# Patient Record
Sex: MALE | Race: WHITE | Employment: FULL TIME | URBAN - NONMETROPOLITAN AREA
[De-identification: names, ages, dates, MRNs, and addresses within clinical notes are randomized per-mention and may not be internally consistent; named-entity substitution may affect disease eponyms.]

---

## 2018-12-15 ENCOUNTER — OFFICE VISIT (OUTPATIENT)
Dept: URGENT CARE | Age: 48
End: 2018-12-15
Payer: COMMERCIAL

## 2018-12-15 VITALS
HEART RATE: 89 BPM | SYSTOLIC BLOOD PRESSURE: 132 MMHG | WEIGHT: 235 LBS | DIASTOLIC BLOOD PRESSURE: 84 MMHG | RESPIRATION RATE: 18 BRPM | TEMPERATURE: 98.6 F | OXYGEN SATURATION: 98 %

## 2018-12-15 DIAGNOSIS — J40 BRONCHITIS: Primary | ICD-10-CM

## 2018-12-15 DIAGNOSIS — R05.9 COUGH: ICD-10-CM

## 2018-12-15 PROCEDURE — 99213 OFFICE O/P EST LOW 20 MIN: CPT | Performed by: NURSE PRACTITIONER

## 2018-12-15 RX ORDER — AZITHROMYCIN 250 MG/1
250 TABLET, FILM COATED ORAL SEE ADMIN INSTRUCTIONS
Qty: 6 TABLET | Refills: 0 | Status: SHIPPED | OUTPATIENT
Start: 2018-12-15 | End: 2018-12-20

## 2018-12-15 RX ORDER — IRBESARTAN 300 MG/1
300 TABLET ORAL DAILY
COMMUNITY
End: 2019-08-07

## 2018-12-15 RX ORDER — INSULIN GLARGINE 100 [IU]/ML
100 INJECTION, SOLUTION SUBCUTANEOUS
COMMUNITY
End: 2021-01-01

## 2018-12-15 RX ORDER — BENZONATATE 200 MG/1
200 CAPSULE ORAL 3 TIMES DAILY PRN
Qty: 30 CAPSULE | Refills: 0 | Status: SHIPPED | OUTPATIENT
Start: 2018-12-15 | End: 2019-03-21 | Stop reason: ALTCHOICE

## 2018-12-15 ASSESSMENT — ENCOUNTER SYMPTOMS
SORE THROAT: 0
EYE REDNESS: 0
ABDOMINAL PAIN: 0
DIARRHEA: 0
SHORTNESS OF BREATH: 0
PHOTOPHOBIA: 0
WHEEZING: 0
EYE DISCHARGE: 0
NAUSEA: 0
EYE PAIN: 0
BLOOD IN STOOL: 0
CONSTIPATION: 0
COUGH: 1
VOMITING: 0

## 2019-03-13 ENCOUNTER — TRANSCRIBE ORDERS (OUTPATIENT)
Dept: ADMINISTRATIVE | Facility: HOSPITAL | Age: 49
End: 2019-03-13

## 2019-03-13 DIAGNOSIS — R10.11 ABDOMINAL PAIN, RIGHT UPPER QUADRANT: Primary | ICD-10-CM

## 2019-03-14 ENCOUNTER — APPOINTMENT (OUTPATIENT)
Dept: ONCOLOGY | Facility: HOSPITAL | Age: 49
End: 2019-03-14

## 2019-03-14 ENCOUNTER — APPOINTMENT (OUTPATIENT)
Dept: LAB | Facility: HOSPITAL | Age: 49
End: 2019-03-14

## 2019-03-18 ENCOUNTER — HOSPITAL ENCOUNTER (OUTPATIENT)
Dept: ULTRASOUND IMAGING | Facility: HOSPITAL | Age: 49
Discharge: HOME | End: 2019-03-18
Admitting: FAMILY MEDICINE

## 2019-03-18 ENCOUNTER — HOSPITAL ENCOUNTER (OUTPATIENT)
Dept: NUCLEAR MEDICINE | Facility: HOSPITAL | Age: 49
Discharge: HOME | End: 2019-03-18

## 2019-03-18 DIAGNOSIS — R10.11 ABDOMINAL PAIN, RIGHT UPPER QUADRANT: ICD-10-CM

## 2019-03-18 PROCEDURE — 76705 ECHO EXAM OF ABDOMEN: CPT

## 2019-03-18 PROCEDURE — A9500 TC99M SESTAMIBI: HCPCS | Performed by: FAMILY MEDICINE

## 2019-03-18 PROCEDURE — 0 TECHNETIUM SESTAMIBI: Performed by: FAMILY MEDICINE

## 2019-03-18 PROCEDURE — 78226 HEPATOBILIARY SYSTEM IMAGING: CPT

## 2019-03-18 RX ADMIN — TECHNETIUM TC 99M SESTAMIBI 1 DOSE: 1 INJECTION INTRAVENOUS at 08:46

## 2019-03-21 ENCOUNTER — HOSPITAL ENCOUNTER (OUTPATIENT)
Dept: PREADMISSION TESTING | Age: 49
Discharge: HOME | End: 2019-03-25
Payer: COMMERCIAL

## 2019-03-21 ENCOUNTER — OFFICE VISIT (OUTPATIENT)
Dept: SURGERY | Age: 49
End: 2019-03-21
Payer: COMMERCIAL

## 2019-03-21 VITALS
BODY MASS INDEX: 34.63 KG/M2 | TEMPERATURE: 98.4 F | HEART RATE: 102 BPM | OXYGEN SATURATION: 99 % | HEIGHT: 69 IN | WEIGHT: 233.8 LBS

## 2019-03-21 VITALS — BODY MASS INDEX: 34.51 KG/M2 | HEIGHT: 69 IN | WEIGHT: 233 LBS

## 2019-03-21 DIAGNOSIS — K81.9 ACALCULOUS CHOLECYSTITIS: Primary | ICD-10-CM

## 2019-03-21 LAB
ALBUMIN SERPL-MCNC: 3.6 G/DL (ref 3.5–5.2)
ALP BLD-CCNC: 56 U/L (ref 40–130)
ALT SERPL-CCNC: 21 U/L (ref 5–41)
ANION GAP SERPL CALCULATED.3IONS-SCNC: 13 MMOL/L (ref 7–19)
AST SERPL-CCNC: 19 U/L (ref 5–40)
BASOPHILS ABSOLUTE: 0.1 K/UL (ref 0–0.2)
BASOPHILS RELATIVE PERCENT: 0.4 % (ref 0–1)
BILIRUB SERPL-MCNC: 0.9 MG/DL (ref 0.2–1.2)
BUN BLDV-MCNC: 8 MG/DL (ref 6–20)
CALCIUM SERPL-MCNC: 8.5 MG/DL (ref 8.6–10)
CHLORIDE BLD-SCNC: 100 MMOL/L (ref 98–111)
CO2: 25 MMOL/L (ref 22–29)
CREAT SERPL-MCNC: 1 MG/DL (ref 0.5–1.2)
EOSINOPHILS ABSOLUTE: 0.2 K/UL (ref 0–0.6)
EOSINOPHILS RELATIVE PERCENT: 0.8 % (ref 0–5)
GFR NON-AFRICAN AMERICAN: >60
GLUCOSE BLD-MCNC: 69 MG/DL (ref 74–109)
HCT VFR BLD CALC: 49.1 % (ref 42–52)
HEMOGLOBIN: 15.9 G/DL (ref 14–18)
LYMPHOCYTES ABSOLUTE: 2.4 K/UL (ref 1.1–4.5)
LYMPHOCYTES RELATIVE PERCENT: 13 % (ref 20–40)
MCH RBC QN AUTO: 28.5 PG (ref 27–31)
MCHC RBC AUTO-ENTMCNC: 32.4 G/DL (ref 33–37)
MCV RBC AUTO: 88 FL (ref 80–94)
MONOCYTES ABSOLUTE: 2 K/UL (ref 0–0.9)
MONOCYTES RELATIVE PERCENT: 10.4 % (ref 0–10)
NEUTROPHILS ABSOLUTE: 14.1 K/UL (ref 1.5–7.5)
NEUTROPHILS RELATIVE PERCENT: 74.8 % (ref 50–65)
PDW BLD-RTO: 13.2 % (ref 11.5–14.5)
PLATELET # BLD: 241 K/UL (ref 130–400)
PMV BLD AUTO: 11.9 FL (ref 9.4–12.4)
POTASSIUM SERPL-SCNC: 3.4 MMOL/L (ref 3.5–5)
RBC # BLD: 5.58 M/UL (ref 4.7–6.1)
SODIUM BLD-SCNC: 138 MMOL/L (ref 136–145)
TOTAL PROTEIN: 6.9 G/DL (ref 6.6–8.7)
WBC # BLD: 18.8 K/UL (ref 4.8–10.8)

## 2019-03-21 PROCEDURE — 85025 COMPLETE CBC W/AUTO DIFF WBC: CPT

## 2019-03-21 PROCEDURE — 80053 COMPREHEN METABOLIC PANEL: CPT

## 2019-03-21 PROCEDURE — 93005 ELECTROCARDIOGRAM TRACING: CPT

## 2019-03-21 PROCEDURE — 99203 OFFICE O/P NEW LOW 30 MIN: CPT | Performed by: PHYSICIAN ASSISTANT

## 2019-03-21 RX ORDER — ROSUVASTATIN CALCIUM 5 MG/1
5 TABLET, COATED ORAL
COMMUNITY
End: 2021-01-01

## 2019-03-22 LAB
EKG P AXIS: 37 DEGREES
EKG P-R INTERVAL: 124 MS
EKG Q-T INTERVAL: 336 MS
EKG QRS DURATION: 100 MS
EKG QTC CALCULATION (BAZETT): 411 MS
EKG T AXIS: 63 DEGREES

## 2019-03-25 ENCOUNTER — PREP FOR PROCEDURE (OUTPATIENT)
Dept: SURGERY | Age: 49
End: 2019-03-25

## 2019-03-25 RX ORDER — SODIUM CHLORIDE 0.9 % (FLUSH) 0.9 %
10 SYRINGE (ML) INJECTION EVERY 12 HOURS SCHEDULED
Status: CANCELLED | OUTPATIENT
Start: 2019-03-25

## 2019-03-25 RX ORDER — SODIUM CHLORIDE, SODIUM LACTATE, POTASSIUM CHLORIDE, CALCIUM CHLORIDE 600; 310; 30; 20 MG/100ML; MG/100ML; MG/100ML; MG/100ML
INJECTION, SOLUTION INTRAVENOUS CONTINUOUS
Status: CANCELLED | OUTPATIENT
Start: 2019-03-25

## 2019-03-25 RX ORDER — SODIUM CHLORIDE 0.9 % (FLUSH) 0.9 %
10 SYRINGE (ML) INJECTION PRN
Status: CANCELLED | OUTPATIENT
Start: 2019-03-25

## 2019-03-25 ASSESSMENT — ENCOUNTER SYMPTOMS
APNEA: 0
ALLERGIC/IMMUNOLOGIC NEGATIVE: 1
DIARRHEA: 1
ABDOMINAL DISTENTION: 1
VOMITING: 1
NAUSEA: 1
BACK PAIN: 1
SHORTNESS OF BREATH: 0
WHEEZING: 0
EYES NEGATIVE: 1
ABDOMINAL PAIN: 1

## 2019-03-26 NOTE — H&P (VIEW-ONLY)
Subjective:      Patient ID: Doreen Talbert is a 53 y.o. male. ALDO Tolentino is a pleasant 67-year-old  male that was referred by Dr. Anjelica Sapp due to problems with midepigastric abdominal pain. He reports the onset of the pain began about 2-1/2 weeks ago after eating a fatty/greasy meal. He reports of problems with nausea, abdominal bloating, and diarrhea that is associated with the pain. He denies any issues with fever, chills, or jaundice. Due to his symptoms he was sent for an ultrasound the gallbladder that was normal. HIDA scan ejection fraction demonstrated a decreased ejection fraction at 22%. The risks, benefits, and options were discussed with the patient. He is agreeable to accept all risks proceed with surgery for as soon as possible. Allergies: Patient has no known allergies. Current Outpatient Medications   Medication Sig Dispense Refill    irbesartan (AVAPRO) 300 MG tablet Take 300 mg by mouth daily Indications: High Blood Pressure Disorder       Dulaglutide (TRULICITY SC) Inject into the skin once a week       insulin glargine (LANTUS) 100 UNIT/ML injection vial Inject 100 Units into the skin daily       traMADol-acetaminophen (ULTRACET) 37.5-325 MG per tablet Take 1 tablet by mouth every 6 hours as needed.  rosuvastatin (CRESTOR) 5 MG tablet Take 5 mg by mouth daily Indications: Changes in Cholesterol      SITagliptin-metFORMIN HCl ER (JANUMET XR) 100-1000 MG TB24 Take 1 tablet by mouth nightly Indications: Diabetes       No current facility-administered medications for this visit.         Past Surgical History:   Procedure Laterality Date    HERNIA REPAIR      RIH     VASECTOMY         Past Medical History:   Diagnosis Date    Diabetes mellitus ([address]     Gallbladder disease     Hyperlipidemia     Hypertension     Sleep apnea     snores       Family History   Problem Relation Age of Onset    Diabetes Mother     Heart Disease Mother     High Blood Pressure and no mass. There is tenderness. There is no rebound and no guarding. No hernia. Musculoskeletal: Normal range of motion. He exhibits no tenderness. Lymphadenopathy:     He has no cervical adenopathy. Neurological: He is alert and oriented to person, place, and time. He exhibits normal muscle tone. Skin: Skin is warm and dry. Psychiatric: He has a normal mood and affect. His behavior is normal. Judgment and thought content normal.       Assessment:      Acalculus Cholecystitis with EF at 22%      Plan:      Plan: The risks, benefits, and options were discussed with the pt. He is willing to accept all risks and proceed with a Lap. Cholecystectomy. The surgical risks included but not limited to bleeding, infection, perforation, risk of common duct injury, risk of needing an open surgery, etc. The anesthetic risks included heart attacks, strokes, pneumonia, phlebitis, etc.  He is willing to accept all risks and proceed with surgery. No guarantees have been given.       Electronically signed by Deedee Winchester PA-C on 3/25/19 at 9:49 PM

## 2019-03-27 ENCOUNTER — HOSPITAL ENCOUNTER (OUTPATIENT)
Age: 49
Setting detail: OUTPATIENT SURGERY
Discharge: HOME | End: 2019-03-27
Attending: SURGERY | Admitting: SURGERY
Payer: COMMERCIAL

## 2019-03-27 ENCOUNTER — ANESTHESIA (OUTPATIENT)
Dept: OPERATING ROOM | Age: 49
End: 2019-03-27
Payer: COMMERCIAL

## 2019-03-27 ENCOUNTER — ANESTHESIA EVENT (OUTPATIENT)
Dept: OPERATING ROOM | Age: 49
End: 2019-03-27
Payer: COMMERCIAL

## 2019-03-27 VITALS
DIASTOLIC BLOOD PRESSURE: 66 MMHG | BODY MASS INDEX: 34.51 KG/M2 | RESPIRATION RATE: 16 BRPM | OXYGEN SATURATION: 97 % | WEIGHT: 233 LBS | TEMPERATURE: 97.2 F | HEART RATE: 70 BPM | HEIGHT: 69 IN | SYSTOLIC BLOOD PRESSURE: 102 MMHG

## 2019-03-27 VITALS
TEMPERATURE: 98.5 F | RESPIRATION RATE: 15 BRPM | SYSTOLIC BLOOD PRESSURE: 86 MMHG | DIASTOLIC BLOOD PRESSURE: 39 MMHG | OXYGEN SATURATION: 94 %

## 2019-03-27 DIAGNOSIS — K81.9 ACALCULOUS CHOLECYSTITIS: Primary | ICD-10-CM

## 2019-03-27 LAB
GLUCOSE BLD-MCNC: 121 MG/DL (ref 70–99)
PERFORMED ON: ABNORMAL

## 2019-03-27 PROCEDURE — 47562 LAPAROSCOPIC CHOLECYSTECTOMY: CPT | Performed by: SURGERY

## 2019-03-27 PROCEDURE — 6370000000 HC RX 637 (ALT 250 FOR IP): Performed by: ANESTHESIOLOGY

## 2019-03-27 PROCEDURE — 3600000004 HC SURGERY LEVEL 4 BASE: Performed by: SURGERY

## 2019-03-27 PROCEDURE — 82948 REAGENT STRIP/BLOOD GLUCOSE: CPT

## 2019-03-27 PROCEDURE — 6360000002 HC RX W HCPCS: Performed by: PHYSICIAN ASSISTANT

## 2019-03-27 PROCEDURE — 3700000000 HC ANESTHESIA ATTENDED CARE: Performed by: SURGERY

## 2019-03-27 PROCEDURE — 6360000002 HC RX W HCPCS: Performed by: ANESTHESIOLOGY

## 2019-03-27 PROCEDURE — 2500000003 HC RX 250 WO HCPCS: Performed by: NURSE ANESTHETIST, CERTIFIED REGISTERED

## 2019-03-27 PROCEDURE — 2709999900 HC NON-CHARGEABLE SUPPLY: Performed by: SURGERY

## 2019-03-27 PROCEDURE — 7100000000 HC PACU RECOVERY - FIRST 15 MIN: Performed by: SURGERY

## 2019-03-27 PROCEDURE — 3700000001 HC ADD 15 MINUTES (ANESTHESIA): Performed by: SURGERY

## 2019-03-27 PROCEDURE — 2580000003 HC RX 258: Performed by: PHYSICIAN ASSISTANT

## 2019-03-27 PROCEDURE — 6360000002 HC RX W HCPCS: Performed by: NURSE ANESTHETIST, CERTIFIED REGISTERED

## 2019-03-27 PROCEDURE — 3600000014 HC SURGERY LEVEL 4 ADDTL 15MIN: Performed by: SURGERY

## 2019-03-27 PROCEDURE — 7100000010 HC PHASE II RECOVERY - FIRST 15 MIN: Performed by: SURGERY

## 2019-03-27 PROCEDURE — 88304 TISSUE EXAM BY PATHOLOGIST: CPT

## 2019-03-27 PROCEDURE — 7100000011 HC PHASE II RECOVERY - ADDTL 15 MIN: Performed by: SURGERY

## 2019-03-27 PROCEDURE — 7100000001 HC PACU RECOVERY - ADDTL 15 MIN: Performed by: SURGERY

## 2019-03-27 PROCEDURE — 2500000003 HC RX 250 WO HCPCS: Performed by: SURGERY

## 2019-03-27 PROCEDURE — 2720000010 HC SURG SUPPLY STERILE: Performed by: SURGERY

## 2019-03-27 RX ORDER — ROCURONIUM BROMIDE 10 MG/ML
INJECTION, SOLUTION INTRAVENOUS PRN
Status: DISCONTINUED | OUTPATIENT
Start: 2019-03-27 | End: 2019-03-27 | Stop reason: SDUPTHER

## 2019-03-27 RX ORDER — SUCCINYLCHOLINE CHLORIDE 20 MG/ML
INJECTION INTRAMUSCULAR; INTRAVENOUS PRN
Status: DISCONTINUED | OUTPATIENT
Start: 2019-03-27 | End: 2019-03-27 | Stop reason: SDUPTHER

## 2019-03-27 RX ORDER — MIDAZOLAM HYDROCHLORIDE 1 MG/ML
2 INJECTION INTRAMUSCULAR; INTRAVENOUS
Status: DISCONTINUED | OUTPATIENT
Start: 2019-03-27 | End: 2019-03-27 | Stop reason: HOSPADM

## 2019-03-27 RX ORDER — BUPIVACAINE HYDROCHLORIDE AND EPINEPHRINE 2.5; 5 MG/ML; UG/ML
INJECTION, SOLUTION INFILTRATION; PERINEURAL PRN
Status: DISCONTINUED | OUTPATIENT
Start: 2019-03-27 | End: 2019-03-27 | Stop reason: ALTCHOICE

## 2019-03-27 RX ORDER — LIDOCAINE HYDROCHLORIDE 10 MG/ML
1 INJECTION, SOLUTION EPIDURAL; INFILTRATION; INTRACAUDAL; PERINEURAL
Status: DISCONTINUED | OUTPATIENT
Start: 2019-03-27 | End: 2019-03-27 | Stop reason: HOSPADM

## 2019-03-27 RX ORDER — ONDANSETRON 2 MG/ML
4 INJECTION INTRAMUSCULAR; INTRAVENOUS EVERY 6 HOURS PRN
Status: CANCELLED | OUTPATIENT
Start: 2019-03-27

## 2019-03-27 RX ORDER — ONDANSETRON 2 MG/ML
INJECTION INTRAMUSCULAR; INTRAVENOUS PRN
Status: DISCONTINUED | OUTPATIENT
Start: 2019-03-27 | End: 2019-03-27 | Stop reason: SDUPTHER

## 2019-03-27 RX ORDER — SODIUM CHLORIDE 0.9 % (FLUSH) 0.9 %
10 SYRINGE (ML) INJECTION EVERY 12 HOURS SCHEDULED
Status: DISCONTINUED | OUTPATIENT
Start: 2019-03-27 | End: 2019-03-27 | Stop reason: HOSPADM

## 2019-03-27 RX ORDER — SODIUM CHLORIDE 0.9 % (FLUSH) 0.9 %
10 SYRINGE (ML) INJECTION EVERY 12 HOURS SCHEDULED
Status: CANCELLED | OUTPATIENT
Start: 2019-03-27

## 2019-03-27 RX ORDER — LIDOCAINE HYDROCHLORIDE 10 MG/ML
INJECTION, SOLUTION EPIDURAL; INFILTRATION; INTRACAUDAL; PERINEURAL PRN
Status: DISCONTINUED | OUTPATIENT
Start: 2019-03-27 | End: 2019-03-27 | Stop reason: SDUPTHER

## 2019-03-27 RX ORDER — FENTANYL CITRATE 50 UG/ML
25 INJECTION, SOLUTION INTRAMUSCULAR; INTRAVENOUS
Status: DISCONTINUED | OUTPATIENT
Start: 2019-03-27 | End: 2019-03-27 | Stop reason: HOSPADM

## 2019-03-27 RX ORDER — FENTANYL CITRATE 50 UG/ML
50 INJECTION, SOLUTION INTRAMUSCULAR; INTRAVENOUS
Status: DISCONTINUED | OUTPATIENT
Start: 2019-03-27 | End: 2019-03-27 | Stop reason: HOSPADM

## 2019-03-27 RX ORDER — SODIUM CHLORIDE, SODIUM LACTATE, POTASSIUM CHLORIDE, CALCIUM CHLORIDE 600; 310; 30; 20 MG/100ML; MG/100ML; MG/100ML; MG/100ML
INJECTION, SOLUTION INTRAVENOUS CONTINUOUS
Status: DISCONTINUED | OUTPATIENT
Start: 2019-03-27 | End: 2019-03-27 | Stop reason: HOSPADM

## 2019-03-27 RX ORDER — SODIUM CHLORIDE 0.9 % (FLUSH) 0.9 %
10 SYRINGE (ML) INJECTION PRN
Status: DISCONTINUED | OUTPATIENT
Start: 2019-03-27 | End: 2019-03-27 | Stop reason: HOSPADM

## 2019-03-27 RX ORDER — MEPERIDINE HYDROCHLORIDE 50 MG/ML
12.5 INJECTION INTRAMUSCULAR; INTRAVENOUS; SUBCUTANEOUS EVERY 5 MIN PRN
Status: DISCONTINUED | OUTPATIENT
Start: 2019-03-27 | End: 2019-03-27 | Stop reason: HOSPADM

## 2019-03-27 RX ORDER — METOCLOPRAMIDE HYDROCHLORIDE 5 MG/ML
10 INJECTION INTRAMUSCULAR; INTRAVENOUS ONCE
Status: COMPLETED | OUTPATIENT
Start: 2019-03-27 | End: 2019-03-27

## 2019-03-27 RX ORDER — MIDAZOLAM HYDROCHLORIDE 1 MG/ML
INJECTION INTRAMUSCULAR; INTRAVENOUS PRN
Status: DISCONTINUED | OUTPATIENT
Start: 2019-03-27 | End: 2019-03-27 | Stop reason: SDUPTHER

## 2019-03-27 RX ORDER — SODIUM CHLORIDE 0.9 % (FLUSH) 0.9 %
10 SYRINGE (ML) INJECTION PRN
Status: CANCELLED | OUTPATIENT
Start: 2019-03-27

## 2019-03-27 RX ORDER — LABETALOL HYDROCHLORIDE 5 MG/ML
5 INJECTION, SOLUTION INTRAVENOUS EVERY 10 MIN PRN
Status: DISCONTINUED | OUTPATIENT
Start: 2019-03-27 | End: 2019-03-27 | Stop reason: HOSPADM

## 2019-03-27 RX ORDER — ENALAPRILAT 2.5 MG/2ML
1.25 INJECTION INTRAVENOUS
Status: DISCONTINUED | OUTPATIENT
Start: 2019-03-27 | End: 2019-03-27 | Stop reason: HOSPADM

## 2019-03-27 RX ORDER — MORPHINE SULFATE 2 MG/ML
2 INJECTION, SOLUTION INTRAMUSCULAR; INTRAVENOUS EVERY 5 MIN PRN
Status: DISCONTINUED | OUTPATIENT
Start: 2019-03-27 | End: 2019-03-27 | Stop reason: HOSPADM

## 2019-03-27 RX ORDER — SCOLOPAMINE TRANSDERMAL SYSTEM 1 MG/1
1 PATCH, EXTENDED RELEASE TRANSDERMAL
Status: DISCONTINUED | OUTPATIENT
Start: 2019-03-27 | End: 2019-03-27 | Stop reason: HOSPADM

## 2019-03-27 RX ORDER — PROMETHAZINE HYDROCHLORIDE 25 MG/ML
6.25 INJECTION, SOLUTION INTRAMUSCULAR; INTRAVENOUS
Status: DISCONTINUED | OUTPATIENT
Start: 2019-03-27 | End: 2019-03-27 | Stop reason: HOSPADM

## 2019-03-27 RX ORDER — MORPHINE SULFATE 4 MG/ML
2 INJECTION, SOLUTION INTRAMUSCULAR; INTRAVENOUS
Status: CANCELLED | OUTPATIENT
Start: 2019-03-27

## 2019-03-27 RX ORDER — KETOROLAC TROMETHAMINE 30 MG/ML
INJECTION, SOLUTION INTRAMUSCULAR; INTRAVENOUS PRN
Status: DISCONTINUED | OUTPATIENT
Start: 2019-03-27 | End: 2019-03-27 | Stop reason: SDUPTHER

## 2019-03-27 RX ORDER — LABETALOL HYDROCHLORIDE 5 MG/ML
INJECTION, SOLUTION INTRAVENOUS PRN
Status: DISCONTINUED | OUTPATIENT
Start: 2019-03-27 | End: 2019-03-27 | Stop reason: SDUPTHER

## 2019-03-27 RX ORDER — OXYCODONE HYDROCHLORIDE 5 MG/1
5 TABLET ORAL EVERY 4 HOURS PRN
Qty: 20 TABLET | Refills: 0 | Status: SHIPPED | OUTPATIENT
Start: 2019-03-27 | End: 2019-04-03

## 2019-03-27 RX ORDER — APREPITANT 40 MG/1
40 CAPSULE ORAL ONCE
Status: COMPLETED | OUTPATIENT
Start: 2019-03-27 | End: 2019-03-27

## 2019-03-27 RX ORDER — HYDROCODONE BITARTRATE AND ACETAMINOPHEN 5; 325 MG/1; MG/1
2 TABLET ORAL EVERY 4 HOURS PRN
Status: CANCELLED | OUTPATIENT
Start: 2019-03-27

## 2019-03-27 RX ORDER — DEXAMETHASONE SODIUM PHOSPHATE 10 MG/ML
INJECTION INTRAMUSCULAR; INTRAVENOUS PRN
Status: DISCONTINUED | OUTPATIENT
Start: 2019-03-27 | End: 2019-03-27 | Stop reason: SDUPTHER

## 2019-03-27 RX ORDER — MORPHINE SULFATE 1 MG/ML
1 INJECTION, SOLUTION EPIDURAL; INTRATHECAL; INTRAVENOUS EVERY 5 MIN PRN
Status: DISCONTINUED | OUTPATIENT
Start: 2019-03-27 | End: 2019-03-27 | Stop reason: HOSPADM

## 2019-03-27 RX ORDER — ONDANSETRON 2 MG/ML
4 INJECTION INTRAMUSCULAR; INTRAVENOUS
Status: DISCONTINUED | OUTPATIENT
Start: 2019-03-27 | End: 2019-03-27 | Stop reason: HOSPADM

## 2019-03-27 RX ORDER — FENTANYL CITRATE 50 UG/ML
INJECTION, SOLUTION INTRAMUSCULAR; INTRAVENOUS PRN
Status: DISCONTINUED | OUTPATIENT
Start: 2019-03-27 | End: 2019-03-27 | Stop reason: SDUPTHER

## 2019-03-27 RX ORDER — HYDROCODONE BITARTRATE AND ACETAMINOPHEN 5; 325 MG/1; MG/1
1 TABLET ORAL EVERY 4 HOURS PRN
Status: CANCELLED | OUTPATIENT
Start: 2019-03-27

## 2019-03-27 RX ORDER — DIPHENHYDRAMINE HYDROCHLORIDE 50 MG/ML
12.5 INJECTION INTRAMUSCULAR; INTRAVENOUS
Status: DISCONTINUED | OUTPATIENT
Start: 2019-03-27 | End: 2019-03-27 | Stop reason: HOSPADM

## 2019-03-27 RX ORDER — MORPHINE SULFATE 4 MG/ML
4 INJECTION, SOLUTION INTRAMUSCULAR; INTRAVENOUS
Status: CANCELLED | OUTPATIENT
Start: 2019-03-27

## 2019-03-27 RX ORDER — PROPOFOL 10 MG/ML
INJECTION, EMULSION INTRAVENOUS PRN
Status: DISCONTINUED | OUTPATIENT
Start: 2019-03-27 | End: 2019-03-27 | Stop reason: SDUPTHER

## 2019-03-27 RX ORDER — HYDRALAZINE HYDROCHLORIDE 20 MG/ML
5 INJECTION INTRAMUSCULAR; INTRAVENOUS EVERY 10 MIN PRN
Status: DISCONTINUED | OUTPATIENT
Start: 2019-03-27 | End: 2019-03-27 | Stop reason: HOSPADM

## 2019-03-27 RX ADMIN — LIDOCAINE HYDROCHLORIDE 5 ML: 10 INJECTION, SOLUTION EPIDURAL; INFILTRATION; INTRACAUDAL; PERINEURAL at 12:08

## 2019-03-27 RX ADMIN — APREPITANT 40 MG: 40 CAPSULE ORAL at 11:58

## 2019-03-27 RX ADMIN — KETOROLAC TROMETHAMINE 30 MG: 30 INJECTION, SOLUTION INTRAMUSCULAR; INTRAVENOUS at 12:08

## 2019-03-27 RX ADMIN — ONDANSETRON HYDROCHLORIDE 4 MG: 2 INJECTION, SOLUTION INTRAMUSCULAR; INTRAVENOUS at 12:08

## 2019-03-27 RX ADMIN — METOCLOPRAMIDE 10 MG: 5 INJECTION, SOLUTION INTRAMUSCULAR; INTRAVENOUS at 11:44

## 2019-03-27 RX ADMIN — HYDROMORPHONE HYDROCHLORIDE 1 MG: 1 INJECTION, SOLUTION INTRAMUSCULAR; INTRAVENOUS; SUBCUTANEOUS at 13:08

## 2019-03-27 RX ADMIN — LABETALOL 20 MG/4 ML (5 MG/ML) INTRAVENOUS SYRINGE 5 MG: at 12:50

## 2019-03-27 RX ADMIN — SODIUM CHLORIDE, SODIUM LACTATE, POTASSIUM CHLORIDE, AND CALCIUM CHLORIDE: 600; 310; 30; 20 INJECTION, SOLUTION INTRAVENOUS at 12:03

## 2019-03-27 RX ADMIN — SUCCINYLCHOLINE CHLORIDE 100 MG: 20 INJECTION, SOLUTION INTRAMUSCULAR; INTRAVENOUS; PARENTERAL at 12:08

## 2019-03-27 RX ADMIN — FENTANYL CITRATE 50 MCG: 50 INJECTION INTRAMUSCULAR; INTRAVENOUS at 13:15

## 2019-03-27 RX ADMIN — SODIUM CHLORIDE, SODIUM LACTATE, POTASSIUM CHLORIDE, AND CALCIUM CHLORIDE: 600; 310; 30; 20 INJECTION, SOLUTION INTRAVENOUS at 10:40

## 2019-03-27 RX ADMIN — Medication 2 G: at 12:16

## 2019-03-27 RX ADMIN — MIDAZOLAM 2 MG: 1 INJECTION INTRAMUSCULAR; INTRAVENOUS at 12:03

## 2019-03-27 RX ADMIN — FENTANYL CITRATE 100 MCG: 50 INJECTION INTRAMUSCULAR; INTRAVENOUS at 12:08

## 2019-03-27 RX ADMIN — DEXAMETHASONE SODIUM PHOSPHATE 10 MG: 10 INJECTION INTRAMUSCULAR; INTRAVENOUS at 12:08

## 2019-03-27 RX ADMIN — SUGAMMADEX 250 MG: 100 INJECTION, SOLUTION INTRAVENOUS at 13:05

## 2019-03-27 RX ADMIN — ROCURONIUM BROMIDE 10 MG: 10 INJECTION INTRAVENOUS at 12:08

## 2019-03-27 RX ADMIN — PROPOFOL 150 MG: 10 INJECTION, EMULSION INTRAVENOUS at 12:08

## 2019-03-27 ASSESSMENT — PAIN DESCRIPTION - PAIN TYPE: TYPE: ACUTE PAIN

## 2019-03-27 ASSESSMENT — PAIN DESCRIPTION - DESCRIPTORS: DESCRIPTORS: DISCOMFORT

## 2019-03-27 ASSESSMENT — ENCOUNTER SYMPTOMS: SHORTNESS OF BREATH: 0

## 2019-03-27 ASSESSMENT — PAIN SCALES - GENERAL
PAINLEVEL_OUTOF10: 2

## 2019-03-27 ASSESSMENT — LIFESTYLE VARIABLES: SMOKING_STATUS: 0

## 2019-03-27 NOTE — INTERVAL H&P NOTE
H&P Update    Patient's History and Physical from March 25, 2019 was reviewed. Patient examined. There has been no change.     Patricai Espinosa

## 2019-03-27 NOTE — BRIEF OP NOTE
Brief Postoperative Note  ______________________________________________________________    Patient: Quan Breath  YOB: 1970  MRN: [id number]  Date of Procedure: 3/27/2019    Pre-Op Diagnosis: ACALCULUS CHOLECYSTITIS    Post-Op Diagnosis: Same       Procedure(s):  CHOLECYSTECTOMY LAPAROSCOPIC    Anesthesia: General    Surgeon(s):  Stephy Santillan MD    Assistant: Tamie Cabrera    Estimated Blood Loss (mL): minimal    Complications: None    Specimens:   ID Type Source Tests Collected by Time Destination   A : GALLBLADDER Tissue Gallbladder SURGICAL PATHOLOGY Stephy Santillan MD 3/27/2019 1150        Implants:  * No implants in log *      Drains: * No LDAs found *    Findings: No acute findings, fat encasing the gallbladder, 5th port for fan retractor    Stephy Santillan MD  Date: 3/27/2019  Time: 1:08 PM

## 2019-03-27 NOTE — OP NOTE
Operative Report    PATIENT NAME: Sita Mullins [facility] RECORD NO. [id number]  SURGEON: Tim Sanz MD   Primary Care Physician: Wolf Mcclure MD  Date: 3/27/2019, 1:08 PM     PROCEDURE PERFORMED: Laparoscopic Cholecystectomy  PREOPERATIVE DIAGNOSIS: acalculous cholecystitis  POSTOPERATIVE DIAGNOSIS: Same, path pending  SURGEON:  Bryn Cooper MD   ANESTHESIA:  General endotracheal anesthesia and local  ESTIMATED BLOOD LOSS: minimal  SPECIMEN:  Gallbladder  COMPLICATIONS:  None; patient tolerated the procedure well. FINDINGS: No acute findings. Fat-encased gallbladder. 5th port for fan retractor   DISPOSITION: PACU - hemodynamically stable. CONDITION: stable      Indications: The patient is a 53year old male who presented with a complaint of upper abdominal pain associated with eating, recreation of symptoms with HIDA. He presents at this time for cholecystectomy. Procedure Details     After the risks and benefits of the procedure were explained, informed consent was obtained, and the patient was taken to the operating room. The patient was placed in supine position and general anesthesia was begun. The abdomen was prepped and draped in normal sterile fashion. Preoperative antibiotics had been given. A time out was performed. Local anesthetic was injected and a 5mm supraumbilical incision was made. The base of the umbilicus was grasped and elevated and the verus needle was inserted. The abdomen was insufflated and the 5mm trochar was inserted. The camera was inserted and three additional ports were placed under direct visualization: an 11 mm subxiphoid port and two 5 mm right quadrant ports. The patient was then placed in position head up and rotated slightly to the left. A 5th port was placed in the midline for the fan retractor. The tip of the gallbladder was then grasped and elevated over the edge of the liver. The peritoneum over the neck of the gallbladder was incised using electrocautery.

## 2019-03-27 NOTE — DISCHARGE INSTR - DIET

## 2019-03-29 ENCOUNTER — TELEPHONE (OUTPATIENT)
Dept: SURGERY | Age: 49
End: 2019-03-29

## 2019-03-30 ENCOUNTER — APPOINTMENT (OUTPATIENT)
Dept: CT IMAGING | Age: 49
DRG: 392 | End: 2019-03-30
Payer: COMMERCIAL

## 2019-03-30 ENCOUNTER — HOSPITAL ENCOUNTER (INPATIENT)
Age: 49
LOS: 3 days | Discharge: HOME | DRG: 392 | End: 2019-04-02
Attending: EMERGENCY MEDICINE | Admitting: FAMILY MEDICINE
Payer: COMMERCIAL

## 2019-03-30 ENCOUNTER — OFFICE VISIT (OUTPATIENT)
Dept: URGENT CARE | Age: 49
End: 2019-03-30

## 2019-03-30 VITALS
WEIGHT: 222.8 LBS | HEIGHT: 69 IN | SYSTOLIC BLOOD PRESSURE: 118 MMHG | BODY MASS INDEX: 33 KG/M2 | TEMPERATURE: 99.3 F | DIASTOLIC BLOOD PRESSURE: 84 MMHG | RESPIRATION RATE: 18 BRPM | OXYGEN SATURATION: 98 % | HEART RATE: 111 BPM

## 2019-03-30 DIAGNOSIS — R10.9 ACUTE ABDOMINAL PAIN: Primary | ICD-10-CM

## 2019-03-30 DIAGNOSIS — R19.7 DIARRHEA, UNSPECIFIED TYPE: Primary | ICD-10-CM

## 2019-03-30 DIAGNOSIS — K52.9 COLITIS: ICD-10-CM

## 2019-03-30 LAB
ALBUMIN SERPL-MCNC: 3.2 G/DL (ref 3.5–5.2)
ALP BLD-CCNC: 69 U/L (ref 40–130)
ALT SERPL-CCNC: 19 U/L (ref 5–41)
ANION GAP SERPL CALCULATED.3IONS-SCNC: 16 MMOL/L (ref 7–19)
AST SERPL-CCNC: 10 U/L (ref 5–40)
BACTERIA: NEGATIVE /HPF
BANDED NEUTROPHILS RELATIVE PERCENT: 1 % (ref 0–5)
BASOPHILS ABSOLUTE: 0 K/UL (ref 0–0.2)
BASOPHILS MANUAL: 0 %
BASOPHILS RELATIVE PERCENT: 0 % (ref 0–1)
BILIRUB SERPL-MCNC: 0.7 MG/DL (ref 0.2–1.2)
BILIRUBIN URINE: NEGATIVE
BLOOD, URINE: ABNORMAL
BUN BLDV-MCNC: 11 MG/DL (ref 6–20)
C DIFFICILE TOXIN, EIA: NORMAL
C-REACTIVE PROTEIN: 35.47 MG/DL (ref 0–0.5)
CALCIUM SERPL-MCNC: 8.5 MG/DL (ref 8.6–10)
CHLORIDE BLD-SCNC: 93 MMOL/L (ref 98–111)
CLARITY: CLEAR
CO2: 25 MMOL/L (ref 22–29)
COLOR: ABNORMAL
CREAT SERPL-MCNC: 1 MG/DL (ref 0.5–1.2)
EOSINOPHILS ABSOLUTE: 0 K/UL (ref 0–0.6)
EOSINOPHILS RELATIVE PERCENT: 0 % (ref 0–5)
EPITHELIAL CELLS, UA: 3 /HPF (ref 0–5)
GFR NON-AFRICAN AMERICAN: >60
GLUCOSE BLD-MCNC: 165 MG/DL (ref 70–99)
GLUCOSE BLD-MCNC: 220 MG/DL (ref 74–109)
GLUCOSE URINE: >=1000 MG/DL
HCT VFR BLD CALC: 51 % (ref 42–52)
HEMOGLOBIN: 16.8 G/DL (ref 14–18)
HYALINE CASTS: 22 /HPF (ref 0–8)
KETONES, URINE: 80 MG/DL
LEUKOCYTE ESTERASE, URINE: NEGATIVE
LIPASE: 14 U/L (ref 13–60)
LYMPHOCYTES ABSOLUTE: 0.9 K/UL (ref 1.1–4.5)
LYMPHOCYTES RELATIVE PERCENT: 3 % (ref 20–40)
MCH RBC QN AUTO: 29 PG (ref 27–31)
MCHC RBC AUTO-ENTMCNC: 32.9 G/DL (ref 33–37)
MCV RBC AUTO: 87.9 FL (ref 80–94)
MONOCYTES ABSOLUTE: 3 K/UL (ref 0–0.9)
MONOCYTES RELATIVE PERCENT: 10 % (ref 0–10)
NEUTROPHILS ABSOLUTE: 26 K/UL (ref 1.5–7.5)
NEUTROPHILS MANUAL: 86 %
NEUTROPHILS RELATIVE PERCENT: 86 % (ref 50–65)
NITRITE, URINE: NEGATIVE
PDW BLD-RTO: 13.3 % (ref 11.5–14.5)
PERFORMED ON: ABNORMAL
PH UA: 6.5 (ref 5–8)
PLATELET # BLD: 313 K/UL (ref 130–400)
PLATELET SLIDE REVIEW: ADEQUATE
PMV BLD AUTO: 11.3 FL (ref 9.4–12.4)
POTASSIUM SERPL-SCNC: 3.7 MMOL/L (ref 3.5–5)
PROTEIN UA: 100 MG/DL
RBC # BLD: 5.8 M/UL (ref 4.7–6.1)
RBC # BLD: NORMAL 10*6/UL
RBC UA: 2 /HPF (ref 0–4)
SEDIMENTATION RATE, ERYTHROCYTE: 16 MM/HR (ref 0–10)
SODIUM BLD-SCNC: 134 MMOL/L (ref 136–145)
SPECIFIC GRAVITY UA: 1.03 (ref 1–1.03)
TOTAL PROTEIN: 6.6 G/DL (ref 6.6–8.7)
URINE REFLEX TO CULTURE: ABNORMAL
UROBILINOGEN, URINE: 0.2 E.U./DL
WBC # BLD: 29.9 K/UL (ref 4.8–10.8)
WBC UA: 3 /HPF (ref 0–5)

## 2019-03-30 PROCEDURE — 1210000000 HC MED SURG R&B

## 2019-03-30 PROCEDURE — 87046 STOOL CULTR AEROBIC BACT EA: CPT

## 2019-03-30 PROCEDURE — 2580000003 HC RX 258: Performed by: NURSE PRACTITIONER

## 2019-03-30 PROCEDURE — 86140 C-REACTIVE PROTEIN: CPT

## 2019-03-30 PROCEDURE — 87015 SPECIMEN INFECT AGNT CONCNTJ: CPT

## 2019-03-30 PROCEDURE — 87040 BLOOD CULTURE FOR BACTERIA: CPT

## 2019-03-30 PROCEDURE — 99285 EMERGENCY DEPT VISIT HI MDM: CPT

## 2019-03-30 PROCEDURE — 80053 COMPREHEN METABOLIC PANEL: CPT

## 2019-03-30 PROCEDURE — 87427 SHIGA-LIKE TOXIN AG IA: CPT

## 2019-03-30 PROCEDURE — 36415 COLL VENOUS BLD VENIPUNCTURE: CPT

## 2019-03-30 PROCEDURE — 81001 URINALYSIS AUTO W/SCOPE: CPT

## 2019-03-30 PROCEDURE — 2500000003 HC RX 250 WO HCPCS: Performed by: FAMILY MEDICINE

## 2019-03-30 PROCEDURE — 99285 EMERGENCY DEPT VISIT HI MDM: CPT | Performed by: EMERGENCY MEDICINE

## 2019-03-30 PROCEDURE — 6360000002 HC RX W HCPCS: Performed by: EMERGENCY MEDICINE

## 2019-03-30 PROCEDURE — 6360000004 HC RX CONTRAST MEDICATION: Performed by: EMERGENCY MEDICINE

## 2019-03-30 PROCEDURE — 87449 NOS EACH ORGANISM AG IA: CPT

## 2019-03-30 PROCEDURE — 74177 CT ABD & PELVIS W/CONTRAST: CPT

## 2019-03-30 PROCEDURE — 87899 AGENT NOS ASSAY W/OPTIC: CPT

## 2019-03-30 PROCEDURE — 87324 CLOSTRIDIUM AG IA: CPT

## 2019-03-30 PROCEDURE — 2580000003 HC RX 258: Performed by: EMERGENCY MEDICINE

## 2019-03-30 PROCEDURE — 87045 FECES CULTURE AEROBIC BACT: CPT

## 2019-03-30 PROCEDURE — 83690 ASSAY OF LIPASE: CPT

## 2019-03-30 PROCEDURE — 85025 COMPLETE CBC W/AUTO DIFF WBC: CPT

## 2019-03-30 PROCEDURE — 6370000000 HC RX 637 (ALT 250 FOR IP): Performed by: PHYSICIAN ASSISTANT

## 2019-03-30 PROCEDURE — 6360000002 HC RX W HCPCS: Performed by: NURSE PRACTITIONER

## 2019-03-30 PROCEDURE — 82948 REAGENT STRIP/BLOOD GLUCOSE: CPT

## 2019-03-30 PROCEDURE — 85652 RBC SED RATE AUTOMATED: CPT

## 2019-03-30 PROCEDURE — 6370000000 HC RX 637 (ALT 250 FOR IP): Performed by: NURSE PRACTITIONER

## 2019-03-30 RX ORDER — SODIUM CHLORIDE 0.9 % (FLUSH) 0.9 %
10 SYRINGE (ML) INJECTION EVERY 12 HOURS SCHEDULED
Status: DISCONTINUED | OUTPATIENT
Start: 2019-03-30 | End: 2019-04-02 | Stop reason: HOSPADM

## 2019-03-30 RX ORDER — SODIUM CHLORIDE 0.9 % (FLUSH) 0.9 %
10 SYRINGE (ML) INJECTION PRN
Status: DISCONTINUED | OUTPATIENT
Start: 2019-03-30 | End: 2019-04-02 | Stop reason: HOSPADM

## 2019-03-30 RX ORDER — NICOTINE POLACRILEX 4 MG
15 LOZENGE BUCCAL PRN
Status: DISCONTINUED | OUTPATIENT
Start: 2019-03-30 | End: 2019-04-02 | Stop reason: HOSPADM

## 2019-03-30 RX ORDER — SODIUM CHLORIDE 9 MG/ML
INJECTION, SOLUTION INTRAVENOUS CONTINUOUS
Status: DISCONTINUED | OUTPATIENT
Start: 2019-03-30 | End: 2019-04-02 | Stop reason: HOSPADM

## 2019-03-30 RX ORDER — DEXTROSE MONOHYDRATE 50 MG/ML
100 INJECTION, SOLUTION INTRAVENOUS PRN
Status: DISCONTINUED | OUTPATIENT
Start: 2019-03-30 | End: 2019-04-02 | Stop reason: HOSPADM

## 2019-03-30 RX ORDER — CIPROFLOXACIN 2 MG/ML
400 INJECTION, SOLUTION INTRAVENOUS ONCE
Status: COMPLETED | OUTPATIENT
Start: 2019-03-30 | End: 2019-03-30

## 2019-03-30 RX ORDER — CIPROFLOXACIN 2 MG/ML
400 INJECTION, SOLUTION INTRAVENOUS EVERY 12 HOURS
Status: DISCONTINUED | OUTPATIENT
Start: 2019-03-31 | End: 2019-04-02

## 2019-03-30 RX ORDER — ONDANSETRON 2 MG/ML
4 INJECTION INTRAMUSCULAR; INTRAVENOUS EVERY 8 HOURS PRN
Status: DISCONTINUED | OUTPATIENT
Start: 2019-03-30 | End: 2019-04-02 | Stop reason: HOSPADM

## 2019-03-30 RX ORDER — ACETAMINOPHEN 325 MG/1
650 TABLET ORAL EVERY 4 HOURS PRN
Status: DISCONTINUED | OUTPATIENT
Start: 2019-03-30 | End: 2019-04-02 | Stop reason: HOSPADM

## 2019-03-30 RX ORDER — INSULIN GLARGINE 100 [IU]/ML
100 INJECTION, SOLUTION SUBCUTANEOUS
Status: DISCONTINUED | OUTPATIENT
Start: 2019-03-31 | End: 2019-04-02 | Stop reason: HOSPADM

## 2019-03-30 RX ORDER — MORPHINE SULFATE 2 MG/ML
4 INJECTION, SOLUTION INTRAMUSCULAR; INTRAVENOUS EVERY 4 HOURS PRN
Status: DISCONTINUED | OUTPATIENT
Start: 2019-03-30 | End: 2019-04-02 | Stop reason: HOSPADM

## 2019-03-30 RX ORDER — 0.9 % SODIUM CHLORIDE 0.9 %
1000 INTRAVENOUS SOLUTION INTRAVENOUS ONCE
Status: COMPLETED | OUTPATIENT
Start: 2019-03-30 | End: 2019-03-30

## 2019-03-30 RX ORDER — DEXTROSE MONOHYDRATE 25 G/50ML
12.5 INJECTION, SOLUTION INTRAVENOUS PRN
Status: DISCONTINUED | OUTPATIENT
Start: 2019-03-30 | End: 2019-04-02 | Stop reason: HOSPADM

## 2019-03-30 RX ADMIN — METRONIDAZOLE 500 MG: 500 INJECTION, SOLUTION INTRAVENOUS at 17:16

## 2019-03-30 RX ADMIN — CIPROFLOXACIN 400 MG: 2 INJECTION, SOLUTION INTRAVENOUS at 16:31

## 2019-03-30 RX ADMIN — Medication 10 ML: at 20:53

## 2019-03-30 RX ADMIN — LIDOCAINE HYDROCHLORIDE: 20 SOLUTION ORAL; TOPICAL at 16:32

## 2019-03-30 RX ADMIN — INSULIN LISPRO 1 UNITS: 100 INJECTION, SOLUTION INTRAVENOUS; SUBCUTANEOUS at 22:23

## 2019-03-30 RX ADMIN — SODIUM CHLORIDE 1000 ML: 9 INJECTION, SOLUTION INTRAVENOUS at 15:02

## 2019-03-30 RX ADMIN — SODIUM CHLORIDE: 9 INJECTION, SOLUTION INTRAVENOUS at 17:16

## 2019-03-30 RX ADMIN — IOPAMIDOL 90 ML: 755 INJECTION, SOLUTION INTRAVENOUS at 15:14

## 2019-03-30 RX ADMIN — ENOXAPARIN SODIUM 40 MG: 40 INJECTION SUBCUTANEOUS at 20:53

## 2019-03-30 ASSESSMENT — ENCOUNTER SYMPTOMS
BLOOD IN STOOL: 0
BACK PAIN: 0
NAUSEA: 0
DIARRHEA: 1
ABDOMINAL PAIN: 1
RHINORRHEA: 0
SORE THROAT: 0
SHORTNESS OF BREATH: 0
VOMITING: 0
COUGH: 0

## 2019-03-30 ASSESSMENT — PAIN SCALES - GENERAL
PAINLEVEL_OUTOF10: 3
PAINLEVEL_OUTOF10: 3

## 2019-03-30 NOTE — ED PROVIDER NOTES
[facility] [facility]  eMERGENCY dEPARTMENT eNCOUnter      Pt Name: Jose Urrutia  MRN: [id number]  [place] 1970  Date of evaluation: 3/30/2019  Provider: Nataliia Hemphill MD    [address]       Chief Complaint   Patient presents with    Post-op Problem     Gallbladder removal Wednesday with fever 103F on Thurs/Fri night, states ongoing diarrhea x1 month         HISTORY OF PRESENT ILLNESS   (Location/Symptom, Timing/Onset,Context/Setting, Quality, Duration, Modifying Factors, Severity)  Note limiting factors. Jose Urrutia is a 53 y.o. male who presents to the emergency department for concern of postoperative fatigue pain and fever. Patient had a laparoscopic cholecystectomy on Wednesday by Dr. Te Soliman and was discharged same day and was doing well. On Thursday he had a fever of 103 and also last night he had a fever similar 103. He has had chronic ongoing watery diarrhea for the past one month. No antibiotics except for one dose prior to surgery. Denies any vomiting. No cough nasal congestion or other obvious infectious symptoms. He was seen by [facility] urgent care and referred here for further evaluation. HPI    NursingNotes were reviewed. REVIEW OF SYSTEMS    (2-9 systems for level 4, 10 or more for level 5)     Review of Systems   Constitutional: Positive for activity change, appetite change and fatigue. Negative for chills and fever. HENT: Negative for rhinorrhea and sore throat. Respiratory: Negative for cough and shortness of breath. Cardiovascular: Negative for chest pain and leg swelling. Gastrointestinal: Positive for abdominal pain and diarrhea. Negative for blood in stool, nausea and vomiting. Genitourinary: Negative for dysuria, flank pain and frequency. Musculoskeletal: Negative for back pain and neck pain. Neurological: Negative for dizziness and headaches. All other systems reviewed and are negative.            PAST MEDICALHISTORY     Past Medical History:   Diagnosis Date    Diabetes mellitus [address] [address]     Gallbladder disease     Hyperlipidemia     Hypertension     Sleep apnea     snores         SURGICAL HISTORY       Past Surgical History:   Procedure Laterality Date    CHOLECYSTECTOMY, LAPAROSCOPIC N/A 3/27/2019    [facility]     Current Discharge Medication List      CONTINUE these medications which have NOT CHANGED    Details   oxyCODONE (ROXICODONE) 5 MG immediate release tablet Take 1 tablet by mouth every 4 hours as needed for Pain for up to 7 days. Intended supply: 5 days. Take lowest dose possible to manage post operative pain  Qty: 20 tablet, Refills: 0    Comments: Reduce doses taken as pain becomes manageable  Associated Diagnoses: Acalculous cholecystitis      traMADol-acetaminophen (ULTRACET) 37.5-325 MG per tablet Take 1 tablet by mouth every 6 hours as needed. rosuvastatin (CRESTOR) 5 MG tablet Take 5 mg by mouth every morning (before breakfast) Indications: Changes in Cholesterol       SITagliptin-metFORMIN HCl ER (JANUMET XR) 100-1000 MG TB24 Take 1 tablet by mouth nightly Indications: Diabetes      irbesartan (AVAPRO) 300 MG tablet Take 300 mg by mouth daily Indications: High Blood Pressure Disorder       Dulaglutide (TRULICITY SC) Inject 1.37 mg into the skin once a week Indications: Wednesday       insulin glargine (LANTUS) 100 UNIT/ML injection vial Inject 100 Units into the skin every morning (before breakfast)              ALLERGIES     Patient has no known allergies.     FAMILY HISTORY       Family History   Problem Relation Age of Onset    Diabetes Mother     Heart Disease Mother     High Blood Pressure Mother     Diabetes Sister           SOCIAL HISTORY       Social History     Socioeconomic History    Marital status:      Spouse name: None    Number of children: None    Years of education: None    Highest education level: None   Occupational History    None   Social Needs    Financial resource strain: None    Food insecurity:     Worry: None     Inability: None    Transportation needs:     Medical: None     Non-medical: None   Tobacco Use    Smoking status: Never Smoker    Smokeless tobacco: Never Used   Substance and Sexual Activity    Alcohol use: Yes     Comment: occ.  Drug use: None    Sexual activity: None   Lifestyle    Physical activity:     Days per week: None     Minutes per session: None    Stress: None   Relationships    Social connections:     Talks on phone: None     Gets together: None     Attends Worship service: None     Active member of club or organization: None     Attends meetings of clubs or organizations: None     Relationship status: None    Intimate partner violence:     Fear of current or ex partner: None     Emotionally abused: None     Physically abused: None     Forced sexual activity: None   Other Topics Concern    None   Social History Narrative    None       SCREENINGS             PHYSICAL EXAM    (up to 7 for level 4, 8 or more for level 5)     ED Triage Vitals   BP Temp Temp Source Pulse Resp SpO2 Height Weight   03/30/19 1318 03/30/19 1318 03/30/19 1318 03/30/19 1318 03/30/19 1318 03/30/19 1318 03/30/19 1316 03/30/19 1316   123/88 98.6 °F (37 °C) Oral 117 20 95 % 5' 9\" (1.753 m) 222 lb (100.7 kg)       Physical Exam   Constitutional: He is oriented to person, place, and time. He appears well-developed and well-nourished. HENT:   Head: Normocephalic and atraumatic. Eyes: Conjunctivae and EOM are normal.   Neck: Normal range of motion. Neck supple. No tracheal deviation present. Cardiovascular: Normal rate, regular rhythm and normal heart sounds. No murmur heard. Pulmonary/Chest: Breath sounds normal. He has no wheezes. He has no rales. Abdominal: Soft. He exhibits no mass. There is tenderness in the right upper quadrant.    Incisions appear well healing, superior umbilical incision has surrounding ecchymosis but doesn't appear infected   Musculoskeletal: Normal range of motion. He exhibits no edema. Neurological: He is alert and oriented to person, place, and time. Skin: Skin is warm and dry. Vitals reviewed. DIAGNOSTIC RESULTS         RADIOLOGY:  Non-plain film images such as CT, Ultrasound and MRI are read by the radiologist. Plain radiographic images are visualized and preliminarily interpreted bythe emergency physician with the below findings:          CT ABDOMEN PELVIS W IV CONTRAST Additional Contrast? None   Final Result   1. Diffusely inflamed and thickened colon and rectum. Top differential   is C. difficile colitis. Ulcerative colitis also considered, although   less likely. No colonic perforation or abscess. 2. No complication related to recent cholecystectomy.    Signed by Dr Olayinka Cho on 3/30/2019 3:36 PM              LABS:  Labs Reviewed   CBC WITH AUTO DIFFERENTIAL - Abnormal; Notable for the following components:       Result Value    WBC 29.9 (*)     MCHC 32.9 (*)     Neutrophils % 86.0 (*)     Lymphocytes % 3.0 (*)     Neutrophils # 26.0 (*)     Lymphocytes # 0.9 (*)     Monocytes # 3.00 (*)     All other components within normal limits   COMPREHENSIVE METABOLIC PANEL - Abnormal; Notable for the following components:    Sodium 134 (*)     Chloride 93 (*)     Glucose 220 (*)     Calcium 8.5 (*)     Alb 3.2 (*)     All other components within normal limits   URINE RT REFLEX TO CULTURE - Abnormal; Notable for the following components:    Glucose, Ur >=1000 (*)     Ketones, Urine 80 (*)     Blood, Urine SMALL (*)     Protein,  (*)     All other components within normal limits   MICROSCOPIC URINALYSIS - Abnormal; Notable for the following components:    Hyaline Casts, UA 22 (*)     All other components within normal limits   SEDIMENTATION RATE - Abnormal; Notable for the following components:    Sed Rate 16 (*)     All other components within normal limits   C-REACTIVE PROTEIN - Abnormal; Notable for the following components:    CRP 35.47 (*)     All other components within normal limits   CULTURE STOOL   C DIFF TOXIN/ANTIGEN   CULTURE BLOOD #1   CULTURE BLOOD #2   LIPASE       All other labs were within normal range or not returned as of this dictation. EMERGENCY DEPARTMENT COURSE and DIFFERENTIAL DIAGNOSIS/MDM:   Vitals:    Vitals:    03/30/19 1316 03/30/19 1318 03/30/19 1658   BP:  123/88 114/81   Pulse:  117 100   Resp:  20 16   Temp:  98.6 °F (37 °C) 98.5 °F (36.9 °C)   TempSrc:  Oral Temporal   SpO2:  95% 95%   Weight: 222 lb (100.7 kg)     Height: 5' 9\" (1.753 m)         MDM  Number of Diagnoses or Management Options  Acute abdominal pain:   Colitis:      Amount and/or Complexity of Data Reviewed  Clinical lab tests: ordered and reviewed  Tests in the radiology section of CPT®: ordered and reviewed  Discuss the patient with other providers: yes  Independent visualization of images, tracings, or specimens: yes        S/p lap som 4 days ago, incisions appear well healed, appropriate tenderness over ruq area, significant leukocystosis likely related to colitis, have added blood cx, esr, crp, pending stool cx and c diff, due to hx of fever and wbc have d/w Darel Fothergill with Dr. Lucy Reed office for admission      CONSULTS:  None    PROCEDURES:  Unless otherwise noted below, none     Procedures    FINAL IMPRESSION      1. Acute abdominal pain    2.  Colitis          DISPOSITION/PLAN   DISPOSITION Admitted 03/30/2019 03:44:59 PM      PATIENT REFERRED TO:  Kayleen Hurley MD  [address]  [phone]            DISCHARGE MEDICATIONS:  Current Discharge Medication List             (Please note that portions of this note were completed with a voice recognition program.  Efforts were made to edit thedictations but occasionally words are mis-transcribed.)    Ferdinand Johnson MD (electronically signed)  Attending Emergency Physician        Juan Colon MD  03/30/19 0112

## 2019-03-31 PROBLEM — Z79.4 DIABETES MELLITUS, TYPE II, INSULIN DEPENDENT (HCC): Status: ACTIVE | Noted: 2019-03-31

## 2019-03-31 PROBLEM — G47.33 OBSTRUCTIVE SLEEP APNEA: Status: ACTIVE | Noted: 2019-03-31

## 2019-03-31 PROBLEM — R10.84 GENERALIZED ABDOMINAL PAIN: Status: ACTIVE | Noted: 2019-03-31

## 2019-03-31 PROBLEM — K52.9 COLITIS: Status: ACTIVE | Noted: 2019-03-31

## 2019-03-31 PROBLEM — E11.9 DIABETES MELLITUS, TYPE II, INSULIN DEPENDENT (HCC): Status: ACTIVE | Noted: 2019-03-31

## 2019-03-31 PROBLEM — D72.829 LEUKOCYTOSIS: Status: ACTIVE | Noted: 2019-03-31

## 2019-03-31 PROBLEM — R19.7 DIARRHEA OF PRESUMED INFECTIOUS ORIGIN: Status: ACTIVE | Noted: 2019-03-31

## 2019-03-31 PROBLEM — I10 ESSENTIAL HYPERTENSION: Status: ACTIVE | Noted: 2019-03-31

## 2019-03-31 LAB
GLUCOSE BLD-MCNC: 131 MG/DL (ref 70–99)
GLUCOSE BLD-MCNC: 140 MG/DL (ref 70–99)
GLUCOSE BLD-MCNC: 155 MG/DL (ref 70–99)
GLUCOSE BLD-MCNC: 200 MG/DL (ref 70–99)
PERFORMED ON: ABNORMAL

## 2019-03-31 PROCEDURE — 86255 FLUORESCENT ANTIBODY SCREEN: CPT

## 2019-03-31 PROCEDURE — 1210000000 HC MED SURG R&B

## 2019-03-31 PROCEDURE — 2500000003 HC RX 250 WO HCPCS: Performed by: NURSE PRACTITIONER

## 2019-03-31 PROCEDURE — 2580000003 HC RX 258: Performed by: NURSE PRACTITIONER

## 2019-03-31 PROCEDURE — 82948 REAGENT STRIP/BLOOD GLUCOSE: CPT

## 2019-03-31 PROCEDURE — 6370000000 HC RX 637 (ALT 250 FOR IP): Performed by: NURSE PRACTITIONER

## 2019-03-31 PROCEDURE — 6360000002 HC RX W HCPCS: Performed by: NURSE PRACTITIONER

## 2019-03-31 PROCEDURE — 2500000003 HC RX 250 WO HCPCS: Performed by: PHYSICIAN ASSISTANT

## 2019-03-31 RX ADMIN — Medication 10 ML: at 21:29

## 2019-03-31 RX ADMIN — Medication 10 ML: at 09:01

## 2019-03-31 RX ADMIN — CIPROFLOXACIN 400 MG: 2 INJECTION, SOLUTION INTRAVENOUS at 15:53

## 2019-03-31 RX ADMIN — SODIUM CHLORIDE: 9 INJECTION, SOLUTION INTRAVENOUS at 22:22

## 2019-03-31 RX ADMIN — INSULIN LISPRO 2 UNITS: 100 INJECTION, SOLUTION INTRAVENOUS; SUBCUTANEOUS at 09:00

## 2019-03-31 RX ADMIN — ENOXAPARIN SODIUM 40 MG: 40 INJECTION SUBCUTANEOUS at 21:29

## 2019-03-31 RX ADMIN — Medication 100 UNITS: at 07:28

## 2019-03-31 RX ADMIN — CIPROFLOXACIN 400 MG: 2 INJECTION, SOLUTION INTRAVENOUS at 03:52

## 2019-03-31 RX ADMIN — METRONIDAZOLE 500 MG: 500 INJECTION, SOLUTION INTRAVENOUS at 17:03

## 2019-03-31 RX ADMIN — FAMOTIDINE 20 MG: 10 INJECTION, SOLUTION INTRAVENOUS at 21:29

## 2019-03-31 RX ADMIN — FAMOTIDINE 20 MG: 10 INJECTION, SOLUTION INTRAVENOUS at 09:00

## 2019-03-31 RX ADMIN — METRONIDAZOLE 500 MG: 500 INJECTION, SOLUTION INTRAVENOUS at 05:03

## 2019-03-31 RX ADMIN — SODIUM CHLORIDE: 9 INJECTION, SOLUTION INTRAVENOUS at 11:49

## 2019-03-31 ASSESSMENT — PAIN - FUNCTIONAL ASSESSMENT: PAIN_FUNCTIONAL_ASSESSMENT: ACTIVITIES ARE NOT PREVENTED

## 2019-03-31 ASSESSMENT — PAIN SCALES - GENERAL: PAINLEVEL_OUTOF10: 0

## 2019-03-31 ASSESSMENT — PAIN DESCRIPTION - ORIENTATION: ORIENTATION: ANTERIOR

## 2019-03-31 ASSESSMENT — PAIN DESCRIPTION - PROGRESSION: CLINICAL_PROGRESSION: NOT CHANGED

## 2019-03-31 ASSESSMENT — PAIN DESCRIPTION - DESCRIPTORS: DESCRIPTORS: ACHING;DISCOMFORT

## 2019-03-31 ASSESSMENT — PAIN DESCRIPTION - FREQUENCY: FREQUENCY: INTERMITTENT

## 2019-03-31 ASSESSMENT — PAIN DESCRIPTION - LOCATION: LOCATION: ABDOMEN

## 2019-03-31 ASSESSMENT — PAIN DESCRIPTION - ONSET: ONSET: ON-GOING

## 2019-03-31 ASSESSMENT — PAIN DESCRIPTION - PAIN TYPE: TYPE: ACUTE PAIN

## 2019-03-31 NOTE — H&P
BMP:    Recent Labs     03/30/19  1424   *   K 3.7   CL 93*   CO2 25   BUN 11   CREATININE 1.0   GLUCOSE 220*     Hepatic:   Recent Labs     03/30/19  1424   AST 10   ALT 19   BILITOT 0.7   ALKPHOS 69     Troponin T: No results for input(s): TROPONINI in the last 72 hours. Pro-BNP: No results for input(s): BNP in the last 72 hours. Lipids: No results for input(s): CHOL, HDL in the last 72 hours. Invalid input(s): LDLCALCU  ABGs: No results found for: PHART, PO2ART, [id number]  INR: No results for input(s): INR in the last 72 hours. -----------------------------------------------------------------  EKG:   Radiology:     Ct Abdomen Pelvis W Iv Contrast Additional Contrast? None    Result Date: 3/30/2019  History: 50-year-old with fever post laparoscopic cholecystectomy. Reference: None. Technique Contrast-enhanced CT abdomen/pelvis was performed with coronal and sagittal reformatted images provided. For this CT exam, one or more of the following dose reduction techniques was employed: -automated exposure control -mA and/or kVp adjustment for patient size -iterative reconstruction DLP 2970 mGy-cm Findings Chest Incidental scanning through the lower chest demonstrates minimal atelectasis. Abdomen Liver and spleen unremarkable. Prior cholecystectomy. No fluid collection in the gallbladder fossa. No evidence of liver injury. Pancreas and adrenals are unremarkable. Slight left pelvocaliectasis. There is some retroperitoneal inflammation surrounding the mid and distal ureters potentially accounting for this. Kidneys otherwise unremarkable. There is diffuse moderate wall thickening of the colon and rectum with mucosal hyperemia. Pericolonic inflammation with fat inflammation extending throughout the retroperitoneum. No extraluminal air or formed fluid collection. No pneumatosis. Mild plaque of the abdominal aorta without aneurysm. No lymphadenopathy.  Expected changes of the abdominal wall related to laparoscopic approach for surgery. Pelvis Nondistended urinary bladder. Small fat-containing inguinal hernias bilaterally. Small amount of reactive free fluid in the upper pelvis. No acute osseous abnormalities. Sacroiliac joints appear normal.    1. Diffusely inflamed and thickened colon and rectum. Top differential is C. difficile colitis. Ulcerative colitis also considered, although less likely. No colonic perforation or abscess. 2. No complication related to recent cholecystectomy. Signed by Dr Cesar Lira on 3/30/2019 3:36 PM      Assessment and Plan   1. Principal Problem:    Colitis  Active Problems:    Colitis    Diarrhea of presumed infectious origin    Generalized abdominal pain    Leukocytosis    Diabetes mellitus, type II, insulin dependent (HCC)    Essential hypertension    Obstructive sleep apnea    CT scan noted. C. diff test negative. Check IBD markers. Patient with 1 month history of abdominal pain and diarrhea. Pain is been in the right upper quadrant. Gallbladder removed for low ejection fraction at 22%. High suspicion for Crohn's colitis. IV antibiotics started Flagyl Cipro. Labs reviewed. Basal insulin. Hold Janumet . Accu-Cheks with slight scale insulin coverage. Clear liquid low carb diet. IV Pepcid  Extended conference with patient and spouse at bedside. Extended Records reviewed  Continue course of care monitor for changes. Xavier Dean  .  3/31/2019     Diarrhea-hold Glucophage will need workup once treated for infectious etiology of does not resolve. Continue IV antibiotics of Flagyl/Cipro. Check labs for IBS    The patient was seen on rounds today. Reviewed the last 24 hours of labs and x-rays that are available. Updated overnight status with nursing staff. Reviewed the case with Balwinder Spann PA-C. All questions were answered to the patient's/family's understanding. Patient is medically stable, please see orders for further details.     I have discussed the care of Chavajd Bardales, including pertinent history and exam findings with the ARNP/PA. I have seen and examined the patient and the key elements of all parts of the encounter have been performed by me. I agree with the assessment and plan as outlined by the ARNP/PA. Please refer to my separate note for complete documentation.      Electronically signed by Lissy Escalante MD on 3/31/2019 at 9:36 AM

## 2019-03-31 NOTE — PLAN OF CARE
Problem: Falls - Risk of:  Goal: Will remain free from falls  Description  Will remain free from falls  Outcome: Ongoing  Goal: Absence of physical injury  Description  Absence of physical injury  Outcome: Ongoing     Problem:  Bowel/Gastric:  Goal: Control of bowel function will improve  Description  Control of bowel function will improve  Outcome: Ongoing  Goal: Ability to achieve a regular elimination pattern will improve  Description  Ability to achieve a regular elimination pattern will improve  Outcome: Ongoing     Problem: Nutritional:  Goal: Ability to follow a diet with enough fiber (20 to 30 grams) for normal bowel function will improve  Description  Ability to follow a diet with enough fiber (20 to 30 grams) for normal bowel function will improve  Outcome: Ongoing     Problem: Skin Integrity:  Goal: Risk for impaired skin integrity will decrease  Description  Risk for impaired skin integrity will decrease  Outcome: Ongoing     Problem: Pain:  Goal: Pain level will decrease  Description  Pain level will decrease  Outcome: Ongoing  Goal: Control of acute pain  Description  Control of acute pain  Outcome: Ongoing  Goal: Control of chronic pain  Description  Control of chronic pain  Outcome: Ongoing

## 2019-04-01 LAB
ALBUMIN SERPL-MCNC: 2.2 G/DL (ref 3.5–5.2)
ALP BLD-CCNC: 48 U/L (ref 40–130)
ALT SERPL-CCNC: 10 U/L (ref 5–41)
ANION GAP SERPL CALCULATED.3IONS-SCNC: 12 MMOL/L (ref 7–19)
AST SERPL-CCNC: 7 U/L (ref 5–40)
BASOPHILS ABSOLUTE: 0.1 K/UL (ref 0–0.2)
BASOPHILS RELATIVE PERCENT: 0.5 % (ref 0–1)
BILIRUB SERPL-MCNC: 0.4 MG/DL (ref 0.2–1.2)
BUN BLDV-MCNC: 7 MG/DL (ref 6–20)
C-REACTIVE PROTEIN: 17.87 MG/DL (ref 0–0.5)
CALCIUM SERPL-MCNC: 7.6 MG/DL (ref 8.6–10)
CHLORIDE BLD-SCNC: 103 MMOL/L (ref 98–111)
CO2: 28 MMOL/L (ref 22–29)
CREAT SERPL-MCNC: 0.9 MG/DL (ref 0.5–1.2)
EOSINOPHILS ABSOLUTE: 0.2 K/UL (ref 0–0.6)
EOSINOPHILS RELATIVE PERCENT: 1.2 % (ref 0–5)
GFR NON-AFRICAN AMERICAN: >60
GLUCOSE BLD-MCNC: 101 MG/DL (ref 70–99)
GLUCOSE BLD-MCNC: 105 MG/DL (ref 74–109)
GLUCOSE BLD-MCNC: 115 MG/DL (ref 70–99)
GLUCOSE BLD-MCNC: 120 MG/DL (ref 70–99)
GLUCOSE BLD-MCNC: 47 MG/DL (ref 70–99)
GLUCOSE BLD-MCNC: 52 MG/DL (ref 70–99)
GLUCOSE BLD-MCNC: 99 MG/DL (ref 70–99)
HCT VFR BLD CALC: 43.4 % (ref 42–52)
HEMOGLOBIN: 14.3 G/DL (ref 14–18)
LYMPHOCYTES ABSOLUTE: 2 K/UL (ref 1.1–4.5)
LYMPHOCYTES RELATIVE PERCENT: 13.5 % (ref 20–40)
MCH RBC QN AUTO: 29.5 PG (ref 27–31)
MCHC RBC AUTO-ENTMCNC: 32.9 G/DL (ref 33–37)
MCV RBC AUTO: 89.7 FL (ref 80–94)
MONOCYTES ABSOLUTE: 1.5 K/UL (ref 0–0.9)
MONOCYTES RELATIVE PERCENT: 10.3 % (ref 0–10)
NEUTROPHILS ABSOLUTE: 10.6 K/UL (ref 1.5–7.5)
NEUTROPHILS RELATIVE PERCENT: 72.9 % (ref 50–65)
PDW BLD-RTO: 13.2 % (ref 11.5–14.5)
PERFORMED ON: ABNORMAL
PERFORMED ON: NORMAL
PLATELET # BLD: 277 K/UL (ref 130–400)
PMV BLD AUTO: 11.7 FL (ref 9.4–12.4)
POTASSIUM SERPL-SCNC: 3.6 MMOL/L (ref 3.5–5)
RBC # BLD: 4.84 M/UL (ref 4.7–6.1)
SEDIMENTATION RATE, ERYTHROCYTE: 13 MM/HR (ref 0–10)
SODIUM BLD-SCNC: 143 MMOL/L (ref 136–145)
TOTAL PROTEIN: 4.8 G/DL (ref 6.6–8.7)
WBC # BLD: 14.6 K/UL (ref 4.8–10.8)

## 2019-04-01 PROCEDURE — 85025 COMPLETE CBC W/AUTO DIFF WBC: CPT

## 2019-04-01 PROCEDURE — 36415 COLL VENOUS BLD VENIPUNCTURE: CPT

## 2019-04-01 PROCEDURE — 2500000003 HC RX 250 WO HCPCS: Performed by: PHYSICIAN ASSISTANT

## 2019-04-01 PROCEDURE — 6370000000 HC RX 637 (ALT 250 FOR IP): Performed by: NURSE PRACTITIONER

## 2019-04-01 PROCEDURE — 2500000003 HC RX 250 WO HCPCS: Performed by: NURSE PRACTITIONER

## 2019-04-01 PROCEDURE — 85652 RBC SED RATE AUTOMATED: CPT

## 2019-04-01 PROCEDURE — 1210000000 HC MED SURG R&B

## 2019-04-01 PROCEDURE — 2500000003 HC RX 250 WO HCPCS: Performed by: FAMILY MEDICINE

## 2019-04-01 PROCEDURE — 2580000003 HC RX 258: Performed by: FAMILY MEDICINE

## 2019-04-01 PROCEDURE — 80053 COMPREHEN METABOLIC PANEL: CPT

## 2019-04-01 PROCEDURE — 6360000002 HC RX W HCPCS: Performed by: NURSE PRACTITIONER

## 2019-04-01 PROCEDURE — 6370000000 HC RX 637 (ALT 250 FOR IP): Performed by: FAMILY MEDICINE

## 2019-04-01 PROCEDURE — 6360000002 HC RX W HCPCS: Performed by: FAMILY MEDICINE

## 2019-04-01 PROCEDURE — 2580000003 HC RX 258: Performed by: NURSE PRACTITIONER

## 2019-04-01 PROCEDURE — 86140 C-REACTIVE PROTEIN: CPT

## 2019-04-01 PROCEDURE — 82948 REAGENT STRIP/BLOOD GLUCOSE: CPT

## 2019-04-01 RX ORDER — CHOLESTYRAMINE LIGHT 4 G/5.7G
4 POWDER, FOR SUSPENSION ORAL DAILY
Status: DISCONTINUED | OUTPATIENT
Start: 2019-04-01 | End: 2019-04-02 | Stop reason: HOSPADM

## 2019-04-01 RX ORDER — LACTOBACILLUS RHAMNOSUS GG 10B CELL
1 CAPSULE ORAL 2 TIMES DAILY WITH MEALS
Status: DISCONTINUED | OUTPATIENT
Start: 2019-04-01 | End: 2019-04-02 | Stop reason: HOSPADM

## 2019-04-01 RX ADMIN — FAMOTIDINE 20 MG: 10 INJECTION, SOLUTION INTRAVENOUS at 09:03

## 2019-04-01 RX ADMIN — METRONIDAZOLE 500 MG: 500 INJECTION, SOLUTION INTRAVENOUS at 05:19

## 2019-04-01 RX ADMIN — ENOXAPARIN SODIUM 40 MG: 40 INJECTION SUBCUTANEOUS at 22:20

## 2019-04-01 RX ADMIN — Medication 100 UNITS: at 09:02

## 2019-04-01 RX ADMIN — CIPROFLOXACIN 400 MG: 2 INJECTION, SOLUTION INTRAVENOUS at 16:08

## 2019-04-01 RX ADMIN — CHOLESTYRAMINE 4 G: 4 POWDER, FOR SUSPENSION ORAL at 09:06

## 2019-04-01 RX ADMIN — Medication 10 ML: at 09:04

## 2019-04-01 RX ADMIN — METRONIDAZOLE 500 MG: 500 INJECTION, SOLUTION INTRAVENOUS at 19:05

## 2019-04-01 RX ADMIN — Medication 1 CAPSULE: at 19:04

## 2019-04-01 RX ADMIN — Medication 10 ML: at 22:21

## 2019-04-01 RX ADMIN — CIPROFLOXACIN 400 MG: 2 INJECTION, SOLUTION INTRAVENOUS at 04:13

## 2019-04-01 RX ADMIN — Medication 1 CAPSULE: at 09:06

## 2019-04-01 RX ADMIN — FAMOTIDINE 20 MG: 10 INJECTION, SOLUTION INTRAVENOUS at 22:21

## 2019-04-01 RX ADMIN — SODIUM CHLORIDE: 9 INJECTION, SOLUTION INTRAVENOUS at 13:25

## 2019-04-01 ASSESSMENT — ENCOUNTER SYMPTOMS
SHORTNESS OF BREATH: 0
NAUSEA: 1
VOMITING: 0

## 2019-04-01 ASSESSMENT — PAIN SCALES - GENERAL: PAINLEVEL_OUTOF10: 0

## 2019-04-01 NOTE — PROGRESS NOTES
Chava Bardales is a 53 y.o. male patient.     Current Facility-Administered Medications   Medication Dose Route Frequency Provider Last Rate Last Dose    lactobacillus (BACID) tablet 1 tablet  1 tablet Oral TID  Haydee White MD        cholestyramine light packet 4 g  4 g Oral Daily Haydee White MD        famotidine (PEPCID) injection 20 mg  20 mg Intravenous BID Xavier Dean PA-C   20 mg at 03/31/19 2129    sodium chloride flush 0.9 % injection 10 mL  10 mL Intravenous 2 times per day Lanora Fish, APRN - CNP   10 mL at 03/31/19 2129    sodium chloride flush 0.9 % injection 10 mL  10 mL Intravenous PRN Lanora Fish, APRN - CNP   10 mL at 03/30/19 2053    acetaminophen (TYLENOL) tablet 650 mg  650 mg Oral Q4H PRN Lanora Fish, APRN - CNP        enoxaparin (LOVENOX) injection 40 mg  40 mg Subcutaneous Q24H Lanora Fish, APRN - CNP   40 mg at 03/31/19 2129    0.9 % sodium chloride infusion   Intravenous Continuous Lanora Fish, APRN - CNP 75 mL/hr at 03/31/19 2222      morphine (PF) injection 4 mg  4 mg Intravenous Q4H PRN Lanora Fish, APRN - CNP        ondansetron [facility]) injection 4 mg  4 mg Intravenous Q8H PRN Lanora Fish, APRN - CNP        ciprofloxacin (CIPRO) IVPB 400 mg  400 mg Intravenous Q12H Lanora Fish, APRN - CNP   Stopped at 04/01/19 0513    metronidazole (FLAGYL) 500 mg in NaCl 100 mL IVPB premix  500 mg Intravenous Q12H Lanora Fish, APRN - CNP   Stopped at 04/01/19 0025    [START ON 4/3/2019] Dulaglutide SOPN 0.75 mg  0.75 mg Subcutaneous Q7 Days Lanora Fish, APRN - CNP        insulin glargine (LANTUS) injection vial 100 Units  100 Units Subcutaneous QAM AC Lanora Fish, APRN - CNP   100 Units at 03/31/19 0728    glucose (GLUTOSE) 40 % oral gel 15 g  15 g Oral PRN Lanora Fish, APRN - CNP        dextrose 50 % solution 12.5 g  12.5 g Intravenous PRN Lanora Fish, APRN - CNP        glucagon (rDNA) injection 1 mg  1 mg Intramuscular PRN Margo Garcia Patient is alert and oriented to person, place and time. Pupils:  Pupils are equal, round, and reactive to light. Skin:  Warm. Assessment:    Condition: In stable condition. Improving. (Diarrhea-patient has been on multiple antibiotics for recent surgical procedure as well as recent pneumonia ×2. Staff reports stool smells \"like C. Diff. \" therefore will treat C. diff and await cultures. In the meantime add probiotic and bile acid sequestrant and DC Glucophage. Conference with family at bedside today. Will need outpatient colonoscopy to rule out inflammatory bowel disease. Hopefully be able to discharge home tomorrow if diarrhea pain under better control. ).        Tacos Dewitt MD  4/1/2019

## 2019-04-02 VITALS
OXYGEN SATURATION: 96 % | HEART RATE: 85 BPM | WEIGHT: 222 LBS | RESPIRATION RATE: 18 BRPM | BODY MASS INDEX: 32.88 KG/M2 | HEIGHT: 69 IN | DIASTOLIC BLOOD PRESSURE: 82 MMHG | SYSTOLIC BLOOD PRESSURE: 139 MMHG | TEMPERATURE: 96.7 F

## 2019-04-02 LAB
CULTURE, STOOL: NORMAL
E COLI SHIGA TOXIN ASSAY: NORMAL
GLUCOSE BLD-MCNC: 148 MG/DL (ref 70–99)
GLUCOSE BLD-MCNC: 77 MG/DL (ref 70–99)
PERFORMED ON: ABNORMAL
PERFORMED ON: NORMAL

## 2019-04-02 PROCEDURE — 6360000002 HC RX W HCPCS: Performed by: FAMILY MEDICINE

## 2019-04-02 PROCEDURE — 6370000000 HC RX 637 (ALT 250 FOR IP): Performed by: FAMILY MEDICINE

## 2019-04-02 PROCEDURE — 2500000003 HC RX 250 WO HCPCS: Performed by: FAMILY MEDICINE

## 2019-04-02 PROCEDURE — 2580000003 HC RX 258: Performed by: FAMILY MEDICINE

## 2019-04-02 PROCEDURE — 82948 REAGENT STRIP/BLOOD GLUCOSE: CPT

## 2019-04-02 PROCEDURE — 2500000003 HC RX 250 WO HCPCS: Performed by: PHYSICIAN ASSISTANT

## 2019-04-02 RX ORDER — LACTOBACILLUS RHAMNOSUS GG 10B CELL
1 CAPSULE ORAL 2 TIMES DAILY WITH MEALS
Qty: 60 CAPSULE | Refills: 1 | Status: SHIPPED | OUTPATIENT
Start: 2019-04-02 | End: 2021-01-01

## 2019-04-02 RX ORDER — METRONIDAZOLE 500 MG/1
500 TABLET ORAL EVERY 8 HOURS SCHEDULED
Status: DISCONTINUED | OUTPATIENT
Start: 2019-04-02 | End: 2019-04-02 | Stop reason: HOSPADM

## 2019-04-02 RX ORDER — METRONIDAZOLE 500 MG/1
500 TABLET ORAL EVERY 8 HOURS SCHEDULED
Qty: 30 TABLET | Refills: 0 | Status: SHIPPED | OUTPATIENT
Start: 2019-04-02 | End: 2019-04-12

## 2019-04-02 RX ORDER — CHOLESTYRAMINE LIGHT 4 G/5.7G
4 POWDER, FOR SUSPENSION ORAL DAILY
Qty: 60 PACKET | Refills: 3 | Status: SHIPPED | OUTPATIENT
Start: 2019-04-02 | End: 2019-08-07 | Stop reason: ALTCHOICE

## 2019-04-02 RX ADMIN — CIPROFLOXACIN 400 MG: 2 INJECTION, SOLUTION INTRAVENOUS at 04:13

## 2019-04-02 RX ADMIN — Medication 1 CAPSULE: at 09:22

## 2019-04-02 RX ADMIN — METRONIDAZOLE 500 MG: 500 INJECTION, SOLUTION INTRAVENOUS at 05:19

## 2019-04-02 RX ADMIN — INSULIN LISPRO 1 UNITS: 100 INJECTION, SOLUTION INTRAVENOUS; SUBCUTANEOUS at 12:09

## 2019-04-02 RX ADMIN — Medication 10 ML: at 09:20

## 2019-04-02 RX ADMIN — FAMOTIDINE 20 MG: 10 INJECTION, SOLUTION INTRAVENOUS at 09:15

## 2019-04-02 RX ADMIN — CHOLESTYRAMINE 4 G: 4 POWDER, FOR SUSPENSION ORAL at 09:14

## 2019-04-02 NOTE — DISCHARGE INSTR - DIET

## 2019-04-02 NOTE — DISCHARGE SUMMARY
Hospital Discharge Summary    Chava Bardales  :  1970  MRN:  [id number]    Admit date:  3/30/2019  Discharge date:   2019    Admitting Physician:    Crista Pinzon MD    Discharge Diagnoses:    Principal Problem:    Colitis  Active Problems:    Colitis    Diarrhea of presumed infectious origin    Generalized abdominal pain    Leukocytosis    Diabetes mellitus, type II, insulin dependent ([address]    Essential hypertension    Obstructive sleep apnea  Resolved Problems:    * No resolved hospital problems. [facility] Course: The patient was admitted for the above noted medical/surgical issues. Please see daily progress note for further details concerning their stay. The patient improved throughout their stay and reached maximum medical improvement on the day of discharge. The patient/family agree with the treatment plan as outlined above. All questions concerning their stay were answered prior to discharge. They understand the importance of follow up concerning any abnormal test results. Patient was admitted with persistent diarrhea for the last 4-5 weeks. He has been treated for pneumonia ×2 and recently underwent a laparoscopic cholecystectomy. The diarrhea was present prior to his surgery. An experienced nurse on the floor, and it that it looked and smelled like C. diff. Unfortunately the stool studies were negative. We elected to treat it as if it was C. diff and he responded to the metronidazole. Due to the persistent diarrhea we elected to discontinue his metformin as well. His blood sugars were stable throughout his admission. On the morning of  we switch from IV to oral medication and discharged home. He is to follow-up with Dr. Charles Brody in 7-10 days and agreed that a colonoscopy would be in order in 4-6 weeks.     Discharge Medications:       Theoplis Ant   Home Medication Instructions BCW:[id number]    Printed on:19 [id number]   Medication Information

## 2019-04-02 NOTE — PROGRESS NOTES
At 2036 patient's blood glucose via accu-check was 47. Gave patient orange juice with sugar in it. Rechecked 30 minutes later, patien't blood glucose was 101. Will continue to monitor.   Electronically signed by Karen Mchugh RN on 04/01/2019 at 09:40 PM

## 2019-04-03 LAB — ANCA IFA: NORMAL

## 2019-04-04 LAB
BLOOD CULTURE, ROUTINE: NORMAL
CULTURE, BLOOD 2: NORMAL

## 2019-04-08 LAB
ALBUMIN SERPL-MCNC: 3 G/DL (ref 3.5–5.2)
ALP BLD-CCNC: 56 U/L (ref 40–130)
ALT SERPL-CCNC: 24 U/L (ref 5–41)
ANION GAP SERPL CALCULATED.3IONS-SCNC: 10 MMOL/L (ref 7–19)
AST SERPL-CCNC: 16 U/L (ref 5–40)
BASOPHILS ABSOLUTE: 0.1 K/UL (ref 0–0.2)
BASOPHILS RELATIVE PERCENT: 0.5 % (ref 0–1)
BILIRUB SERPL-MCNC: <0.2 MG/DL (ref 0.2–1.2)
BUN BLDV-MCNC: 9 MG/DL (ref 6–20)
CALCIUM SERPL-MCNC: 8.2 MG/DL (ref 8.6–10)
CHLORIDE BLD-SCNC: 102 MMOL/L (ref 98–111)
CO2: 28 MMOL/L (ref 22–29)
CREAT SERPL-MCNC: 1 MG/DL (ref 0.5–1.2)
EOSINOPHILS ABSOLUTE: 0.3 K/UL (ref 0–0.6)
EOSINOPHILS RELATIVE PERCENT: 2 % (ref 0–5)
GFR NON-AFRICAN AMERICAN: >60
GLUCOSE BLD-MCNC: 278 MG/DL (ref 74–109)
HCT VFR BLD CALC: 49.5 % (ref 42–52)
HEMOGLOBIN: 15.9 G/DL (ref 14–18)
LYMPHOCYTES ABSOLUTE: 2.7 K/UL (ref 1.1–4.5)
LYMPHOCYTES RELATIVE PERCENT: 18.9 % (ref 20–40)
MCH RBC QN AUTO: 29.1 PG (ref 27–31)
MCHC RBC AUTO-ENTMCNC: 32.1 G/DL (ref 33–37)
MCV RBC AUTO: 90.5 FL (ref 80–94)
MONOCYTES ABSOLUTE: 1.1 K/UL (ref 0–0.9)
MONOCYTES RELATIVE PERCENT: 8 % (ref 0–10)
NEUTROPHILS ABSOLUTE: 9.8 K/UL (ref 1.5–7.5)
NEUTROPHILS RELATIVE PERCENT: 69.1 % (ref 50–65)
PDW BLD-RTO: 13.8 % (ref 11.5–14.5)
PLATELET # BLD: 353 K/UL (ref 130–400)
PMV BLD AUTO: 11.2 FL (ref 9.4–12.4)
POTASSIUM SERPL-SCNC: 4.6 MMOL/L (ref 3.5–5)
RBC # BLD: 5.47 M/UL (ref 4.7–6.1)
SODIUM BLD-SCNC: 140 MMOL/L (ref 136–145)
TOTAL PROTEIN: 6.3 G/DL (ref 6.6–8.7)
WBC # BLD: 14.2 K/UL (ref 4.8–10.8)

## 2019-04-09 LAB — C DIFFICILE TOXIN, EIA: NORMAL

## 2019-04-11 ENCOUNTER — OFFICE VISIT (OUTPATIENT)
Dept: SURGERY | Age: 49
End: 2019-04-11

## 2019-04-11 VITALS
SYSTOLIC BLOOD PRESSURE: 120 MMHG | DIASTOLIC BLOOD PRESSURE: 70 MMHG | BODY MASS INDEX: 32.38 KG/M2 | WEIGHT: 218.6 LBS | TEMPERATURE: 97.9 F | HEIGHT: 69 IN

## 2019-04-11 DIAGNOSIS — Z90.49 S/P LAPAROSCOPIC CHOLECYSTECTOMY: Primary | ICD-10-CM

## 2019-04-11 PROCEDURE — 99024 POSTOP FOLLOW-UP VISIT: CPT | Performed by: SURGERY

## 2019-04-12 LAB
CULTURE, STOOL: NORMAL
E COLI SHIGA TOXIN ASSAY: NORMAL

## 2019-04-15 LAB
BASOPHILS ABSOLUTE: 0.1 K/UL (ref 0–0.2)
BASOPHILS RELATIVE PERCENT: 0.7 % (ref 0–1)
EOSINOPHILS ABSOLUTE: 0.4 K/UL (ref 0–0.6)
EOSINOPHILS RELATIVE PERCENT: 5.1 % (ref 0–5)
HCT VFR BLD CALC: 49.3 % (ref 42–52)
HEMOGLOBIN: 15.9 G/DL (ref 14–18)
LYMPHOCYTES ABSOLUTE: 2.1 K/UL (ref 1.1–4.5)
LYMPHOCYTES RELATIVE PERCENT: 29.3 % (ref 20–40)
MCH RBC QN AUTO: 29.4 PG (ref 27–31)
MCHC RBC AUTO-ENTMCNC: 32.3 G/DL (ref 33–37)
MCV RBC AUTO: 91.3 FL (ref 80–94)
MONOCYTES ABSOLUTE: 0.9 K/UL (ref 0–0.9)
MONOCYTES RELATIVE PERCENT: 11.8 % (ref 0–10)
NEUTROPHILS ABSOLUTE: 3.8 K/UL (ref 1.5–7.5)
NEUTROPHILS RELATIVE PERCENT: 52.5 % (ref 50–65)
PDW BLD-RTO: 14 % (ref 11.5–14.5)
PLATELET # BLD: 326 K/UL (ref 130–400)
PMV BLD AUTO: 11.3 FL (ref 9.4–12.4)
RBC # BLD: 5.4 M/UL (ref 4.7–6.1)
WBC # BLD: 7.3 K/UL (ref 4.8–10.8)

## 2019-04-17 RX ORDER — VANCOMYCIN HYDROCHLORIDE 250 MG/1
250 CAPSULE ORAL 4 TIMES DAILY
Status: ON HOLD | COMMUNITY
End: 2019-06-07

## 2019-04-17 RX ORDER — INSULIN GLARGINE 100 [IU]/ML
INJECTION, SOLUTION SUBCUTANEOUS DAILY
COMMUNITY

## 2019-04-18 ENCOUNTER — OFFICE VISIT (OUTPATIENT)
Dept: GASTROENTEROLOGY | Facility: CLINIC | Age: 49
End: 2019-04-18

## 2019-04-18 VITALS
DIASTOLIC BLOOD PRESSURE: 94 MMHG | WEIGHT: 224 LBS | OXYGEN SATURATION: 99 % | SYSTOLIC BLOOD PRESSURE: 134 MMHG | TEMPERATURE: 95.9 F | HEIGHT: 69 IN | HEART RATE: 85 BPM | BODY MASS INDEX: 33.18 KG/M2

## 2019-04-18 DIAGNOSIS — R93.5 ABNORMAL CT OF THE ABDOMEN: Primary | ICD-10-CM

## 2019-04-18 DIAGNOSIS — R19.7 DIARRHEA, UNSPECIFIED TYPE: ICD-10-CM

## 2019-04-18 PROCEDURE — 99204 OFFICE O/P NEW MOD 45 MIN: CPT | Performed by: NURSE PRACTITIONER

## 2019-04-18 RX ORDER — IRBESARTAN 300 MG/1
300 TABLET ORAL DAILY
COMMUNITY
End: 2019-09-30

## 2019-04-18 NOTE — PROGRESS NOTES
[facility] GASTROENTEROLOGY - OFFICE NOTE    4/18/2019    Gualberto Dallas   1970    Primary Physician: Cheng Ignacio MD    Chief Complaint   Patient presents with   • GI Problem     colitis         HISTORY OF PRESENT ILLNESS    Gualberto Dallas is a 48 y.o. male presents  with abnormal CT scan and diarrhea.  He started with abdominal pain and diarrhea 1 March.  He had lower abdominal cramping across lower abdomen as well as right upper quadrant pain.  He was having 7-10 loose to watery stools a day.  He was having nocturnal diarrhea as well.  He did note very small amount of blood on the tissue but believes that is from irritation from having diarrhea.  Never had blood noted in the stool.  He had his gallbladder worked up and subsequently had cholecystectomy March 27, 2019.  Shortly after that he started running a fever.  Still continued with diarrhea.  He had no further upper abdominal pain.  He was evaluated at [facility] as well as Dr. Ignacio.  See CT scan of the abdomen pelvis below.  He was admitted to [facility] for dehydration.  He was on IV Flagyl and then switched to p.o. Flagyl upon discharge.  He had been on Flagyl about 8 days at which time he saw Dr. Ignacio in his office.  He had improved some during the course of Flagyl but then started having problems with increased diarrhea again.  It was then decided to switch him to vancomycin.  He is taking vancomycin 250 mg 4 times daily and today is his last day.  Over the last 2 days he has had solid stool.  He has lost about 20 pounds during this time.  No rectal bleeding.  No abdominal pain.  No further fevers.  No nausea or vomiting.    Prior to developing diarrhea he had never had problems like this before.  There is no family history of colorectal cancer, colon polyps, or inflammatory bowel disease.  He has never had a colonoscopy.  Of note he was on antibiotics December 2000 18×2 rounds for respiratory infection.  Sometime last year he was  treated with antibiotics for pneumonia as well.      CT ABDOMEN PELVIS W IV CONTRAST   3-30-19 Additional Contrast? None3/30/2019  [facility] [place], [place]  Result Impression   1. Diffusely inflamed and thickened colon and rectum. Top differential  is C. difficile colitis. Ulcerative colitis also considered, although  less likely. No colonic perforation or abscess.  2. No complication related to recent cholecystectomy.  Signed by Dr Amrit Jackson on 3/30/2019 3:36 PM     Labs   4-15-19 wbc normal, hgb normal,   4-9-19 stool culture and cdt negative.   4-8-19 Wbc 14.2  4-1-19 wbc 14.6, esr 13, crp 17.8,   3-30-19 wbc 29.9, esr 16, crp 35.4  3-21-19 wbc 18.8      Had cholecystectomy 3-27-19 with path chronic cholecystitis, focal cholesterolosis.     Past Medical History:   Diagnosis Date   • Diabetes mellitus (CMS/HCC)    • Hypertension        Past Surgical History:   Procedure Laterality Date   • CHOLECYSTECTOMY     • HERNIA REPAIR     • VASECTOMY         Outpatient Medications Marked as Taking for the 4/18/19 encounter (Office Visit) with Rosalba Dunlap APRN   Medication Sig Dispense Refill   • insulin aspart (novoLOG FLEXPEN) 100 UNIT/ML solution pen-injector sc pen Inject  under the skin into the appropriate area as directed 3 (Three) Times a Day With Meals.     • insulin glargine (LANTUS) 100 UNIT/ML injection Inject  under the skin into the appropriate area as directed Daily.     • vancomycin (VANCOCIN HCL) 250 MG capsule Take 250 mg by mouth 4 (Four) Times a Day.         No Known Allergies    Social History     Socioeconomic History   • Marital status:      Spouse name: Not on file   • Number of children: Not on file   • Years of education: Not on file   • Highest education level: Not on file   Tobacco Use   • Smoking status: Former Smoker   • Smokeless tobacco: Never Used   Substance and Sexual Activity   • Alcohol use: Yes     Comment: rare   • Drug use: No   • Sexual activity: Defer       Family History  "  Problem Relation Age of Onset   • Colon cancer Maternal Uncle        Review of Systems   Constitutional: Positive for unexpected weight change. Negative for appetite change, chills, fatigue and fever.   HENT: Negative for sore throat and trouble swallowing.    Eyes: Negative for visual disturbance.   Respiratory: Negative for cough, chest tightness, shortness of breath and wheezing.    Cardiovascular: Negative for chest pain and palpitations.   Gastrointestinal: Positive for abdominal pain (Currently resolved) and diarrhea (Currently resolved). Negative for abdominal distention, anal bleeding, blood in stool, constipation, nausea and vomiting.        As mentioned in hpi   Genitourinary: Negative for difficulty urinating and hematuria.   Musculoskeletal: Negative for arthralgias and back pain.   Skin: Negative for color change and rash.   Neurological: Positive for weakness. Negative for dizziness, seizures, syncope, light-headedness and headaches.   Hematological: Negative for adenopathy.   Psychiatric/Behavioral: Negative for confusion. The patient is not nervous/anxious.         Vitals:    04/18/19 1254   BP: 134/94   Pulse: 85   Temp: 95.9 °F (35.5 °C)   SpO2: 99%   Weight: 102 kg (224 lb)   Height: 175.3 cm (69\")      Body mass index is 33.08 kg/m².    Physical Exam   Constitutional: He appears well-developed and well-nourished. No distress.   HENT:   Head: Normocephalic and atraumatic.   Eyes: EOM are normal. No scleral icterus.   Neck: Neck supple. No JVD present.   Cardiovascular: Normal rate, regular rhythm and normal heart sounds.   Pulmonary/Chest: Effort normal and breath sounds normal.   Abdominal: Soft. Bowel sounds are normal. He exhibits no distension. There is no tenderness.   Musculoskeletal: Normal range of motion. He exhibits no deformity.   Neurological: He is alert.   Skin: Skin is warm and dry. No rash noted.   Psychiatric: He has a normal mood and affect. His behavior is normal.   Vitals " reviewed.      No results found for this or any previous visit.        ASSESSMENT AND PLAN    Assessment/Plan     Gualberto was seen today for gi problem.    Diagnoses and all orders for this visit:    Abnormal CT of the abdomen  -     Case Request; Standing  -     Case Request    Diarrhea, unspecified type  -     Case Request; Standing  -     Case Request    Other orders  -     Follow Anesthesia Guidelines / Standing Orders; Future  -     Implement Anesthesia Orders Day of Procedure; Standing  -     Obtain Informed Consent; Standing  -     polyethylene glycol (GOLYTELY) 236 g solution; Take 4,000 mL by mouth 1 (One) Time for 1 dose. Take as directed per instruction sheet.    I feel that his symptoms and findings on CT of the abdomen are most likely related to infectious process.  Stool studies were all negative however he had been running a fever and now has improved on antibiotics.  He is no longer running a fever and has had solid stools for the last 2 to 3 days.  He is on his last day of vancomycin and he is to finish.  We are going to schedule him for a colonoscopy in 4 to 6 weeks.  He understands to call if has recurrence of symptoms in the meantime.  He is to continue taking a probiotic on a daily basis.         Body mass index is 33.08 kg/m².    Patient's Body mass index is 33.08 kg/m². BMI is above normal parameters. Recommendations include: No follow-up, recommend weight loss..       No Follow-up on file.          KIRSTY Hutchins Dragon/transcription disclaimer:  Much of this encounter note is electronic transcription/translation of spoken language to printed text.  The electronic translation of spoken language may be erroneous, or at times, nonsensical words or phrases may be inadvertently transcribed.  Although I have reviewed the note for such errors, some may still exist.

## 2019-04-26 NOTE — PROGRESS NOTES
Subjective:  Mr. Narcisa Bass is here to follow up after laparoscopic cholecystectomy. He reports that concerning surgery, he has been doing well. He is not having issues with incisional pain or drainage from incisions. His RUQ pain has improved. However, he is still having some nausea, bloating, and diarrhea. He reports that he is supposed to be following up with Dr. Hattie Arellano and getting a referral to GI. Objective:  Vital signs reviewed  General: NAD< AAO  Chest: Regular, non-labored  Abdomen: soft, NT, ND. Incisions C/D/I and healing well    Pathology:  FINAL DIAGNOSIS:    Gallbladder, cholecystectomy:   1.  Mild chronic cholecystitis.   2.  Focal cholesterolosis.   CBG/CBG    Assessment and plan:  53year old male s/p laparoscopic cholecystectomy  1) Doing okay from a surgical standpoint. I do think he is having some issues more likely related to possible an irritable bowel or crohn/UC.  Discussed that if he has any issues getting in to Dr. Dejan Charles as planned, then to let us know and we'll see about a [facility] consult  2) Follow up in general surgery as needed and call for any questions or concerns

## 2019-06-07 ENCOUNTER — ANESTHESIA (OUTPATIENT)
Dept: GASTROENTEROLOGY | Facility: HOSPITAL | Age: 49
End: 2019-06-07

## 2019-06-07 ENCOUNTER — ANESTHESIA EVENT (OUTPATIENT)
Dept: GASTROENTEROLOGY | Facility: HOSPITAL | Age: 49
End: 2019-06-07

## 2019-06-07 ENCOUNTER — HOSPITAL ENCOUNTER (OUTPATIENT)
Facility: HOSPITAL | Age: 49
Setting detail: HOSPITAL OUTPATIENT SURGERY
Discharge: HOME | End: 2019-06-07
Attending: INTERNAL MEDICINE | Admitting: INTERNAL MEDICINE

## 2019-06-07 VITALS
DIASTOLIC BLOOD PRESSURE: 99 MMHG | BODY MASS INDEX: 33.77 KG/M2 | RESPIRATION RATE: 18 BRPM | HEART RATE: 73 BPM | HEIGHT: 69 IN | SYSTOLIC BLOOD PRESSURE: 150 MMHG | OXYGEN SATURATION: 100 % | TEMPERATURE: 96.9 F | WEIGHT: 228 LBS

## 2019-06-07 DIAGNOSIS — R19.7 DIARRHEA, UNSPECIFIED TYPE: ICD-10-CM

## 2019-06-07 DIAGNOSIS — R93.5 ABNORMAL CT OF THE ABDOMEN: ICD-10-CM

## 2019-06-07 LAB — GLUCOSE BLDC GLUCOMTR-MCNC: 119 MG/DL (ref 70–130)

## 2019-06-07 PROCEDURE — 88305 TISSUE EXAM BY PATHOLOGIST: CPT | Performed by: INTERNAL MEDICINE

## 2019-06-07 PROCEDURE — 45385 COLONOSCOPY W/LESION REMOVAL: CPT | Performed by: INTERNAL MEDICINE

## 2019-06-07 PROCEDURE — 82962 GLUCOSE BLOOD TEST: CPT

## 2019-06-07 PROCEDURE — 25010000002 PROPOFOL 10 MG/ML EMULSION: Performed by: NURSE ANESTHETIST, CERTIFIED REGISTERED

## 2019-06-07 RX ORDER — SODIUM CHLORIDE 0.9 % (FLUSH) 0.9 %
3 SYRINGE (ML) INJECTION AS NEEDED
Status: DISCONTINUED | OUTPATIENT
Start: 2019-06-07 | End: 2019-06-07 | Stop reason: HOSPADM

## 2019-06-07 RX ORDER — SODIUM CHLORIDE 9 MG/ML
500 INJECTION, SOLUTION INTRAVENOUS CONTINUOUS PRN
Status: DISCONTINUED | OUTPATIENT
Start: 2019-06-07 | End: 2019-06-07 | Stop reason: HOSPADM

## 2019-06-07 RX ORDER — PROPOFOL 10 MG/ML
VIAL (ML) INTRAVENOUS AS NEEDED
Status: DISCONTINUED | OUTPATIENT
Start: 2019-06-07 | End: 2019-06-07 | Stop reason: SURG

## 2019-06-07 RX ORDER — LIDOCAINE HYDROCHLORIDE 20 MG/ML
INJECTION, SOLUTION INFILTRATION; PERINEURAL AS NEEDED
Status: DISCONTINUED | OUTPATIENT
Start: 2019-06-07 | End: 2019-06-07 | Stop reason: SURG

## 2019-06-07 RX ORDER — ONDANSETRON 2 MG/ML
4 INJECTION INTRAMUSCULAR; INTRAVENOUS ONCE AS NEEDED
Status: DISCONTINUED | OUTPATIENT
Start: 2019-06-07 | End: 2019-06-07 | Stop reason: HOSPADM

## 2019-06-07 RX ADMIN — PROPOFOL 250 MG: 10 INJECTION, EMULSION INTRAVENOUS at 08:33

## 2019-06-07 RX ADMIN — SODIUM CHLORIDE 500 ML: 9 INJECTION, SOLUTION INTRAVENOUS at 07:36

## 2019-06-07 RX ADMIN — LIDOCAINE HYDROCHLORIDE 100 MG: 20 INJECTION, SOLUTION INFILTRATION; PERINEURAL at 08:33

## 2019-06-07 NOTE — H&P
"[facility]  Pre Procedure History & Physical    Chief Complaint:   Abnormal CT scan    Subjective     HPI:   He had a acute diarrheal illness back in April.  He had a CT scan showing diffuse inflammatory changes.  He was treated with antibiotics.  He has clinically improved.  He presents now for evaluation.    Past Medical History:   Past Medical History:   Diagnosis Date   • Diabetes mellitus (CMS/HCC)    • Hypertension        Past Surgical History:  Past Surgical History:   Procedure Laterality Date   • CHOLECYSTECTOMY     • HERNIA REPAIR     • VASECTOMY          Family History:  Family History   Problem Relation Age of Onset   • Colon cancer Maternal Uncle        Social History:   reports that he has quit smoking. He has never used smokeless tobacco. He reports that he drinks alcohol. He reports that he does not use drugs.    Medications:   Prior to Admission medications    Medication Sig Start Date End Date Taking? Authorizing Provider   insulin aspart (novoLOG FLEXPEN) 100 UNIT/ML solution pen-injector sc pen Inject  under the skin into the appropriate area as directed 3 (Three) Times a Day With Meals.   Yes Guero Mojica MD   insulin glargine (LANTUS) 100 UNIT/ML injection Inject  under the skin into the appropriate area as directed Daily.   Yes Guero Mojica MD   irbesartan (AVAPRO) 300 MG tablet Take 300 mg by mouth Daily.   Yes Guero Mojica MD   vancomycin (VANCOCIN HCL) 250 MG capsule Take 250 mg by mouth 4 (Four) Times a Day.  6/7/19  Guero Mojica MD       Allergies:  Patient has no known allergies.    ROS:    General: Weight stable  Resp: No SOA  Cardiovascular: No CP    Objective     Blood pressure (!) 149/107, pulse 70, temperature 96.9 °F (36.1 °C), temperature source Temporal, resp. rate 18, height 175.3 cm (69\"), weight 103 kg (228 lb), SpO2 98 %.    Physical Exam   Constitutional: Pt is oriented to person, place, and in no distress.   HENT: " Mouth/Throat: Oropharynx is clear.   Cardiovascular: Normal rate, regular rhythm.    Pulmonary/Chest: Effort normal. No respiratory distress. No  wheezes.   Abdominal: Soft. Non-distended.  Skin: Skin is warm and dry.   Psychiatric: Mood, memory, affect and judgment appear normal.     Assessment/Plan     Diagnosis:  Abnormal CT scan  Acute diarrheal illness now resolved    Anticipated Surgical Procedure:  Colonoscopy    The risks, benefits, and alternatives of this procedure have been discussed with the patient or the responsible party- the patient understands and agrees to proceed.    EMR Dragon/transcription disclaimer:  Much of this encounter note is electronic transcription/translation of spoken language to printed text.  The electronic translation of spoken language may be erroneous, or at times, nonsensical words or phrases may be inadvertently transcribed.  Although I have reviewed the note for such errors, some may still exist.

## 2019-06-07 NOTE — ANESTHESIA POSTPROCEDURE EVALUATION
"Patient: Gualberto Dallas    Procedure Summary     Date:  06/07/19 Room / Location:  [facility] ENDOSCOPY 6 / [facility] ENDOSCOPY    Anesthesia Start:  0824 Anesthesia Stop:  0856    Procedure:  COLONOSCOPY WITH ANESTHESIA (N/A ) Diagnosis:       Abnormal CT of the abdomen      Diarrhea, unspecified type      (Abnormal CT of the abdomen [R93.5])      (Diarrhea, unspecified type [R19.7])    Surgeon:  Brenden Londono MD Provider:  Park Sawant CRNA    Anesthesia Type:  MAC ASA Status:  3          Anesthesia Type: MAC  Last vitals  BP   150/99 (06/07/19 0915)   Temp   96.9 °F (36.1 °C) (06/07/19 0725)   Pulse   73 (06/07/19 0915)   Resp   18 (06/07/19 0915)     SpO2   100 % (06/07/19 0915)     Post Anesthesia Care and Evaluation    Patient location during evaluation: PHASE II  Patient participation: complete - patient participated  Level of consciousness: awake and awake and alert  Pain score: 0  Pain management: adequate  Airway patency: patent  Anesthetic complications: No anesthetic complications  PONV Status: none  Cardiovascular status: acceptable  Respiratory status: acceptable  Hydration status: acceptable    Comments: Patient discharged according to acceptable Paulette score per RN assessment. See nursing records for further information.     Blood pressure 150/99, pulse 73, temperature 96.9 °F (36.1 °C), temperature source Temporal, resp. rate 18, height 175.3 cm (69\"), weight 103 kg (228 lb), SpO2 100 %.        "

## 2019-06-07 NOTE — ANESTHESIA PREPROCEDURE EVALUATION
Anesthesia Evaluation     Patient summary reviewed   no history of anesthetic complications:  NPO Solid Status: > 8 hours  NPO Liquid Status: > 2 hours           Airway   Mallampati: I  TM distance: >3 FB  Neck ROM: full  No difficulty expected  Dental - normal exam     Pulmonary - negative pulmonary ROS     ROS comment: Wife reports snoring, no formal diagnosis of sleep apnea  Cardiovascular   Exercise tolerance: good (4-7 METS)    (+) hypertension,       Neuro/Psych- negative ROS  GI/Hepatic/Renal/Endo    (+) obesity,   diabetes mellitus type 2 using insulin,   (-) liver disease, no renal disease    Musculoskeletal     Abdominal    Substance History      OB/GYN          Other                        Anesthesia Plan    ASA 3     MAC     intravenous induction   Anesthetic plan, all risks, benefits, and alternatives have been provided, discussed and informed consent has been obtained with: patient.

## 2019-06-10 LAB
CYTO UR: NORMAL
LAB AP CASE REPORT: NORMAL
PATH REPORT.FINAL DX SPEC: NORMAL
PATH REPORT.GROSS SPEC: NORMAL

## 2019-07-16 LAB
ALBUMIN SERPL-MCNC: 4.1 G/DL (ref 3.5–5.2)
ALP BLD-CCNC: 52 U/L (ref 40–130)
ALT SERPL-CCNC: 28 U/L (ref 5–41)
ANION GAP SERPL CALCULATED.3IONS-SCNC: 14 MMOL/L (ref 7–19)
AST SERPL-CCNC: 19 U/L (ref 5–40)
BILIRUB SERPL-MCNC: 0.5 MG/DL (ref 0.2–1.2)
BUN BLDV-MCNC: 14 MG/DL (ref 6–20)
CALCIUM SERPL-MCNC: 9.1 MG/DL (ref 8.6–10)
CHLORIDE BLD-SCNC: 101 MMOL/L (ref 98–111)
CHOLESTEROL, TOTAL: 165 MG/DL (ref 160–199)
CO2: 25 MMOL/L (ref 22–29)
CREAT SERPL-MCNC: 0.9 MG/DL (ref 0.5–1.2)
GFR NON-AFRICAN AMERICAN: >60
GLUCOSE BLD-MCNC: 137 MG/DL (ref 74–109)
HBA1C MFR BLD: 8.3 % (ref 4–6)
HDLC SERPL-MCNC: 36 MG/DL (ref 55–121)
LDL CHOLESTEROL CALCULATED: 108 MG/DL
POTASSIUM SERPL-SCNC: 3.9 MMOL/L (ref 3.5–5)
SODIUM BLD-SCNC: 140 MMOL/L (ref 136–145)
TOTAL PROTEIN: 7.3 G/DL (ref 6.6–8.7)
TRIGL SERPL-MCNC: 104 MG/DL (ref 0–149)

## 2019-08-07 ENCOUNTER — HOSPITAL ENCOUNTER (INPATIENT)
Age: 49
LOS: 2 days | Discharge: HOME | DRG: 389 | End: 2019-08-09
Attending: EMERGENCY MEDICINE | Admitting: FAMILY MEDICINE
Payer: COMMERCIAL

## 2019-08-07 ENCOUNTER — APPOINTMENT (OUTPATIENT)
Dept: CT IMAGING | Age: 49
DRG: 389 | End: 2019-08-07
Payer: COMMERCIAL

## 2019-08-07 DIAGNOSIS — R11.10 ABDOMINAL PAIN, VOMITING, AND DIARRHEA: Primary | ICD-10-CM

## 2019-08-07 DIAGNOSIS — D72.829 LEUKOCYTOSIS, UNSPECIFIED TYPE: ICD-10-CM

## 2019-08-07 DIAGNOSIS — R19.7 ABDOMINAL PAIN, VOMITING, AND DIARRHEA: Primary | ICD-10-CM

## 2019-08-07 DIAGNOSIS — R10.9 ABDOMINAL PAIN, VOMITING, AND DIARRHEA: Primary | ICD-10-CM

## 2019-08-07 DIAGNOSIS — R93.5 ABNORMAL CT OF THE ABDOMEN: ICD-10-CM

## 2019-08-07 PROBLEM — K56.609 SBO (SMALL BOWEL OBSTRUCTION) (HCC): Status: ACTIVE | Noted: 2019-08-07

## 2019-08-07 LAB
ALBUMIN SERPL-MCNC: 5.1 G/DL (ref 3.5–5.2)
ALP BLD-CCNC: 62 U/L (ref 40–130)
ALT SERPL-CCNC: 57 U/L (ref 5–41)
ANION GAP SERPL CALCULATED.3IONS-SCNC: 12 MMOL/L (ref 7–19)
AST SERPL-CCNC: 37 U/L (ref 5–40)
BACTERIA: NEGATIVE /HPF
BASOPHILS ABSOLUTE: 0.1 K/UL (ref 0–0.2)
BASOPHILS RELATIVE PERCENT: 0.5 % (ref 0–1)
BILIRUB SERPL-MCNC: 0.7 MG/DL (ref 0.2–1.2)
BILIRUBIN URINE: NEGATIVE
BLOOD, URINE: NEGATIVE
BUN BLDV-MCNC: 15 MG/DL (ref 6–20)
CALCIUM SERPL-MCNC: 9.8 MG/DL (ref 8.6–10)
CHLORIDE BLD-SCNC: 98 MMOL/L (ref 98–111)
CLARITY: CLEAR
CO2: 24 MMOL/L (ref 22–29)
COLOR: YELLOW
CREAT SERPL-MCNC: 1 MG/DL (ref 0.5–1.2)
EOSINOPHILS ABSOLUTE: 0 K/UL (ref 0–0.6)
EOSINOPHILS RELATIVE PERCENT: 0.1 % (ref 0–5)
EPITHELIAL CELLS, UA: 0 /HPF (ref 0–5)
GFR NON-AFRICAN AMERICAN: >60
GLUCOSE BLD-MCNC: 275 MG/DL (ref 74–109)
GLUCOSE URINE: >=1000 MG/DL
HCT VFR BLD CALC: 60.1 % (ref 42–52)
HEMOGLOBIN: 20.1 G/DL (ref 14–18)
HYALINE CASTS: 4 /HPF (ref 0–8)
KETONES, URINE: 15 MG/DL
LACTIC ACID: 1.2 MMOL/L (ref 0.5–1.9)
LACTIC ACID: 2.1 MMOL/L (ref 0.5–1.9)
LEUKOCYTE ESTERASE, URINE: NEGATIVE
LIPASE: 24 U/L (ref 13–60)
LYMPHOCYTES ABSOLUTE: 1.2 K/UL (ref 1.1–4.5)
LYMPHOCYTES RELATIVE PERCENT: 6.6 % (ref 20–40)
MCH RBC QN AUTO: 29.1 PG (ref 27–31)
MCHC RBC AUTO-ENTMCNC: 33.4 G/DL (ref 33–37)
MCV RBC AUTO: 87 FL (ref 80–94)
MONOCYTES ABSOLUTE: 0.6 K/UL (ref 0–0.9)
MONOCYTES RELATIVE PERCENT: 3.3 % (ref 0–10)
NEUTROPHILS ABSOLUTE: 15.7 K/UL (ref 1.5–7.5)
NEUTROPHILS RELATIVE PERCENT: 88.9 % (ref 50–65)
NITRITE, URINE: NEGATIVE
PDW BLD-RTO: 13.2 % (ref 11.5–14.5)
PH UA: 5.5 (ref 5–8)
PLATELET # BLD: 247 K/UL (ref 130–400)
PMV BLD AUTO: 12.2 FL (ref 9.4–12.4)
POTASSIUM REFLEX MAGNESIUM: 5.6 MMOL/L (ref 3.5–5)
PROTEIN UA: 30 MG/DL
RBC # BLD: 6.91 M/UL (ref 4.7–6.1)
RBC UA: 1 /HPF (ref 0–4)
SODIUM BLD-SCNC: 134 MMOL/L (ref 136–145)
SPECIFIC GRAVITY UA: >1.045 (ref 1–1.03)
TOTAL PROTEIN: 9 G/DL (ref 6.6–8.7)
TROPONIN: <0.01 NG/ML (ref 0–0.03)
URINE REFLEX TO CULTURE: ABNORMAL
UROBILINOGEN, URINE: 0.2 E.U./DL
WBC # BLD: 17.6 K/UL (ref 4.8–10.8)
WBC UA: 1 /HPF (ref 0–5)

## 2019-08-07 PROCEDURE — 2580000003 HC RX 258: Performed by: NURSE PRACTITIONER

## 2019-08-07 PROCEDURE — 81001 URINALYSIS AUTO W/SCOPE: CPT

## 2019-08-07 PROCEDURE — 83690 ASSAY OF LIPASE: CPT

## 2019-08-07 PROCEDURE — 99285 EMERGENCY DEPT VISIT HI MDM: CPT | Performed by: EMERGENCY MEDICINE

## 2019-08-07 PROCEDURE — 84484 ASSAY OF TROPONIN QUANT: CPT

## 2019-08-07 PROCEDURE — 85025 COMPLETE CBC W/AUTO DIFF WBC: CPT

## 2019-08-07 PROCEDURE — 96374 THER/PROPH/DIAG INJ IV PUSH: CPT

## 2019-08-07 PROCEDURE — C9113 INJ PANTOPRAZOLE SODIUM, VIA: HCPCS | Performed by: NURSE PRACTITIONER

## 2019-08-07 PROCEDURE — 99285 EMERGENCY DEPT VISIT HI MDM: CPT

## 2019-08-07 PROCEDURE — 93005 ELECTROCARDIOGRAM TRACING: CPT

## 2019-08-07 PROCEDURE — 1210000000 HC MED SURG R&B

## 2019-08-07 PROCEDURE — 6360000002 HC RX W HCPCS: Performed by: NURSE PRACTITIONER

## 2019-08-07 PROCEDURE — 96375 TX/PRO/DX INJ NEW DRUG ADDON: CPT

## 2019-08-07 PROCEDURE — 74177 CT ABD & PELVIS W/CONTRAST: CPT

## 2019-08-07 PROCEDURE — 96376 TX/PRO/DX INJ SAME DRUG ADON: CPT

## 2019-08-07 PROCEDURE — 36415 COLL VENOUS BLD VENIPUNCTURE: CPT

## 2019-08-07 PROCEDURE — 80053 COMPREHEN METABOLIC PANEL: CPT

## 2019-08-07 PROCEDURE — 83605 ASSAY OF LACTIC ACID: CPT

## 2019-08-07 PROCEDURE — 6360000004 HC RX CONTRAST MEDICATION: Performed by: NURSE PRACTITIONER

## 2019-08-07 RX ORDER — 0.9 % SODIUM CHLORIDE 0.9 %
1000 INTRAVENOUS SOLUTION INTRAVENOUS ONCE
Status: COMPLETED | OUTPATIENT
Start: 2019-08-07 | End: 2019-08-07

## 2019-08-07 RX ORDER — SODIUM CHLORIDE 0.9 % (FLUSH) 0.9 %
10 SYRINGE (ML) INJECTION EVERY 12 HOURS SCHEDULED
Status: DISCONTINUED | OUTPATIENT
Start: 2019-08-07 | End: 2019-08-09 | Stop reason: HOSPADM

## 2019-08-07 RX ORDER — LISINOPRIL 40 MG/1
40 TABLET ORAL DAILY
COMMUNITY
End: 2021-01-01

## 2019-08-07 RX ORDER — PANTOPRAZOLE SODIUM 40 MG/10ML
40 INJECTION, POWDER, LYOPHILIZED, FOR SOLUTION INTRAVENOUS DAILY
Status: DISCONTINUED | OUTPATIENT
Start: 2019-08-07 | End: 2019-08-09 | Stop reason: HOSPADM

## 2019-08-07 RX ORDER — SODIUM CHLORIDE 0.9 % (FLUSH) 0.9 %
10 SYRINGE (ML) INJECTION PRN
Status: DISCONTINUED | OUTPATIENT
Start: 2019-08-07 | End: 2019-08-09 | Stop reason: HOSPADM

## 2019-08-07 RX ORDER — MORPHINE SULFATE 4 MG/ML
4 INJECTION, SOLUTION INTRAMUSCULAR; INTRAVENOUS ONCE
Status: COMPLETED | OUTPATIENT
Start: 2019-08-07 | End: 2019-08-07

## 2019-08-07 RX ORDER — SODIUM CHLORIDE 9 MG/ML
INJECTION, SOLUTION INTRAVENOUS CONTINUOUS
Status: DISCONTINUED | OUTPATIENT
Start: 2019-08-07 | End: 2019-08-09 | Stop reason: HOSPADM

## 2019-08-07 RX ORDER — ONDANSETRON 2 MG/ML
4 INJECTION INTRAMUSCULAR; INTRAVENOUS ONCE
Status: COMPLETED | OUTPATIENT
Start: 2019-08-07 | End: 2019-08-07

## 2019-08-07 RX ORDER — MORPHINE SULFATE 2 MG/ML
4 INJECTION, SOLUTION INTRAMUSCULAR; INTRAVENOUS
Status: DISCONTINUED | OUTPATIENT
Start: 2019-08-07 | End: 2019-08-09 | Stop reason: HOSPADM

## 2019-08-07 RX ORDER — 0.9 % SODIUM CHLORIDE 0.9 %
10 VIAL (ML) INJECTION DAILY
Status: DISCONTINUED | OUTPATIENT
Start: 2019-08-07 | End: 2019-08-09 | Stop reason: HOSPADM

## 2019-08-07 RX ORDER — ONDANSETRON 2 MG/ML
4 INJECTION INTRAMUSCULAR; INTRAVENOUS EVERY 6 HOURS PRN
Status: DISCONTINUED | OUTPATIENT
Start: 2019-08-07 | End: 2019-08-09 | Stop reason: HOSPADM

## 2019-08-07 RX ADMIN — MORPHINE SULFATE 4 MG: 4 INJECTION INTRAVENOUS at 13:17

## 2019-08-07 RX ADMIN — SODIUM CHLORIDE 1000 ML: 9 INJECTION, SOLUTION INTRAVENOUS at 12:34

## 2019-08-07 RX ADMIN — Medication 10 ML: at 19:34

## 2019-08-07 RX ADMIN — Medication 10 ML: at 09:59

## 2019-08-07 RX ADMIN — MORPHINE SULFATE 4 MG: 4 INJECTION INTRAVENOUS at 09:59

## 2019-08-07 RX ADMIN — ONDANSETRON 4 MG: 2 INJECTION INTRAMUSCULAR; INTRAVENOUS at 09:59

## 2019-08-07 RX ADMIN — SODIUM CHLORIDE: 9 INJECTION, SOLUTION INTRAVENOUS at 19:37

## 2019-08-07 RX ADMIN — IOPAMIDOL 90 ML: 755 INJECTION, SOLUTION INTRAVENOUS at 10:57

## 2019-08-07 RX ADMIN — PANTOPRAZOLE SODIUM 40 MG: 40 INJECTION, POWDER, FOR SOLUTION INTRAVENOUS at 09:59

## 2019-08-07 RX ADMIN — MORPHINE SULFATE 4 MG: 2 INJECTION, SOLUTION INTRAMUSCULAR; INTRAVENOUS at 19:34

## 2019-08-07 RX ADMIN — SODIUM CHLORIDE 1000 ML: 9 INJECTION, SOLUTION INTRAVENOUS at 09:58

## 2019-08-07 RX ADMIN — MORPHINE SULFATE 4 MG: 2 INJECTION, SOLUTION INTRAMUSCULAR; INTRAVENOUS at 16:41

## 2019-08-07 ASSESSMENT — PAIN DESCRIPTION - FREQUENCY
FREQUENCY: CONTINUOUS

## 2019-08-07 ASSESSMENT — ENCOUNTER SYMPTOMS
VOMITING: 1
ABDOMINAL PAIN: 1
DIARRHEA: 1
NAUSEA: 1

## 2019-08-07 ASSESSMENT — PAIN DESCRIPTION - PAIN TYPE
TYPE: ACUTE PAIN

## 2019-08-07 ASSESSMENT — PAIN DESCRIPTION - DESCRIPTORS
DESCRIPTORS: BURNING;CRAMPING
DESCRIPTORS: BURNING;CRAMPING

## 2019-08-07 ASSESSMENT — PAIN DESCRIPTION - ORIENTATION
ORIENTATION: MID

## 2019-08-07 ASSESSMENT — PAIN DESCRIPTION - PROGRESSION
CLINICAL_PROGRESSION: GRADUALLY IMPROVING
CLINICAL_PROGRESSION: GRADUALLY IMPROVING
CLINICAL_PROGRESSION: NOT CHANGED

## 2019-08-07 ASSESSMENT — PAIN SCALES - GENERAL
PAINLEVEL_OUTOF10: 6
PAINLEVEL_OUTOF10: 6
PAINLEVEL_OUTOF10: 4
PAINLEVEL_OUTOF10: 0
PAINLEVEL_OUTOF10: 7
PAINLEVEL_OUTOF10: 3
PAINLEVEL_OUTOF10: 7
PAINLEVEL_OUTOF10: 6
PAINLEVEL_OUTOF10: 3
PAINLEVEL_OUTOF10: 2

## 2019-08-07 ASSESSMENT — PAIN DESCRIPTION - LOCATION
LOCATION: ABDOMEN

## 2019-08-07 ASSESSMENT — PAIN DESCRIPTION - ONSET
ONSET: ON-GOING
ONSET: PROGRESSIVE
ONSET: GRADUAL

## 2019-08-07 ASSESSMENT — PAIN - FUNCTIONAL ASSESSMENT
PAIN_FUNCTIONAL_ASSESSMENT: PREVENTS OR INTERFERES SOME ACTIVE ACTIVITIES AND ADLS
PAIN_FUNCTIONAL_ASSESSMENT: PREVENTS OR INTERFERES SOME ACTIVE ACTIVITIES AND ADLS

## 2019-08-07 NOTE — ED PROVIDER NOTES
MD at [facility] OR    HERNIA REPAIR      [facility]     VASECTOMY           CURRENT MEDICATIONS       Previous Medications    DULAGLUTIDE (TRULICITY SC)    Inject 0.75 mg into the skin once a week Indications: Wednesday     INSULIN GLARGINE (LANTUS) 100 UNIT/ML INJECTION VIAL    Inject 100 Units into the skin every morning (before breakfast)     INSULIN LISPRO (HUMALOG SC)    Inject into the skin    LACTOBACILLUS (CULTURELLE) CAPS CAPSULE    Take 1 capsule by mouth 2 times daily (with meals)    LISINOPRIL (PRINIVIL;ZESTRIL) 40 MG TABLET    Take 40 mg by mouth daily    ROSUVASTATIN (CRESTOR) 5 MG TABLET    Take 5 mg by mouth every morning (before breakfast) Indications: Changes in Cholesterol     TRAMADOL-ACETAMINOPHEN (ULTRACET) 37.5-325 MG PER TABLET    Take 1 tablet by mouth every 6 hours as needed. ALLERGIES     Bee venom and Other    FAMILY HISTORY       Family History   Problem Relation Age of Onset    Diabetes Mother     Heart Disease Mother     High Blood Pressure Mother     Diabetes Sister           SOCIAL HISTORY       Social History     Socioeconomic History    Marital status:      Spouse name: None    Number of children: None    Years of education: None    Highest education level: None   Occupational History    None   Social Needs    Financial resource strain: None    Food insecurity:     Worry: None     Inability: None    Transportation needs:     Medical: None     Non-medical: None   Tobacco Use    Smoking status: Never Smoker    Smokeless tobacco: Never Used   Substance and Sexual Activity    Alcohol use: Yes     Comment: occ.     Drug use: Never    Sexual activity: None   Lifestyle    Physical activity:     Days per week: None     Minutes per session: None    Stress: None   Relationships    Social connections:     Talks on phone: None     Gets together: None     Attends [employer]hov[employer] witness service: None     Active member of club or organization: None     Attends meetings of clubs or REFLEX TO CULTURE - Abnormal; Notable for the following components:    Glucose, Ur >=1000 (*)     Ketones, Urine 15 (*)     Protein, UA 30 (*)     All other components within normal limits   C DIFF TOXIN/ANTIGEN   CULTURE STOOL   LIPASE   TROPONIN   MICROSCOPIC URINALYSIS       All other labs were within normal range or not returned as of this dictation. RE-ASSESSMENT     Discussed with consultant Dr. Rasheeda Calles who will see patient. Related gives pt's diarrhea doubt SBO and ok for clear liquid diet no NG tube. Discussed with USAMA Xie taking call for Dr. Jonel Balderas who agrees with admission. EMERGENCY DEPARTMENT COURSE and DIFFERENTIALDIAGNOSIS/MDM:   Vitals:    Vitals:    08/07/19 1001 08/07/19 1030 08/07/19 1110 08/07/19 1230   BP: (!) 139/91 (!) 123/91 (!) 143/94 105/73   Pulse: 88  78 76   Resp: 16  16 16   Temp:    98 °F (36.7 °C)   TempSrc:       SpO2: 100% 99% 97% 93%   Weight:       Height:           MDM      CONSULTS:  IP CONSULT TO GENERAL SURGERY    PROCEDURES:  Unless otherwise notedbelow, none     Procedures    FINAL IMPRESSION     1. Abdominal pain, vomiting, and diarrhea    2. Abnormal CT of the abdomen    3. Leukocytosis, unspecified type          DISPOSITION/PLAN   DISPOSITION Decision To Admit 08/07/2019 01:36:18 PM      PATIENT REFERRED TO:  No follow-up provider specified.     DISCHARGE MEDICATIONS:       Current Discharge Medication List          (Pleasenote that portions of this note were completed with a voice recognition program.  Efforts were made to edit the dictations but occasionally words are mis-transcribed.)              USAMA Manuel  08/07/19 2846

## 2019-08-07 NOTE — LETTER
[facility] Surg Services  [address]  [address]  Phone: [phone]    No name on file. August 9, 2019     Patient: Ginette Severance   YOB: 1970   Date of Visit: 8/7/2019       To Whom It May Concern: It is my medical opinion that Karmen Henry should remain out of work until followup with primarycare physician. If you have any questions or concerns, please don't hesitate to call. Sincerely,        No name on file.  vivi SMITH

## 2019-08-08 ENCOUNTER — APPOINTMENT (OUTPATIENT)
Dept: GENERAL RADIOLOGY | Age: 49
DRG: 389 | End: 2019-08-08
Payer: COMMERCIAL

## 2019-08-08 PROBLEM — R19.7 ABDOMINAL PAIN, VOMITING, AND DIARRHEA: Status: ACTIVE | Noted: 2019-03-31

## 2019-08-08 PROBLEM — R11.10 ABDOMINAL PAIN, VOMITING, AND DIARRHEA: Status: ACTIVE | Noted: 2019-03-31

## 2019-08-08 PROBLEM — R10.9 ABDOMINAL PAIN, VOMITING, AND DIARRHEA: Status: ACTIVE | Noted: 2019-03-31

## 2019-08-08 LAB
BASOPHILS ABSOLUTE: 0.1 K/UL (ref 0–0.2)
BASOPHILS RELATIVE PERCENT: 0.4 % (ref 0–1)
EKG P AXIS: 45 DEGREES
EKG P-R INTERVAL: 140 MS
EKG Q-T INTERVAL: 340 MS
EKG QRS DURATION: 90 MS
EKG QTC CALCULATION (BAZETT): 382 MS
EKG T AXIS: 52 DEGREES
EOSINOPHILS ABSOLUTE: 0 K/UL (ref 0–0.6)
EOSINOPHILS RELATIVE PERCENT: 0.1 % (ref 0–5)
HCT VFR BLD CALC: 53.7 % (ref 42–52)
HEMOGLOBIN: 17.3 G/DL (ref 14–18)
LYMPHOCYTES ABSOLUTE: 1.3 K/UL (ref 1.1–4.5)
LYMPHOCYTES RELATIVE PERCENT: 7.7 % (ref 20–40)
MCH RBC QN AUTO: 28.9 PG (ref 27–31)
MCHC RBC AUTO-ENTMCNC: 32.2 G/DL (ref 33–37)
MCV RBC AUTO: 89.6 FL (ref 80–94)
MONOCYTES ABSOLUTE: 1.1 K/UL (ref 0–0.9)
MONOCYTES RELATIVE PERCENT: 6.2 % (ref 0–10)
NEUTROPHILS ABSOLUTE: 14.7 K/UL (ref 1.5–7.5)
NEUTROPHILS RELATIVE PERCENT: 85.1 % (ref 50–65)
PDW BLD-RTO: 12.9 % (ref 11.5–14.5)
PLATELET # BLD: 191 K/UL (ref 130–400)
PMV BLD AUTO: 12.1 FL (ref 9.4–12.4)
RBC # BLD: 5.99 M/UL (ref 4.7–6.1)
WBC # BLD: 17.3 K/UL (ref 4.8–10.8)

## 2019-08-08 PROCEDURE — 81270 JAK2 GENE: CPT

## 2019-08-08 PROCEDURE — C9113 INJ PANTOPRAZOLE SODIUM, VIA: HCPCS | Performed by: NURSE PRACTITIONER

## 2019-08-08 PROCEDURE — 36415 COLL VENOUS BLD VENIPUNCTURE: CPT

## 2019-08-08 PROCEDURE — 1210000000 HC MED SURG R&B

## 2019-08-08 PROCEDURE — 6360000002 HC RX W HCPCS: Performed by: NURSE PRACTITIONER

## 2019-08-08 PROCEDURE — 99254 IP/OBS CNSLTJ NEW/EST MOD 60: CPT | Performed by: INTERNAL MEDICINE

## 2019-08-08 PROCEDURE — 2580000003 HC RX 258: Performed by: NURSE PRACTITIONER

## 2019-08-08 PROCEDURE — 85025 COMPLETE CBC W/AUTO DIFF WBC: CPT

## 2019-08-08 PROCEDURE — 99254 IP/OBS CNSLTJ NEW/EST MOD 60: CPT | Performed by: SURGERY

## 2019-08-08 PROCEDURE — 82668 ASSAY OF ERYTHROPOIETIN: CPT

## 2019-08-08 PROCEDURE — 74250 X-RAY XM SM INT 1CNTRST STD: CPT

## 2019-08-08 RX ADMIN — MORPHINE SULFATE 4 MG: 2 INJECTION, SOLUTION INTRAMUSCULAR; INTRAVENOUS at 01:42

## 2019-08-08 RX ADMIN — MORPHINE SULFATE 4 MG: 2 INJECTION, SOLUTION INTRAMUSCULAR; INTRAVENOUS at 22:37

## 2019-08-08 RX ADMIN — MORPHINE SULFATE 4 MG: 2 INJECTION, SOLUTION INTRAMUSCULAR; INTRAVENOUS at 17:33

## 2019-08-08 RX ADMIN — MORPHINE SULFATE 4 MG: 2 INJECTION, SOLUTION INTRAMUSCULAR; INTRAVENOUS at 07:15

## 2019-08-08 RX ADMIN — PANTOPRAZOLE SODIUM 40 MG: 40 INJECTION, POWDER, FOR SOLUTION INTRAVENOUS at 10:25

## 2019-08-08 RX ADMIN — MORPHINE SULFATE 4 MG: 2 INJECTION, SOLUTION INTRAMUSCULAR; INTRAVENOUS at 10:25

## 2019-08-08 RX ADMIN — Medication 10 ML: at 10:28

## 2019-08-08 ASSESSMENT — PAIN DESCRIPTION - FREQUENCY: FREQUENCY: CONTINUOUS

## 2019-08-08 ASSESSMENT — PAIN DESCRIPTION - DESCRIPTORS: DESCRIPTORS: CRAMPING

## 2019-08-08 ASSESSMENT — PAIN DESCRIPTION - LOCATION
LOCATION: ABDOMEN
LOCATION: ABDOMEN

## 2019-08-08 ASSESSMENT — PAIN SCALES - GENERAL
PAINLEVEL_OUTOF10: 1
PAINLEVEL_OUTOF10: 3
PAINLEVEL_OUTOF10: 7
PAINLEVEL_OUTOF10: 6
PAINLEVEL_OUTOF10: 8
PAINLEVEL_OUTOF10: 0
PAINLEVEL_OUTOF10: 5
PAINLEVEL_OUTOF10: 3
PAINLEVEL_OUTOF10: 8

## 2019-08-08 ASSESSMENT — PAIN DESCRIPTION - PAIN TYPE: TYPE: ACUTE PAIN

## 2019-08-08 NOTE — PROGRESS NOTES
ACIDOSIS  DMII  HYPERKALEMIA  HYPONATREMIA  LEUKOCYTOSIS  ERYTHROCYTOSIS    PLAN:    NPO  NGT TO LWS  IVF  IV ANALGESIA  SURGERY CONSULT APPRECIATED  APPRECIATE HEME CONSULT - THIS ERYTHROCYTOSIS IS NOT EXCLUSIVE TO THIS ILLNESS,     HE HAS HAD ERTHROCYTOSIS FOR SOME TIME.        Emilee Burnett MD,MPH  .  8/8/2019

## 2019-08-08 NOTE — CONSULTS
Reason for consult: Possible small bowel obstruction    History of Present Illness: Patient is a 60-year-old gentleman who presented to the emergency room with complaints of nausea, vomiting, and diarrhea. He developed all this the night before admission and is feeling quite poorly. He had decreased appetite and his abdomen felt distended. The patient denies chills or fever. There is no chest pain, palpations, or SOB. There is no weakness, dizziness, or stroke-like symptoms. PRESENTING SYMPTOM DIARRHEA. He has not had a bowel movement since yesterday morning. He is passing gas. He feels much better. Hemoglobin was markedly elevated at 20 on admission and it has dropped to 17 with rehydration. CT abdomen pelvis-8/7/2019     EXAMINATION:  CT ABDOMEN PELVIS W IV CONTRAST  8/7/2019 11:05 AM   HISTORY: Epigastric abdominal pain. Vomiting and diarrhea. TECHNIQUE: Spiral CT was performed of the abdomen and pelvis with   contrast. Multiplanar images were reconstructed. DLP: 1335 mGy-cm. Automated exposure control was utilized. COMPARISON: 3/30/2019. LUNG BASES: The lung bases are clear. LIVER AND SPLEEN: There is fatty infiltration of the liver. Prior   cholecystectomy. Normal spleen. PANCREAS: No pancreatic mass or inflammatory change. KIDNEYS, BLADDER AND ADRENALS: No renal mass or cyst. No   hydronephrosis. Adrenal glands are unremarkable. No bladder   abnormality is seen. BOWEL: The stomach is filled with fluid. There are multiple   fluid-filled small bowel loops with air-fluid levels. The proximal to   mid small bowel is dilated measuring up to 4.2 cm. Distal small bowel   loops are decompressed. There are some air-fluid levels in the colon. OTHER: There is minimal atheromatous disease of the aortoiliac   vessels. There are bilateral fat-containing inguinal hernias. No other   abdominal wall hernia is seen. No acute bony abnormality is seen.       Impression   1.  Fluid-filled

## 2019-08-09 VITALS
SYSTOLIC BLOOD PRESSURE: 117 MMHG | WEIGHT: 233 LBS | OXYGEN SATURATION: 94 % | RESPIRATION RATE: 16 BRPM | BODY MASS INDEX: 34.51 KG/M2 | DIASTOLIC BLOOD PRESSURE: 72 MMHG | HEART RATE: 67 BPM | HEIGHT: 69 IN | TEMPERATURE: 97.3 F

## 2019-08-09 LAB
ANION GAP SERPL CALCULATED.3IONS-SCNC: 9 MMOL/L (ref 7–19)
BASOPHILS ABSOLUTE: 0.1 K/UL (ref 0–0.2)
BASOPHILS RELATIVE PERCENT: 0.6 % (ref 0–1)
BUN BLDV-MCNC: 14 MG/DL (ref 6–20)
CALCIUM SERPL-MCNC: 8.1 MG/DL (ref 8.6–10)
CHLORIDE BLD-SCNC: 107 MMOL/L (ref 98–111)
CO2: 23 MMOL/L (ref 22–29)
CREAT SERPL-MCNC: 0.8 MG/DL (ref 0.5–1.2)
EOSINOPHILS ABSOLUTE: 0.4 K/UL (ref 0–0.6)
EOSINOPHILS RELATIVE PERCENT: 4 % (ref 0–5)
ERYTHROPOIETIN: 9 MU/ML (ref 4–27)
GFR NON-AFRICAN AMERICAN: >60
GLUCOSE BLD-MCNC: 114 MG/DL (ref 74–109)
HCT VFR BLD CALC: 45.4 % (ref 42–52)
HEMOGLOBIN: 14.8 G/DL (ref 14–18)
LYMPHOCYTES ABSOLUTE: 2.3 K/UL (ref 1.1–4.5)
LYMPHOCYTES RELATIVE PERCENT: 26.2 % (ref 20–40)
MCH RBC QN AUTO: 29 PG (ref 27–31)
MCHC RBC AUTO-ENTMCNC: 32.6 G/DL (ref 33–37)
MCV RBC AUTO: 88.8 FL (ref 80–94)
MONOCYTES ABSOLUTE: 0.9 K/UL (ref 0–0.9)
MONOCYTES RELATIVE PERCENT: 10.3 % (ref 0–10)
NEUTROPHILS ABSOLUTE: 5.1 K/UL (ref 1.5–7.5)
NEUTROPHILS RELATIVE PERCENT: 58.6 % (ref 50–65)
PDW BLD-RTO: 12.7 % (ref 11.5–14.5)
PLATELET # BLD: 169 K/UL (ref 130–400)
PMV BLD AUTO: 11.9 FL (ref 9.4–12.4)
POTASSIUM SERPL-SCNC: 3.9 MMOL/L (ref 3.5–5)
RBC # BLD: 5.11 M/UL (ref 4.7–6.1)
SODIUM BLD-SCNC: 139 MMOL/L (ref 136–145)
WBC # BLD: 8.7 K/UL (ref 4.8–10.8)

## 2019-08-09 PROCEDURE — 2580000003 HC RX 258: Performed by: NURSE PRACTITIONER

## 2019-08-09 PROCEDURE — 99231 SBSQ HOSP IP/OBS SF/LOW 25: CPT | Performed by: INTERNAL MEDICINE

## 2019-08-09 PROCEDURE — 36415 COLL VENOUS BLD VENIPUNCTURE: CPT

## 2019-08-09 PROCEDURE — 99231 SBSQ HOSP IP/OBS SF/LOW 25: CPT | Performed by: SURGERY

## 2019-08-09 PROCEDURE — 85025 COMPLETE CBC W/AUTO DIFF WBC: CPT

## 2019-08-09 PROCEDURE — C9113 INJ PANTOPRAZOLE SODIUM, VIA: HCPCS | Performed by: NURSE PRACTITIONER

## 2019-08-09 PROCEDURE — 80048 BASIC METABOLIC PNL TOTAL CA: CPT

## 2019-08-09 PROCEDURE — 6360000002 HC RX W HCPCS: Performed by: NURSE PRACTITIONER

## 2019-08-09 PROCEDURE — 84165 PROTEIN E-PHORESIS SERUM: CPT

## 2019-08-09 PROCEDURE — 84160 ASSAY OF PROTEIN ANY SOURCE: CPT

## 2019-08-09 RX ADMIN — PANTOPRAZOLE SODIUM 40 MG: 40 INJECTION, POWDER, FOR SOLUTION INTRAVENOUS at 08:38

## 2019-08-09 RX ADMIN — Medication 10 ML: at 08:40

## 2019-08-09 RX ADMIN — Medication 10 ML: at 08:38

## 2019-08-09 NOTE — PROGRESS NOTES
feet, no paresis; OBJECTIVE:    Vitals:    08/09/19 0640   BP: 110/66   Pulse: 73   Resp: 16   Temp: 97.6 °F (36.4 °C)   SpO2: 97%       Intake/Output Summary (Last 24 hours) at 8/9/2019 0705  Last data filed at 8/9/2019 0252  Gross per 24 hour   Intake 1540 ml   Output 1250 ml   Net 290 ml       PHYSICAL EXAM:   CONSTITUTIONAL: Alert, appropriate, no acute distress  EYES: Non icteric, EOM intact, pupils equal round   ENT: Mucus membranes moist, no oral pharyngeal lesions, external inspection of ears and nose are normal  NECK: Supple, no masses. No palpable thyroid mass  CHEST/LUNGS: CTA bilaterally, normal respiratory effort   CARDIOVASCULAR: RRR, no murmurs. No lower extremity edema  ABDOMEN: soft non-tender, active bowel sounds, no HSM. No palpable masses  EXTREMITIES: warm, full ROM in all 4 extremities, no focal weakness. SKIN: warm, dry with no rashes or lesions  LYMPH: No cervical, clavicular, axillary, or inguinal lymphadenopathy  NEUROLOGIC: follows commands, non focal   PSYCH: mood and affect appropriate. Alert and oriented to time, place, person    Recent Labs     08/09/19  0255 08/08/19  0214 08/07/19  0913   WBC 8.7 17.3* 17.6*   HGB 14.8 17.3 20.1*   HCT 45.4 53.7* 60.1*   MCV 88.8 89.6 87.0    191 247       Lab Results   Component Value Date     08/09/2019    K 3.9 08/09/2019     08/09/2019    CO2 23 08/09/2019    BUN 14 08/09/2019    CREATININE 0.8 08/09/2019    GLUCOSE 114 (H) 08/09/2019    CALCIUM 8.1 (L) 08/09/2019    PROT 9.0 (H) 08/07/2019    LABALBU 5.1 08/07/2019    BILITOT 0.7 08/07/2019    ALKPHOS 62 08/07/2019    AST 37 08/07/2019    ALT 57 (H) 08/07/2019    LABGLOM >60 08/09/2019     Reviewed the CBC today. ASSESSMENT/PLAN:    Ct Abdomen Pelvis W Iv Contrast Additional Contrast? None     Result Date: 8/7/2019  EXAMINATION:  CT ABDOMEN PELVIS W IV CONTRAST  8/7/2019 11:05 AM HISTORY: Epigastric abdominal pain. Vomiting and diarrhea.  TECHNIQUE: Spiral CT was Pending EPO levels/Thomas-2. Hemoglobin down to 14.8 after aggressive hydration. #Small bowel obstruction/colitis-surgery has been consulted.   #Hyperproteinemia- pending SPEP  Diabetes mellitus type 2  Sleep apnea     Plan  Continue IV fluids  Continue supportive care  RTC outpatient in 2 weeks to follow-up results EPO/Thomas-2        Niall Butts MD    08/09/19  7:05 AM

## 2019-08-09 NOTE — DISCHARGE SUMMARY
HOME  Follow up with No primary care provider on file. in 1 weeks.     Signed:  Claudine Rocha MD,MPH  8/9/2019, 3:05 PM

## 2019-08-09 NOTE — PROGRESS NOTES
CONTRAST  8/7/2019 11:05 AM HISTORY: Epigastric abdominal pain. Vomiting and diarrhea. TECHNIQUE: Spiral CT was performed of the abdomen and pelvis with contrast. Multiplanar images were reconstructed. DLP: 2420 mGy-cm. Automated exposure control was utilized. COMPARISON: 3/30/2019. LUNG BASES: The lung bases are clear. LIVER AND SPLEEN: There is fatty infiltration of the liver. Prior cholecystectomy. Normal spleen. PANCREAS: No pancreatic mass or inflammatory change. KIDNEYS, BLADDER AND ADRENALS: No renal mass or cyst. No hydronephrosis. Adrenal glands are unremarkable. No bladder abnormality is seen. BOWEL: The stomach is filled with fluid. There are multiple fluid-filled small bowel loops with air-fluid levels. The proximal to mid small bowel is dilated measuring up to 4.2 cm. Distal small bowel loops are decompressed. There are some air-fluid levels in the colon. OTHER: There is minimal atheromatous disease of the aortoiliac vessels. There are bilateral fat-containing inguinal hernias. No other abdominal wall hernia is seen. No acute bony abnormality is seen. 1. Fluid-filled stomach with multiple fluid-filled small bowel loops with air-fluid levels. The proximal to mid small bowel is dilated measuring up to 4.2 cm and distal small bowel loops are decompressed. The findings are consistent with small bowel obstruction. 2. There is some fluid in the colon that is nondistended. There is diverticulosis of the colon. 3. Bilateral fat-containing inguinal hernias. The hernias do not contain bowel loops. 4. Fatty infiltration of the liver. 5. Minimal atheromatous disease of the aortoiliac vessels. The full report of this exam was immediately signed and available to the emergency room. The patient is currently in the emergency room. Signed by Dr Floresita Mittal on 8/7/2019 11:10 AM      Assessment and Plan   1.      Active Problems:    Abdominal pain, vomiting, and diarrhea    SBO (small bowel obstruction)

## 2019-08-10 ENCOUNTER — HOSPITAL ENCOUNTER (INPATIENT)
Age: 49
LOS: 4 days | Discharge: HOME | DRG: 390 | End: 2019-08-15
Attending: EMERGENCY MEDICINE | Admitting: FAMILY MEDICINE
Payer: COMMERCIAL

## 2019-08-10 ENCOUNTER — APPOINTMENT (OUTPATIENT)
Dept: CT IMAGING | Age: 49
DRG: 390 | End: 2019-08-10
Payer: COMMERCIAL

## 2019-08-10 DIAGNOSIS — K56.7 ILEUS (HCC): Primary | ICD-10-CM

## 2019-08-10 PROCEDURE — 6360000004 HC RX CONTRAST MEDICATION: Performed by: EMERGENCY MEDICINE

## 2019-08-10 PROCEDURE — 96375 TX/PRO/DX INJ NEW DRUG ADDON: CPT

## 2019-08-10 PROCEDURE — 6360000002 HC RX W HCPCS: Performed by: EMERGENCY MEDICINE

## 2019-08-10 PROCEDURE — 2580000003 HC RX 258: Performed by: EMERGENCY MEDICINE

## 2019-08-10 PROCEDURE — 99285 EMERGENCY DEPT VISIT HI MDM: CPT

## 2019-08-10 PROCEDURE — 74177 CT ABD & PELVIS W/CONTRAST: CPT

## 2019-08-10 PROCEDURE — 96374 THER/PROPH/DIAG INJ IV PUSH: CPT

## 2019-08-10 RX ORDER — 0.9 % SODIUM CHLORIDE 0.9 %
1000 INTRAVENOUS SOLUTION INTRAVENOUS ONCE
Status: COMPLETED | OUTPATIENT
Start: 2019-08-10 | End: 2019-08-10

## 2019-08-10 RX ORDER — MORPHINE SULFATE 4 MG/ML
4 INJECTION, SOLUTION INTRAMUSCULAR; INTRAVENOUS ONCE
Status: COMPLETED | OUTPATIENT
Start: 2019-08-10 | End: 2019-08-10

## 2019-08-10 RX ORDER — ONDANSETRON 2 MG/ML
4 INJECTION INTRAMUSCULAR; INTRAVENOUS ONCE
Status: COMPLETED | OUTPATIENT
Start: 2019-08-10 | End: 2019-08-10

## 2019-08-10 RX ADMIN — SODIUM CHLORIDE 1000 ML: 9 INJECTION, SOLUTION INTRAVENOUS at 21:51

## 2019-08-10 RX ADMIN — MORPHINE SULFATE 4 MG: 4 INJECTION INTRAVENOUS at 21:51

## 2019-08-10 RX ADMIN — IOPAMIDOL 90 ML: 755 INJECTION, SOLUTION INTRAVENOUS at 23:47

## 2019-08-10 RX ADMIN — ONDANSETRON 4 MG: 2 INJECTION INTRAMUSCULAR; INTRAVENOUS at 21:51

## 2019-08-10 ASSESSMENT — ENCOUNTER SYMPTOMS
ABDOMINAL PAIN: 1
RHINORRHEA: 0
DIARRHEA: 0
CONSTIPATION: 1
SHORTNESS OF BREATH: 0
VOMITING: 0
BACK PAIN: 0
ABDOMINAL DISTENTION: 1
SORE THROAT: 0
NAUSEA: 0

## 2019-08-10 ASSESSMENT — PAIN SCALES - GENERAL
PAINLEVEL_OUTOF10: 8
PAINLEVEL_OUTOF10: 8
PAINLEVEL_OUTOF10: 5

## 2019-08-11 PROBLEM — K56.7 ILEUS (HCC): Status: ACTIVE | Noted: 2019-08-11

## 2019-08-11 LAB
ALBUMIN SERPL-MCNC: 3.08 G/DL (ref 3.75–5.01)
ALBUMIN SERPL-MCNC: 4.4 G/DL (ref 3.5–5.2)
ALP BLD-CCNC: 55 U/L (ref 40–130)
ALPHA-1-GLOBULIN: 0.32 G/DL (ref 0.19–0.46)
ALPHA-2-GLOBULIN: 0.63 G/DL (ref 0.48–1.05)
ALT SERPL-CCNC: 33 U/L (ref 5–41)
ANION GAP SERPL CALCULATED.3IONS-SCNC: 18 MMOL/L (ref 7–19)
APTT: 30.2 SEC (ref 26–36.2)
AST SERPL-CCNC: 19 U/L (ref 5–40)
BASOPHILS ABSOLUTE: 0.1 K/UL (ref 0–0.2)
BASOPHILS RELATIVE PERCENT: 0.4 % (ref 0–1)
BETA GLOBULIN: 0.69 G/DL (ref 0.48–1.1)
BILIRUB SERPL-MCNC: 0.7 MG/DL (ref 0.2–1.2)
BILIRUBIN URINE: ABNORMAL
BLOOD, URINE: NEGATIVE
BUN BLDV-MCNC: 10 MG/DL (ref 6–20)
CALCIUM SERPL-MCNC: 9.8 MG/DL (ref 8.6–10)
CHLORIDE BLD-SCNC: 95 MMOL/L (ref 98–111)
CLARITY: CLEAR
CO2: 24 MMOL/L (ref 22–29)
COLOR: YELLOW
CREAT SERPL-MCNC: 0.8 MG/DL (ref 0.5–1.2)
EOSINOPHILS ABSOLUTE: 0.2 K/UL (ref 0–0.6)
EOSINOPHILS RELATIVE PERCENT: 1.5 % (ref 0–5)
GAMMA GLOBULIN: 0.68 G/DL (ref 0.62–1.51)
GFR NON-AFRICAN AMERICAN: >60
GLUCOSE BLD-MCNC: 116 MG/DL (ref 70–99)
GLUCOSE BLD-MCNC: 124 MG/DL (ref 70–99)
GLUCOSE BLD-MCNC: 127 MG/DL (ref 70–99)
GLUCOSE BLD-MCNC: 140 MG/DL (ref 70–99)
GLUCOSE BLD-MCNC: 161 MG/DL (ref 70–99)
GLUCOSE BLD-MCNC: 170 MG/DL (ref 70–99)
GLUCOSE BLD-MCNC: 190 MG/DL (ref 74–109)
GLUCOSE URINE: 250 MG/DL
HBA1C MFR BLD: 8.5 % (ref 4–6)
HCT VFR BLD CALC: 51.5 % (ref 42–52)
HEMOGLOBIN: 17.7 G/DL (ref 14–18)
INR BLD: 1.04 (ref 0.88–1.18)
JAK2 GENE MUTATION QUAL: NOT DETECTED
KETONES, URINE: >=80 MG/DL
LACTIC ACID: 1 MMOL/L (ref 0.5–1.9)
LEUKOCYTE ESTERASE, URINE: NEGATIVE
LIPASE: 30 U/L (ref 13–60)
LYMPHOCYTES ABSOLUTE: 1.4 K/UL (ref 1.1–4.5)
LYMPHOCYTES RELATIVE PERCENT: 12 % (ref 20–40)
MCH RBC QN AUTO: 29 PG (ref 27–31)
MCHC RBC AUTO-ENTMCNC: 34.4 G/DL (ref 33–37)
MCV RBC AUTO: 84.4 FL (ref 80–94)
MONOCYTES ABSOLUTE: 1.1 K/UL (ref 0–0.9)
MONOCYTES RELATIVE PERCENT: 9.4 % (ref 0–10)
NEUTROPHILS ABSOLUTE: 8.6 K/UL (ref 1.5–7.5)
NEUTROPHILS RELATIVE PERCENT: 76.3 % (ref 50–65)
NITRITE, URINE: NEGATIVE
PDW BLD-RTO: 12.3 % (ref 11.5–14.5)
PERFORMED ON: ABNORMAL
PH UA: 6 (ref 5–8)
PLATELET # BLD: 209 K/UL (ref 130–400)
PMV BLD AUTO: 11.4 FL (ref 9.4–12.4)
POTASSIUM SERPL-SCNC: 3.6 MMOL/L (ref 3.5–5)
PROTEIN ELECTROPHORESIS, SERUM: ABNORMAL
PROTEIN UA: ABNORMAL MG/DL
PROTHROMBIN TIME: 13 SEC (ref 12–14.6)
RBC # BLD: 6.1 M/UL (ref 4.7–6.1)
SODIUM BLD-SCNC: 137 MMOL/L (ref 136–145)
SPE/IFE INTERPRETATION: ABNORMAL
SPECIFIC GRAVITY UA: 1.01 (ref 1–1.03)
TOTAL PROTEIN: 5.4 G/DL (ref 6–8.3)
TOTAL PROTEIN: 8 G/DL (ref 6.6–8.7)
URINE REFLEX TO CULTURE: ABNORMAL
UROBILINOGEN, URINE: 0.2 E.U./DL
WBC # BLD: 11.3 K/UL (ref 4.8–10.8)

## 2019-08-11 PROCEDURE — 2580000003 HC RX 258: Performed by: INTERNAL MEDICINE

## 2019-08-11 PROCEDURE — 6370000000 HC RX 637 (ALT 250 FOR IP): Performed by: INTERNAL MEDICINE

## 2019-08-11 PROCEDURE — 83605 ASSAY OF LACTIC ACID: CPT

## 2019-08-11 PROCEDURE — 6360000002 HC RX W HCPCS: Performed by: INTERNAL MEDICINE

## 2019-08-11 PROCEDURE — 1210000000 HC MED SURG R&B

## 2019-08-11 PROCEDURE — 85025 COMPLETE CBC W/AUTO DIFF WBC: CPT

## 2019-08-11 PROCEDURE — 82948 REAGENT STRIP/BLOOD GLUCOSE: CPT

## 2019-08-11 PROCEDURE — 6360000002 HC RX W HCPCS: Performed by: EMERGENCY MEDICINE

## 2019-08-11 PROCEDURE — 2580000003 HC RX 258: Performed by: EMERGENCY MEDICINE

## 2019-08-11 PROCEDURE — 85730 THROMBOPLASTIN TIME PARTIAL: CPT

## 2019-08-11 PROCEDURE — 83690 ASSAY OF LIPASE: CPT

## 2019-08-11 PROCEDURE — 83036 HEMOGLOBIN GLYCOSYLATED A1C: CPT

## 2019-08-11 PROCEDURE — 85610 PROTHROMBIN TIME: CPT

## 2019-08-11 PROCEDURE — 80053 COMPREHEN METABOLIC PANEL: CPT

## 2019-08-11 PROCEDURE — 81003 URINALYSIS AUTO W/O SCOPE: CPT

## 2019-08-11 PROCEDURE — 99252 IP/OBS CONSLTJ NEW/EST SF 35: CPT | Performed by: SURGERY

## 2019-08-11 PROCEDURE — 99285 EMERGENCY DEPT VISIT HI MDM: CPT | Performed by: EMERGENCY MEDICINE

## 2019-08-11 PROCEDURE — 36415 COLL VENOUS BLD VENIPUNCTURE: CPT

## 2019-08-11 PROCEDURE — 96375 TX/PRO/DX INJ NEW DRUG ADDON: CPT

## 2019-08-11 RX ORDER — DEXTROSE MONOHYDRATE 50 MG/ML
100 INJECTION, SOLUTION INTRAVENOUS PRN
Status: DISCONTINUED | OUTPATIENT
Start: 2019-08-11 | End: 2019-08-15 | Stop reason: HOSPADM

## 2019-08-11 RX ORDER — DEXTROSE MONOHYDRATE 25 G/50ML
12.5 INJECTION, SOLUTION INTRAVENOUS PRN
Status: DISCONTINUED | OUTPATIENT
Start: 2019-08-11 | End: 2019-08-15 | Stop reason: HOSPADM

## 2019-08-11 RX ORDER — NICOTINE POLACRILEX 4 MG
15 LOZENGE BUCCAL PRN
Status: DISCONTINUED | OUTPATIENT
Start: 2019-08-11 | End: 2019-08-15 | Stop reason: HOSPADM

## 2019-08-11 RX ORDER — LISINOPRIL 20 MG/1
40 TABLET ORAL DAILY
Status: DISCONTINUED | OUTPATIENT
Start: 2019-08-11 | End: 2019-08-15 | Stop reason: HOSPADM

## 2019-08-11 RX ORDER — SODIUM CHLORIDE 0.9 % (FLUSH) 0.9 %
10 SYRINGE (ML) INJECTION EVERY 12 HOURS SCHEDULED
Status: DISCONTINUED | OUTPATIENT
Start: 2019-08-11 | End: 2019-08-15 | Stop reason: HOSPADM

## 2019-08-11 RX ORDER — ACETAMINOPHEN 325 MG/1
650 TABLET ORAL EVERY 4 HOURS PRN
Status: DISCONTINUED | OUTPATIENT
Start: 2019-08-11 | End: 2019-08-15 | Stop reason: HOSPADM

## 2019-08-11 RX ORDER — SODIUM CHLORIDE 9 MG/ML
INJECTION, SOLUTION INTRAVENOUS CONTINUOUS
Status: DISCONTINUED | OUTPATIENT
Start: 2019-08-11 | End: 2019-08-15 | Stop reason: HOSPADM

## 2019-08-11 RX ORDER — ROSUVASTATIN CALCIUM 10 MG/1
5 TABLET, COATED ORAL
Status: DISCONTINUED | OUTPATIENT
Start: 2019-08-12 | End: 2019-08-15 | Stop reason: HOSPADM

## 2019-08-11 RX ORDER — KETOROLAC TROMETHAMINE 30 MG/ML
30 INJECTION, SOLUTION INTRAMUSCULAR; INTRAVENOUS EVERY 6 HOURS PRN
Status: DISCONTINUED | OUTPATIENT
Start: 2019-08-11 | End: 2019-08-15 | Stop reason: HOSPADM

## 2019-08-11 RX ORDER — MORPHINE SULFATE 2 MG/ML
1 INJECTION, SOLUTION INTRAMUSCULAR; INTRAVENOUS EVERY 6 HOURS PRN
Status: DISCONTINUED | OUTPATIENT
Start: 2019-08-11 | End: 2019-08-15 | Stop reason: HOSPADM

## 2019-08-11 RX ORDER — SODIUM CHLORIDE 0.9 % (FLUSH) 0.9 %
10 SYRINGE (ML) INJECTION PRN
Status: DISCONTINUED | OUTPATIENT
Start: 2019-08-11 | End: 2019-08-15 | Stop reason: HOSPADM

## 2019-08-11 RX ADMIN — KETOROLAC TROMETHAMINE 30 MG: 30 INJECTION, SOLUTION INTRAMUSCULAR; INTRAVENOUS at 18:29

## 2019-08-11 RX ADMIN — MORPHINE SULFATE 1 MG: 2 INJECTION, SOLUTION INTRAMUSCULAR; INTRAVENOUS at 23:39

## 2019-08-11 RX ADMIN — LISINOPRIL 40 MG: 20 TABLET ORAL at 17:12

## 2019-08-11 RX ADMIN — Medication 10 ML: at 07:55

## 2019-08-11 RX ADMIN — INSULIN LISPRO 1 UNITS: 100 INJECTION, SOLUTION INTRAVENOUS; SUBCUTANEOUS at 09:16

## 2019-08-11 RX ADMIN — HYDROMORPHONE HYDROCHLORIDE 1 MG: 1 INJECTION, SOLUTION INTRAMUSCULAR; INTRAVENOUS; SUBCUTANEOUS at 01:14

## 2019-08-11 RX ADMIN — SODIUM CHLORIDE: 9 INJECTION, SOLUTION INTRAVENOUS at 22:48

## 2019-08-11 RX ADMIN — MORPHINE SULFATE 1 MG: 2 INJECTION, SOLUTION INTRAMUSCULAR; INTRAVENOUS at 12:18

## 2019-08-11 RX ADMIN — SODIUM CHLORIDE: 9 INJECTION, SOLUTION INTRAVENOUS at 01:56

## 2019-08-11 RX ADMIN — ENOXAPARIN SODIUM 40 MG: 40 INJECTION SUBCUTANEOUS at 07:54

## 2019-08-11 RX ADMIN — SODIUM CHLORIDE: 9 INJECTION, SOLUTION INTRAVENOUS at 12:18

## 2019-08-11 RX ADMIN — KETOROLAC TROMETHAMINE 30 MG: 30 INJECTION, SOLUTION INTRAMUSCULAR; INTRAVENOUS at 06:48

## 2019-08-11 RX ADMIN — Medication 10 ML: at 22:21

## 2019-08-11 ASSESSMENT — ENCOUNTER SYMPTOMS
EYE REDNESS: 0
ABDOMINAL DISTENTION: 1
SORE THROAT: 1
SHORTNESS OF BREATH: 0
WHEEZING: 0
ABDOMINAL PAIN: 1
NAUSEA: 1
BACK PAIN: 1
EYE PAIN: 0
VOMITING: 1
COUGH: 0

## 2019-08-11 ASSESSMENT — PAIN DESCRIPTION - PROGRESSION
CLINICAL_PROGRESSION: GRADUALLY IMPROVING

## 2019-08-11 ASSESSMENT — PAIN SCALES - GENERAL
PAINLEVEL_OUTOF10: 5
PAINLEVEL_OUTOF10: 5
PAINLEVEL_OUTOF10: 4
PAINLEVEL_OUTOF10: 5
PAINLEVEL_OUTOF10: 7
PAINLEVEL_OUTOF10: 5
PAINLEVEL_OUTOF10: 3

## 2019-08-11 ASSESSMENT — PAIN - FUNCTIONAL ASSESSMENT: PAIN_FUNCTIONAL_ASSESSMENT: PREVENTS OR INTERFERES SOME ACTIVE ACTIVITIES AND ADLS

## 2019-08-11 ASSESSMENT — PAIN DESCRIPTION - DESCRIPTORS: DESCRIPTORS: CRAMPING

## 2019-08-11 ASSESSMENT — PAIN DESCRIPTION - ORIENTATION: ORIENTATION: MID

## 2019-08-11 ASSESSMENT — PAIN DESCRIPTION - PAIN TYPE: TYPE: ACUTE PAIN

## 2019-08-11 ASSESSMENT — PAIN DESCRIPTION - LOCATION: LOCATION: ABDOMEN

## 2019-08-11 ASSESSMENT — PAIN DESCRIPTION - FREQUENCY: FREQUENCY: CONTINUOUS

## 2019-08-11 ASSESSMENT — PAIN DESCRIPTION - ONSET: ONSET: ON-GOING

## 2019-08-11 NOTE — H&P
Internal Medicine History and Physical      Name: Claudeen Bigness  MRN: [id number]     Acct: [de-identified]  Room: [address] [place] [address]    Admit Date: 8/10/2019  PCP: No primary care provider on file. Chief Complaint:     Chief Complaint   Patient presents with    Abdominal Pain     c/o abd pain with constipation and recently dx SBO. Discharged from hospital yesterday. History Obtained From:     Patient; review of records    History of Present Illness:      Claudeen Bigness is a 51 y.o.  male who presented to [facility] Emergency Department with abdominal pain. Recently discharged from [facility] 8/9 after a 2day hospitalization for SBO. Went home on a regular diet. Had flatus and BMs that night & AM.  Had brunch with turkey sandwich. However, has had worsening abdominal pain and distension. No nausea or vomiting. No fever or chills. In ED, CT with continued partial SBO. NGT placed. Patient did have stool output overnight. Past Medical History:     Code Status:  Full Code  Past Medical History:   Diagnosis Date    Colitis     Diabetes mellitus ([address]     Gallbladder disease     Hyperlipidemia     Hypertension     Migraine     Sleep apnea     snores        Past Surgical History:     Past Surgical History:   Procedure Laterality Date    CHOLECYSTECTOMY, LAPAROSCOPIC N/A 3/27/2019    CHOLECYSTECTOMY LAPAROSCOPIC performed by Kitty Nunez MD at [address]        Medications Prior to Admission:       Prior to Admission medications    Medication Sig Start Date End Date Taking?  Authorizing Provider   lisinopril (PRINIVIL;ZESTRIL) 40 MG tablet Take 40 mg by mouth daily   Yes Historical Provider, MD   Insulin Lispro (HUMALOG SC) Inject into the skin Sliding scale   Yes Historical Provider, MD   lactobacillus (CULTURELLE) CAPS capsule Take 1 capsule by mouth 2 times daily (with meals)  Patient taking differently: Take 1 capsule by mouth daily  4/2/19  Yes [facility] Problems    Diagnosis Date Noted    [facility]us ([facility] [K56.7] 08/11/2019    Diabetes mellitus, type II, insulin dependent ([facility] [E11.9, Z79.4] 03/31/2019    Essential hypertension [I10] 03/31/2019       Plan:     1. Bowel rest.  NPO.  IVF hydration. 2. Monitor with serial abd exams. Will notify Dr. Carmel Valle of patient admission. 3. Hold basal while NPO. Monitor glc & cover with SSI. 4. BP ok. Electronically signed by Maria Teresa Navarro MD on 8/11/2019 at 12:54 PM     Copy to be sent to Dr. Berkowitz primary care provider on file.

## 2019-08-11 NOTE — CONSULTS
decompression, f/u KUB in am, and NGT clamping trial in the AM.   2) His recurrence could definitely be due to advancing his diet too quickly. However, he has had surgery in the past several months, so a CT enterography may be considered as well. He has been seen by GI since his surgery and had a colonoscopy with benign polyps since his cholecystectomy.   3) Continue other cares and diabetes management per primary team

## 2019-08-11 NOTE — ED PROVIDER NOTES
 Colitis     Diabetes mellitus ([facility]     Gallbladder disease     Hyperlipidemia     Hypertension     Sleep apnea     snores         SURGICAL HISTORY       Past Surgical History:   Procedure Laterality Date    CHOLECYSTECTOMY, LAPAROSCOPIC N/A 3/27/2019    CHOLECYSTECTOMY LAPAROSCOPIC performed by Rima Stark MD at [facility]     VASECTOMY           CURRENT MEDICATIONS       Previous Medications    DULAGLUTIDE (TRULICITY SC)    Inject 0.75 mg into the skin once a week Indications: Wednesday     INSULIN GLARGINE (LANTUS) 100 UNIT/ML INJECTION VIAL    Inject 100 Units into the skin every morning (before breakfast)     INSULIN LISPRO (HUMALOG SC)    Inject into the skin Sliding scale    LACTOBACILLUS (CULTURELLE) CAPS CAPSULE    Take 1 capsule by mouth 2 times daily (with meals)    LISINOPRIL (PRINIVIL;ZESTRIL) 40 MG TABLET    Take 40 mg by mouth daily    ROSUVASTATIN (CRESTOR) 5 MG TABLET    Take 5 mg by mouth every morning (before breakfast) Indications: Changes in Cholesterol     TRAMADOL-ACETAMINOPHEN (ULTRACET) 37.5-325 MG PER TABLET    Take 1 tablet by mouth every 6 hours as needed. ALLERGIES     Bee venom; Mushroom extract complex; and Other    FAMILY HISTORY       Family History   Problem Relation Age of Onset    Diabetes Mother     Heart Disease Mother     High Blood Pressure Mother     Diabetes Sister           SOCIAL HISTORY       Social History     Socioeconomic History    Marital status:      Spouse name: None    Number of children: None    Years of education: None    Highest education level: None   Occupational History    None   Social Needs    Financial resource strain: None    Food insecurity:     Worry: None     Inability: None    Transportation needs:     Medical: None     Non-medical: None   Tobacco Use    Smoking status: Never Smoker    Smokeless tobacco: Never Used   Substance and Sexual Activity    Alcohol use: Yes     Comment: occ.

## 2019-08-12 ENCOUNTER — APPOINTMENT (OUTPATIENT)
Dept: GENERAL RADIOLOGY | Age: 49
DRG: 390 | End: 2019-08-12
Payer: COMMERCIAL

## 2019-08-12 LAB
ANION GAP SERPL CALCULATED.3IONS-SCNC: 14 MMOL/L (ref 7–19)
BASOPHILS ABSOLUTE: 0 K/UL (ref 0–0.2)
BASOPHILS RELATIVE PERCENT: 0.5 % (ref 0–1)
BUN BLDV-MCNC: 14 MG/DL (ref 6–20)
CALCIUM SERPL-MCNC: 8.3 MG/DL (ref 8.6–10)
CHLORIDE BLD-SCNC: 104 MMOL/L (ref 98–111)
CO2: 25 MMOL/L (ref 22–29)
CREAT SERPL-MCNC: 0.9 MG/DL (ref 0.5–1.2)
EOSINOPHILS ABSOLUTE: 0.3 K/UL (ref 0–0.6)
EOSINOPHILS RELATIVE PERCENT: 3.7 % (ref 0–5)
GFR NON-AFRICAN AMERICAN: >60
GLUCOSE BLD-MCNC: 119 MG/DL (ref 70–99)
GLUCOSE BLD-MCNC: 128 MG/DL (ref 70–99)
GLUCOSE BLD-MCNC: 132 MG/DL (ref 74–109)
GLUCOSE BLD-MCNC: 134 MG/DL (ref 70–99)
GLUCOSE BLD-MCNC: 136 MG/DL (ref 70–99)
HCT VFR BLD CALC: 44.2 % (ref 42–52)
HEMOGLOBIN: 14.9 G/DL (ref 14–18)
LYMPHOCYTES ABSOLUTE: 1.8 K/UL (ref 1.1–4.5)
LYMPHOCYTES RELATIVE PERCENT: 22.3 % (ref 20–40)
MCH RBC QN AUTO: 29 PG (ref 27–31)
MCHC RBC AUTO-ENTMCNC: 33.7 G/DL (ref 33–37)
MCV RBC AUTO: 86 FL (ref 80–94)
MONOCYTES ABSOLUTE: 0.9 K/UL (ref 0–0.9)
MONOCYTES RELATIVE PERCENT: 10.8 % (ref 0–10)
NEUTROPHILS ABSOLUTE: 5 K/UL (ref 1.5–7.5)
NEUTROPHILS RELATIVE PERCENT: 62.4 % (ref 50–65)
PDW BLD-RTO: 12.3 % (ref 11.5–14.5)
PERFORMED ON: ABNORMAL
PLATELET # BLD: 183 K/UL (ref 130–400)
PMV BLD AUTO: 12.1 FL (ref 9.4–12.4)
POTASSIUM SERPL-SCNC: 3.7 MMOL/L (ref 3.5–5)
RBC # BLD: 5.14 M/UL (ref 4.7–6.1)
SODIUM BLD-SCNC: 143 MMOL/L (ref 136–145)
WBC # BLD: 7.9 K/UL (ref 4.8–10.8)

## 2019-08-12 PROCEDURE — 36415 COLL VENOUS BLD VENIPUNCTURE: CPT

## 2019-08-12 PROCEDURE — 74018 RADEX ABDOMEN 1 VIEW: CPT

## 2019-08-12 PROCEDURE — 82948 REAGENT STRIP/BLOOD GLUCOSE: CPT

## 2019-08-12 PROCEDURE — 6360000002 HC RX W HCPCS: Performed by: INTERNAL MEDICINE

## 2019-08-12 PROCEDURE — 80048 BASIC METABOLIC PNL TOTAL CA: CPT

## 2019-08-12 PROCEDURE — 2580000003 HC RX 258: Performed by: EMERGENCY MEDICINE

## 2019-08-12 PROCEDURE — 99231 SBSQ HOSP IP/OBS SF/LOW 25: CPT | Performed by: SURGERY

## 2019-08-12 PROCEDURE — 2580000003 HC RX 258: Performed by: INTERNAL MEDICINE

## 2019-08-12 PROCEDURE — 6370000000 HC RX 637 (ALT 250 FOR IP): Performed by: INTERNAL MEDICINE

## 2019-08-12 PROCEDURE — 85025 COMPLETE CBC W/AUTO DIFF WBC: CPT

## 2019-08-12 PROCEDURE — 1210000000 HC MED SURG R&B

## 2019-08-12 RX ADMIN — Medication 10 ML: at 08:42

## 2019-08-12 RX ADMIN — KETOROLAC TROMETHAMINE 30 MG: 30 INJECTION, SOLUTION INTRAMUSCULAR; INTRAVENOUS at 12:43

## 2019-08-12 RX ADMIN — MORPHINE SULFATE 1 MG: 2 INJECTION, SOLUTION INTRAMUSCULAR; INTRAVENOUS at 08:39

## 2019-08-12 RX ADMIN — MORPHINE SULFATE 1 MG: 2 INJECTION, SOLUTION INTRAMUSCULAR; INTRAVENOUS at 20:24

## 2019-08-12 RX ADMIN — Medication 10 ML: at 20:24

## 2019-08-12 RX ADMIN — LISINOPRIL 40 MG: 20 TABLET ORAL at 08:39

## 2019-08-12 RX ADMIN — SODIUM CHLORIDE: 9 INJECTION, SOLUTION INTRAVENOUS at 20:26

## 2019-08-12 RX ADMIN — ENOXAPARIN SODIUM 40 MG: 40 INJECTION SUBCUTANEOUS at 08:39

## 2019-08-12 ASSESSMENT — PAIN DESCRIPTION - ORIENTATION
ORIENTATION: MID
ORIENTATION: MID

## 2019-08-12 ASSESSMENT — PAIN DESCRIPTION - LOCATION
LOCATION: ABDOMEN

## 2019-08-12 ASSESSMENT — PAIN SCALES - GENERAL
PAINLEVEL_OUTOF10: 7
PAINLEVEL_OUTOF10: 4
PAINLEVEL_OUTOF10: 5
PAINLEVEL_OUTOF10: 7
PAINLEVEL_OUTOF10: 3

## 2019-08-12 ASSESSMENT — PAIN DESCRIPTION - DESCRIPTORS
DESCRIPTORS: CRAMPING
DESCRIPTORS: CRAMPING

## 2019-08-12 ASSESSMENT — PAIN DESCRIPTION - PAIN TYPE
TYPE: ACUTE PAIN
TYPE: ACUTE PAIN

## 2019-08-12 ASSESSMENT — PAIN DESCRIPTION - FREQUENCY
FREQUENCY: CONTINUOUS
FREQUENCY: CONTINUOUS

## 2019-08-12 ASSESSMENT — PAIN DESCRIPTION - PROGRESSION: CLINICAL_PROGRESSION: GRADUALLY WORSENING

## 2019-08-12 ASSESSMENT — PAIN DESCRIPTION - ONSET: ONSET: GRADUAL

## 2019-08-12 NOTE — PLAN OF CARE
Problem: Falls - Risk of:  Goal: Will remain free from falls  Description  Will remain free from falls  Outcome: Ongoing  Goal: Absence of physical injury  Description  Absence of physical injury  Outcome: Ongoing     Problem: Pain:  Goal: Pain level will decrease  Description  Pain level will decrease  Outcome: Ongoing  Goal: Control of acute pain  Description  Control of acute pain  Outcome: Ongoing  Goal: Control of chronic pain  Description  Control of chronic pain  Outcome: Ongoing     Problem: Infection:  Goal: Will remain free from infection  Description  Will remain free from infection  Outcome: Ongoing     Problem: Safety:  Goal: Free from accidental physical injury  Description  Free from accidental physical injury  Outcome: Ongoing  Goal: Free from intentional harm  Description  Free from intentional harm  Outcome: Ongoing     Problem: Daily Care:  Goal: Daily care needs are met  Description  Daily care needs are met  Outcome: Ongoing     Problem: Skin Integrity:  Goal: Skin integrity will stabilize  Description  Skin integrity will stabilize  Outcome: Ongoing     Problem: Discharge Planning:  Goal: Patients continuum of care needs are met  Description  Patients continuum of care needs are met  Outcome: Ongoing     Problem: Nutrition Deficit:  Goal: Ability to achieve adequate nutritional intake will improve  Description  Ability to achieve adequate nutritional intake will improve  Outcome: Ongoing

## 2019-08-12 NOTE — PROGRESS NOTES
and in no distress and oriented to person, place, and time  Mental status - alert, oriented to person, place, and time, normal mood, behavior, speech, dress, motor activity, and thought processes  Eyes - pupils equal and reactive, extraocular eye movements intact  Ears - bilateral TM's and external ear canals normal, hearing grossly normal bilaterally  Mouth - mucous membranes moist, pharynx normal without lesions  Neck - supple, no significant adenopathy  Lymphatics - no palpable lymphadenopathy, no hepatosplenomegaly  Chest - clear to auscultation, no wheezes, rales or rhonchi, symmetric air entry, no tachypnea, retractions or cyanosis  Heart - normal rate, regular rhythm, normal S1, S2, no murmurs, rubs, clicks or gallops  Abdomen - soft, hypoactive bowel sounds, nontender  Neurological - alert, oriented, normal speech, no focal findings or movement disorder noted  Musculoskeletal - no joint tenderness, deformity or swelling  Extremities - peripheral pulses normal, no pedal edema, no clubbing or cyanosis  Skin - normal coloration and turgor, no rashes, no suspicious skin lesions noted      Assessment and Plan:     Primary Problem:  Ileus ([address]  [facility]  Hospital Problem list:  Principal Problem:    Ileus ([address]  Active Problems:    Diabetes mellitus, type II, insulin dependent ([address]    Essential hypertension  Resolved Problems:    * No resolved hospital problems. *      PMH:  Past Medical History:   Diagnosis Date    Colitis     Diabetes mellitus ([address]     Gallbladder disease     Hyperlipidemia     Hypertension     Migraine     Sleep apnea     snores       Treatment Plan:  CONTINUE NPO  IVF  IV ANALGESIA  IV ANTIEMETICS    Discharge planning:   Home    Reviewed treatment plans with the patient and/or family. 30 minutes spent in face to face interaction and coordination of care.      Electronically signed by Rosmery Mauricio MD on 8/12/2019 at 7:32 AM

## 2019-08-13 ENCOUNTER — APPOINTMENT (OUTPATIENT)
Dept: CT IMAGING | Age: 49
DRG: 390 | End: 2019-08-13
Payer: COMMERCIAL

## 2019-08-13 LAB
C-REACTIVE PROTEIN: 2.48 MG/DL (ref 0–0.5)
GLUCOSE BLD-MCNC: 110 MG/DL (ref 70–99)
GLUCOSE BLD-MCNC: 133 MG/DL (ref 70–99)
GLUCOSE BLD-MCNC: 135 MG/DL (ref 70–99)
GLUCOSE BLD-MCNC: 137 MG/DL (ref 70–99)
PERFORMED ON: ABNORMAL
SEDIMENTATION RATE, ERYTHROCYTE: 23 MM/HR (ref 0–10)
TSH REFLEX FT4: 3.44 UIU/ML (ref 0.35–5.5)

## 2019-08-13 PROCEDURE — 6360000002 HC RX W HCPCS: Performed by: INTERNAL MEDICINE

## 2019-08-13 PROCEDURE — 99221 1ST HOSP IP/OBS SF/LOW 40: CPT | Performed by: PHYSICIAN ASSISTANT

## 2019-08-13 PROCEDURE — 86140 C-REACTIVE PROTEIN: CPT

## 2019-08-13 PROCEDURE — 6370000000 HC RX 637 (ALT 250 FOR IP): Performed by: INTERNAL MEDICINE

## 2019-08-13 PROCEDURE — 74177 CT ABD & PELVIS W/CONTRAST: CPT

## 2019-08-13 PROCEDURE — 2580000003 HC RX 258: Performed by: INTERNAL MEDICINE

## 2019-08-13 PROCEDURE — 36415 COLL VENOUS BLD VENIPUNCTURE: CPT

## 2019-08-13 PROCEDURE — 82948 REAGENT STRIP/BLOOD GLUCOSE: CPT

## 2019-08-13 PROCEDURE — 2580000003 HC RX 258: Performed by: EMERGENCY MEDICINE

## 2019-08-13 PROCEDURE — 85652 RBC SED RATE AUTOMATED: CPT

## 2019-08-13 PROCEDURE — 6360000002 HC RX W HCPCS: Performed by: SURGERY

## 2019-08-13 PROCEDURE — 6360000004 HC RX CONTRAST MEDICATION: Performed by: SURGERY

## 2019-08-13 PROCEDURE — 2580000003 HC RX 258: Performed by: SURGERY

## 2019-08-13 PROCEDURE — 1210000000 HC MED SURG R&B

## 2019-08-13 PROCEDURE — 84443 ASSAY THYROID STIM HORMONE: CPT

## 2019-08-13 PROCEDURE — C9113 INJ PANTOPRAZOLE SODIUM, VIA: HCPCS | Performed by: SURGERY

## 2019-08-13 RX ORDER — 0.9 % SODIUM CHLORIDE 0.9 %
10 VIAL (ML) INJECTION DAILY
Status: DISCONTINUED | OUTPATIENT
Start: 2019-08-13 | End: 2019-08-15 | Stop reason: HOSPADM

## 2019-08-13 RX ORDER — PANTOPRAZOLE SODIUM 40 MG/10ML
40 INJECTION, POWDER, LYOPHILIZED, FOR SOLUTION INTRAVENOUS DAILY
Status: DISCONTINUED | OUTPATIENT
Start: 2019-08-13 | End: 2019-08-15 | Stop reason: HOSPADM

## 2019-08-13 RX ADMIN — Medication 10 ML: at 19:42

## 2019-08-13 RX ADMIN — SODIUM CHLORIDE 10 ML: 9 INJECTION, SOLUTION INTRAMUSCULAR; INTRAVENOUS; SUBCUTANEOUS at 13:42

## 2019-08-13 RX ADMIN — IOPAMIDOL 90 ML: 755 INJECTION, SOLUTION INTRAVENOUS at 14:44

## 2019-08-13 RX ADMIN — SODIUM CHLORIDE: 9 INJECTION, SOLUTION INTRAVENOUS at 06:30

## 2019-08-13 RX ADMIN — LISINOPRIL 40 MG: 20 TABLET ORAL at 08:46

## 2019-08-13 RX ADMIN — KETOROLAC TROMETHAMINE 30 MG: 30 INJECTION, SOLUTION INTRAMUSCULAR; INTRAVENOUS at 19:42

## 2019-08-13 RX ADMIN — PANTOPRAZOLE SODIUM 40 MG: 40 INJECTION, POWDER, FOR SOLUTION INTRAVENOUS at 13:40

## 2019-08-13 RX ADMIN — ENOXAPARIN SODIUM 40 MG: 40 INJECTION SUBCUTANEOUS at 08:46

## 2019-08-13 RX ADMIN — ROSUVASTATIN CALCIUM 5 MG: 10 TABLET, FILM COATED ORAL at 06:56

## 2019-08-13 RX ADMIN — Medication 10 ML: at 08:47

## 2019-08-13 ASSESSMENT — ENCOUNTER SYMPTOMS
ALLERGIC/IMMUNOLOGIC NEGATIVE: 1
CONSTIPATION: 1
APNEA: 0
NAUSEA: 1
VOMITING: 0
DIARRHEA: 1
ABDOMINAL PAIN: 1
BACK PAIN: 0
ABDOMINAL DISTENTION: 1
EYES NEGATIVE: 1
SHORTNESS OF BREATH: 0
WHEEZING: 0

## 2019-08-13 ASSESSMENT — PAIN - FUNCTIONAL ASSESSMENT: PAIN_FUNCTIONAL_ASSESSMENT: ACTIVITIES ARE NOT PREVENTED

## 2019-08-13 ASSESSMENT — PAIN DESCRIPTION - FREQUENCY: FREQUENCY: INTERMITTENT

## 2019-08-13 ASSESSMENT — PAIN DESCRIPTION - LOCATION: LOCATION: HEAD

## 2019-08-13 ASSESSMENT — PAIN SCALES - GENERAL
PAINLEVEL_OUTOF10: 0
PAINLEVEL_OUTOF10: 5

## 2019-08-13 ASSESSMENT — PAIN DESCRIPTION - DESCRIPTORS: DESCRIPTORS: ACHING;NAGGING

## 2019-08-13 ASSESSMENT — PAIN DESCRIPTION - ONSET: ONSET: GRADUAL

## 2019-08-13 ASSESSMENT — PAIN DESCRIPTION - ORIENTATION: ORIENTATION: RIGHT;ANTERIOR

## 2019-08-13 ASSESSMENT — PAIN DESCRIPTION - PAIN TYPE: TYPE: ACUTE PAIN

## 2019-08-13 NOTE — CONSULTS
Inpatient consult to General Surgery  Consult performed by: Maty Hodge PA-C  Consult ordered by: Sophia Christianson MD                [facility] GENERAL SURGERY PROGRESS NOTE    HPI:  Mr. Clarisse Valenzuela is pleasant 52 y/o, w/m that was d/c'ed yesterday after treatment for PSBO. He was d/c'ed home on [place] and unfortunately got worse last night with recurrent abd. cramps and n/v. Now readmitted with NGT for recurrent PSBO. Had about 600 ml in NGT canister with pt. feeling better. Passing flatus and small amount of diarrhea stool. Has only had a Lap. Sushma. and an inguinal hernia repair in the past.   Recent colonoscopy was ess. normal with a couple of benign polyps removed. Currently scheduled for CT enterography this AM.     Allergies: Bee venom;  Mushroom extract complex; and Other      Current Facility-Administered Medications   Medication Dose Route Frequency Provider Last Rate Last Dose    0.9 % sodium chloride infusion   Intravenous Continuous Frantz Gomez  mL/hr at 08/13/19 0630      sodium chloride flush 0.9 % injection 10 mL  10 mL Intravenous 2 times per day Xochitl Navarro MD   10 mL at 08/13/19 0847    sodium chloride flush 0.9 % injection 10 mL  10 mL Intravenous PRN Xochitl Navarro MD        acetaminophen (TYLENOL) tablet 650 mg  650 mg Oral Q4H PRN Xochitl Navarro MD        enoxaparin (LOVENOX) injection 40 mg  40 mg Subcutaneous Daily Xochitl Navarro MD   40 mg at 08/13/19 0846    ketorolac (TORADOL) injection 30 mg  30 mg Intravenous Q6H PRN Xochitl Navarro MD   30 mg at 08/12/19 1243    morphine (PF) injection 1 mg  1 mg Intravenous Q6H PRN Xochitl Navarro MD   1 mg at 08/12/19 2024    glucose (GLUTOSE) 40 % oral gel 15 g  15 g Oral PRN Xochitl Navarro MD        dextrose 50 % IV solution  12.5 g Intravenous PRN Xochitl Navarro MD        glucagon (rDNA) injection 1 mg  1 mg Intramuscular PRN Xochitl Navarro MD        dextrose 5 % solution  100 mL/hr Intravenous PRN Xochitl Navarro MD  insulin lispro (HUMALOG) injection vial 0-6 Units  0-6 Units Subcutaneous Q4H Thierry Astudillo MD   Stopped at 08/12/19 1635    lisinopril (PRINIVIL;ZESTRIL) tablet 40 mg  40 mg Oral Daily Thierry Astudillo MD   40 mg at 08/13/19 0846    rosuvastatin (CRESTOR) tablet 5 mg  5 mg Oral QAM AC Thierry Astudillo MD   5 mg at 08/13/19 9973       Past Surgical History:   Procedure Laterality Date    CHOLECYSTECTOMY, LAPAROSCOPIC N/A 3/27/2019    CHOLECYSTECTOMY LAPAROSCOPIC performed by Suad Lyon MD at [address]         Past Medical History:   Diagnosis Date    Colitis     Diabetes mellitus ([address]     Gallbladder disease     Hyperlipidemia     Hypertension     Migraine     Sleep apnea     snores       Family History   Problem Relation Age of Onset    Diabetes Mother     Heart Disease Mother     High Blood Pressure Mother     Diabetes Sister        Social History     Tobacco Use    Smoking status: Never Smoker    Smokeless tobacco: Never Used   Substance Use Topics    Alcohol use: Yes     Comment: occ. Lab Results   Component Value Date    WBC 7.9 08/12/2019    HGB 14.9 08/12/2019    HCT 44.2 08/12/2019    MCV 86.0 08/12/2019     08/12/2019     Lab Results   Component Value Date     08/12/2019    K 3.7 08/12/2019     08/12/2019    CO2 25 08/12/2019    BUN 14 08/12/2019    CREATININE 0.9 08/12/2019    GLUCOSE 132 (H) 08/12/2019    CALCIUM 8.3 (L) 08/12/2019    PROT 8.0 08/11/2019    LABALBU 4.4 08/11/2019    BILITOT 0.7 08/11/2019    ALKPHOS 55 08/11/2019    AST 19 08/11/2019    ALT 33 08/11/2019    LABGLOM >60 08/12/2019             Review of Systems   Constitutional: Negative for chills, fatigue, fever and unexpected weight change. HENT: Negative. Eyes: Negative. Respiratory: Negative for apnea, shortness of breath and wheezing. Cardiovascular: Negative for chest pain, palpitations and leg swelling.    Gastrointestinal: Positive

## 2019-08-13 NOTE — PROGRESS NOTES
Never Smoker    Smokeless tobacco: Never Used   Substance and Sexual Activity    Alcohol use: Yes     Comment: occ.     Drug use: Never    Sexual activity: Not on file   Lifestyle    Physical activity:     Days per week: Not on file     Minutes per session: Not on file    Stress: Not on file   Relationships    Social connections:     Talks on phone: Not on file     Gets together: Not on file     Attends Confucianist service: Not on file     Active member of club or organization: Not on file     Attends meetings of clubs or organizations: Not on file     Relationship status: Not on file    Intimate partner violence:     Fear of current or ex partner: Not on file     Emotionally abused: Not on file     Physically abused: Not on file     Forced sexual activity: Not on file   Other Topics Concern    Not on file   Social History Narrative    Not on file       Labs:  Hematology:  Recent Labs     19  0619 19  0623 19  0242   WBC 11.3*  --  7.9   HGB 17.7  --  14.9   HCT 51.5  --  44.2     --  183   INR  --  1.04  --      Chemistry:  Recent Labs     19  0559 19  0242    143   K 3.6 3.7   CL 95* 104   CO2 24 25   GLUCOSE 190* 132*   BUN 10 14   CREATININE 0.8 0.9   ANIONGAP 18 14   LABGLOM >60 >60   CALCIUM 9.8 8.3*     Recent Labs     19  0559   PROT 8.0   LABALBU 4.4   LABA1C 8.5*   AST 19   ALT 33   ALKPHOS 55   BILITOT 0.7   LIPASE 30       Objective:     Vitals: BP (!) 159/88   Pulse 83   Temp 98.2 °F (36.8 °C) (Temporal)   Resp 16   Ht 5' 9\" (1.753 m)   Wt 230 lb (104.3 kg)   SpO2 93%   BMI 33.97 kg/m²      Intake/Output Summary (Last 24 hours) at 2019 0800  Last data filed at 2019 0004  Gross per 24 hour   Intake 800 ml   Output 1800 ml   Net -1000 ml    Temp (24hrs), Av °F (36.7 °C), Min:97.4 °F (36.3 °C), Max:98.6 °F (37 °C)    Glucose:  Recent Labs     19  0750 19  1154 19  1554 19  0727   POCGLU 128* 136* 134* 135*

## 2019-08-14 LAB
GLUCOSE BLD-MCNC: 100 MG/DL (ref 70–99)
GLUCOSE BLD-MCNC: 112 MG/DL (ref 70–99)
GLUCOSE BLD-MCNC: 115 MG/DL (ref 70–99)
GLUCOSE BLD-MCNC: 170 MG/DL (ref 70–99)
PERFORMED ON: ABNORMAL

## 2019-08-14 PROCEDURE — 2580000003 HC RX 258: Performed by: INTERNAL MEDICINE

## 2019-08-14 PROCEDURE — 86671 FUNGUS NES ANTIBODY: CPT

## 2019-08-14 PROCEDURE — 2580000003 HC RX 258: Performed by: EMERGENCY MEDICINE

## 2019-08-14 PROCEDURE — 82948 REAGENT STRIP/BLOOD GLUCOSE: CPT

## 2019-08-14 PROCEDURE — 6370000000 HC RX 637 (ALT 250 FOR IP): Performed by: FAMILY MEDICINE

## 2019-08-14 PROCEDURE — 1210000000 HC MED SURG R&B

## 2019-08-14 PROCEDURE — 6360000002 HC RX W HCPCS: Performed by: SURGERY

## 2019-08-14 PROCEDURE — 6370000000 HC RX 637 (ALT 250 FOR IP): Performed by: INTERNAL MEDICINE

## 2019-08-14 PROCEDURE — 6360000002 HC RX W HCPCS: Performed by: INTERNAL MEDICINE

## 2019-08-14 PROCEDURE — 2580000003 HC RX 258: Performed by: SURGERY

## 2019-08-14 PROCEDURE — C9113 INJ PANTOPRAZOLE SODIUM, VIA: HCPCS | Performed by: SURGERY

## 2019-08-14 PROCEDURE — 99232 SBSQ HOSP IP/OBS MODERATE 35: CPT | Performed by: SURGERY

## 2019-08-14 RX ORDER — AMLODIPINE BESYLATE 5 MG/1
5 TABLET ORAL DAILY
Status: DISCONTINUED | OUTPATIENT
Start: 2019-08-14 | End: 2019-08-15 | Stop reason: HOSPADM

## 2019-08-14 RX ORDER — BUTALBITAL, ACETAMINOPHEN AND CAFFEINE 50; 325; 40 MG/1; MG/1; MG/1
1 TABLET ORAL EVERY 4 HOURS PRN
Status: DISCONTINUED | OUTPATIENT
Start: 2019-08-14 | End: 2019-08-15 | Stop reason: HOSPADM

## 2019-08-14 RX ORDER — CLONIDINE HYDROCHLORIDE 0.1 MG/1
0.1 TABLET ORAL EVERY 4 HOURS PRN
Status: DISCONTINUED | OUTPATIENT
Start: 2019-08-14 | End: 2019-08-15 | Stop reason: HOSPADM

## 2019-08-14 RX ADMIN — SODIUM CHLORIDE 10 ML: 9 INJECTION, SOLUTION INTRAMUSCULAR; INTRAVENOUS; SUBCUTANEOUS at 08:58

## 2019-08-14 RX ADMIN — MORPHINE SULFATE 1 MG: 2 INJECTION, SOLUTION INTRAMUSCULAR; INTRAVENOUS at 00:55

## 2019-08-14 RX ADMIN — KETOROLAC TROMETHAMINE 30 MG: 30 INJECTION, SOLUTION INTRAMUSCULAR; INTRAVENOUS at 05:35

## 2019-08-14 RX ADMIN — ROSUVASTATIN CALCIUM 5 MG: 10 TABLET, FILM COATED ORAL at 07:43

## 2019-08-14 RX ADMIN — AMLODIPINE BESYLATE 5 MG: 5 TABLET ORAL at 20:34

## 2019-08-14 RX ADMIN — ACETAMINOPHEN 650 MG: 325 TABLET ORAL at 18:38

## 2019-08-14 RX ADMIN — BUTALBITAL, ACETAMINOPHEN, AND CAFFEINE 1 TABLET: 50; 325; 40 TABLET ORAL at 20:35

## 2019-08-14 RX ADMIN — ENOXAPARIN SODIUM 40 MG: 40 INJECTION SUBCUTANEOUS at 07:50

## 2019-08-14 RX ADMIN — PANTOPRAZOLE SODIUM 40 MG: 40 INJECTION, POWDER, FOR SOLUTION INTRAVENOUS at 07:43

## 2019-08-14 RX ADMIN — SODIUM CHLORIDE: 9 INJECTION, SOLUTION INTRAVENOUS at 12:56

## 2019-08-14 RX ADMIN — Medication 10 ML: at 20:36

## 2019-08-14 RX ADMIN — LISINOPRIL 40 MG: 20 TABLET ORAL at 07:43

## 2019-08-14 ASSESSMENT — PAIN SCALES - GENERAL
PAINLEVEL_OUTOF10: 0
PAINLEVEL_OUTOF10: 7
PAINLEVEL_OUTOF10: 4
PAINLEVEL_OUTOF10: 5

## 2019-08-14 NOTE — PROGRESS NOTES
Patient's NG tube was clamped for 4 hours and residual was checked. Residual was minimal with 20 mL. Will contact provider.  Electronically signed by Gregorio Gross RN on 8/14/2019 at 12:08 PM

## 2019-08-15 VITALS
HEIGHT: 69 IN | RESPIRATION RATE: 18 BRPM | DIASTOLIC BLOOD PRESSURE: 87 MMHG | BODY MASS INDEX: 34.07 KG/M2 | SYSTOLIC BLOOD PRESSURE: 153 MMHG | HEART RATE: 69 BPM | OXYGEN SATURATION: 98 % | TEMPERATURE: 97.1 F | WEIGHT: 230 LBS

## 2019-08-15 LAB
GLUCOSE BLD-MCNC: 129 MG/DL (ref 70–99)
GLUCOSE BLD-MCNC: 96 MG/DL (ref 70–99)
PERFORMED ON: ABNORMAL
PERFORMED ON: NORMAL

## 2019-08-15 PROCEDURE — 6370000000 HC RX 637 (ALT 250 FOR IP): Performed by: FAMILY MEDICINE

## 2019-08-15 PROCEDURE — 99254 IP/OBS CNSLTJ NEW/EST MOD 60: CPT | Performed by: INTERNAL MEDICINE

## 2019-08-15 PROCEDURE — 6370000000 HC RX 637 (ALT 250 FOR IP): Performed by: INTERNAL MEDICINE

## 2019-08-15 PROCEDURE — 82948 REAGENT STRIP/BLOOD GLUCOSE: CPT

## 2019-08-15 RX ORDER — AMLODIPINE BESYLATE 5 MG/1
5 TABLET ORAL DAILY
Qty: 30 TABLET | Refills: 3 | Status: SHIPPED | OUTPATIENT
Start: 2019-08-15 | End: 2021-01-01

## 2019-08-15 RX ADMIN — ROSUVASTATIN CALCIUM 5 MG: 10 TABLET, FILM COATED ORAL at 06:12

## 2019-08-15 RX ADMIN — LISINOPRIL 40 MG: 20 TABLET ORAL at 10:39

## 2019-08-15 RX ADMIN — AMLODIPINE BESYLATE 5 MG: 5 TABLET ORAL at 10:39

## 2019-08-15 NOTE — PROGRESS NOTES
[facility]  Daily Progress Note  Chanell Bryant  MRN: [id number] LOS: 3    Admit Date: 8/10/2019   8/14/2019 8:00 PM    Subjective:          Chief Complaint:  Chief Complaint   Patient presents with    Abdominal Pain     c/o abd pain with constipation and recently dx SBO. Discharged from hospital yesterday. Interval History:    Reviewed overnight events and nursing notes.    Status:  improved  Pain:  some relief    Decompressing, +FLATUS, NGT OUT, TOLERATING CLEARS    ROS:  10 point ROS obtained:   Gen: No fevers  HEENT: No migraine or visual change  Lung: No cough, hemopotysis or wheezing  Cv: No chest pain or pressure  Abd: No nausea or vomit, no change in bowel fct, no gi bleeding  Ext: No inc pain or swelling  Neuro: No seizure or syncope  Skin: No new rashes  Endo: No polyuria, polyphagia or poilydypsia  Psyc: No inc anxiety or depression  MS: No increase in joint pain or swelling reported    DIET:  DIET CLEAR LIQUID; Carb Control: 4 carb choices (60 gms)/meal    Medications:      sodium chloride 100 mL/hr at 08/14/19 1256    dextrose        amLODIPine  5 mg Oral Daily    pantoprazole  40 mg Intravenous Daily    And    sodium chloride (PF)  10 mL Intravenous Daily    sodium chloride flush  10 mL Intravenous 2 times per day    enoxaparin  40 mg Subcutaneous Daily    insulin lispro  0-6 Units Subcutaneous Q4H    lisinopril  40 mg Oral Daily    rosuvastatin  5 mg Oral QAM AC       Data:     Code Status: Full Code    Family History   Problem Relation Age of Onset    Diabetes Mother     Heart Disease Mother     High Blood Pressure Mother     Diabetes Sister      Social History     Socioeconomic History    Marital status:      Spouse name: Not on file    Number of children: Not on file    Years of education: Not on file    Highest education level: Not on file   Occupational History    Not on file   Social Needs    Financial resource strain: Not on file    Food insecurity: hour   Intake 1951.65 ml   Output 925 ml   Net 1026.65 ml    Temp (24hrs), Av.8 °F (36.6 °C), Min:97.2 °F (36.2 °C), Max:98.4 °F (36.9 °C)    Glucose:  Recent Labs     196 19  0827 19  1301 19  1650   POCGLU 110* 112* 100* 170*     Physical Examination:   General appearance - alert, well appearing, and in no distress and oriented to person, place, and time  Mental status - alert, oriented to person, place, and time, normal mood, behavior, speech, dress, motor activity, and thought processes  Eyes - pupils equal and reactive, extraocular eye movements intact  Ears - bilateral TM's and external ear canals normal, hearing grossly normal bilaterally  Mouth - mucous membranes moist, pharynx normal without lesions  Neck - supple, no significant adenopathy  Lymphatics - no palpable lymphadenopathy, no hepatosplenomegaly  Chest - clear to auscultation, no wheezes, rales or rhonchi, symmetric air entry, no tachypnea, retractions or cyanosis  Heart - normal rate, regular rhythm, normal S1, S2, no murmurs, rubs, clicks or gallops  Abdomen - soft, hypoactive bowel sounds, nontender  Neurological - alert, oriented, normal speech, no focal findings or movement disorder noted  Musculoskeletal - no joint tenderness, deformity or swelling  Extremities - peripheral pulses normal, no pedal edema, no clubbing or cyanosis  Skin - normal coloration and turgor, no rashes, no suspicious skin lesions noted      Assessment and Plan:     Primary Problem:  Ileus ([address]  ABD PAIN  Hospital Problem list:  Principal Problem:    Ileus ([address]  Active Problems:    Diabetes mellitus, type II, insulin dependent ([address]    Essential hypertension  Resolved Problems:    * No resolved hospital problems.  *      PMH:  Past Medical History:   Diagnosis Date    Colitis     Diabetes mellitus ([address]     Gallbladder disease     Hyperlipidemia     Hypertension     Migraine     Sleep apnea     snores       Treatment Plan:  STELLA

## 2019-08-17 LAB
SACCHAROMYCES CEREVISIAE AB IGA: 18.7 UNITS (ref 0–24.9)
SACCHAROMYCES CEREVISIAE AB IGG: 9.7 UNITS (ref 0–24.9)

## 2019-09-12 NOTE — PROGRESS NOTES
Brain's CBC did not reveal erythrocytosis at initial  consultation. He was asymptomatic and with no family history of hemochromatosis. At that time no additional evaluation was warranted other than routine monitoring. At follow-up visit on 12/12/2018, CBC documented a hemoglobin of 17.6, hematocrit 51.4. Brain remained asymptomatic with a resting SPO2 of 94% and 95% with ambulation, both on room air. Serology studies on 12/12/2018:  Peripheral blood smear documented a normal RBC and platelet morphology. Eosinophilia was noted with an absolute eosinophil count of 0.7.   JAK2 negative with reflex to CALR/Exon12-15/MPL      Ferritin 433   Erythropoietin 6.6  Brain's serology studies on 12/18/2018 did not identify any findings suggesting polycythemia vera. Shirley Corral continues to be asymptomatic, denying any headaches or vision changes, and no further workup is warranted at this time. We will continue to routinely monitor at this time.   Routine health screening:  PSA level of 0.4 on 6/6/2018    PAST MEDICAL HISTORY:   Past Medical History:   Diagnosis Date    Colitis     Diabetes mellitus ([facility]     Erythrocytosis     Intermittent    Gallbladder disease     Hyperlipidemia     Hypertension     Migraine     Sleep apnea     snores          PAST SURGICAL HISTORY:  Past Surgical History:   Procedure Laterality Date    CHOLECYSTECTOMY, LAPAROSCOPIC N/A 3/27/2019    CHOLECYSTECTOMY LAPAROSCOPIC performed by Ale Carter MD at [address] [place] [address]     VASECTOMY          SOCIAL HISTORY:  Social History     Socioeconomic History    Marital status:      Spouse name: Not on file    Number of children: Not on file    Years of education: Not on file    Highest education level: Not on file   Occupational History    Not on file   Social Needs    Financial resource strain: Not on file    Food insecurity:     Worry: Not on file     Inability: Not on file    Transportation needs:     Medical: inguinal lymphadenopathy  NEUROLOGIC: follows commands, non focal.   PSYCH: mood and affect appropriate. Alert and oriented to time and place and person. Relevant Lab findings/reviewed by me:    WBC 8.6, hemoglobin 16.5/hematocrit 50.4, platelets 599,137. Relevant Imaging studies/reviewed by me:  Ct Enterography W Contrast    Result Date: 8/13/2019  Impression: 1. Normal CT enterography without evidence of bowel obstruction. 2. Hepatic steatosis. Signed by Dr Garcia Faust on 8/13/2019 5:14 PM    At home sleep study-I reviewed in detail with the patient the results of his sleep study. Overall impression: Moderate obstructive sleep apnea  Several episodes of hypopnea and apnea      ASSESSMENT    No orders of the defined types were placed in this encounter. Krystal Bell was seen today for other. Diagnoses and all orders for this visit:    Secondary erythremia    Secondary erythrocytosis       Secondary erythrocytosis  Krystal Bell continues to deny any use of testosterone, alcohol and tobacco.   CBC today revealed a WBC of 13.24, ANC 7.91, Hgb 17.3, MCV 88.0, RBC 5.81, hematocrit 51.1 and a platelet count of 854,158. Physical exam is with findings as documented above. Serology studies on 12/12/2018:  Peripheral blood smear documented a normal RBC and platelet morphology. Eosinophilia was noted with an absolute eosinophil count of 0.7.   JAK2 negative with reflex to CALR/Exon12-15/MPL      Ferritin 433   Erythropoietin 6.6    Sleep study-consistent with moderate obstructive sleep apnea. CBC showing mild elevated hematocrit which is an appropriate compensation due to occasional hypoxia secondary to moderate obstructive sleep apnea. Recommendations are for treatment of the underlying cause. No need for frequent monitoring of his CBC. He does not have primary polycythemia vera. This is an appropriate physiologic response. Summary of plan:  Return as needed.       I have seen, examined and reviewed

## 2019-09-13 ENCOUNTER — HOSPITAL ENCOUNTER (OUTPATIENT)
Dept: INFUSION THERAPY | Age: 49
Discharge: HOME | End: 2019-09-13
Payer: COMMERCIAL

## 2019-09-13 ENCOUNTER — OFFICE VISIT (OUTPATIENT)
Dept: HEMATOLOGY | Age: 49
End: 2019-09-13
Payer: COMMERCIAL

## 2019-09-13 VITALS
HEART RATE: 81 BPM | OXYGEN SATURATION: 97 % | HEIGHT: 69 IN | SYSTOLIC BLOOD PRESSURE: 124 MMHG | BODY MASS INDEX: 34.67 KG/M2 | WEIGHT: 234.1 LBS | DIASTOLIC BLOOD PRESSURE: 78 MMHG

## 2019-09-13 DIAGNOSIS — D75.1: ICD-10-CM

## 2019-09-13 DIAGNOSIS — D75.1 SECONDARY ERYTHROCYTOSIS: ICD-10-CM

## 2019-09-13 DIAGNOSIS — D72.829 LEUKOCYTOSIS, UNSPECIFIED TYPE: ICD-10-CM

## 2019-09-13 DIAGNOSIS — D72.829 LEUKOCYTOSIS, UNSPECIFIED TYPE: Primary | ICD-10-CM

## 2019-09-13 PROCEDURE — 85025 COMPLETE CBC W/AUTO DIFF WBC: CPT

## 2019-09-13 PROCEDURE — 99214 OFFICE O/P EST MOD 30 MIN: CPT | Performed by: INTERNAL MEDICINE

## 2019-09-13 RX ORDER — NEBIVOLOL 5 MG/1
5 TABLET ORAL DAILY
COMMUNITY
End: 2021-01-01

## 2019-09-14 PROBLEM — D75.1 SECONDARY ERYTHROCYTOSIS: Status: ACTIVE | Noted: 2019-09-14

## 2019-09-14 PROBLEM — D75.1: Status: ACTIVE | Noted: 2019-09-14

## 2019-09-30 ENCOUNTER — OFFICE VISIT (OUTPATIENT)
Dept: GASTROENTEROLOGY | Facility: CLINIC | Age: 49
End: 2019-09-30

## 2019-09-30 VITALS
SYSTOLIC BLOOD PRESSURE: 116 MMHG | DIASTOLIC BLOOD PRESSURE: 78 MMHG | OXYGEN SATURATION: 100 % | TEMPERATURE: 95 F | HEIGHT: 69 IN | BODY MASS INDEX: 35.84 KG/M2 | HEART RATE: 70 BPM | WEIGHT: 242 LBS

## 2019-09-30 DIAGNOSIS — Z87.19 HISTORY OF SMALL BOWEL OBSTRUCTION: Primary | ICD-10-CM

## 2019-09-30 DIAGNOSIS — R89.9 ABNORMAL LABORATORY TEST: ICD-10-CM

## 2019-09-30 PROCEDURE — 99213 OFFICE O/P EST LOW 20 MIN: CPT | Performed by: NURSE PRACTITIONER

## 2019-09-30 RX ORDER — LISINOPRIL 40 MG/1
40 TABLET ORAL DAILY
COMMUNITY

## 2019-09-30 RX ORDER — NEBIVOLOL 10 MG/1
10 TABLET ORAL DAILY
COMMUNITY

## 2019-09-30 RX ORDER — AMLODIPINE BESYLATE 10 MG/1
10 TABLET ORAL DAILY
COMMUNITY

## 2019-09-30 RX ORDER — ROSUVASTATIN CALCIUM 5 MG/1
5 TABLET, COATED ORAL DAILY
COMMUNITY

## 2019-10-01 PROBLEM — Z87.19 HISTORY OF SMALL BOWEL OBSTRUCTION: Status: ACTIVE | Noted: 2019-10-01

## 2019-10-01 PROBLEM — R89.9 ABNORMAL LABORATORY TEST: Status: ACTIVE | Noted: 2019-10-01

## 2019-10-09 ENCOUNTER — ANESTHESIA (OUTPATIENT)
Dept: GASTROENTEROLOGY | Facility: HOSPITAL | Age: 49
End: 2019-10-09

## 2019-10-09 ENCOUNTER — ANESTHESIA EVENT (OUTPATIENT)
Dept: GASTROENTEROLOGY | Facility: HOSPITAL | Age: 49
End: 2019-10-09

## 2019-10-09 ENCOUNTER — HOSPITAL ENCOUNTER (OUTPATIENT)
Facility: HOSPITAL | Age: 49
Setting detail: HOSPITAL OUTPATIENT SURGERY
Discharge: HOME | End: 2019-10-09
Attending: INTERNAL MEDICINE | Admitting: INTERNAL MEDICINE

## 2019-10-09 VITALS
HEART RATE: 63 BPM | HEIGHT: 69 IN | DIASTOLIC BLOOD PRESSURE: 79 MMHG | WEIGHT: 231 LBS | BODY MASS INDEX: 34.21 KG/M2 | OXYGEN SATURATION: 100 % | SYSTOLIC BLOOD PRESSURE: 111 MMHG | TEMPERATURE: 97.2 F | RESPIRATION RATE: 14 BRPM

## 2019-10-09 DIAGNOSIS — Z87.19 HISTORY OF SMALL BOWEL OBSTRUCTION: ICD-10-CM

## 2019-10-09 DIAGNOSIS — R89.9 ABNORMAL LABORATORY TEST: ICD-10-CM

## 2019-10-09 LAB — GLUCOSE BLDC GLUCOMTR-MCNC: 92 MG/DL (ref 70–130)

## 2019-10-09 PROCEDURE — 25010000002 PROPOFOL 10 MG/ML EMULSION: Performed by: NURSE ANESTHETIST, CERTIFIED REGISTERED

## 2019-10-09 PROCEDURE — 43239 EGD BIOPSY SINGLE/MULTIPLE: CPT | Performed by: INTERNAL MEDICINE

## 2019-10-09 PROCEDURE — 87081 CULTURE SCREEN ONLY: CPT | Performed by: INTERNAL MEDICINE

## 2019-10-09 PROCEDURE — 45378 DIAGNOSTIC COLONOSCOPY: CPT | Performed by: INTERNAL MEDICINE

## 2019-10-09 PROCEDURE — 82962 GLUCOSE BLOOD TEST: CPT

## 2019-10-09 RX ORDER — SODIUM CHLORIDE 9 MG/ML
500 INJECTION, SOLUTION INTRAVENOUS CONTINUOUS PRN
Status: DISCONTINUED | OUTPATIENT
Start: 2019-10-09 | End: 2019-10-09 | Stop reason: HOSPADM

## 2019-10-09 RX ORDER — ONDANSETRON 2 MG/ML
4 INJECTION INTRAMUSCULAR; INTRAVENOUS ONCE AS NEEDED
Status: DISCONTINUED | OUTPATIENT
Start: 2019-10-09 | End: 2019-10-09 | Stop reason: HOSPADM

## 2019-10-09 RX ORDER — SODIUM CHLORIDE 0.9 % (FLUSH) 0.9 %
10 SYRINGE (ML) INJECTION AS NEEDED
Status: DISCONTINUED | OUTPATIENT
Start: 2019-10-09 | End: 2019-10-09 | Stop reason: HOSPADM

## 2019-10-09 RX ORDER — PROPOFOL 10 MG/ML
VIAL (ML) INTRAVENOUS AS NEEDED
Status: DISCONTINUED | OUTPATIENT
Start: 2019-10-09 | End: 2019-10-09 | Stop reason: SURG

## 2019-10-09 RX ORDER — PANTOPRAZOLE SODIUM 40 MG/1
40 TABLET, DELAYED RELEASE ORAL DAILY
Qty: 30 TABLET | Refills: 0 | Status: SHIPPED | OUTPATIENT
Start: 2019-10-09 | End: 2019-10-31 | Stop reason: SDUPTHER

## 2019-10-09 RX ADMIN — SODIUM CHLORIDE 500 ML: 9 INJECTION, SOLUTION INTRAVENOUS at 09:03

## 2019-10-09 RX ADMIN — PROPOFOL 100 MG: 10 INJECTION, EMULSION INTRAVENOUS at 10:10

## 2019-10-09 RX ADMIN — PROPOFOL 40 MG: 10 INJECTION, EMULSION INTRAVENOUS at 10:04

## 2019-10-09 RX ADMIN — PROPOFOL 60 MG: 10 INJECTION, EMULSION INTRAVENOUS at 10:02

## 2019-10-09 RX ADMIN — LIDOCAINE HYDROCHLORIDE 100 MG: 20 INJECTION, SOLUTION INTRAVENOUS at 10:00

## 2019-10-09 RX ADMIN — PROPOFOL 100 MG: 10 INJECTION, EMULSION INTRAVENOUS at 10:00

## 2019-10-09 NOTE — ANESTHESIA POSTPROCEDURE EVALUATION
Patient: Gualberto Dallas    Procedure Summary     Date:  10/09/19 Room / Location:  [facility] ENDOSCOPY 5 / [facility] ENDOSCOPY    Anesthesia Start:  0951 Anesthesia Stop:  1022    Procedures:       ESOPHAGOGASTRODUODENOSCOPY WITH ANESTHESIA (N/A )      COLONOSCOPY WITH ANESTHESIA (N/A ) Diagnosis:       History of small bowel obstruction      Abnormal laboratory test      (History of small bowel obstruction [Z87.19])      (Abnormal laboratory test [R89.9])    Surgeon:  Brenden Londono MD Provider:  Naveed Aguirre CRNA    Anesthesia Type:  MAC ASA Status:  3          Anesthesia Type: MAC  Last vitals  BP   118/78 (10/09/19 0841)   Temp   97.2 °F (36.2 °C) (10/09/19 0841)   Pulse   57 (10/09/19 0841)   Resp   18 (10/09/19 0841)     SpO2   98 % (10/09/19 0841)     Post Anesthesia Care and Evaluation    Patient location during evaluation: PHASE II  Patient participation: complete - patient participated  Level of consciousness: awake  Pain score: 0  Pain management: adequate  Airway patency: patent  Anesthetic complications: No anesthetic complications  PONV Status: none  Cardiovascular status: acceptable  Respiratory status: acceptable  Hydration status: acceptable

## 2019-10-09 NOTE — ANESTHESIA PREPROCEDURE EVALUATION
Anesthesia Evaluation     no history of anesthetic complications:  NPO Solid Status: > 8 hours  NPO Liquid Status: > 2 hours           Airway   Mallampati: I  TM distance: >3 FB  Neck ROM: full  No difficulty expected  Dental      Pulmonary    (+) sleep apnea on CPAP,   Cardiovascular   Exercise tolerance: good (4-7 METS)    (+) hypertension,   (-) CAD      Neuro/Psych  (-) seizures, TIA, CVA  GI/Hepatic/Renal/Endo    (+) obesity,   liver disease fatty liver disease, diabetes mellitus type 2 using insulin,   (-) no renal disease    Musculoskeletal     Abdominal    Substance History      OB/GYN          Other                        Anesthesia Plan    ASA 3     MAC     intravenous induction   Anesthetic plan, all risks, benefits, and alternatives have been provided, discussed and informed consent has been obtained with: patient.

## 2019-10-09 NOTE — INTERVAL H&P NOTE
H&P updated. The patient was examined and the following changes are noted:  He continues to remain asymptomatic he tells me.

## 2019-10-10 ENCOUNTER — TELEPHONE (OUTPATIENT)
Dept: GASTROENTEROLOGY | Facility: CLINIC | Age: 49
End: 2019-10-10

## 2019-10-10 LAB — UREASE TISS QL: POSITIVE

## 2019-10-10 NOTE — TELEPHONE ENCOUNTER
He was H. pylori positive.  Can you please contact him and inform him that the bacteria was there as we discussed after his endoscopy.  I recommend treatment.    Rec. tx for h. Pylori with Biaxin 500mg bid for 10 days and Amoxicillin 1 gram bid for 10 days.  He will also need to continue the Protonix I prescribed daily while on the antibiotics.  Please call in the prescriptions for the antibiotics to his pharmacy.    He will need a stool H. pylori antigen in 4 weeks

## 2019-10-14 DIAGNOSIS — A04.8 H. PYLORI INFECTION: Primary | ICD-10-CM

## 2019-10-31 RX ORDER — PANTOPRAZOLE SODIUM 40 MG/1
TABLET, DELAYED RELEASE ORAL
Qty: 30 TABLET | Refills: 10 | Status: SHIPPED | OUTPATIENT
Start: 2019-10-31

## 2019-11-05 LAB
ALBUMIN SERPL-MCNC: 4.6 G/DL (ref 3.5–5.2)
ALP BLD-CCNC: 48 U/L (ref 40–130)
ALT SERPL-CCNC: 33 U/L (ref 5–41)
ANION GAP SERPL CALCULATED.3IONS-SCNC: 15 MMOL/L (ref 7–19)
AST SERPL-CCNC: 18 U/L (ref 5–40)
BASOPHILS ABSOLUTE: 0.1 K/UL (ref 0–0.2)
BASOPHILS RELATIVE PERCENT: 1.2 % (ref 0–1)
BILIRUB SERPL-MCNC: 0.5 MG/DL (ref 0.2–1.2)
BUN BLDV-MCNC: 17 MG/DL (ref 6–20)
CALCIUM SERPL-MCNC: 9.3 MG/DL (ref 8.6–10)
CHLORIDE BLD-SCNC: 104 MMOL/L (ref 98–111)
CHOLESTEROL, TOTAL: 177 MG/DL (ref 160–199)
CO2: 23 MMOL/L (ref 22–29)
CREAT SERPL-MCNC: 1.1 MG/DL (ref 0.5–1.2)
EOSINOPHILS ABSOLUTE: 0.6 K/UL (ref 0–0.6)
EOSINOPHILS RELATIVE PERCENT: 7.4 % (ref 0–5)
GFR NON-AFRICAN AMERICAN: >60
GLUCOSE BLD-MCNC: 191 MG/DL (ref 74–109)
HBA1C MFR BLD: 8.1 % (ref 4–6)
HCT VFR BLD CALC: 50.8 % (ref 42–52)
HDLC SERPL-MCNC: 41 MG/DL (ref 55–121)
HEMOGLOBIN: 16.7 G/DL (ref 14–18)
IMMATURE GRANULOCYTES #: 0.1 K/UL
LDL CHOLESTEROL CALCULATED: 115 MG/DL
LYMPHOCYTES ABSOLUTE: 2.3 K/UL (ref 1.1–4.5)
LYMPHOCYTES RELATIVE PERCENT: 26.7 % (ref 20–40)
MCH RBC QN AUTO: 29.2 PG (ref 27–31)
MCHC RBC AUTO-ENTMCNC: 32.9 G/DL (ref 33–37)
MCV RBC AUTO: 89 FL (ref 80–94)
MONOCYTES ABSOLUTE: 0.7 K/UL (ref 0–0.9)
MONOCYTES RELATIVE PERCENT: 8.1 % (ref 0–10)
NEUTROPHILS ABSOLUTE: 4.8 K/UL (ref 1.5–7.5)
NEUTROPHILS RELATIVE PERCENT: 55.7 % (ref 50–65)
PDW BLD-RTO: 12.7 % (ref 11.5–14.5)
PLATELET # BLD: 207 K/UL (ref 130–400)
PMV BLD AUTO: 11.8 FL (ref 9.4–12.4)
POTASSIUM SERPL-SCNC: 4.6 MMOL/L (ref 3.5–5)
RBC # BLD: 5.71 M/UL (ref 4.7–6.1)
SODIUM BLD-SCNC: 142 MMOL/L (ref 136–145)
TOTAL PROTEIN: 6.9 G/DL (ref 6.6–8.7)
TRIGL SERPL-MCNC: 107 MG/DL (ref 0–149)
WBC # BLD: 8.7 K/UL (ref 4.8–10.8)

## 2019-11-12 LAB
PROSTATE SPECIFIC ANTIGEN: 0.51 NG/ML (ref 0–4)
T3 FREE: 3.4 PG/ML (ref 2–4.4)
T4 FREE: 1.1 NG/DL (ref 0.9–1.7)
TSH SERPL DL<=0.05 MIU/L-ACNC: 2.61 UIU/ML (ref 0.27–4.2)

## 2019-11-13 LAB
THYROGLOBULIN AB: <0.9 IU/ML (ref 0–4)
THYROID PEROXIDASE (TPO) ABS: 4.8 IU/ML (ref 0–9)

## 2019-11-15 ENCOUNTER — HOSPITAL ENCOUNTER (OUTPATIENT)
Dept: ULTRASOUND IMAGING | Age: 49
Discharge: HOME | End: 2019-11-15
Payer: COMMERCIAL

## 2019-11-15 DIAGNOSIS — E01.0 THYROMEGALY: ICD-10-CM

## 2019-11-15 PROCEDURE — 76536 US EXAM OF HEAD AND NECK: CPT

## 2019-11-26 ENCOUNTER — LAB (OUTPATIENT)
Dept: LAB | Facility: HOSPITAL | Age: 49
End: 2019-11-26

## 2019-11-26 DIAGNOSIS — A04.8 H. PYLORI INFECTION: ICD-10-CM

## 2019-11-26 PROCEDURE — 87338 HPYLORI STOOL AG IA: CPT | Performed by: INTERNAL MEDICINE

## 2019-11-29 LAB — H PYLORI AG STL QL IA: NEGATIVE

## 2019-12-12 ENCOUNTER — HOSPITAL ENCOUNTER (OUTPATIENT)
Dept: CT IMAGING | Age: 49
Discharge: HOME | End: 2019-12-12
Payer: COMMERCIAL

## 2019-12-12 DIAGNOSIS — R22.1 NECK MASS: ICD-10-CM

## 2019-12-12 PROCEDURE — 6360000004 HC RX CONTRAST MEDICATION: Performed by: FAMILY MEDICINE

## 2019-12-12 PROCEDURE — 70491 CT SOFT TISSUE NECK W/DYE: CPT

## 2019-12-12 RX ADMIN — IOPAMIDOL 90 ML: 755 INJECTION, SOLUTION INTRAVENOUS at 09:06

## 2020-03-25 PROBLEM — K52.9 COLITIS: Status: RESOLVED | Noted: 2019-03-30 | Resolved: 2020-03-24

## 2021-01-01 ENCOUNTER — APPOINTMENT (OUTPATIENT)
Dept: GENERAL RADIOLOGY | Age: 51
DRG: 207 | End: 2021-01-01
Payer: COMMERCIAL

## 2021-01-01 ENCOUNTER — HOSPITAL ENCOUNTER (INPATIENT)
Age: 51
LOS: 21 days | DRG: 207 | End: 2021-08-17
Attending: EMERGENCY MEDICINE | Admitting: INTERNAL MEDICINE
Payer: COMMERCIAL

## 2021-01-01 ENCOUNTER — HOSPITAL ENCOUNTER (OUTPATIENT)
Dept: INFUSION THERAPY | Age: 51
Setting detail: INFUSION SERIES
Discharge: HOME | End: 2021-07-27
Payer: COMMERCIAL

## 2021-01-01 VITALS
WEIGHT: 198.9 LBS | DIASTOLIC BLOOD PRESSURE: 59 MMHG | RESPIRATION RATE: 27 BRPM | HEIGHT: 69 IN | BODY MASS INDEX: 29.46 KG/M2 | SYSTOLIC BLOOD PRESSURE: 103 MMHG | TEMPERATURE: 99.9 F | OXYGEN SATURATION: 74 % | HEART RATE: 130 BPM

## 2021-01-01 VITALS
DIASTOLIC BLOOD PRESSURE: 105 MMHG | RESPIRATION RATE: 24 BRPM | TEMPERATURE: 97.6 F | OXYGEN SATURATION: 95 % | HEART RATE: 134 BPM | SYSTOLIC BLOOD PRESSURE: 153 MMHG

## 2021-01-01 DIAGNOSIS — U07.1 COVID-19 VIRUS INFECTION: ICD-10-CM

## 2021-01-01 DIAGNOSIS — N17.9 AKI (ACUTE KIDNEY INJURY) (HCC): ICD-10-CM

## 2021-01-01 DIAGNOSIS — U07.1 COVID-19: ICD-10-CM

## 2021-01-01 DIAGNOSIS — E11.10 DIABETIC KETOACIDOSIS WITHOUT COMA ASSOCIATED WITH TYPE 2 DIABETES MELLITUS (HCC): Primary | ICD-10-CM

## 2021-01-01 LAB
ACETONE BLOOD: ABNORMAL
ACINETOBACTER CALCOAC BAUMANNII COMPLEX BY PCR: NOT DETECTED
ALBUMIN SERPL-MCNC: 2.1 G/DL (ref 3.5–5.2)
ALBUMIN SERPL-MCNC: 2.3 G/DL (ref 3.5–5.2)
ALBUMIN SERPL-MCNC: 2.5 G/DL (ref 3.5–5.2)
ALBUMIN SERPL-MCNC: 2.6 G/DL (ref 3.5–5.2)
ALBUMIN SERPL-MCNC: 2.7 G/DL (ref 3.5–5.2)
ALBUMIN SERPL-MCNC: 2.7 G/DL (ref 3.5–5.2)
ALBUMIN SERPL-MCNC: 2.8 G/DL (ref 3.5–5.2)
ALBUMIN SERPL-MCNC: 2.9 G/DL (ref 3.5–5.2)
ALBUMIN SERPL-MCNC: 3 G/DL (ref 3.5–5.2)
ALBUMIN SERPL-MCNC: 3 G/DL (ref 3.5–5.2)
ALBUMIN SERPL-MCNC: 3.1 G/DL (ref 3.5–5.2)
ALBUMIN SERPL-MCNC: 3.2 G/DL (ref 3.5–5.2)
ALBUMIN SERPL-MCNC: 3.3 G/DL (ref 3.5–5.2)
ALBUMIN SERPL-MCNC: 3.3 G/DL (ref 3.5–5.2)
ALBUMIN SERPL-MCNC: 4.2 G/DL (ref 3.5–5.2)
ALP BLD-CCNC: 104 U/L (ref 40–130)
ALP BLD-CCNC: 108 U/L (ref 40–130)
ALP BLD-CCNC: 130 U/L (ref 40–130)
ALP BLD-CCNC: 170 U/L (ref 40–130)
ALP BLD-CCNC: 44 U/L (ref 40–130)
ALP BLD-CCNC: 44 U/L (ref 40–130)
ALP BLD-CCNC: 45 U/L (ref 40–130)
ALP BLD-CCNC: 46 U/L (ref 40–130)
ALP BLD-CCNC: 49 U/L (ref 40–130)
ALP BLD-CCNC: 51 U/L (ref 40–130)
ALP BLD-CCNC: 54 U/L (ref 40–130)
ALP BLD-CCNC: 58 U/L (ref 40–130)
ALP BLD-CCNC: 63 U/L (ref 40–130)
ALP BLD-CCNC: 85 U/L (ref 40–130)
ALP BLD-CCNC: 85 U/L (ref 40–130)
ALP BLD-CCNC: 88 U/L (ref 40–130)
ALP BLD-CCNC: 89 U/L (ref 40–130)
ALP BLD-CCNC: 91 U/L (ref 40–130)
ALP BLD-CCNC: 91 U/L (ref 40–130)
ALP BLD-CCNC: 96 U/L (ref 40–130)
ALT SERPL-CCNC: 118 U/L (ref 5–41)
ALT SERPL-CCNC: 15 U/L (ref 5–41)
ALT SERPL-CCNC: 151 U/L (ref 5–41)
ALT SERPL-CCNC: 16 U/L (ref 5–41)
ALT SERPL-CCNC: 17 U/L (ref 5–41)
ALT SERPL-CCNC: 18 U/L (ref 5–41)
ALT SERPL-CCNC: 18 U/L (ref 5–41)
ALT SERPL-CCNC: 20 U/L (ref 5–41)
ALT SERPL-CCNC: 21 U/L (ref 5–41)
ALT SERPL-CCNC: 223 U/L (ref 5–41)
ALT SERPL-CCNC: 24 U/L (ref 5–41)
ALT SERPL-CCNC: 267 U/L (ref 5–41)
ALT SERPL-CCNC: 27 U/L (ref 5–41)
ALT SERPL-CCNC: 34 U/L (ref 5–41)
ALT SERPL-CCNC: 35 U/L (ref 5–41)
ALT SERPL-CCNC: 36 U/L (ref 5–41)
ALT SERPL-CCNC: 39 U/L (ref 5–41)
ALT SERPL-CCNC: 44 U/L (ref 5–41)
ALT SERPL-CCNC: 44 U/L (ref 5–41)
ALT SERPL-CCNC: 45 U/L (ref 5–41)
ALT SERPL-CCNC: 55 U/L (ref 5–41)
ALT SERPL-CCNC: 642 U/L (ref 5–41)
ALT SERPL-CCNC: 85 U/L (ref 5–41)
ANION GAP SERPL CALCULATED.3IONS-SCNC: 10 MMOL/L (ref 7–19)
ANION GAP SERPL CALCULATED.3IONS-SCNC: 11 MMOL/L (ref 7–19)
ANION GAP SERPL CALCULATED.3IONS-SCNC: 12 MMOL/L (ref 7–19)
ANION GAP SERPL CALCULATED.3IONS-SCNC: 13 MMOL/L (ref 7–19)
ANION GAP SERPL CALCULATED.3IONS-SCNC: 13 MMOL/L (ref 7–19)
ANION GAP SERPL CALCULATED.3IONS-SCNC: 14 MMOL/L (ref 7–19)
ANION GAP SERPL CALCULATED.3IONS-SCNC: 14 MMOL/L (ref 7–19)
ANION GAP SERPL CALCULATED.3IONS-SCNC: 15 MMOL/L (ref 7–19)
ANION GAP SERPL CALCULATED.3IONS-SCNC: 16 MMOL/L (ref 7–19)
ANION GAP SERPL CALCULATED.3IONS-SCNC: 17 MMOL/L (ref 7–19)
ANION GAP SERPL CALCULATED.3IONS-SCNC: 17 MMOL/L (ref 7–19)
ANION GAP SERPL CALCULATED.3IONS-SCNC: 22 MMOL/L (ref 7–19)
ANION GAP SERPL CALCULATED.3IONS-SCNC: 28 MMOL/L (ref 7–19)
ANION GAP SERPL CALCULATED.3IONS-SCNC: 35 MMOL/L (ref 7–19)
ANION GAP SERPL CALCULATED.3IONS-SCNC: 4 MMOL/L (ref 7–19)
ANION GAP SERPL CALCULATED.3IONS-SCNC: 5 MMOL/L (ref 7–19)
ANION GAP SERPL CALCULATED.3IONS-SCNC: 7 MMOL/L (ref 7–19)
ANION GAP SERPL CALCULATED.3IONS-SCNC: 7 MMOL/L (ref 7–19)
ANION GAP SERPL CALCULATED.3IONS-SCNC: 8 MMOL/L (ref 7–19)
ANION GAP SERPL CALCULATED.3IONS-SCNC: 8 MMOL/L (ref 7–19)
ANION GAP SERPL CALCULATED.3IONS-SCNC: 9 MMOL/L (ref 7–19)
ANISOCYTOSIS: ABNORMAL
ANISOCYTOSIS: ABNORMAL
APTT: 38 SEC (ref 26–36.2)
AST SERPL-CCNC: 16 U/L (ref 5–40)
AST SERPL-CCNC: 19 U/L (ref 5–40)
AST SERPL-CCNC: 19 U/L (ref 5–40)
AST SERPL-CCNC: 20 U/L (ref 5–40)
AST SERPL-CCNC: 21 U/L (ref 5–40)
AST SERPL-CCNC: 211 U/L (ref 5–40)
AST SERPL-CCNC: 23 U/L (ref 5–40)
AST SERPL-CCNC: 24 U/L (ref 5–40)
AST SERPL-CCNC: 26 U/L (ref 5–40)
AST SERPL-CCNC: 27 U/L (ref 5–40)
AST SERPL-CCNC: 27 U/L (ref 5–40)
AST SERPL-CCNC: 279 U/L (ref 5–40)
AST SERPL-CCNC: 28 U/L (ref 5–40)
AST SERPL-CCNC: 29 U/L (ref 5–40)
AST SERPL-CCNC: 29 U/L (ref 5–40)
AST SERPL-CCNC: 30 U/L (ref 5–40)
AST SERPL-CCNC: 31 U/L (ref 5–40)
AST SERPL-CCNC: 32 U/L (ref 5–40)
AST SERPL-CCNC: 32 U/L (ref 5–40)
AST SERPL-CCNC: 42 U/L (ref 5–40)
AST SERPL-CCNC: 73 U/L (ref 5–40)
ATYPICAL LYMPHOCYTE RELATIVE PERCENT: 4 % (ref 0–8)
ATYPICAL LYMPHOCYTE RELATIVE PERCENT: 5 % (ref 0–8)
ATYPICAL LYMPHOCYTE RELATIVE PERCENT: 7 % (ref 0–8)
BACTEROIDES FRAGILIS BY PCR: NOT DETECTED
BANDED NEUTROPHILS RELATIVE PERCENT: 12 % (ref 0–5)
BANDED NEUTROPHILS RELATIVE PERCENT: 29 % (ref 0–5)
BANDED NEUTROPHILS RELATIVE PERCENT: 7 % (ref 0–5)
BASE EXCESS ARTERIAL: -0.3 MMOL/L (ref -2–2)
BASE EXCESS ARTERIAL: -25 MMOL/L (ref -2–2)
BASE EXCESS ARTERIAL: 11.7 MMOL/L (ref -2–2)
BASE EXCESS ARTERIAL: 14.3 MMOL/L (ref -2–2)
BASE EXCESS ARTERIAL: 2.2 MMOL/L (ref -2–2)
BASE EXCESS ARTERIAL: 2.4 MMOL/L (ref -2–2)
BASE EXCESS ARTERIAL: 2.6 MMOL/L (ref -2–2)
BASE EXCESS ARTERIAL: 5.2 MMOL/L (ref -2–2)
BASE EXCESS ARTERIAL: 6 MMOL/L (ref -2–2)
BASE EXCESS ARTERIAL: 6.4 MMOL/L (ref -2–2)
BASE EXCESS ARTERIAL: 7.4 MMOL/L (ref -2–2)
BASOPHILS ABSOLUTE: 0 K/UL (ref 0–0.2)
BASOPHILS ABSOLUTE: 0.1 K/UL (ref 0–0.2)
BASOPHILS RELATIVE PERCENT: 0 % (ref 0–1)
BASOPHILS RELATIVE PERCENT: 0.1 % (ref 0–1)
BASOPHILS RELATIVE PERCENT: 0.1 % (ref 0–1)
BASOPHILS RELATIVE PERCENT: 0.2 % (ref 0–1)
BASOPHILS RELATIVE PERCENT: 0.3 % (ref 0–1)
BASOPHILS RELATIVE PERCENT: 0.3 % (ref 0–1)
BASOPHILS RELATIVE PERCENT: 0.4 % (ref 0–1)
BASOPHILS RELATIVE PERCENT: 0.5 % (ref 0–1)
BASOPHILS RELATIVE PERCENT: 0.5 % (ref 0–1)
BASOPHILS RELATIVE PERCENT: 0.8 % (ref 0–1)
BILIRUB SERPL-MCNC: 0.3 MG/DL (ref 0.2–1.2)
BILIRUB SERPL-MCNC: 0.4 MG/DL (ref 0.2–1.2)
BILIRUB SERPL-MCNC: 0.5 MG/DL (ref 0.2–1.2)
BILIRUB SERPL-MCNC: 0.6 MG/DL (ref 0.2–1.2)
BILIRUB SERPL-MCNC: 0.8 MG/DL (ref 0.2–1.2)
BLOOD CULTURE, ROUTINE: NORMAL
BUN BLDV-MCNC: 11 MG/DL (ref 6–20)
BUN BLDV-MCNC: 12 MG/DL (ref 6–20)
BUN BLDV-MCNC: 14 MG/DL (ref 6–20)
BUN BLDV-MCNC: 14 MG/DL (ref 6–20)
BUN BLDV-MCNC: 15 MG/DL (ref 6–20)
BUN BLDV-MCNC: 15 MG/DL (ref 6–20)
BUN BLDV-MCNC: 16 MG/DL (ref 6–20)
BUN BLDV-MCNC: 16 MG/DL (ref 6–20)
BUN BLDV-MCNC: 18 MG/DL (ref 6–20)
BUN BLDV-MCNC: 19 MG/DL (ref 6–20)
BUN BLDV-MCNC: 20 MG/DL (ref 6–20)
BUN BLDV-MCNC: 20 MG/DL (ref 6–20)
BUN BLDV-MCNC: 21 MG/DL (ref 6–20)
BUN BLDV-MCNC: 24 MG/DL (ref 6–20)
BUN BLDV-MCNC: 25 MG/DL (ref 6–20)
BUN BLDV-MCNC: 27 MG/DL (ref 6–20)
BUN BLDV-MCNC: 28 MG/DL (ref 6–20)
BUN BLDV-MCNC: 30 MG/DL (ref 6–20)
BUN BLDV-MCNC: 31 MG/DL (ref 6–20)
BUN BLDV-MCNC: 37 MG/DL (ref 6–20)
BUN BLDV-MCNC: 39 MG/DL (ref 6–20)
BUN BLDV-MCNC: 40 MG/DL (ref 6–20)
BUN BLDV-MCNC: 7 MG/DL (ref 6–20)
C-REACTIVE PROTEIN: 0.49 MG/DL (ref 0–0.5)
C-REACTIVE PROTEIN: 2.04 MG/DL (ref 0–0.5)
C-REACTIVE PROTEIN: 2.11 MG/DL (ref 0–0.5)
C-REACTIVE PROTEIN: 2.84 MG/DL (ref 0–0.5)
C-REACTIVE PROTEIN: 24.38 MG/DL (ref 0–0.5)
C-REACTIVE PROTEIN: 4.28 MG/DL (ref 0–0.5)
C-REACTIVE PROTEIN: <0.3 MG/DL (ref 0–0.5)
CALCIUM SERPL-MCNC: 6.9 MG/DL (ref 8.6–10)
CALCIUM SERPL-MCNC: 7.1 MG/DL (ref 8.6–10)
CALCIUM SERPL-MCNC: 7.3 MG/DL (ref 8.6–10)
CALCIUM SERPL-MCNC: 7.3 MG/DL (ref 8.6–10)
CALCIUM SERPL-MCNC: 7.4 MG/DL (ref 8.6–10)
CALCIUM SERPL-MCNC: 7.4 MG/DL (ref 8.6–10)
CALCIUM SERPL-MCNC: 7.6 MG/DL (ref 8.6–10)
CALCIUM SERPL-MCNC: 7.6 MG/DL (ref 8.6–10)
CALCIUM SERPL-MCNC: 7.7 MG/DL (ref 8.6–10)
CALCIUM SERPL-MCNC: 7.8 MG/DL (ref 8.6–10)
CALCIUM SERPL-MCNC: 7.8 MG/DL (ref 8.6–10)
CALCIUM SERPL-MCNC: 8.1 MG/DL (ref 8.6–10)
CALCIUM SERPL-MCNC: 8.1 MG/DL (ref 8.6–10)
CALCIUM SERPL-MCNC: 8.2 MG/DL (ref 8.6–10)
CALCIUM SERPL-MCNC: 8.3 MG/DL (ref 8.6–10)
CALCIUM SERPL-MCNC: 8.4 MG/DL (ref 8.6–10)
CALCIUM SERPL-MCNC: 8.5 MG/DL (ref 8.6–10)
CALCIUM SERPL-MCNC: 8.5 MG/DL (ref 8.6–10)
CALCIUM SERPL-MCNC: 8.6 MG/DL (ref 8.6–10)
CALCIUM SERPL-MCNC: 8.6 MG/DL (ref 8.6–10)
CALCIUM SERPL-MCNC: 8.7 MG/DL (ref 8.6–10)
CALCIUM SERPL-MCNC: 8.7 MG/DL (ref 8.6–10)
CALCIUM SERPL-MCNC: 8.8 MG/DL (ref 8.6–10)
CALCIUM SERPL-MCNC: 9.1 MG/DL (ref 8.6–10)
CANDIDA ALBICANS BY PCR: NOT DETECTED
CANDIDA AURIS BY PCR: NOT DETECTED
CANDIDA GLABRATA BY PCR: NOT DETECTED
CANDIDA KRUSEI BY PCR: NOT DETECTED
CANDIDA PARAPSILOSIS BY PCR: NOT DETECTED
CANDIDA TROPICALIS BY PCR: NOT DETECTED
CARBOXYHEMOGLOBIN ARTERIAL: 1.5 % (ref 0–5)
CARBOXYHEMOGLOBIN ARTERIAL: 1.7 % (ref 0–5)
CARBOXYHEMOGLOBIN ARTERIAL: 1.7 % (ref 0–5)
CARBOXYHEMOGLOBIN ARTERIAL: 1.8 % (ref 0–5)
CARBOXYHEMOGLOBIN ARTERIAL: 1.8 % (ref 0–5)
CARBOXYHEMOGLOBIN ARTERIAL: 1.9 % (ref 0–5)
CARBOXYHEMOGLOBIN ARTERIAL: 2.1 % (ref 0–5)
CARBOXYHEMOGLOBIN ARTERIAL: 2.4 % (ref 0–5)
CARBOXYHEMOGLOBIN ARTERIAL: 2.5 % (ref 0–5)
CARBOXYHEMOGLOBIN ARTERIAL: 2.6 % (ref 0–5)
CARBOXYHEMOGLOBIN ARTERIAL: 2.9 % (ref 0–5)
CHLORIDE BLD-SCNC: 100 MMOL/L (ref 98–111)
CHLORIDE BLD-SCNC: 101 MMOL/L (ref 98–111)
CHLORIDE BLD-SCNC: 102 MMOL/L (ref 98–111)
CHLORIDE BLD-SCNC: 83 MMOL/L (ref 98–111)
CHLORIDE BLD-SCNC: 90 MMOL/L (ref 98–111)
CHLORIDE BLD-SCNC: 95 MMOL/L (ref 98–111)
CHLORIDE BLD-SCNC: 96 MMOL/L (ref 98–111)
CHLORIDE BLD-SCNC: 97 MMOL/L (ref 98–111)
CHLORIDE BLD-SCNC: 98 MMOL/L (ref 98–111)
CHLORIDE BLD-SCNC: 99 MMOL/L (ref 98–111)
CO2: 12 MMOL/L (ref 22–29)
CO2: 13 MMOL/L (ref 22–29)
CO2: 16 MMOL/L (ref 22–29)
CO2: 16 MMOL/L (ref 22–29)
CO2: 17 MMOL/L (ref 22–29)
CO2: 17 MMOL/L (ref 22–29)
CO2: 18 MMOL/L (ref 22–29)
CO2: 20 MMOL/L (ref 22–29)
CO2: 21 MMOL/L (ref 22–29)
CO2: 22 MMOL/L (ref 22–29)
CO2: 22 MMOL/L (ref 22–29)
CO2: 23 MMOL/L (ref 22–29)
CO2: 23 MMOL/L (ref 22–29)
CO2: 24 MMOL/L (ref 22–29)
CO2: 24 MMOL/L (ref 22–29)
CO2: 25 MMOL/L (ref 22–29)
CO2: 26 MMOL/L (ref 22–29)
CO2: 29 MMOL/L (ref 22–29)
CO2: 29 MMOL/L (ref 22–29)
CO2: 31 MMOL/L (ref 22–29)
CO2: 32 MMOL/L (ref 22–29)
CO2: 32 MMOL/L (ref 22–29)
CO2: 33 MMOL/L (ref 22–29)
CO2: 35 MMOL/L (ref 22–29)
CO2: 35 MMOL/L (ref 22–29)
CO2: 37 MMOL/L (ref 22–29)
CO2: 6 MMOL/L (ref 22–29)
CO2: 8 MMOL/L (ref 22–29)
CREAT SERPL-MCNC: 0.4 MG/DL (ref 0.5–1.2)
CREAT SERPL-MCNC: 0.5 MG/DL (ref 0.5–1.2)
CREAT SERPL-MCNC: 0.6 MG/DL (ref 0.5–1.2)
CREAT SERPL-MCNC: 0.7 MG/DL (ref 0.5–1.2)
CREAT SERPL-MCNC: 0.8 MG/DL (ref 0.5–1.2)
CREAT SERPL-MCNC: 0.9 MG/DL (ref 0.5–1.2)
CREAT SERPL-MCNC: 1 MG/DL (ref 0.5–1.2)
CREAT SERPL-MCNC: 1.3 MG/DL (ref 0.5–1.2)
CREAT SERPL-MCNC: 1.5 MG/DL (ref 0.5–1.2)
CREAT SERPL-MCNC: 1.5 MG/DL (ref 0.5–1.2)
CRYPTOCOCCUS NEOFORMANS/GATTII BY PCR: NOT DETECTED
CULTURE, BLOOD 2: ABNORMAL
CULTURE, BLOOD 2: NORMAL
CULTURE, BLOOD 2: NORMAL
CULTURE, RESPIRATORY: NORMAL
D DIMER: 0.68 UG/ML FEU (ref 0–0.48)
D DIMER: 15.24 UG/ML FEU (ref 0–0.48)
D DIMER: 5.34 UG/ML FEU (ref 0–0.48)
DOHLE BODIES: ABNORMAL
EKG P AXIS: -39 DEGREES
EKG P-R INTERVAL: 114 MS
EKG Q-T INTERVAL: 306 MS
EKG QRS DURATION: 94 MS
EKG QTC CALCULATION (BAZETT): 423 MS
EKG T AXIS: 120 DEGREES
ENTEROBACTER CLOACAE COMPLEX BY PCR: NOT DETECTED
ENTEROBACTERALES BY PCR: NOT DETECTED
ENTEROCOCCUS FAECALIS BY PCR: NOT DETECTED
ENTEROCOCCUS FAECIUM BY PCR: NOT DETECTED
EOSINOPHILS ABSOLUTE: 0 K/UL (ref 0–0.6)
EOSINOPHILS ABSOLUTE: 0.1 K/UL (ref 0–0.6)
EOSINOPHILS ABSOLUTE: 0.2 K/UL (ref 0–0.6)
EOSINOPHILS ABSOLUTE: 0.3 K/UL (ref 0–0.6)
EOSINOPHILS ABSOLUTE: 0.4 K/UL (ref 0–0.6)
EOSINOPHILS RELATIVE PERCENT: 0 % (ref 0–5)
EOSINOPHILS RELATIVE PERCENT: 0.1 % (ref 0–5)
EOSINOPHILS RELATIVE PERCENT: 0.2 % (ref 0–5)
EOSINOPHILS RELATIVE PERCENT: 0.3 % (ref 0–5)
EOSINOPHILS RELATIVE PERCENT: 0.8 % (ref 0–5)
EOSINOPHILS RELATIVE PERCENT: 1.4 % (ref 0–5)
EOSINOPHILS RELATIVE PERCENT: 3 % (ref 0–5)
EOSINOPHILS RELATIVE PERCENT: 4 % (ref 0–5)
ESCHERICHIA COLI BY PCR: NOT DETECTED
FERRITIN: >2000 NG/ML (ref 30–400)
FIBRINOGEN: 418 MG/DL (ref 238–505)
FIBRINOGEN: 422 MG/DL (ref 238–505)
FIBRINOGEN: 425 MG/DL (ref 238–505)
FIBRINOGEN: 441 MG/DL (ref 238–505)
FIBRINOGEN: 470 MG/DL (ref 238–505)
FIBRINOGEN: 514 MG/DL (ref 238–505)
FIBRINOGEN: 515 MG/DL (ref 238–505)
FIBRINOGEN: 543 MG/DL (ref 238–505)
GFR AFRICAN AMERICAN: >59
GFR NON-AFRICAN AMERICAN: 49
GFR NON-AFRICAN AMERICAN: 49
GFR NON-AFRICAN AMERICAN: 58
GFR NON-AFRICAN AMERICAN: >60
GLUCOSE BLD-MCNC: 100 MG/DL (ref 70–99)
GLUCOSE BLD-MCNC: 101 MG/DL (ref 70–99)
GLUCOSE BLD-MCNC: 108 MG/DL (ref 70–99)
GLUCOSE BLD-MCNC: 110 MG/DL (ref 70–99)
GLUCOSE BLD-MCNC: 111 MG/DL (ref 70–99)
GLUCOSE BLD-MCNC: 114 MG/DL (ref 74–109)
GLUCOSE BLD-MCNC: 120 MG/DL (ref 74–109)
GLUCOSE BLD-MCNC: 127 MG/DL (ref 70–99)
GLUCOSE BLD-MCNC: 130 MG/DL (ref 70–99)
GLUCOSE BLD-MCNC: 131 MG/DL (ref 70–99)
GLUCOSE BLD-MCNC: 132 MG/DL (ref 70–99)
GLUCOSE BLD-MCNC: 133 MG/DL (ref 70–99)
GLUCOSE BLD-MCNC: 135 MG/DL (ref 74–109)
GLUCOSE BLD-MCNC: 136 MG/DL (ref 70–99)
GLUCOSE BLD-MCNC: 138 MG/DL (ref 70–99)
GLUCOSE BLD-MCNC: 140 MG/DL (ref 70–99)
GLUCOSE BLD-MCNC: 143 MG/DL (ref 70–99)
GLUCOSE BLD-MCNC: 144 MG/DL (ref 70–99)
GLUCOSE BLD-MCNC: 148 MG/DL (ref 70–99)
GLUCOSE BLD-MCNC: 153 MG/DL (ref 70–99)
GLUCOSE BLD-MCNC: 153 MG/DL (ref 70–99)
GLUCOSE BLD-MCNC: 156 MG/DL (ref 70–99)
GLUCOSE BLD-MCNC: 157 MG/DL (ref 70–99)
GLUCOSE BLD-MCNC: 157 MG/DL (ref 70–99)
GLUCOSE BLD-MCNC: 157 MG/DL (ref 74–109)
GLUCOSE BLD-MCNC: 161 MG/DL (ref 70–99)
GLUCOSE BLD-MCNC: 161 MG/DL (ref 74–109)
GLUCOSE BLD-MCNC: 162 MG/DL (ref 70–99)
GLUCOSE BLD-MCNC: 162 MG/DL (ref 70–99)
GLUCOSE BLD-MCNC: 163 MG/DL (ref 70–99)
GLUCOSE BLD-MCNC: 163 MG/DL (ref 70–99)
GLUCOSE BLD-MCNC: 166 MG/DL (ref 70–99)
GLUCOSE BLD-MCNC: 167 MG/DL (ref 70–99)
GLUCOSE BLD-MCNC: 168 MG/DL (ref 74–109)
GLUCOSE BLD-MCNC: 169 MG/DL (ref 70–99)
GLUCOSE BLD-MCNC: 170 MG/DL (ref 70–99)
GLUCOSE BLD-MCNC: 171 MG/DL (ref 70–99)
GLUCOSE BLD-MCNC: 171 MG/DL (ref 70–99)
GLUCOSE BLD-MCNC: 171 MG/DL (ref 74–109)
GLUCOSE BLD-MCNC: 173 MG/DL (ref 70–99)
GLUCOSE BLD-MCNC: 173 MG/DL (ref 70–99)
GLUCOSE BLD-MCNC: 175 MG/DL (ref 74–109)
GLUCOSE BLD-MCNC: 178 MG/DL (ref 70–99)
GLUCOSE BLD-MCNC: 179 MG/DL (ref 70–99)
GLUCOSE BLD-MCNC: 181 MG/DL (ref 70–99)
GLUCOSE BLD-MCNC: 183 MG/DL (ref 70–99)
GLUCOSE BLD-MCNC: 183 MG/DL (ref 74–109)
GLUCOSE BLD-MCNC: 184 MG/DL (ref 70–99)
GLUCOSE BLD-MCNC: 188 MG/DL (ref 74–109)
GLUCOSE BLD-MCNC: 188 MG/DL (ref 74–109)
GLUCOSE BLD-MCNC: 190 MG/DL (ref 70–99)
GLUCOSE BLD-MCNC: 190 MG/DL (ref 70–99)
GLUCOSE BLD-MCNC: 191 MG/DL (ref 70–99)
GLUCOSE BLD-MCNC: 192 MG/DL (ref 74–109)
GLUCOSE BLD-MCNC: 194 MG/DL (ref 70–99)
GLUCOSE BLD-MCNC: 196 MG/DL (ref 70–99)
GLUCOSE BLD-MCNC: 197 MG/DL (ref 74–109)
GLUCOSE BLD-MCNC: 198 MG/DL (ref 70–99)
GLUCOSE BLD-MCNC: 199 MG/DL (ref 70–99)
GLUCOSE BLD-MCNC: 199 MG/DL (ref 74–109)
GLUCOSE BLD-MCNC: 201 MG/DL (ref 70–99)
GLUCOSE BLD-MCNC: 203 MG/DL (ref 70–99)
GLUCOSE BLD-MCNC: 205 MG/DL (ref 70–99)
GLUCOSE BLD-MCNC: 205 MG/DL (ref 70–99)
GLUCOSE BLD-MCNC: 206 MG/DL (ref 70–99)
GLUCOSE BLD-MCNC: 206 MG/DL (ref 70–99)
GLUCOSE BLD-MCNC: 206 MG/DL (ref 74–109)
GLUCOSE BLD-MCNC: 208 MG/DL (ref 70–99)
GLUCOSE BLD-MCNC: 211 MG/DL (ref 74–109)
GLUCOSE BLD-MCNC: 214 MG/DL (ref 70–99)
GLUCOSE BLD-MCNC: 215 MG/DL (ref 70–99)
GLUCOSE BLD-MCNC: 218 MG/DL (ref 70–99)
GLUCOSE BLD-MCNC: 218 MG/DL (ref 70–99)
GLUCOSE BLD-MCNC: 220 MG/DL (ref 70–99)
GLUCOSE BLD-MCNC: 225 MG/DL (ref 70–99)
GLUCOSE BLD-MCNC: 226 MG/DL (ref 70–99)
GLUCOSE BLD-MCNC: 228 MG/DL (ref 70–99)
GLUCOSE BLD-MCNC: 231 MG/DL (ref 70–99)
GLUCOSE BLD-MCNC: 231 MG/DL (ref 70–99)
GLUCOSE BLD-MCNC: 232 MG/DL (ref 70–99)
GLUCOSE BLD-MCNC: 232 MG/DL (ref 70–99)
GLUCOSE BLD-MCNC: 233 MG/DL (ref 74–109)
GLUCOSE BLD-MCNC: 234 MG/DL (ref 70–99)
GLUCOSE BLD-MCNC: 235 MG/DL (ref 70–99)
GLUCOSE BLD-MCNC: 240 MG/DL (ref 70–99)
GLUCOSE BLD-MCNC: 242 MG/DL (ref 70–99)
GLUCOSE BLD-MCNC: 243 MG/DL (ref 70–99)
GLUCOSE BLD-MCNC: 244 MG/DL (ref 74–109)
GLUCOSE BLD-MCNC: 248 MG/DL (ref 74–109)
GLUCOSE BLD-MCNC: 249 MG/DL (ref 70–99)
GLUCOSE BLD-MCNC: 249 MG/DL (ref 74–109)
GLUCOSE BLD-MCNC: 251 MG/DL (ref 70–99)
GLUCOSE BLD-MCNC: 251 MG/DL (ref 74–109)
GLUCOSE BLD-MCNC: 253 MG/DL (ref 70–99)
GLUCOSE BLD-MCNC: 254 MG/DL (ref 74–109)
GLUCOSE BLD-MCNC: 259 MG/DL (ref 70–99)
GLUCOSE BLD-MCNC: 260 MG/DL (ref 70–99)
GLUCOSE BLD-MCNC: 262 MG/DL (ref 70–99)
GLUCOSE BLD-MCNC: 270 MG/DL (ref 70–99)
GLUCOSE BLD-MCNC: 270 MG/DL (ref 70–99)
GLUCOSE BLD-MCNC: 272 MG/DL (ref 70–99)
GLUCOSE BLD-MCNC: 272 MG/DL (ref 74–109)
GLUCOSE BLD-MCNC: 273 MG/DL (ref 70–99)
GLUCOSE BLD-MCNC: 280 MG/DL (ref 74–109)
GLUCOSE BLD-MCNC: 283 MG/DL (ref 70–99)
GLUCOSE BLD-MCNC: 283 MG/DL (ref 70–99)
GLUCOSE BLD-MCNC: 288 MG/DL (ref 70–99)
GLUCOSE BLD-MCNC: 288 MG/DL (ref 74–109)
GLUCOSE BLD-MCNC: 290 MG/DL (ref 70–99)
GLUCOSE BLD-MCNC: 293 MG/DL (ref 74–109)
GLUCOSE BLD-MCNC: 294 MG/DL (ref 70–99)
GLUCOSE BLD-MCNC: 298 MG/DL (ref 70–99)
GLUCOSE BLD-MCNC: 298 MG/DL (ref 70–99)
GLUCOSE BLD-MCNC: 305 MG/DL (ref 70–99)
GLUCOSE BLD-MCNC: 308 MG/DL (ref 70–99)
GLUCOSE BLD-MCNC: 310 MG/DL (ref 70–99)
GLUCOSE BLD-MCNC: 310 MG/DL (ref 74–109)
GLUCOSE BLD-MCNC: 321 MG/DL (ref 74–109)
GLUCOSE BLD-MCNC: 325 MG/DL (ref 70–99)
GLUCOSE BLD-MCNC: 327 MG/DL (ref 70–99)
GLUCOSE BLD-MCNC: 339 MG/DL (ref 70–99)
GLUCOSE BLD-MCNC: 467 MG/DL (ref 74–109)
GLUCOSE BLD-MCNC: 501 MG/DL (ref 70–99)
GLUCOSE BLD-MCNC: 71 MG/DL (ref 70–99)
GLUCOSE BLD-MCNC: 80 MG/DL (ref 70–99)
GLUCOSE BLD-MCNC: 87 MG/DL (ref 70–99)
GLUCOSE BLD-MCNC: 88 MG/DL (ref 70–99)
GLUCOSE BLD-MCNC: 89 MG/DL (ref 74–109)
GLUCOSE BLD-MCNC: 93 MG/DL (ref 70–99)
GRAM STAIN RESULT: NORMAL
HAEMOPHILUS INFLUENZAE BY PCR: NOT DETECTED
HCO3 ARTERIAL: 25.5 MMOL/L (ref 22–26)
HCO3 ARTERIAL: 26.4 MMOL/L (ref 22–26)
HCO3 ARTERIAL: 27.9 MMOL/L (ref 22–26)
HCO3 ARTERIAL: 28.5 MMOL/L (ref 22–26)
HCO3 ARTERIAL: 29 MMOL/L (ref 22–26)
HCO3 ARTERIAL: 3.7 MMOL/L (ref 22–26)
HCO3 ARTERIAL: 31.8 MMOL/L (ref 22–26)
HCO3 ARTERIAL: 32.8 MMOL/L (ref 22–26)
HCO3 ARTERIAL: 37.1 MMOL/L (ref 22–26)
HCO3 ARTERIAL: 37.7 MMOL/L (ref 22–26)
HCO3 ARTERIAL: 39 MMOL/L (ref 22–26)
HCT VFR BLD CALC: 36.9 % (ref 42–52)
HCT VFR BLD CALC: 38.3 % (ref 42–52)
HCT VFR BLD CALC: 39.1 % (ref 42–52)
HCT VFR BLD CALC: 40 % (ref 42–52)
HCT VFR BLD CALC: 41.2 % (ref 42–52)
HCT VFR BLD CALC: 43.9 % (ref 42–52)
HCT VFR BLD CALC: 45.1 % (ref 42–52)
HCT VFR BLD CALC: 45.2 % (ref 42–52)
HCT VFR BLD CALC: 45.3 % (ref 42–52)
HCT VFR BLD CALC: 46 % (ref 42–52)
HCT VFR BLD CALC: 46.2 % (ref 42–52)
HCT VFR BLD CALC: 47.4 % (ref 42–52)
HCT VFR BLD CALC: 47.7 % (ref 42–52)
HCT VFR BLD CALC: 47.8 % (ref 42–52)
HCT VFR BLD CALC: 48.7 % (ref 42–52)
HCT VFR BLD CALC: 49.2 % (ref 42–52)
HCT VFR BLD CALC: 49.8 % (ref 42–52)
HCT VFR BLD CALC: 50.1 % (ref 42–52)
HCT VFR BLD CALC: 50.2 % (ref 42–52)
HCT VFR BLD CALC: 51.9 % (ref 42–52)
HCT VFR BLD CALC: 53.7 % (ref 42–52)
HCT VFR BLD CALC: 56.1 % (ref 42–52)
HCT VFR BLD CALC: 60 % (ref 42–52)
HEMOGLOBIN, ART, EXTENDED: 11.8 G/DL (ref 14–18)
HEMOGLOBIN, ART, EXTENDED: 12.8 G/DL (ref 14–18)
HEMOGLOBIN, ART, EXTENDED: 14.3 G/DL (ref 14–18)
HEMOGLOBIN, ART, EXTENDED: 14.6 G/DL (ref 14–18)
HEMOGLOBIN, ART, EXTENDED: 16.1 G/DL (ref 14–18)
HEMOGLOBIN, ART, EXTENDED: 16.4 G/DL (ref 14–18)
HEMOGLOBIN, ART, EXTENDED: 16.4 G/DL (ref 14–18)
HEMOGLOBIN, ART, EXTENDED: 16.9 G/DL (ref 14–18)
HEMOGLOBIN, ART, EXTENDED: 17.5 G/DL (ref 14–18)
HEMOGLOBIN, ART, EXTENDED: 18.7 G/DL (ref 14–18)
HEMOGLOBIN, ART, EXTENDED: 8.5 G/DL (ref 14–18)
HEMOGLOBIN: 11.2 G/DL (ref 14–18)
HEMOGLOBIN: 11.7 G/DL (ref 14–18)
HEMOGLOBIN: 11.8 G/DL (ref 14–18)
HEMOGLOBIN: 11.9 G/DL (ref 14–18)
HEMOGLOBIN: 14 G/DL (ref 14–18)
HEMOGLOBIN: 14.4 G/DL (ref 14–18)
HEMOGLOBIN: 14.9 G/DL (ref 14–18)
HEMOGLOBIN: 15.1 G/DL (ref 14–18)
HEMOGLOBIN: 15.2 G/DL (ref 14–18)
HEMOGLOBIN: 15.6 G/DL (ref 14–18)
HEMOGLOBIN: 15.6 G/DL (ref 14–18)
HEMOGLOBIN: 15.7 G/DL (ref 14–18)
HEMOGLOBIN: 15.8 G/DL (ref 14–18)
HEMOGLOBIN: 16.1 G/DL (ref 14–18)
HEMOGLOBIN: 16.2 G/DL (ref 14–18)
HEMOGLOBIN: 16.7 G/DL (ref 14–18)
HEMOGLOBIN: 16.7 G/DL (ref 14–18)
HEMOGLOBIN: 16.8 G/DL (ref 14–18)
HEMOGLOBIN: 17.6 G/DL (ref 14–18)
HEMOGLOBIN: 18 G/DL (ref 14–18)
HEMOGLOBIN: 20.4 G/DL (ref 14–18)
IMMATURE GRANULOCYTES #: 0 K/UL
IMMATURE GRANULOCYTES #: 0.1 K/UL
IMMATURE GRANULOCYTES #: 0.2 K/UL
IMMATURE GRANULOCYTES #: 0.4 K/UL
IMMATURE GRANULOCYTES #: 0.6 K/UL
IMMATURE GRANULOCYTES #: 0.7 K/UL
IMMATURE GRANULOCYTES #: 0.7 K/UL
KLEBSIELLA AEROGENES BY PCR: NOT DETECTED
KLEBSIELLA OXYTOCA BY PCR: NOT DETECTED
KLEBSIELLA PNEUMONIAE GROUP BY PCR: NOT DETECTED
LACTIC ACID: 1.4 MMOL/L (ref 0.5–1.9)
LACTIC ACID: 2.1 MMOL/L (ref 0.5–1.9)
LACTIC ACID: 4.3 MMOL/L (ref 0.5–1.9)
LISTERIA MONOCYTOGENES BY PCR: NOT DETECTED
LV EF: 45 %
LVEF MODALITY: NORMAL
LYMPHOCYTES ABSOLUTE: 0.4 K/UL (ref 1.1–4.5)
LYMPHOCYTES ABSOLUTE: 0.4 K/UL (ref 1.1–4.5)
LYMPHOCYTES ABSOLUTE: 0.5 K/UL (ref 1.1–4.5)
LYMPHOCYTES ABSOLUTE: 0.6 K/UL (ref 1.1–4.5)
LYMPHOCYTES ABSOLUTE: 0.6 K/UL (ref 1.1–4.5)
LYMPHOCYTES ABSOLUTE: 0.7 K/UL (ref 1.1–4.5)
LYMPHOCYTES ABSOLUTE: 0.8 K/UL (ref 1.1–4.5)
LYMPHOCYTES ABSOLUTE: 0.9 K/UL (ref 1.1–4.5)
LYMPHOCYTES ABSOLUTE: 1 K/UL (ref 1.1–4.5)
LYMPHOCYTES ABSOLUTE: 1.1 K/UL (ref 1.1–4.5)
LYMPHOCYTES ABSOLUTE: 1.2 K/UL (ref 1.1–4.5)
LYMPHOCYTES RELATIVE PERCENT: 1.3 % (ref 20–40)
LYMPHOCYTES RELATIVE PERCENT: 10.4 % (ref 20–40)
LYMPHOCYTES RELATIVE PERCENT: 11 % (ref 20–40)
LYMPHOCYTES RELATIVE PERCENT: 11.1 % (ref 20–40)
LYMPHOCYTES RELATIVE PERCENT: 12 % (ref 20–40)
LYMPHOCYTES RELATIVE PERCENT: 12.4 % (ref 20–40)
LYMPHOCYTES RELATIVE PERCENT: 18 % (ref 20–40)
LYMPHOCYTES RELATIVE PERCENT: 2 % (ref 20–40)
LYMPHOCYTES RELATIVE PERCENT: 2.2 % (ref 20–40)
LYMPHOCYTES RELATIVE PERCENT: 3 % (ref 20–40)
LYMPHOCYTES RELATIVE PERCENT: 3.3 % (ref 20–40)
LYMPHOCYTES RELATIVE PERCENT: 3.3 % (ref 20–40)
LYMPHOCYTES RELATIVE PERCENT: 4 % (ref 20–40)
LYMPHOCYTES RELATIVE PERCENT: 6 % (ref 20–40)
LYMPHOCYTES RELATIVE PERCENT: 6.7 % (ref 20–40)
LYMPHOCYTES RELATIVE PERCENT: 7.3 % (ref 20–40)
LYMPHOCYTES RELATIVE PERCENT: 7.8 % (ref 20–40)
LYMPHOCYTES RELATIVE PERCENT: 8.6 % (ref 20–40)
LYMPHOCYTES RELATIVE PERCENT: 9 % (ref 20–40)
LYMPHOCYTES RELATIVE PERCENT: 9 % (ref 20–40)
LYMPHOCYTES RELATIVE PERCENT: 9.2 % (ref 20–40)
LYMPHOCYTES RELATIVE PERCENT: 9.5 % (ref 20–40)
Lab: NORMAL
MAGNESIUM: 2 MG/DL (ref 1.6–2.6)
MAGNESIUM: 2.1 MG/DL (ref 1.6–2.6)
MAGNESIUM: 2.2 MG/DL (ref 1.6–2.6)
MAGNESIUM: 2.2 MG/DL (ref 1.6–2.6)
MAGNESIUM: 2.3 MG/DL (ref 1.6–2.6)
MAGNESIUM: 2.3 MG/DL (ref 1.6–2.6)
MAGNESIUM: 2.4 MG/DL (ref 1.6–2.6)
MAGNESIUM: 2.5 MG/DL (ref 1.6–2.6)
MAGNESIUM: 2.5 MG/DL (ref 1.6–2.6)
MAGNESIUM: 2.8 MG/DL (ref 1.6–2.6)
MAGNESIUM: 2.9 MG/DL (ref 1.6–2.6)
MCH RBC QN AUTO: 29.5 PG (ref 27–31)
MCH RBC QN AUTO: 29.6 PG (ref 27–31)
MCH RBC QN AUTO: 29.7 PG (ref 27–31)
MCH RBC QN AUTO: 29.9 PG (ref 27–31)
MCH RBC QN AUTO: 30 PG (ref 27–31)
MCH RBC QN AUTO: 30.1 PG (ref 27–31)
MCH RBC QN AUTO: 30.2 PG (ref 27–31)
MCH RBC QN AUTO: 30.2 PG (ref 27–31)
MCH RBC QN AUTO: 30.3 PG (ref 27–31)
MCH RBC QN AUTO: 30.4 PG (ref 27–31)
MCH RBC QN AUTO: 30.5 PG (ref 27–31)
MCH RBC QN AUTO: 30.7 PG (ref 27–31)
MCH RBC QN AUTO: 30.8 PG (ref 27–31)
MCH RBC QN AUTO: 30.8 PG (ref 27–31)
MCHC RBC AUTO-ENTMCNC: 29.5 G/DL (ref 33–37)
MCHC RBC AUTO-ENTMCNC: 29.9 G/DL (ref 33–37)
MCHC RBC AUTO-ENTMCNC: 30.4 G/DL (ref 33–37)
MCHC RBC AUTO-ENTMCNC: 30.4 G/DL (ref 33–37)
MCHC RBC AUTO-ENTMCNC: 30.7 G/DL (ref 33–37)
MCHC RBC AUTO-ENTMCNC: 31.1 G/DL (ref 33–37)
MCHC RBC AUTO-ENTMCNC: 31.2 G/DL (ref 33–37)
MCHC RBC AUTO-ENTMCNC: 31.4 G/DL (ref 33–37)
MCHC RBC AUTO-ENTMCNC: 31.5 G/DL (ref 33–37)
MCHC RBC AUTO-ENTMCNC: 31.7 G/DL (ref 33–37)
MCHC RBC AUTO-ENTMCNC: 32.4 G/DL (ref 33–37)
MCHC RBC AUTO-ENTMCNC: 33.3 G/DL (ref 33–37)
MCHC RBC AUTO-ENTMCNC: 33.3 G/DL (ref 33–37)
MCHC RBC AUTO-ENTMCNC: 33.5 G/DL (ref 33–37)
MCHC RBC AUTO-ENTMCNC: 33.5 G/DL (ref 33–37)
MCHC RBC AUTO-ENTMCNC: 33.7 G/DL (ref 33–37)
MCHC RBC AUTO-ENTMCNC: 33.8 G/DL (ref 33–37)
MCHC RBC AUTO-ENTMCNC: 34 G/DL (ref 33–37)
MCHC RBC AUTO-ENTMCNC: 34 G/DL (ref 33–37)
MCHC RBC AUTO-ENTMCNC: 34.4 G/DL (ref 33–37)
MCHC RBC AUTO-ENTMCNC: 34.5 G/DL (ref 33–37)
MCHC RBC AUTO-ENTMCNC: 34.7 G/DL (ref 33–37)
MCHC RBC AUTO-ENTMCNC: 35 G/DL (ref 33–37)
MCV RBC AUTO: 100 FL (ref 80–94)
MCV RBC AUTO: 101.3 FL (ref 80–94)
MCV RBC AUTO: 86 FL (ref 80–94)
MCV RBC AUTO: 87.5 FL (ref 80–94)
MCV RBC AUTO: 87.6 FL (ref 80–94)
MCV RBC AUTO: 88.5 FL (ref 80–94)
MCV RBC AUTO: 89.4 FL (ref 80–94)
MCV RBC AUTO: 90.2 FL (ref 80–94)
MCV RBC AUTO: 90.6 FL (ref 80–94)
MCV RBC AUTO: 90.8 FL (ref 80–94)
MCV RBC AUTO: 91.3 FL (ref 80–94)
MCV RBC AUTO: 91.4 FL (ref 80–94)
MCV RBC AUTO: 93.9 FL (ref 80–94)
MCV RBC AUTO: 95 FL (ref 80–94)
MCV RBC AUTO: 95.1 FL (ref 80–94)
MCV RBC AUTO: 95.5 FL (ref 80–94)
MCV RBC AUTO: 96.5 FL (ref 80–94)
MCV RBC AUTO: 96.7 FL (ref 80–94)
MCV RBC AUTO: 97.6 FL (ref 80–94)
MCV RBC AUTO: 98.9 FL (ref 80–94)
MCV RBC AUTO: 99.1 FL (ref 80–94)
METAMYELOCYTES RELATIVE PERCENT: 3 %
METHEMOGLOBIN ARTERIAL: 1.1 %
METHEMOGLOBIN ARTERIAL: 1.2 %
METHEMOGLOBIN ARTERIAL: 1.4 %
METHEMOGLOBIN ARTERIAL: 1.5 %
METHEMOGLOBIN ARTERIAL: 1.5 %
METHICILLIN RESISTANCE MECA/C  BY PCR: DETECTED
MICROCYTES: ABNORMAL
MICROCYTES: ABNORMAL
MONOCYTES ABSOLUTE: 0 K/UL (ref 0–0.9)
MONOCYTES ABSOLUTE: 0.1 K/UL (ref 0–0.9)
MONOCYTES ABSOLUTE: 0.1 K/UL (ref 0–0.9)
MONOCYTES ABSOLUTE: 0.3 K/UL (ref 0–0.9)
MONOCYTES ABSOLUTE: 0.3 K/UL (ref 0–0.9)
MONOCYTES ABSOLUTE: 0.4 K/UL (ref 0–0.9)
MONOCYTES ABSOLUTE: 0.5 K/UL (ref 0–0.9)
MONOCYTES ABSOLUTE: 0.6 K/UL (ref 0–0.9)
MONOCYTES ABSOLUTE: 0.6 K/UL (ref 0–0.9)
MONOCYTES ABSOLUTE: 0.7 K/UL (ref 0–0.9)
MONOCYTES ABSOLUTE: 0.7 K/UL (ref 0–0.9)
MONOCYTES ABSOLUTE: 0.8 K/UL (ref 0–0.9)
MONOCYTES ABSOLUTE: 0.8 K/UL (ref 0–0.9)
MONOCYTES ABSOLUTE: 1 K/UL (ref 0–0.9)
MONOCYTES ABSOLUTE: 1.1 K/UL (ref 0–0.9)
MONOCYTES ABSOLUTE: 1.3 K/UL (ref 0–0.9)
MONOCYTES ABSOLUTE: 1.4 K/UL (ref 0–0.9)
MONOCYTES RELATIVE PERCENT: 1 % (ref 0–10)
MONOCYTES RELATIVE PERCENT: 1 % (ref 0–10)
MONOCYTES RELATIVE PERCENT: 10 % (ref 0–10)
MONOCYTES RELATIVE PERCENT: 10.6 % (ref 0–10)
MONOCYTES RELATIVE PERCENT: 11.6 % (ref 0–10)
MONOCYTES RELATIVE PERCENT: 12.7 % (ref 0–10)
MONOCYTES RELATIVE PERCENT: 13.1 % (ref 0–10)
MONOCYTES RELATIVE PERCENT: 13.1 % (ref 0–10)
MONOCYTES RELATIVE PERCENT: 3 % (ref 0–10)
MONOCYTES RELATIVE PERCENT: 3 % (ref 0–10)
MONOCYTES RELATIVE PERCENT: 3.4 % (ref 0–10)
MONOCYTES RELATIVE PERCENT: 3.5 % (ref 0–10)
MONOCYTES RELATIVE PERCENT: 4 % (ref 0–10)
MONOCYTES RELATIVE PERCENT: 4 % (ref 0–10)
MONOCYTES RELATIVE PERCENT: 4.2 % (ref 0–10)
MONOCYTES RELATIVE PERCENT: 5.3 % (ref 0–10)
MONOCYTES RELATIVE PERCENT: 5.8 % (ref 0–10)
MONOCYTES RELATIVE PERCENT: 7 % (ref 0–10)
MONOCYTES RELATIVE PERCENT: 7.2 % (ref 0–10)
MONOCYTES RELATIVE PERCENT: 8.7 % (ref 0–10)
MONOCYTES RELATIVE PERCENT: 9 % (ref 0–10)
MONOCYTES RELATIVE PERCENT: 9.6 % (ref 0–10)
MYELOCYTE PERCENT: 10 %
NEISSERIA MENINGITIDIS BY PCR: NOT DETECTED
NEUTROPHILS ABSOLUTE: 13.8 K/UL (ref 1.5–7.5)
NEUTROPHILS ABSOLUTE: 14 K/UL (ref 1.5–7.5)
NEUTROPHILS ABSOLUTE: 16.7 K/UL (ref 1.5–7.5)
NEUTROPHILS ABSOLUTE: 17.1 K/UL (ref 1.5–7.5)
NEUTROPHILS ABSOLUTE: 22.3 K/UL (ref 1.5–7.5)
NEUTROPHILS ABSOLUTE: 3.2 K/UL (ref 1.5–7.5)
NEUTROPHILS ABSOLUTE: 3.5 K/UL (ref 1.5–7.5)
NEUTROPHILS ABSOLUTE: 33.5 K/UL (ref 1.5–7.5)
NEUTROPHILS ABSOLUTE: 4.2 K/UL (ref 1.5–7.5)
NEUTROPHILS ABSOLUTE: 4.2 K/UL (ref 1.5–7.5)
NEUTROPHILS ABSOLUTE: 5.8 K/UL (ref 1.5–7.5)
NEUTROPHILS ABSOLUTE: 5.9 K/UL (ref 1.5–7.5)
NEUTROPHILS ABSOLUTE: 6 K/UL (ref 1.5–7.5)
NEUTROPHILS ABSOLUTE: 6.1 K/UL (ref 1.5–7.5)
NEUTROPHILS ABSOLUTE: 6.3 K/UL (ref 1.5–7.5)
NEUTROPHILS ABSOLUTE: 7 K/UL (ref 1.5–7.5)
NEUTROPHILS ABSOLUTE: 7.9 K/UL (ref 1.5–7.5)
NEUTROPHILS ABSOLUTE: 7.9 K/UL (ref 1.5–7.5)
NEUTROPHILS ABSOLUTE: 8.5 K/UL (ref 1.5–7.5)
NEUTROPHILS ABSOLUTE: 8.6 K/UL (ref 1.5–7.5)
NEUTROPHILS ABSOLUTE: 8.9 K/UL (ref 1.5–7.5)
NEUTROPHILS ABSOLUTE: 9.5 K/UL (ref 1.5–7.5)
NEUTROPHILS RELATIVE PERCENT: 51 % (ref 50–65)
NEUTROPHILS RELATIVE PERCENT: 59 % (ref 50–65)
NEUTROPHILS RELATIVE PERCENT: 67.3 % (ref 50–65)
NEUTROPHILS RELATIVE PERCENT: 69.1 % (ref 50–65)
NEUTROPHILS RELATIVE PERCENT: 73 % (ref 50–65)
NEUTROPHILS RELATIVE PERCENT: 74.2 % (ref 50–65)
NEUTROPHILS RELATIVE PERCENT: 77.2 % (ref 50–65)
NEUTROPHILS RELATIVE PERCENT: 77.6 % (ref 50–65)
NEUTROPHILS RELATIVE PERCENT: 77.8 % (ref 50–65)
NEUTROPHILS RELATIVE PERCENT: 79.4 % (ref 50–65)
NEUTROPHILS RELATIVE PERCENT: 80 % (ref 50–65)
NEUTROPHILS RELATIVE PERCENT: 80.7 % (ref 50–65)
NEUTROPHILS RELATIVE PERCENT: 81.3 % (ref 50–65)
NEUTROPHILS RELATIVE PERCENT: 82 % (ref 50–65)
NEUTROPHILS RELATIVE PERCENT: 85.5 % (ref 50–65)
NEUTROPHILS RELATIVE PERCENT: 86 % (ref 50–65)
NEUTROPHILS RELATIVE PERCENT: 88.7 % (ref 50–65)
NEUTROPHILS RELATIVE PERCENT: 89.2 % (ref 50–65)
NEUTROPHILS RELATIVE PERCENT: 90.3 % (ref 50–65)
NEUTROPHILS RELATIVE PERCENT: 92.3 % (ref 50–65)
NEUTROPHILS RELATIVE PERCENT: 93 % (ref 50–65)
NEUTROPHILS RELATIVE PERCENT: 93.3 % (ref 50–65)
O2 CONTENT ARTERIAL: 10.5 ML/DL
O2 CONTENT ARTERIAL: 14.1 ML/DL
O2 CONTENT ARTERIAL: 15.5 ML/DL
O2 CONTENT ARTERIAL: 19.6 ML/DL
O2 CONTENT ARTERIAL: 19.8 ML/DL
O2 CONTENT ARTERIAL: 20 ML/DL
O2 CONTENT ARTERIAL: 20.6 ML/DL
O2 CONTENT ARTERIAL: 20.7 ML/DL
O2 CONTENT ARTERIAL: 21.8 ML/DL
O2 CONTENT ARTERIAL: 23.8 ML/DL
O2 CONTENT ARTERIAL: 24.9 ML/DL
O2 SAT, ARTERIAL: 78.4 %
O2 SAT, ARTERIAL: 84.4 %
O2 SAT, ARTERIAL: 87.3 %
O2 SAT, ARTERIAL: 89.8 %
O2 SAT, ARTERIAL: 90.1 %
O2 SAT, ARTERIAL: 93 %
O2 SAT, ARTERIAL: 94.6 %
O2 SAT, ARTERIAL: 95.1 %
O2 SAT, ARTERIAL: 96 %
O2 SAT, ARTERIAL: 96.2 %
O2 SAT, ARTERIAL: 96.7 %
O2 THERAPY: ABNORMAL
ORGANISM: ABNORMAL
OVALOCYTES: ABNORMAL
PAPPENHEIMER BODIES: ABNORMAL
PCO2 ARTERIAL: 14 MMHG (ref 35–45)
PCO2 ARTERIAL: 35 MMHG (ref 35–45)
PCO2 ARTERIAL: 39 MMHG (ref 35–45)
PCO2 ARTERIAL: 45 MMHG (ref 35–45)
PCO2 ARTERIAL: 47 MMHG (ref 35–45)
PCO2 ARTERIAL: 49 MMHG (ref 35–45)
PCO2 ARTERIAL: 49 MMHG (ref 35–45)
PCO2 ARTERIAL: 50 MMHG (ref 35–45)
PCO2 ARTERIAL: 51 MMHG (ref 35–45)
PCO2 ARTERIAL: 53 MMHG (ref 35–45)
PCO2 ARTERIAL: 86 MMHG (ref 35–45)
PDW BLD-RTO: 12.1 % (ref 11.5–14.5)
PDW BLD-RTO: 12.2 % (ref 11.5–14.5)
PDW BLD-RTO: 12.3 % (ref 11.5–14.5)
PDW BLD-RTO: 12.3 % (ref 11.5–14.5)
PDW BLD-RTO: 12.4 % (ref 11.5–14.5)
PDW BLD-RTO: 12.5 % (ref 11.5–14.5)
PDW BLD-RTO: 12.5 % (ref 11.5–14.5)
PDW BLD-RTO: 12.9 % (ref 11.5–14.5)
PDW BLD-RTO: 13 % (ref 11.5–14.5)
PDW BLD-RTO: 13.1 % (ref 11.5–14.5)
PDW BLD-RTO: 13.2 % (ref 11.5–14.5)
PDW BLD-RTO: 13.4 % (ref 11.5–14.5)
PDW BLD-RTO: 13.4 % (ref 11.5–14.5)
PDW BLD-RTO: 13.5 % (ref 11.5–14.5)
PDW BLD-RTO: 13.7 % (ref 11.5–14.5)
PERFORMED ON: ABNORMAL
PERFORMED ON: NORMAL
PH ARTERIAL: 7.03 (ref 7.35–7.45)
PH ARTERIAL: 7.25 (ref 7.35–7.45)
PH ARTERIAL: 7.34 (ref 7.35–7.45)
PH ARTERIAL: 7.39 (ref 7.35–7.45)
PH ARTERIAL: 7.4 (ref 7.35–7.45)
PH ARTERIAL: 7.4 (ref 7.35–7.45)
PH ARTERIAL: 7.42 (ref 7.35–7.45)
PH ARTERIAL: 7.47 (ref 7.35–7.45)
PH ARTERIAL: 7.47 (ref 7.35–7.45)
PH ARTERIAL: 7.48 (ref 7.35–7.45)
PH ARTERIAL: 7.5 (ref 7.35–7.45)
PHOSPHORUS: 1.1 MG/DL (ref 2.5–4.5)
PHOSPHORUS: 1.2 MG/DL (ref 2.5–4.5)
PHOSPHORUS: 1.3 MG/DL (ref 2.5–4.5)
PHOSPHORUS: 1.3 MG/DL (ref 2.5–4.5)
PHOSPHORUS: 1.4 MG/DL (ref 2.5–4.5)
PHOSPHORUS: 2.1 MG/DL (ref 2.5–4.5)
PHOSPHORUS: 2.1 MG/DL (ref 2.5–4.5)
PHOSPHORUS: 2.4 MG/DL (ref 2.5–4.5)
PHOSPHORUS: 2.5 MG/DL (ref 2.5–4.5)
PHOSPHORUS: 2.9 MG/DL (ref 2.5–4.5)
PHOSPHORUS: 2.9 MG/DL (ref 2.5–4.5)
PLATELET # BLD: 109 K/UL (ref 130–400)
PLATELET # BLD: 109 K/UL (ref 130–400)
PLATELET # BLD: 119 K/UL (ref 130–400)
PLATELET # BLD: 124 K/UL (ref 130–400)
PLATELET # BLD: 124 K/UL (ref 130–400)
PLATELET # BLD: 132 K/UL (ref 130–400)
PLATELET # BLD: 141 K/UL (ref 130–400)
PLATELET # BLD: 161 K/UL (ref 130–400)
PLATELET # BLD: 166 K/UL (ref 130–400)
PLATELET # BLD: 170 K/UL (ref 130–400)
PLATELET # BLD: 195 K/UL (ref 130–400)
PLATELET # BLD: 226 K/UL (ref 130–400)
PLATELET # BLD: 243 K/UL (ref 130–400)
PLATELET # BLD: 247 K/UL (ref 130–400)
PLATELET # BLD: 263 K/UL (ref 130–400)
PLATELET # BLD: 274 K/UL (ref 130–400)
PLATELET # BLD: 281 K/UL (ref 130–400)
PLATELET # BLD: 285 K/UL (ref 130–400)
PLATELET # BLD: 74 K/UL (ref 130–400)
PLATELET # BLD: 77 K/UL (ref 130–400)
PLATELET # BLD: 93 K/UL (ref 130–400)
PLATELET # BLD: 95 K/UL (ref 130–400)
PLATELET # BLD: 99 K/UL (ref 130–400)
PLATELET SLIDE REVIEW: ABNORMAL
PLATELET SLIDE REVIEW: ADEQUATE
PMV BLD AUTO: 10.5 FL (ref 9.4–12.4)
PMV BLD AUTO: 10.8 FL (ref 9.4–12.4)
PMV BLD AUTO: 10.9 FL (ref 9.4–12.4)
PMV BLD AUTO: 11.1 FL (ref 9.4–12.4)
PMV BLD AUTO: 11.2 FL (ref 9.4–12.4)
PMV BLD AUTO: 11.3 FL (ref 9.4–12.4)
PMV BLD AUTO: 11.3 FL (ref 9.4–12.4)
PMV BLD AUTO: 11.5 FL (ref 9.4–12.4)
PMV BLD AUTO: 11.7 FL (ref 9.4–12.4)
PMV BLD AUTO: 11.8 FL (ref 9.4–12.4)
PMV BLD AUTO: 12.4 FL (ref 9.4–12.4)
PMV BLD AUTO: 12.6 FL (ref 9.4–12.4)
PMV BLD AUTO: 12.7 FL (ref 9.4–12.4)
PMV BLD AUTO: 12.9 FL (ref 9.4–12.4)
PMV BLD AUTO: 13 FL (ref 9.4–12.4)
PMV BLD AUTO: 13.3 FL (ref 9.4–12.4)
PMV BLD AUTO: 13.4 FL (ref 9.4–12.4)
PMV BLD AUTO: 13.5 FL (ref 9.4–12.4)
PO2 ARTERIAL: 119 MMHG (ref 80–100)
PO2 ARTERIAL: 179 MMHG (ref 80–100)
PO2 ARTERIAL: 44 MMHG (ref 80–100)
PO2 ARTERIAL: 46 MMHG (ref 80–100)
PO2 ARTERIAL: 47 MMHG (ref 80–100)
PO2 ARTERIAL: 53 MMHG (ref 80–100)
PO2 ARTERIAL: 59 MMHG (ref 80–100)
PO2 ARTERIAL: 64 MMHG (ref 80–100)
PO2 ARTERIAL: 80 MMHG (ref 80–100)
PO2 ARTERIAL: 87 MMHG (ref 80–100)
PO2 ARTERIAL: 99 MMHG (ref 80–100)
POTASSIUM REFLEX MAGNESIUM: 3.3 MMOL/L (ref 3.5–5)
POTASSIUM REFLEX MAGNESIUM: 3.5 MMOL/L (ref 3.5–5)
POTASSIUM REFLEX MAGNESIUM: 3.7 MMOL/L (ref 3.5–5)
POTASSIUM REFLEX MAGNESIUM: 3.7 MMOL/L (ref 3.5–5)
POTASSIUM REFLEX MAGNESIUM: 4.2 MMOL/L (ref 3.5–5)
POTASSIUM REFLEX MAGNESIUM: 4.7 MMOL/L (ref 3.5–5)
POTASSIUM REFLEX MAGNESIUM: 5.7 MMOL/L (ref 3.5–5)
POTASSIUM REFLEX MAGNESIUM: 5.9 MMOL/L (ref 3.5–5)
POTASSIUM SERPL-SCNC: 3.1 MMOL/L (ref 3.5–5)
POTASSIUM SERPL-SCNC: 3.1 MMOL/L (ref 3.5–5)
POTASSIUM SERPL-SCNC: 3.2 MMOL/L (ref 3.5–5)
POTASSIUM SERPL-SCNC: 3.3 MMOL/L (ref 3.5–5)
POTASSIUM SERPL-SCNC: 3.5 MMOL/L (ref 3.5–5)
POTASSIUM SERPL-SCNC: 3.5 MMOL/L (ref 3.5–5)
POTASSIUM SERPL-SCNC: 3.6 MMOL/L (ref 3.5–5)
POTASSIUM SERPL-SCNC: 3.6 MMOL/L (ref 3.5–5)
POTASSIUM SERPL-SCNC: 3.7 MMOL/L (ref 3.5–5)
POTASSIUM SERPL-SCNC: 4 MMOL/L (ref 3.5–5)
POTASSIUM SERPL-SCNC: 4 MMOL/L (ref 3.5–5)
POTASSIUM SERPL-SCNC: 4.1 MMOL/L (ref 3.5–5)
POTASSIUM SERPL-SCNC: 4.2 MMOL/L (ref 3.5–5)
POTASSIUM SERPL-SCNC: 4.2 MMOL/L (ref 3.5–5)
POTASSIUM SERPL-SCNC: 4.6 MMOL/L (ref 3.5–5)
POTASSIUM SERPL-SCNC: 4.7 MMOL/L (ref 3.5–5)
POTASSIUM SERPL-SCNC: 5 MMOL/L (ref 3.5–5)
POTASSIUM SERPL-SCNC: 5.1 MMOL/L (ref 3.5–5)
POTASSIUM SERPL-SCNC: 5.4 MMOL/L (ref 3.5–5)
POTASSIUM SERPL-SCNC: 6.2 MMOL/L (ref 3.5–5)
POTASSIUM, WHOLE BLOOD: 2.9
POTASSIUM, WHOLE BLOOD: 3.3
POTASSIUM, WHOLE BLOOD: 3.4
POTASSIUM, WHOLE BLOOD: 3.9
POTASSIUM, WHOLE BLOOD: 4.1
POTASSIUM, WHOLE BLOOD: 4.7
POTASSIUM, WHOLE BLOOD: 4.8
POTASSIUM, WHOLE BLOOD: 4.9
POTASSIUM, WHOLE BLOOD: 5.1
POTASSIUM, WHOLE BLOOD: 5.5
POTASSIUM, WHOLE BLOOD: 5.7
PROTEUS SPECIES BY PCR: NOT DETECTED
PSEUDOMONAS AERUGINOSA BY PCR: NOT DETECTED
RBC # BLD: 3.73 M/UL (ref 4.7–6.1)
RBC # BLD: 3.86 M/UL (ref 4.7–6.1)
RBC # BLD: 4 M/UL (ref 4.7–6.1)
RBC # BLD: 4.01 M/UL (ref 4.7–6.1)
RBC # BLD: 4.64 M/UL (ref 4.7–6.1)
RBC # BLD: 4.79 M/UL (ref 4.7–6.1)
RBC # BLD: 4.9 M/UL (ref 4.7–6.1)
RBC # BLD: 4.94 M/UL (ref 4.7–6.1)
RBC # BLD: 5.02 M/UL (ref 4.7–6.1)
RBC # BLD: 5.02 M/UL (ref 4.7–6.1)
RBC # BLD: 5.1 M/UL (ref 4.7–6.1)
RBC # BLD: 5.11 M/UL (ref 4.7–6.1)
RBC # BLD: 5.17 M/UL (ref 4.7–6.1)
RBC # BLD: 5.17 M/UL (ref 4.7–6.1)
RBC # BLD: 5.23 M/UL (ref 4.7–6.1)
RBC # BLD: 5.28 M/UL (ref 4.7–6.1)
RBC # BLD: 5.33 M/UL (ref 4.7–6.1)
RBC # BLD: 5.52 M/UL (ref 4.7–6.1)
RBC # BLD: 5.53 M/UL (ref 4.7–6.1)
RBC # BLD: 5.57 M/UL (ref 4.7–6.1)
RBC # BLD: 5.8 M/UL (ref 4.7–6.1)
RBC # BLD: 6.01 M/UL (ref 4.7–6.1)
RBC # BLD: 6.65 M/UL (ref 4.7–6.1)
REASON FOR REJECTION: NORMAL
REJECTED TEST: NORMAL
REPORT: NORMAL
SALMONELLA SPECIES BY PCR: NOT DETECTED
SERRATIA MARCESCENS BY PCR: NOT DETECTED
SODIUM BLD-SCNC: 124 MMOL/L (ref 136–145)
SODIUM BLD-SCNC: 126 MMOL/L (ref 136–145)
SODIUM BLD-SCNC: 129 MMOL/L (ref 136–145)
SODIUM BLD-SCNC: 130 MMOL/L (ref 136–145)
SODIUM BLD-SCNC: 131 MMOL/L (ref 136–145)
SODIUM BLD-SCNC: 131 MMOL/L (ref 136–145)
SODIUM BLD-SCNC: 132 MMOL/L (ref 136–145)
SODIUM BLD-SCNC: 132 MMOL/L (ref 136–145)
SODIUM BLD-SCNC: 134 MMOL/L (ref 136–145)
SODIUM BLD-SCNC: 135 MMOL/L (ref 136–145)
SODIUM BLD-SCNC: 136 MMOL/L (ref 136–145)
SODIUM BLD-SCNC: 136 MMOL/L (ref 136–145)
SODIUM BLD-SCNC: 137 MMOL/L (ref 136–145)
SODIUM BLD-SCNC: 138 MMOL/L (ref 136–145)
SODIUM BLD-SCNC: 139 MMOL/L (ref 136–145)
SODIUM BLD-SCNC: 140 MMOL/L (ref 136–145)
SODIUM BLD-SCNC: 141 MMOL/L (ref 136–145)
STAPHYLOCOCCUS AUREUS BY PCR: NOT DETECTED
STAPHYLOCOCCUS EPIDERMIDIS BY PCR: DETECTED
STAPHYLOCOCCUS LUGDUNENSIS BY PCR: NOT DETECTED
STAPHYLOCOCCUS SPECIES BY PCR: DETECTED
STENOTROPHOMONAS MALTOPHILIA BY PCR: NOT DETECTED
STREPTOCOCCUS AGALACTIAE BY PCR: NOT DETECTED
STREPTOCOCCUS PNEUMONIAE BY PCR: NOT DETECTED
STREPTOCOCCUS PYOGENES  BY PCR: NOT DETECTED
STREPTOCOCCUS SPECIES BY PCR: NOT DETECTED
TEAR DROP CELLS: ABNORMAL
TEAR DROP CELLS: ABNORMAL
THIS TEST SENT TO: NORMAL
TOTAL PROTEIN: 4.9 G/DL (ref 6.6–8.7)
TOTAL PROTEIN: 5 G/DL (ref 6.6–8.7)
TOTAL PROTEIN: 5.1 G/DL (ref 6.6–8.7)
TOTAL PROTEIN: 5.2 G/DL (ref 6.6–8.7)
TOTAL PROTEIN: 5.2 G/DL (ref 6.6–8.7)
TOTAL PROTEIN: 5.3 G/DL (ref 6.6–8.7)
TOTAL PROTEIN: 5.4 G/DL (ref 6.6–8.7)
TOTAL PROTEIN: 5.6 G/DL (ref 6.6–8.7)
TOTAL PROTEIN: 5.7 G/DL (ref 6.6–8.7)
TOTAL PROTEIN: 5.8 G/DL (ref 6.6–8.7)
TOTAL PROTEIN: 5.8 G/DL (ref 6.6–8.7)
TOTAL PROTEIN: 5.9 G/DL (ref 6.6–8.7)
TOTAL PROTEIN: 6.1 G/DL (ref 6.6–8.7)
TOTAL PROTEIN: 6.7 G/DL (ref 6.6–8.7)
TOTAL PROTEIN: 8.4 G/DL (ref 6.6–8.7)
TOXIC GRANULATION: ABNORMAL
URINE CULTURE, ROUTINE: ABNORMAL
VANCOMYCIN TROUGH: 11.9 UG/ML (ref 10–20)
VANCOMYCIN TROUGH: 13.9 UG/ML (ref 10–20)
WBC # BLD: 10 K/UL (ref 4.8–10.8)
WBC # BLD: 10.7 K/UL (ref 4.8–10.8)
WBC # BLD: 10.8 K/UL (ref 4.8–10.8)
WBC # BLD: 11 K/UL (ref 4.8–10.8)
WBC # BLD: 11 K/UL (ref 4.8–10.8)
WBC # BLD: 14.5 K/UL (ref 4.8–10.8)
WBC # BLD: 15.6 K/UL (ref 4.8–10.8)
WBC # BLD: 15.7 K/UL (ref 4.8–10.8)
WBC # BLD: 18.4 K/UL (ref 4.8–10.8)
WBC # BLD: 18.5 K/UL (ref 4.8–10.8)
WBC # BLD: 24.2 K/UL (ref 4.8–10.8)
WBC # BLD: 35.9 K/UL (ref 4.8–10.8)
WBC # BLD: 4.2 K/UL (ref 4.8–10.8)
WBC # BLD: 4.5 K/UL (ref 4.8–10.8)
WBC # BLD: 4.8 K/UL (ref 4.8–10.8)
WBC # BLD: 4.9 K/UL (ref 4.8–10.8)
WBC # BLD: 7.4 K/UL (ref 4.8–10.8)
WBC # BLD: 7.5 K/UL (ref 4.8–10.8)
WBC # BLD: 8.2 K/UL (ref 4.8–10.8)
WBC # BLD: 8.5 K/UL (ref 4.8–10.8)
WBC # BLD: 8.6 K/UL (ref 4.8–10.8)
WBC # BLD: 9.6 K/UL (ref 4.8–10.8)
WBC # BLD: 9.7 K/UL (ref 4.8–10.8)

## 2021-01-01 PROCEDURE — 2580000003 HC RX 258: Performed by: INTERNAL MEDICINE

## 2021-01-01 PROCEDURE — 94660 CPAP INITIATION&MGMT: CPT

## 2021-01-01 PROCEDURE — 99291 CRITICAL CARE FIRST HOUR: CPT | Performed by: INTERNAL MEDICINE

## 2021-01-01 PROCEDURE — 80053 COMPREHEN METABOLIC PANEL: CPT

## 2021-01-01 PROCEDURE — 6360000002 HC RX W HCPCS: Performed by: INTERNAL MEDICINE

## 2021-01-01 PROCEDURE — 2500000003 HC RX 250 WO HCPCS: Performed by: INTERNAL MEDICINE

## 2021-01-01 PROCEDURE — 2580000003 HC RX 258: Performed by: HOSPITALIST

## 2021-01-01 PROCEDURE — 2100000000 HC CCU R&B

## 2021-01-01 PROCEDURE — 84132 ASSAY OF SERUM POTASSIUM: CPT

## 2021-01-01 PROCEDURE — 94003 VENT MGMT INPAT SUBQ DAY: CPT

## 2021-01-01 PROCEDURE — 87086 URINE CULTURE/COLONY COUNT: CPT

## 2021-01-01 PROCEDURE — 6370000000 HC RX 637 (ALT 250 FOR IP): Performed by: INTERNAL MEDICINE

## 2021-01-01 PROCEDURE — 6370000000 HC RX 637 (ALT 250 FOR IP): Performed by: HOSPITALIST

## 2021-01-01 PROCEDURE — 94640 AIRWAY INHALATION TREATMENT: CPT

## 2021-01-01 PROCEDURE — 2700000000 HC OXYGEN THERAPY PER DAY

## 2021-01-01 PROCEDURE — 76937 US GUIDE VASCULAR ACCESS: CPT

## 2021-01-01 PROCEDURE — 93005 ELECTROCARDIOGRAM TRACING: CPT | Performed by: EMERGENCY MEDICINE

## 2021-01-01 PROCEDURE — 82947 ASSAY GLUCOSE BLOOD QUANT: CPT

## 2021-01-01 PROCEDURE — 86140 C-REACTIVE PROTEIN: CPT

## 2021-01-01 PROCEDURE — 6360000002 HC RX W HCPCS: Performed by: HOSPITALIST

## 2021-01-01 PROCEDURE — 71045 X-RAY EXAM CHEST 1 VIEW: CPT

## 2021-01-01 PROCEDURE — 94002 VENT MGMT INPAT INIT DAY: CPT

## 2021-01-01 PROCEDURE — 82803 BLOOD GASES ANY COMBINATION: CPT

## 2021-01-01 PROCEDURE — 02HV33Z INSERTION OF INFUSION DEVICE INTO SUPERIOR VENA CAVA, PERCUTANEOUS APPROACH: ICD-10-PCS | Performed by: INTERNAL MEDICINE

## 2021-01-01 PROCEDURE — 31500 INSERT EMERGENCY AIRWAY: CPT

## 2021-01-01 PROCEDURE — 85025 COMPLETE CBC W/AUTO DIFF WBC: CPT

## 2021-01-01 PROCEDURE — 2580000003 HC RX 258

## 2021-01-01 PROCEDURE — 36573 INSJ PICC RS&I 5 YR+: CPT

## 2021-01-01 PROCEDURE — 2500000003 HC RX 250 WO HCPCS: Performed by: HOSPITALIST

## 2021-01-01 PROCEDURE — 85384 FIBRINOGEN ACTIVITY: CPT

## 2021-01-01 PROCEDURE — 36600 WITHDRAWAL OF ARTERIAL BLOOD: CPT

## 2021-01-01 PROCEDURE — 80202 ASSAY OF VANCOMYCIN: CPT

## 2021-01-01 PROCEDURE — 85379 FIBRIN DEGRADATION QUANT: CPT

## 2021-01-01 PROCEDURE — 36592 COLLECT BLOOD FROM PICC: CPT

## 2021-01-01 PROCEDURE — 83605 ASSAY OF LACTIC ACID: CPT

## 2021-01-01 PROCEDURE — 85730 THROMBOPLASTIN TIME PARTIAL: CPT

## 2021-01-01 PROCEDURE — 87040 BLOOD CULTURE FOR BACTERIA: CPT

## 2021-01-01 PROCEDURE — 87070 CULTURE OTHR SPECIMN AEROBIC: CPT

## 2021-01-01 PROCEDURE — 84100 ASSAY OF PHOSPHORUS: CPT

## 2021-01-01 PROCEDURE — 89220 SPUTUM SPECIMEN COLLECTION: CPT

## 2021-01-01 PROCEDURE — 83735 ASSAY OF MAGNESIUM: CPT

## 2021-01-01 PROCEDURE — 99231 SBSQ HOSP IP/OBS SF/LOW 25: CPT | Performed by: SURGERY

## 2021-01-01 PROCEDURE — 5A1955Z RESPIRATORY VENTILATION, GREATER THAN 96 CONSECUTIVE HOURS: ICD-10-PCS | Performed by: INTERNAL MEDICINE

## 2021-01-01 PROCEDURE — 85027 COMPLETE CBC AUTOMATED: CPT

## 2021-01-01 PROCEDURE — 87186 SC STD MICRODIL/AGAR DIL: CPT

## 2021-01-01 PROCEDURE — 0W9B30Z DRAINAGE OF LEFT PLEURAL CAVITY WITH DRAINAGE DEVICE, PERCUTANEOUS APPROACH: ICD-10-PCS | Performed by: SURGERY

## 2021-01-01 PROCEDURE — 2580000003 HC RX 258: Performed by: EMERGENCY MEDICINE

## 2021-01-01 PROCEDURE — 82009 KETONE BODYS QUAL: CPT

## 2021-01-01 PROCEDURE — P9047 ALBUMIN (HUMAN), 25%, 50ML: HCPCS | Performed by: HOSPITALIST

## 2021-01-01 PROCEDURE — 99211 OFF/OP EST MAY X REQ PHY/QHP: CPT

## 2021-01-01 PROCEDURE — 93010 ELECTROCARDIOGRAM REPORT: CPT | Performed by: INTERNAL MEDICINE

## 2021-01-01 PROCEDURE — 99283 EMERGENCY DEPT VISIT LOW MDM: CPT

## 2021-01-01 PROCEDURE — 82728 ASSAY OF FERRITIN: CPT

## 2021-01-01 PROCEDURE — 6370000000 HC RX 637 (ALT 250 FOR IP): Performed by: EMERGENCY MEDICINE

## 2021-01-01 PROCEDURE — 6360000002 HC RX W HCPCS

## 2021-01-01 PROCEDURE — 93308 TTE F-UP OR LMTD: CPT

## 2021-01-01 PROCEDURE — 32551 INSERTION OF CHEST TUBE: CPT | Performed by: SURGERY

## 2021-01-01 PROCEDURE — C1751 CATH, INF, PER/CENT/MIDLINE: HCPCS

## 2021-01-01 PROCEDURE — 0BH17EZ INSERTION OF ENDOTRACHEAL AIRWAY INTO TRACHEA, VIA NATURAL OR ARTIFICIAL OPENING: ICD-10-PCS | Performed by: INTERNAL MEDICINE

## 2021-01-01 PROCEDURE — 36415 COLL VENOUS BLD VENIPUNCTURE: CPT

## 2021-01-01 PROCEDURE — 87077 CULTURE AEROBIC IDENTIFY: CPT

## 2021-01-01 PROCEDURE — 87205 SMEAR GRAM STAIN: CPT

## 2021-01-01 PROCEDURE — 86022 PLATELET ANTIBODIES: CPT

## 2021-01-01 PROCEDURE — 87150 DNA/RNA AMPLIFIED PROBE: CPT

## 2021-01-01 RX ORDER — SODIUM CHLORIDE 0.9 % (FLUSH) 0.9 %
5-40 SYRINGE (ML) INJECTION PRN
Status: DISCONTINUED | OUTPATIENT
Start: 2021-01-01 | End: 2021-01-01

## 2021-01-01 RX ORDER — NALOXONE HYDROCHLORIDE 0.4 MG/ML
0.4 INJECTION, SOLUTION INTRAMUSCULAR; INTRAVENOUS; SUBCUTANEOUS PRN
Status: DISCONTINUED | OUTPATIENT
Start: 2021-01-01 | End: 2021-01-01 | Stop reason: HOSPADM

## 2021-01-01 RX ORDER — LORAZEPAM 2 MG/ML
2 INJECTION INTRAMUSCULAR ONCE
Status: COMPLETED | OUTPATIENT
Start: 2021-01-01 | End: 2021-01-01

## 2021-01-01 RX ORDER — MONTELUKAST SODIUM 10 MG/1
10 TABLET ORAL NIGHTLY
Status: DISCONTINUED | OUTPATIENT
Start: 2021-01-01 | End: 2021-01-01 | Stop reason: HOSPADM

## 2021-01-01 RX ORDER — ASPIRIN 325 MG
325 TABLET ORAL DAILY
Status: DISCONTINUED | OUTPATIENT
Start: 2021-01-01 | End: 2021-01-01 | Stop reason: HOSPADM

## 2021-01-01 RX ORDER — HEPARIN SODIUM 1000 [USP'U]/ML
80 INJECTION, SOLUTION INTRAVENOUS; SUBCUTANEOUS ONCE
Status: COMPLETED | OUTPATIENT
Start: 2021-01-01 | End: 2021-01-01

## 2021-01-01 RX ORDER — ONDANSETRON 2 MG/ML
4 INJECTION INTRAMUSCULAR; INTRAVENOUS EVERY 6 HOURS PRN
Status: DISCONTINUED | OUTPATIENT
Start: 2021-01-01 | End: 2021-01-01 | Stop reason: HOSPADM

## 2021-01-01 RX ORDER — DEXAMETHASONE SODIUM PHOSPHATE 10 MG/ML
6 INJECTION, SOLUTION INTRAMUSCULAR; INTRAVENOUS EVERY 12 HOURS
Status: DISCONTINUED | OUTPATIENT
Start: 2021-01-01 | End: 2021-01-01

## 2021-01-01 RX ORDER — POTASSIUM CHLORIDE 7.45 MG/ML
10 INJECTION INTRAVENOUS PRN
Status: DISCONTINUED | OUTPATIENT
Start: 2021-01-01 | End: 2021-01-01

## 2021-01-01 RX ORDER — DEXTROSE, SODIUM CHLORIDE, AND POTASSIUM CHLORIDE 5; .45; .15 G/100ML; G/100ML; G/100ML
INJECTION INTRAVENOUS CONTINUOUS PRN
Status: DISCONTINUED | OUTPATIENT
Start: 2021-01-01 | End: 2021-01-01

## 2021-01-01 RX ORDER — SODIUM CHLORIDE 0.9 % (FLUSH) 0.9 %
5-40 SYRINGE (ML) INJECTION PRN
Status: DISCONTINUED | OUTPATIENT
Start: 2021-01-01 | End: 2021-01-01 | Stop reason: HOSPADM

## 2021-01-01 RX ORDER — DIPHENHYDRAMINE HYDROCHLORIDE 50 MG/ML
25 INJECTION INTRAMUSCULAR; INTRAVENOUS ONCE
Status: CANCELLED | OUTPATIENT
Start: 2021-01-01

## 2021-01-01 RX ORDER — POTASSIUM CHLORIDE 20 MEQ/1
20 TABLET, EXTENDED RELEASE ORAL ONCE
Status: COMPLETED | OUTPATIENT
Start: 2021-01-01 | End: 2021-01-01

## 2021-01-01 RX ORDER — ACETAMINOPHEN 325 MG/1
325 TABLET ORAL EVERY 6 HOURS PRN
Status: DISCONTINUED | OUTPATIENT
Start: 2021-01-01 | End: 2021-01-01

## 2021-01-01 RX ORDER — INSULIN GLARGINE 100 [IU]/ML
40 INJECTION, SOLUTION SUBCUTANEOUS 2 TIMES DAILY
Status: DISCONTINUED | OUTPATIENT
Start: 2021-01-01 | End: 2021-01-01

## 2021-01-01 RX ORDER — POTASSIUM CHLORIDE 20 MEQ/1
40 TABLET, EXTENDED RELEASE ORAL PRN
Status: DISCONTINUED | OUTPATIENT
Start: 2021-01-01 | End: 2021-01-01 | Stop reason: HOSPADM

## 2021-01-01 RX ORDER — VECURONIUM BROMIDE 1 MG/ML
10 INJECTION, POWDER, LYOPHILIZED, FOR SOLUTION INTRAVENOUS ONCE
Status: COMPLETED | OUTPATIENT
Start: 2021-01-01 | End: 2021-01-01

## 2021-01-01 RX ORDER — PROPOFOL 10 MG/ML
INJECTION, EMULSION INTRAVENOUS
Status: COMPLETED
Start: 2021-01-01 | End: 2021-01-01

## 2021-01-01 RX ORDER — MIDAZOLAM IN NACL,ISO-OSMOT/PF 50 MG/50ML
1-10 INFUSION BOTTLE (ML) INTRAVENOUS CONTINUOUS
Status: DISCONTINUED | OUTPATIENT
Start: 2021-01-01 | End: 2021-01-01 | Stop reason: HOSPADM

## 2021-01-01 RX ORDER — ACETAMINOPHEN 325 MG/1
650 TABLET ORAL ONCE
Status: CANCELLED | OUTPATIENT
Start: 2021-01-01

## 2021-01-01 RX ORDER — FAMOTIDINE 20 MG/1
20 TABLET, FILM COATED ORAL 2 TIMES DAILY
Status: DISCONTINUED | OUTPATIENT
Start: 2021-01-01 | End: 2021-01-01

## 2021-01-01 RX ORDER — VECURONIUM BROMIDE 1 MG/ML
20 INJECTION, POWDER, LYOPHILIZED, FOR SOLUTION INTRAVENOUS EVERY 4 HOURS PRN
Status: DISCONTINUED | OUTPATIENT
Start: 2021-01-01 | End: 2021-01-01 | Stop reason: HOSPADM

## 2021-01-01 RX ORDER — METOPROLOL TARTRATE 5 MG/5ML
5 INJECTION INTRAVENOUS EVERY 6 HOURS PRN
Status: DISCONTINUED | OUTPATIENT
Start: 2021-01-01 | End: 2021-01-01 | Stop reason: HOSPADM

## 2021-01-01 RX ORDER — ALBUTEROL SULFATE 90 UG/1
2 AEROSOL, METERED RESPIRATORY (INHALATION) 4 TIMES DAILY
Status: DISCONTINUED | OUTPATIENT
Start: 2021-01-01 | End: 2021-01-01 | Stop reason: SDUPTHER

## 2021-01-01 RX ORDER — INSULIN GLARGINE 100 [IU]/ML
15 INJECTION, SOLUTION SUBCUTANEOUS NIGHTLY
Status: DISCONTINUED | OUTPATIENT
Start: 2021-01-01 | End: 2021-01-01

## 2021-01-01 RX ORDER — HEPARIN SODIUM 10000 [USP'U]/100ML
5-30 INJECTION, SOLUTION INTRAVENOUS CONTINUOUS
Status: DISCONTINUED | OUTPATIENT
Start: 2021-01-01 | End: 2021-01-01 | Stop reason: HOSPADM

## 2021-01-01 RX ORDER — METHYLPREDNISOLONE SODIUM SUCCINATE 125 MG/2ML
60 INJECTION, POWDER, LYOPHILIZED, FOR SOLUTION INTRAMUSCULAR; INTRAVENOUS ONCE
Status: DISCONTINUED | OUTPATIENT
Start: 2021-01-01 | End: 2021-01-01

## 2021-01-01 RX ORDER — IBUPROFEN 400 MG/1
400 TABLET ORAL EVERY 6 HOURS PRN
Status: DISCONTINUED | OUTPATIENT
Start: 2021-01-01 | End: 2021-01-01

## 2021-01-01 RX ORDER — NOREPINEPHRINE BIT/0.9 % NACL 16MG/250ML
2-100 INFUSION BOTTLE (ML) INTRAVENOUS CONTINUOUS PRN
Status: DISCONTINUED | OUTPATIENT
Start: 2021-01-01 | End: 2021-01-01 | Stop reason: HOSPADM

## 2021-01-01 RX ORDER — MECOBALAMIN 5000 MCG
10 TABLET,DISINTEGRATING ORAL DAILY
Status: DISCONTINUED | OUTPATIENT
Start: 2021-01-01 | End: 2021-01-01 | Stop reason: SDUPTHER

## 2021-01-01 RX ORDER — SODIUM CHLORIDE 0.9 % (FLUSH) 0.9 %
5-40 SYRINGE (ML) INJECTION PRN
Status: CANCELLED | OUTPATIENT
Start: 2021-01-01

## 2021-01-01 RX ORDER — SODIUM CHLORIDE, SODIUM LACTATE, POTASSIUM CHLORIDE, AND CALCIUM CHLORIDE .6; .31; .03; .02 G/100ML; G/100ML; G/100ML; G/100ML
1000 INJECTION, SOLUTION INTRAVENOUS ONCE
Status: COMPLETED | OUTPATIENT
Start: 2021-01-01 | End: 2021-01-01

## 2021-01-01 RX ORDER — 0.9 % SODIUM CHLORIDE 0.9 %
1000 INTRAVENOUS SOLUTION INTRAVENOUS ONCE
Status: COMPLETED | OUTPATIENT
Start: 2021-01-01 | End: 2021-01-01

## 2021-01-01 RX ORDER — EPINEPHRINE 1 MG/ML
0.3 INJECTION, SOLUTION, CONCENTRATE INTRAVENOUS PRN
Status: CANCELLED | OUTPATIENT
Start: 2021-01-01

## 2021-01-01 RX ORDER — ACETAMINOPHEN 325 MG/1
650 TABLET ORAL EVERY 6 HOURS PRN
Status: DISCONTINUED | OUTPATIENT
Start: 2021-01-01 | End: 2021-01-01 | Stop reason: SDUPTHER

## 2021-01-01 RX ORDER — VITAMIN B COMPLEX
2000 TABLET ORAL DAILY
Status: DISCONTINUED | OUTPATIENT
Start: 2021-01-01 | End: 2021-01-01 | Stop reason: HOSPADM

## 2021-01-01 RX ORDER — ETOMIDATE 2 MG/ML
20 INJECTION INTRAVENOUS ONCE
Status: COMPLETED | OUTPATIENT
Start: 2021-01-01 | End: 2021-01-01

## 2021-01-01 RX ORDER — SODIUM CHLORIDE 9 MG/ML
INJECTION, SOLUTION INTRAVENOUS CONTINUOUS
Status: CANCELLED | OUTPATIENT
Start: 2021-01-01

## 2021-01-01 RX ORDER — SODIUM CHLORIDE 9 MG/ML
25 INJECTION, SOLUTION INTRAVENOUS PRN
Status: DISCONTINUED | OUTPATIENT
Start: 2021-01-01 | End: 2021-01-01 | Stop reason: HOSPADM

## 2021-01-01 RX ORDER — SUCCINYLCHOLINE/SOD CL,ISO/PF 200MG/10ML
100 SYRINGE (ML) INTRAVENOUS ONCE
Status: COMPLETED | OUTPATIENT
Start: 2021-01-01 | End: 2021-01-01

## 2021-01-01 RX ORDER — PROPOFOL 10 MG/ML
5-50 INJECTION, EMULSION INTRAVENOUS
Status: DISCONTINUED | OUTPATIENT
Start: 2021-01-01 | End: 2021-01-01 | Stop reason: HOSPADM

## 2021-01-01 RX ORDER — POTASSIUM CHLORIDE 7.45 MG/ML
10 INJECTION INTRAVENOUS PRN
Status: DISCONTINUED | OUTPATIENT
Start: 2021-01-01 | End: 2021-01-01 | Stop reason: HOSPADM

## 2021-01-01 RX ORDER — DIPHENHYDRAMINE HYDROCHLORIDE 50 MG/ML
50 INJECTION INTRAMUSCULAR; INTRAVENOUS ONCE
Status: CANCELLED | OUTPATIENT
Start: 2021-01-01 | End: 2021-01-01

## 2021-01-01 RX ORDER — ACETAZOLAMIDE 500 MG/5ML
250 INJECTION, POWDER, LYOPHILIZED, FOR SOLUTION INTRAVENOUS EVERY 8 HOURS
Status: DISCONTINUED | OUTPATIENT
Start: 2021-01-01 | End: 2021-01-01 | Stop reason: HOSPADM

## 2021-01-01 RX ORDER — INSULIN GLARGINE 100 [IU]/ML
50 INJECTION, SOLUTION SUBCUTANEOUS 2 TIMES DAILY
Status: DISCONTINUED | OUTPATIENT
Start: 2021-01-01 | End: 2021-01-01 | Stop reason: HOSPADM

## 2021-01-01 RX ORDER — MECOBALAMIN 5000 MCG
10 TABLET,DISINTEGRATING ORAL DAILY
Status: DISCONTINUED | OUTPATIENT
Start: 2021-01-01 | End: 2021-01-01 | Stop reason: HOSPADM

## 2021-01-01 RX ORDER — ADENOSINE 3 MG/ML
INJECTION, SOLUTION INTRAVENOUS
Status: COMPLETED
Start: 2021-01-01 | End: 2021-01-01

## 2021-01-01 RX ORDER — METOCLOPRAMIDE HYDROCHLORIDE 5 MG/ML
10 INJECTION INTRAMUSCULAR; INTRAVENOUS EVERY 6 HOURS
Status: DISCONTINUED | OUTPATIENT
Start: 2021-01-01 | End: 2021-01-01 | Stop reason: HOSPADM

## 2021-01-01 RX ORDER — POTASSIUM CHLORIDE 20 MEQ/1
40 TABLET, EXTENDED RELEASE ORAL ONCE
Status: COMPLETED | OUTPATIENT
Start: 2021-01-01 | End: 2021-01-01

## 2021-01-01 RX ORDER — SODIUM CHLORIDE 0.9 % (FLUSH) 0.9 %
5-40 SYRINGE (ML) INJECTION EVERY 12 HOURS SCHEDULED
Status: DISCONTINUED | OUTPATIENT
Start: 2021-01-01 | End: 2021-01-01

## 2021-01-01 RX ORDER — BUDESONIDE AND FORMOTEROL FUMARATE DIHYDRATE 160; 4.5 UG/1; UG/1
2 AEROSOL RESPIRATORY (INHALATION) 2 TIMES DAILY
Status: DISCONTINUED | OUTPATIENT
Start: 2021-01-01 | End: 2021-01-01

## 2021-01-01 RX ORDER — TRAMADOL HYDROCHLORIDE 50 MG/1
50 TABLET ORAL EVERY 6 HOURS PRN
Status: DISCONTINUED | OUTPATIENT
Start: 2021-01-01 | End: 2021-01-01

## 2021-01-01 RX ORDER — POLYETHYLENE GLYCOL 3350 17 G/17G
17 POWDER, FOR SOLUTION ORAL DAILY PRN
Status: DISCONTINUED | OUTPATIENT
Start: 2021-01-01 | End: 2021-01-01 | Stop reason: HOSPADM

## 2021-01-01 RX ORDER — ALBUMIN (HUMAN) 12.5 G/50ML
50 SOLUTION INTRAVENOUS ONCE
Status: COMPLETED | OUTPATIENT
Start: 2021-01-01 | End: 2021-01-01

## 2021-01-01 RX ORDER — ACETAMINOPHEN 325 MG/1
650 TABLET ORAL EVERY 6 HOURS PRN
Status: DISCONTINUED | OUTPATIENT
Start: 2021-01-01 | End: 2021-01-01 | Stop reason: HOSPADM

## 2021-01-01 RX ORDER — INSULIN GLARGINE 100 [IU]/ML
30 INJECTION, SOLUTION SUBCUTANEOUS 2 TIMES DAILY
Status: DISCONTINUED | OUTPATIENT
Start: 2021-01-01 | End: 2021-01-01

## 2021-01-01 RX ORDER — HEPARIN SODIUM (PORCINE) LOCK FLUSH IV SOLN 100 UNIT/ML 100 UNIT/ML
500 SOLUTION INTRAVENOUS PRN
Status: CANCELLED | OUTPATIENT
Start: 2021-01-01

## 2021-01-01 RX ORDER — LORAZEPAM 2 MG/ML
INJECTION INTRAMUSCULAR
Status: COMPLETED
Start: 2021-01-01 | End: 2021-01-01

## 2021-01-01 RX ORDER — DEXTROSE MONOHYDRATE 25 G/50ML
12.5 INJECTION, SOLUTION INTRAVENOUS PRN
Status: DISCONTINUED | OUTPATIENT
Start: 2021-01-01 | End: 2021-01-01 | Stop reason: HOSPADM

## 2021-01-01 RX ORDER — SODIUM CHLORIDE 0.9 % (FLUSH) 0.9 %
5-40 SYRINGE (ML) INJECTION EVERY 12 HOURS SCHEDULED
Status: DISCONTINUED | OUTPATIENT
Start: 2021-01-01 | End: 2021-01-01 | Stop reason: HOSPADM

## 2021-01-01 RX ORDER — LIDOCAINE HYDROCHLORIDE 10 MG/ML
5 INJECTION, SOLUTION EPIDURAL; INFILTRATION; INTRACAUDAL; PERINEURAL ONCE
Status: DISCONTINUED | OUTPATIENT
Start: 2021-01-01 | End: 2021-01-01 | Stop reason: HOSPADM

## 2021-01-01 RX ORDER — DEXTROSE, SODIUM CHLORIDE, AND POTASSIUM CHLORIDE 5; .9; .15 G/100ML; G/100ML; G/100ML
INJECTION INTRAVENOUS CONTINUOUS PRN
Status: DISCONTINUED | OUTPATIENT
Start: 2021-01-01 | End: 2021-01-01

## 2021-01-01 RX ORDER — CHLORHEXIDINE GLUCONATE 0.12 MG/ML
15 RINSE ORAL 2 TIMES DAILY
Status: DISCONTINUED | OUTPATIENT
Start: 2021-01-01 | End: 2021-01-01 | Stop reason: HOSPADM

## 2021-01-01 RX ORDER — CALCIUM CARBONATE 200(500)MG
500 TABLET,CHEWABLE ORAL 3 TIMES DAILY PRN
Status: DISCONTINUED | OUTPATIENT
Start: 2021-01-01 | End: 2021-01-01 | Stop reason: HOSPADM

## 2021-01-01 RX ORDER — TRAMADOL HYDROCHLORIDE 50 MG/1
50 TABLET ORAL EVERY 6 HOURS PRN
Status: DISCONTINUED | OUTPATIENT
Start: 2021-01-01 | End: 2021-01-01 | Stop reason: HOSPADM

## 2021-01-01 RX ORDER — ACETAMINOPHEN 650 MG/1
650 SUPPOSITORY RECTAL EVERY 6 HOURS PRN
Status: DISCONTINUED | OUTPATIENT
Start: 2021-01-01 | End: 2021-01-01 | Stop reason: HOSPADM

## 2021-01-01 RX ORDER — DEXTROSE MONOHYDRATE 25 G/50ML
12.5 INJECTION, SOLUTION INTRAVENOUS PRN
Status: DISCONTINUED | OUTPATIENT
Start: 2021-01-01 | End: 2021-01-01 | Stop reason: SDUPTHER

## 2021-01-01 RX ORDER — DEXTROSE MONOHYDRATE 50 MG/ML
100 INJECTION, SOLUTION INTRAVENOUS PRN
Status: DISCONTINUED | OUTPATIENT
Start: 2021-01-01 | End: 2021-01-01 | Stop reason: HOSPADM

## 2021-01-01 RX ORDER — GUAIFENESIN/DEXTROMETHORPHAN 100-10MG/5
5 SYRUP ORAL EVERY 4 HOURS PRN
Status: DISCONTINUED | OUTPATIENT
Start: 2021-01-01 | End: 2021-01-01 | Stop reason: HOSPADM

## 2021-01-01 RX ORDER — DEXMEDETOMIDINE HYDROCHLORIDE 4 UG/ML
.2-1.4 INJECTION, SOLUTION INTRAVENOUS CONTINUOUS
Status: DISCONTINUED | OUTPATIENT
Start: 2021-01-01 | End: 2021-01-01 | Stop reason: HOSPADM

## 2021-01-01 RX ORDER — GUAIFENESIN 200 MG/1
400 TABLET ORAL EVERY 8 HOURS
Status: DISCONTINUED | OUTPATIENT
Start: 2021-01-01 | End: 2021-01-01

## 2021-01-01 RX ORDER — ZINC SULFATE 50(220)MG
50 CAPSULE ORAL DAILY
Status: DISCONTINUED | OUTPATIENT
Start: 2021-01-01 | End: 2021-01-01 | Stop reason: HOSPADM

## 2021-01-01 RX ORDER — MAGNESIUM SULFATE 1 G/100ML
1000 INJECTION INTRAVENOUS PRN
Status: DISCONTINUED | OUTPATIENT
Start: 2021-01-01 | End: 2021-01-01 | Stop reason: HOSPADM

## 2021-01-01 RX ORDER — SODIUM CHLORIDE 9 MG/ML
25 INJECTION, SOLUTION INTRAVENOUS PRN
Status: DISCONTINUED | OUTPATIENT
Start: 2021-01-01 | End: 2021-01-01 | Stop reason: SDUPTHER

## 2021-01-01 RX ORDER — HEPARIN SODIUM 1000 [USP'U]/ML
80 INJECTION, SOLUTION INTRAVENOUS; SUBCUTANEOUS PRN
Status: DISCONTINUED | OUTPATIENT
Start: 2021-01-01 | End: 2021-01-01 | Stop reason: HOSPADM

## 2021-01-01 RX ORDER — INSULIN GLARGINE 100 [IU]/ML
20 INJECTION, SOLUTION SUBCUTANEOUS 2 TIMES DAILY
Status: DISCONTINUED | OUTPATIENT
Start: 2021-01-01 | End: 2021-01-01

## 2021-01-01 RX ORDER — NOREPINEPHRINE BIT/0.9 % NACL 16MG/250ML
2-100 INFUSION BOTTLE (ML) INTRAVENOUS CONTINUOUS
Status: DISCONTINUED | OUTPATIENT
Start: 2021-01-01 | End: 2021-01-01

## 2021-01-01 RX ORDER — VITAMIN B COMPLEX
1000 TABLET ORAL DAILY
Status: DISCONTINUED | OUTPATIENT
Start: 2021-01-01 | End: 2021-01-01

## 2021-01-01 RX ORDER — VECURONIUM BROMIDE 1 MG/ML
10 INJECTION, POWDER, LYOPHILIZED, FOR SOLUTION INTRAVENOUS EVERY 4 HOURS PRN
Status: DISCONTINUED | OUTPATIENT
Start: 2021-01-01 | End: 2021-01-01

## 2021-01-01 RX ORDER — HEPARIN SODIUM 1000 [USP'U]/ML
40 INJECTION, SOLUTION INTRAVENOUS; SUBCUTANEOUS PRN
Status: DISCONTINUED | OUTPATIENT
Start: 2021-01-01 | End: 2021-01-01 | Stop reason: HOSPADM

## 2021-01-01 RX ORDER — ONDANSETRON 4 MG/1
4 TABLET, ORALLY DISINTEGRATING ORAL EVERY 8 HOURS PRN
Status: DISCONTINUED | OUTPATIENT
Start: 2021-01-01 | End: 2021-01-01 | Stop reason: HOSPADM

## 2021-01-01 RX ORDER — SODIUM CHLORIDE 9 MG/ML
INJECTION, SOLUTION INTRAVENOUS CONTINUOUS
Status: DISCONTINUED | OUTPATIENT
Start: 2021-01-01 | End: 2021-01-01 | Stop reason: ALTCHOICE

## 2021-01-01 RX ORDER — LORAZEPAM 2 MG/ML
1 INJECTION INTRAMUSCULAR EVERY 4 HOURS PRN
Status: DISCONTINUED | OUTPATIENT
Start: 2021-01-01 | End: 2021-01-01 | Stop reason: HOSPADM

## 2021-01-01 RX ORDER — IPRATROPIUM BROMIDE AND ALBUTEROL SULFATE 2.5; .5 MG/3ML; MG/3ML
1 SOLUTION RESPIRATORY (INHALATION) EVERY 4 HOURS
Status: DISCONTINUED | OUTPATIENT
Start: 2021-01-01 | End: 2021-01-01 | Stop reason: HOSPADM

## 2021-01-01 RX ORDER — ATROPINE SULFATE 0.1 MG/ML
INJECTION INTRAVENOUS
Status: COMPLETED
Start: 2021-01-01 | End: 2021-01-01

## 2021-01-01 RX ORDER — METHYLPREDNISOLONE SODIUM SUCCINATE 125 MG/2ML
125 INJECTION, POWDER, LYOPHILIZED, FOR SOLUTION INTRAMUSCULAR; INTRAVENOUS ONCE
Status: CANCELLED | OUTPATIENT
Start: 2021-01-01 | End: 2021-01-01

## 2021-01-01 RX ORDER — NICOTINE POLACRILEX 4 MG
15 LOZENGE BUCCAL PRN
Status: DISCONTINUED | OUTPATIENT
Start: 2021-01-01 | End: 2021-01-01 | Stop reason: HOSPADM

## 2021-01-01 RX ORDER — SODIUM POLYSTYRENE SULFONATE 15 G/60ML
45 SUSPENSION ORAL; RECTAL ONCE
Status: COMPLETED | OUTPATIENT
Start: 2021-01-01 | End: 2021-01-01

## 2021-01-01 RX ORDER — MECOBALAMIN 5000 MCG
5 TABLET,DISINTEGRATING ORAL NIGHTLY PRN
Status: DISCONTINUED | OUTPATIENT
Start: 2021-01-01 | End: 2021-01-01

## 2021-01-01 RX ADMIN — INSULIN LISPRO 1 UNITS: 100 INJECTION, SOLUTION INTRAVENOUS; SUBCUTANEOUS at 20:23

## 2021-01-01 RX ADMIN — Medication 40 UNITS: at 07:58

## 2021-01-01 RX ADMIN — LORAZEPAM 1 MG: 2 INJECTION INTRAMUSCULAR; INTRAVENOUS at 09:57

## 2021-01-01 RX ADMIN — LORAZEPAM 1 MG: 2 INJECTION INTRAMUSCULAR; INTRAVENOUS at 05:06

## 2021-01-01 RX ADMIN — PROPOFOL 50 MCG/KG/MIN: 10 INJECTION, EMULSION INTRAVENOUS at 02:59

## 2021-01-01 RX ADMIN — SODIUM CHLORIDE, PRESERVATIVE FREE 10 ML: 5 INJECTION INTRAVENOUS at 20:14

## 2021-01-01 RX ADMIN — VECURONIUM BROMIDE 10 MG: 1 INJECTION, POWDER, LYOPHILIZED, FOR SOLUTION INTRAVENOUS at 03:28

## 2021-01-01 RX ADMIN — Medication 10 ML: at 21:39

## 2021-01-01 RX ADMIN — DEXAMETHASONE SODIUM PHOSPHATE 6 MG: 10 INJECTION, SOLUTION INTRAMUSCULAR; INTRAVENOUS at 12:46

## 2021-01-01 RX ADMIN — GUAIFENESIN 400 MG: 200 TABLET ORAL at 13:30

## 2021-01-01 RX ADMIN — PROPOFOL 50 MCG/KG/MIN: 10 INJECTION, EMULSION INTRAVENOUS at 10:18

## 2021-01-01 RX ADMIN — VANCOMYCIN HYDROCHLORIDE 1500 MG: 10 INJECTION, POWDER, LYOPHILIZED, FOR SOLUTION INTRAVENOUS at 12:13

## 2021-01-01 RX ADMIN — GUAIFENESIN 400 MG: 200 TABLET ORAL at 13:31

## 2021-01-01 RX ADMIN — Medication 2000 UNITS: at 08:08

## 2021-01-01 RX ADMIN — ADENOSINE 6 MG: 3 INJECTION INTRAVENOUS at 14:24

## 2021-01-01 RX ADMIN — PROPOFOL 50 MCG/KG/MIN: 10 INJECTION, EMULSION INTRAVENOUS at 14:40

## 2021-01-01 RX ADMIN — PROPOFOL 50 MCG/KG/MIN: 10 INJECTION, EMULSION INTRAVENOUS at 15:23

## 2021-01-01 RX ADMIN — ACETAMINOPHEN 650 MG: 325 TABLET ORAL at 17:34

## 2021-01-01 RX ADMIN — Medication 200 MCG/HR: at 02:00

## 2021-01-01 RX ADMIN — BUDESONIDE AND FORMOTEROL FUMARATE DIHYDRATE 2 PUFF: 160; 4.5 AEROSOL RESPIRATORY (INHALATION) at 21:04

## 2021-01-01 RX ADMIN — DEXAMETHASONE SODIUM PHOSPHATE 6 MG: 10 INJECTION, SOLUTION INTRAMUSCULAR; INTRAVENOUS at 00:32

## 2021-01-01 RX ADMIN — METFORMIN HYDROCHLORIDE 500 MG: 500 TABLET ORAL at 07:55

## 2021-01-01 RX ADMIN — Medication 200 MCG/HR: at 00:14

## 2021-01-01 RX ADMIN — Medication 30 UNITS: at 08:42

## 2021-01-01 RX ADMIN — SODIUM CHLORIDE, PRESERVATIVE FREE 10 ML: 5 INJECTION INTRAVENOUS at 08:44

## 2021-01-01 RX ADMIN — DEXMEDETOMIDINE HYDROCHLORIDE 1.2 MCG/KG/HR: 400 INJECTION INTRAVENOUS at 15:50

## 2021-01-01 RX ADMIN — IPRATROPIUM BROMIDE AND ALBUTEROL SULFATE 1 AMPULE: 2.5; .5 SOLUTION RESPIRATORY (INHALATION) at 09:15

## 2021-01-01 RX ADMIN — Medication 2000 UNITS: at 07:36

## 2021-01-01 RX ADMIN — ALBUTEROL SULFATE 2 PUFF: 90 AEROSOL, METERED RESPIRATORY (INHALATION) at 16:14

## 2021-01-01 RX ADMIN — ALBUTEROL SULFATE 2 PUFF: 90 AEROSOL, METERED RESPIRATORY (INHALATION) at 07:16

## 2021-01-01 RX ADMIN — Medication 5 MG/HR: at 12:38

## 2021-01-01 RX ADMIN — BUDESONIDE AND FORMOTEROL FUMARATE DIHYDRATE 2 PUFF: 160; 4.5 AEROSOL RESPIRATORY (INHALATION) at 10:01

## 2021-01-01 RX ADMIN — LORAZEPAM 1 MG: 2 INJECTION INTRAMUSCULAR; INTRAVENOUS at 02:40

## 2021-01-01 RX ADMIN — ENOXAPARIN SODIUM 30 MG: 30 INJECTION SUBCUTANEOUS at 07:40

## 2021-01-01 RX ADMIN — SODIUM ZIRCONIUM CYCLOSILICATE 5 G: 5 POWDER, FOR SUSPENSION ORAL at 14:34

## 2021-01-01 RX ADMIN — INSULIN GLARGINE 20 UNITS: 100 INJECTION, SOLUTION SUBCUTANEOUS at 08:14

## 2021-01-01 RX ADMIN — BUDESONIDE AND FORMOTEROL FUMARATE DIHYDRATE 2 PUFF: 160; 4.5 AEROSOL RESPIRATORY (INHALATION) at 08:39

## 2021-01-01 RX ADMIN — METOCLOPRAMIDE 10 MG: 5 INJECTION, SOLUTION INTRAMUSCULAR; INTRAVENOUS at 02:00

## 2021-01-01 RX ADMIN — TRAMADOL HYDROCHLORIDE 50 MG: 50 TABLET, FILM COATED ORAL at 09:57

## 2021-01-01 RX ADMIN — Medication 40 UNITS: at 20:55

## 2021-01-01 RX ADMIN — ASPIRIN 325 MG: 325 TABLET ORAL at 07:44

## 2021-01-01 RX ADMIN — ALBUTEROL SULFATE 2 PUFF: 90 AEROSOL, METERED RESPIRATORY (INHALATION) at 17:48

## 2021-01-01 RX ADMIN — ASPIRIN 325 MG: 325 TABLET ORAL at 07:41

## 2021-01-01 RX ADMIN — FAMOTIDINE 20 MG: 10 INJECTION, SOLUTION INTRAVENOUS at 08:06

## 2021-01-01 RX ADMIN — POTASSIUM CHLORIDE 40 MEQ: 1500 TABLET, EXTENDED RELEASE ORAL at 07:35

## 2021-01-01 RX ADMIN — IPRATROPIUM BROMIDE AND ALBUTEROL SULFATE 1 AMPULE: 2.5; .5 SOLUTION RESPIRATORY (INHALATION) at 15:36

## 2021-01-01 RX ADMIN — MONTELUKAST 10 MG: 10 TABLET, FILM COATED ORAL at 20:22

## 2021-01-01 RX ADMIN — INSULIN LISPRO 1 UNITS: 100 INJECTION, SOLUTION INTRAVENOUS; SUBCUTANEOUS at 21:09

## 2021-01-01 RX ADMIN — PROPOFOL 50 MCG/KG/MIN: 10 INJECTION, EMULSION INTRAVENOUS at 15:42

## 2021-01-01 RX ADMIN — Medication 2000 UNITS: at 07:53

## 2021-01-01 RX ADMIN — ALBUTEROL SULFATE 2 PUFF: 90 AEROSOL, METERED RESPIRATORY (INHALATION) at 16:00

## 2021-01-01 RX ADMIN — GUAIFENESIN 400 MG: 200 TABLET ORAL at 12:17

## 2021-01-01 RX ADMIN — DEXAMETHASONE SODIUM PHOSPHATE 6 MG: 10 INJECTION, SOLUTION INTRAMUSCULAR; INTRAVENOUS at 01:59

## 2021-01-01 RX ADMIN — Medication 10 MG: at 08:28

## 2021-01-01 RX ADMIN — Medication 10 ML: at 08:42

## 2021-01-01 RX ADMIN — Medication 10 MG: at 08:07

## 2021-01-01 RX ADMIN — ENOXAPARIN SODIUM 30 MG: 30 INJECTION SUBCUTANEOUS at 20:55

## 2021-01-01 RX ADMIN — LORAZEPAM 1 MG: 2 INJECTION INTRAMUSCULAR; INTRAVENOUS at 09:10

## 2021-01-01 RX ADMIN — IPRATROPIUM BROMIDE AND ALBUTEROL SULFATE 1 AMPULE: 2.5; .5 SOLUTION RESPIRATORY (INHALATION) at 04:45

## 2021-01-01 RX ADMIN — IPRATROPIUM BROMIDE AND ALBUTEROL SULFATE 1 AMPULE: 2.5; .5 SOLUTION RESPIRATORY (INHALATION) at 16:25

## 2021-01-01 RX ADMIN — ALBUTEROL SULFATE 2 PUFF: 90 AEROSOL, METERED RESPIRATORY (INHALATION) at 08:09

## 2021-01-01 RX ADMIN — TRAMADOL HYDROCHLORIDE 50 MG: 50 TABLET, FILM COATED ORAL at 20:22

## 2021-01-01 RX ADMIN — PROPOFOL 50 MCG/KG/MIN: 10 INJECTION, EMULSION INTRAVENOUS at 22:40

## 2021-01-01 RX ADMIN — ENOXAPARIN SODIUM 30 MG: 30 INJECTION SUBCUTANEOUS at 08:04

## 2021-01-01 RX ADMIN — POTASSIUM CHLORIDE 10 MEQ: 7.46 INJECTION, SOLUTION INTRAVENOUS at 06:08

## 2021-01-01 RX ADMIN — BUDESONIDE AND FORMOTEROL FUMARATE DIHYDRATE 2 PUFF: 160; 4.5 AEROSOL RESPIRATORY (INHALATION) at 14:55

## 2021-01-01 RX ADMIN — PROPOFOL 50 MCG/KG/MIN: 10 INJECTION, EMULSION INTRAVENOUS at 05:36

## 2021-01-01 RX ADMIN — Medication 100 MG: at 02:07

## 2021-01-01 RX ADMIN — SODIUM ZIRCONIUM CYCLOSILICATE 5 G: 5 POWDER, FOR SUSPENSION ORAL at 10:08

## 2021-01-01 RX ADMIN — ASPIRIN 325 MG: 325 TABLET ORAL at 08:04

## 2021-01-01 RX ADMIN — ACETAMINOPHEN 650 MG: 325 TABLET ORAL at 00:50

## 2021-01-01 RX ADMIN — ALBUTEROL SULFATE 2 PUFF: 90 AEROSOL, METERED RESPIRATORY (INHALATION) at 12:14

## 2021-01-01 RX ADMIN — IPRATROPIUM BROMIDE AND ALBUTEROL SULFATE 1 AMPULE: 2.5; .5 SOLUTION RESPIRATORY (INHALATION) at 12:04

## 2021-01-01 RX ADMIN — ASPIRIN 325 MG: 325 TABLET ORAL at 08:08

## 2021-01-01 RX ADMIN — ALBUTEROL SULFATE 2 PUFF: 90 AEROSOL, METERED RESPIRATORY (INHALATION) at 16:43

## 2021-01-01 RX ADMIN — ALBUTEROL SULFATE 2 PUFF: 90 AEROSOL, METERED RESPIRATORY (INHALATION) at 16:16

## 2021-01-01 RX ADMIN — GUAIFENESIN 400 MG: 200 TABLET ORAL at 04:13

## 2021-01-01 RX ADMIN — IPRATROPIUM BROMIDE AND ALBUTEROL SULFATE 1 AMPULE: 2.5; .5 SOLUTION RESPIRATORY (INHALATION) at 20:00

## 2021-01-01 RX ADMIN — PROPOFOL 40 MCG/KG/MIN: 10 INJECTION, EMULSION INTRAVENOUS at 19:59

## 2021-01-01 RX ADMIN — MONTELUKAST 10 MG: 10 TABLET, FILM COATED ORAL at 20:21

## 2021-01-01 RX ADMIN — LORAZEPAM 1 MG: 2 INJECTION INTRAMUSCULAR; INTRAVENOUS at 07:53

## 2021-01-01 RX ADMIN — Medication 75 MCG/HR: at 02:02

## 2021-01-01 RX ADMIN — PROPOFOL 40 MCG/KG/MIN: 10 INJECTION, EMULSION INTRAVENOUS at 02:01

## 2021-01-01 RX ADMIN — Medication 2000 UNITS: at 07:41

## 2021-01-01 RX ADMIN — Medication 2000 UNITS: at 08:06

## 2021-01-01 RX ADMIN — METFORMIN HYDROCHLORIDE 500 MG: 500 TABLET ORAL at 16:20

## 2021-01-01 RX ADMIN — DEXAMETHASONE SODIUM PHOSPHATE 6 MG: 10 INJECTION, SOLUTION INTRAMUSCULAR; INTRAVENOUS at 11:03

## 2021-01-01 RX ADMIN — GUAIFENESIN AND DEXTROMETHORPHAN 5 ML: 100; 10 SYRUP ORAL at 08:42

## 2021-01-01 RX ADMIN — IPRATROPIUM BROMIDE AND ALBUTEROL SULFATE 1 AMPULE: 2.5; .5 SOLUTION RESPIRATORY (INHALATION) at 21:39

## 2021-01-01 RX ADMIN — FAMOTIDINE 20 MG: 10 INJECTION, SOLUTION INTRAVENOUS at 07:53

## 2021-01-01 RX ADMIN — FAMOTIDINE 20 MG: 10 INJECTION, SOLUTION INTRAVENOUS at 07:43

## 2021-01-01 RX ADMIN — POTASSIUM CHLORIDE 40 MEQ: 1500 TABLET, EXTENDED RELEASE ORAL at 16:58

## 2021-01-01 RX ADMIN — METOPROLOL TARTRATE 5 MG: 5 INJECTION INTRAVENOUS at 05:16

## 2021-01-01 RX ADMIN — SODIUM CHLORIDE 6.7 UNITS/HR: 9 INJECTION, SOLUTION INTRAVENOUS at 22:51

## 2021-01-01 RX ADMIN — Medication 10 MG: at 10:02

## 2021-01-01 RX ADMIN — BUDESONIDE AND FORMOTEROL FUMARATE DIHYDRATE 2 PUFF: 160; 4.5 AEROSOL RESPIRATORY (INHALATION) at 10:17

## 2021-01-01 RX ADMIN — Medication 2000 UNITS: at 08:16

## 2021-01-01 RX ADMIN — IPRATROPIUM BROMIDE AND ALBUTEROL SULFATE 1 AMPULE: 2.5; .5 SOLUTION RESPIRATORY (INHALATION) at 04:13

## 2021-01-01 RX ADMIN — SODIUM CHLORIDE, PRESERVATIVE FREE 10 ML: 5 INJECTION INTRAVENOUS at 20:31

## 2021-01-01 RX ADMIN — LORAZEPAM 1 MG: 2 INJECTION INTRAMUSCULAR; INTRAVENOUS at 22:53

## 2021-01-01 RX ADMIN — HEPARIN SODIUM 18 UNITS/KG/HR: 10000 INJECTION, SOLUTION INTRAVENOUS at 15:22

## 2021-01-01 RX ADMIN — Medication 1 MG/HR: at 07:35

## 2021-01-01 RX ADMIN — BUDESONIDE AND FORMOTEROL FUMARATE DIHYDRATE 2 PUFF: 160; 4.5 AEROSOL RESPIRATORY (INHALATION) at 20:15

## 2021-01-01 RX ADMIN — PROPOFOL 50 MCG/KG/MIN: 10 INJECTION, EMULSION INTRAVENOUS at 07:50

## 2021-01-01 RX ADMIN — FAMOTIDINE 20 MG: 10 INJECTION, SOLUTION INTRAVENOUS at 09:30

## 2021-01-01 RX ADMIN — PROPOFOL 50 MCG/KG/MIN: 10 INJECTION, EMULSION INTRAVENOUS at 00:18

## 2021-01-01 RX ADMIN — SODIUM CHLORIDE, PRESERVATIVE FREE 2000 MG: 5 INJECTION INTRAVENOUS at 07:36

## 2021-01-01 RX ADMIN — Medication 200 MCG/HR: at 14:09

## 2021-01-01 RX ADMIN — MONTELUKAST 10 MG: 10 TABLET, FILM COATED ORAL at 21:04

## 2021-01-01 RX ADMIN — FAMOTIDINE 20 MG: 10 INJECTION, SOLUTION INTRAVENOUS at 08:13

## 2021-01-01 RX ADMIN — GUAIFENESIN 400 MG: 200 TABLET ORAL at 21:23

## 2021-01-01 RX ADMIN — ALBUTEROL SULFATE 2 PUFF: 90 AEROSOL, METERED RESPIRATORY (INHALATION) at 15:36

## 2021-01-01 RX ADMIN — IPRATROPIUM BROMIDE AND ALBUTEROL SULFATE 1 AMPULE: 2.5; .5 SOLUTION RESPIRATORY (INHALATION) at 17:39

## 2021-01-01 RX ADMIN — INSULIN LISPRO 1 UNITS: 100 INJECTION, SOLUTION INTRAVENOUS; SUBCUTANEOUS at 22:53

## 2021-01-01 RX ADMIN — SODIUM CHLORIDE, SODIUM LACTATE, POTASSIUM CHLORIDE, AND CALCIUM CHLORIDE 1000 ML: 600; 310; 30; 20 INJECTION, SOLUTION INTRAVENOUS at 15:30

## 2021-01-01 RX ADMIN — WATER 10 ML: 1 INJECTION INTRAMUSCULAR; INTRAVENOUS; SUBCUTANEOUS at 11:47

## 2021-01-01 RX ADMIN — INSULIN LISPRO 2 UNITS: 100 INJECTION, SOLUTION INTRAVENOUS; SUBCUTANEOUS at 21:05

## 2021-01-01 RX ADMIN — PROPOFOL 50 MCG/KG/MIN: 10 INJECTION, EMULSION INTRAVENOUS at 14:30

## 2021-01-01 RX ADMIN — ENOXAPARIN SODIUM 30 MG: 30 INJECTION SUBCUTANEOUS at 20:18

## 2021-01-01 RX ADMIN — INSULIN LISPRO 2 UNITS: 100 INJECTION, SOLUTION INTRAVENOUS; SUBCUTANEOUS at 20:23

## 2021-01-01 RX ADMIN — Medication 10 MG: at 07:41

## 2021-01-01 RX ADMIN — ENOXAPARIN SODIUM 30 MG: 30 INJECTION SUBCUTANEOUS at 20:21

## 2021-01-01 RX ADMIN — IPRATROPIUM BROMIDE AND ALBUTEROL SULFATE 1 AMPULE: 2.5; .5 SOLUTION RESPIRATORY (INHALATION) at 20:13

## 2021-01-01 RX ADMIN — CHLORHEXIDINE GLUCONATE 15 ML: 1.2 RINSE ORAL at 08:13

## 2021-01-01 RX ADMIN — ALBUTEROL SULFATE 2 PUFF: 90 AEROSOL, METERED RESPIRATORY (INHALATION) at 20:19

## 2021-01-01 RX ADMIN — ENOXAPARIN SODIUM 30 MG: 30 INJECTION SUBCUTANEOUS at 09:10

## 2021-01-01 RX ADMIN — ALBUTEROL SULFATE 2 PUFF: 90 AEROSOL, METERED RESPIRATORY (INHALATION) at 16:24

## 2021-01-01 RX ADMIN — CHLORHEXIDINE GLUCONATE 15 ML: 1.2 RINSE ORAL at 08:17

## 2021-01-01 RX ADMIN — ALBUTEROL SULFATE 2 PUFF: 90 AEROSOL, METERED RESPIRATORY (INHALATION) at 12:29

## 2021-01-01 RX ADMIN — GUAIFENESIN 400 MG: 200 TABLET ORAL at 11:03

## 2021-01-01 RX ADMIN — ALBUTEROL SULFATE 2 PUFF: 90 AEROSOL, METERED RESPIRATORY (INHALATION) at 16:17

## 2021-01-01 RX ADMIN — ALBUTEROL SULFATE 2 PUFF: 90 AEROSOL, METERED RESPIRATORY (INHALATION) at 19:59

## 2021-01-01 RX ADMIN — CHLORHEXIDINE GLUCONATE 15 ML: 1.2 RINSE ORAL at 12:05

## 2021-01-01 RX ADMIN — FAMOTIDINE 20 MG: 10 INJECTION, SOLUTION INTRAVENOUS at 09:15

## 2021-01-01 RX ADMIN — GUAIFENESIN 400 MG: 200 TABLET ORAL at 18:34

## 2021-01-01 RX ADMIN — IPRATROPIUM BROMIDE AND ALBUTEROL SULFATE 1 AMPULE: 2.5; .5 SOLUTION RESPIRATORY (INHALATION) at 19:57

## 2021-01-01 RX ADMIN — Medication 40 UNITS: at 21:50

## 2021-01-01 RX ADMIN — VANCOMYCIN HYDROCHLORIDE 2500 MG: 1 INJECTION, POWDER, LYOPHILIZED, FOR SOLUTION INTRAVENOUS at 13:05

## 2021-01-01 RX ADMIN — MONTELUKAST 10 MG: 10 TABLET, FILM COATED ORAL at 21:23

## 2021-01-01 RX ADMIN — DEXAMETHASONE SODIUM PHOSPHATE 6 MG: 10 INJECTION, SOLUTION INTRAMUSCULAR; INTRAVENOUS at 12:37

## 2021-01-01 RX ADMIN — FAMOTIDINE 20 MG: 10 INJECTION, SOLUTION INTRAVENOUS at 20:47

## 2021-01-01 RX ADMIN — IPRATROPIUM BROMIDE AND ALBUTEROL SULFATE 1 AMPULE: 2.5; .5 SOLUTION RESPIRATORY (INHALATION) at 07:44

## 2021-01-01 RX ADMIN — DEXAMETHASONE SODIUM PHOSPHATE 6 MG: 10 INJECTION, SOLUTION INTRAMUSCULAR; INTRAVENOUS at 01:49

## 2021-01-01 RX ADMIN — GUAIFENESIN 600 MG: 100 SOLUTION ORAL at 00:35

## 2021-01-01 RX ADMIN — ASPIRIN 325 MG: 325 TABLET ORAL at 08:13

## 2021-01-01 RX ADMIN — ACETAMINOPHEN 650 MG: 325 TABLET ORAL at 18:24

## 2021-01-01 RX ADMIN — ALBUTEROL SULFATE 2 PUFF: 90 AEROSOL, METERED RESPIRATORY (INHALATION) at 20:20

## 2021-01-01 RX ADMIN — IPRATROPIUM BROMIDE AND ALBUTEROL SULFATE 1 AMPULE: 2.5; .5 SOLUTION RESPIRATORY (INHALATION) at 08:28

## 2021-01-01 RX ADMIN — PIPERACILLIN AND TAZOBACTAM 3375 MG: 3; .375 INJECTION, POWDER, LYOPHILIZED, FOR SOLUTION INTRAVENOUS at 15:33

## 2021-01-01 RX ADMIN — TRAMADOL HYDROCHLORIDE 50 MG: 50 TABLET, FILM COATED ORAL at 07:55

## 2021-01-01 RX ADMIN — ALBUTEROL SULFATE 2 PUFF: 90 AEROSOL, METERED RESPIRATORY (INHALATION) at 09:29

## 2021-01-01 RX ADMIN — BUDESONIDE AND FORMOTEROL FUMARATE DIHYDRATE 2 PUFF: 160; 4.5 AEROSOL RESPIRATORY (INHALATION) at 21:23

## 2021-01-01 RX ADMIN — Medication 10 MG: at 08:04

## 2021-01-01 RX ADMIN — LORAZEPAM 1 MG: 2 INJECTION INTRAMUSCULAR; INTRAVENOUS at 12:36

## 2021-01-01 RX ADMIN — FAMOTIDINE 20 MG: 10 INJECTION, SOLUTION INTRAVENOUS at 19:57

## 2021-01-01 RX ADMIN — VANCOMYCIN HYDROCHLORIDE 1500 MG: 10 INJECTION, POWDER, LYOPHILIZED, FOR SOLUTION INTRAVENOUS at 00:30

## 2021-01-01 RX ADMIN — BUDESONIDE AND FORMOTEROL FUMARATE DIHYDRATE 2 PUFF: 160; 4.5 AEROSOL RESPIRATORY (INHALATION) at 07:56

## 2021-01-01 RX ADMIN — PROPOFOL 50 MCG/KG/MIN: 10 INJECTION, EMULSION INTRAVENOUS at 09:41

## 2021-01-01 RX ADMIN — METOCLOPRAMIDE 10 MG: 5 INJECTION, SOLUTION INTRAMUSCULAR; INTRAVENOUS at 20:13

## 2021-01-01 RX ADMIN — CHLORHEXIDINE GLUCONATE 15 ML: 1.2 RINSE ORAL at 20:56

## 2021-01-01 RX ADMIN — GUAIFENESIN 400 MG: 200 TABLET ORAL at 10:02

## 2021-01-01 RX ADMIN — FAMOTIDINE 20 MG: 10 INJECTION, SOLUTION INTRAVENOUS at 10:02

## 2021-01-01 RX ADMIN — Medication 10 MG: at 09:30

## 2021-01-01 RX ADMIN — Medication 40 UNITS: at 21:35

## 2021-01-01 RX ADMIN — ONDANSETRON HYDROCHLORIDE 4 MG: 2 SOLUTION INTRAMUSCULAR; INTRAVENOUS at 01:59

## 2021-01-01 RX ADMIN — FAMOTIDINE 20 MG: 10 INJECTION, SOLUTION INTRAVENOUS at 07:57

## 2021-01-01 RX ADMIN — Medication 2000 UNITS: at 07:43

## 2021-01-01 RX ADMIN — LORAZEPAM 1 MG: 2 INJECTION INTRAMUSCULAR; INTRAVENOUS at 12:26

## 2021-01-01 RX ADMIN — DEXMEDETOMIDINE HYDROCHLORIDE 0.4 MCG/KG/HR: 400 INJECTION INTRAVENOUS at 22:01

## 2021-01-01 RX ADMIN — PROPOFOL 50 MCG/KG/MIN: 10 INJECTION, EMULSION INTRAVENOUS at 10:45

## 2021-01-01 RX ADMIN — PROPOFOL 50 MCG/KG/MIN: 10 INJECTION, EMULSION INTRAVENOUS at 05:46

## 2021-01-01 RX ADMIN — FAMOTIDINE 20 MG: 10 INJECTION, SOLUTION INTRAVENOUS at 21:04

## 2021-01-01 RX ADMIN — GUAIFENESIN 400 MG: 200 TABLET ORAL at 04:26

## 2021-01-01 RX ADMIN — SODIUM CHLORIDE, PRESERVATIVE FREE 2000 MG: 5 INJECTION INTRAVENOUS at 07:35

## 2021-01-01 RX ADMIN — DEXMEDETOMIDINE HYDROCHLORIDE 0.6 MCG/KG/HR: 400 INJECTION INTRAVENOUS at 20:15

## 2021-01-01 RX ADMIN — MONTELUKAST 10 MG: 10 TABLET, FILM COATED ORAL at 19:57

## 2021-01-01 RX ADMIN — Medication 40 UNITS: at 08:07

## 2021-01-01 RX ADMIN — Medication 40 UNITS: at 07:59

## 2021-01-01 RX ADMIN — FAMOTIDINE 20 MG: 10 INJECTION, SOLUTION INTRAVENOUS at 21:23

## 2021-01-01 RX ADMIN — METOPROLOL TARTRATE 5 MG: 5 INJECTION INTRAVENOUS at 15:50

## 2021-01-01 RX ADMIN — DEXAMETHASONE SODIUM PHOSPHATE 6 MG: 10 INJECTION, SOLUTION INTRAMUSCULAR; INTRAVENOUS at 00:46

## 2021-01-01 RX ADMIN — LORAZEPAM 1 MG: 2 INJECTION INTRAMUSCULAR; INTRAVENOUS at 20:17

## 2021-01-01 RX ADMIN — BUDESONIDE AND FORMOTEROL FUMARATE DIHYDRATE 2 PUFF: 160; 4.5 AEROSOL RESPIRATORY (INHALATION) at 20:20

## 2021-01-01 RX ADMIN — METFORMIN HYDROCHLORIDE 500 MG: 500 TABLET ORAL at 08:13

## 2021-01-01 RX ADMIN — FAMOTIDINE 20 MG: 10 INJECTION, SOLUTION INTRAVENOUS at 07:36

## 2021-01-01 RX ADMIN — IPRATROPIUM BROMIDE AND ALBUTEROL SULFATE 1 AMPULE: 2.5; .5 SOLUTION RESPIRATORY (INHALATION) at 23:56

## 2021-01-01 RX ADMIN — ASPIRIN 325 MG: 325 TABLET ORAL at 07:36

## 2021-01-01 RX ADMIN — FAMOTIDINE 20 MG: 20 TABLET ORAL at 07:55

## 2021-01-01 RX ADMIN — ASPIRIN 325 MG: 325 TABLET ORAL at 07:58

## 2021-01-01 RX ADMIN — Medication 40 UNITS: at 10:03

## 2021-01-01 RX ADMIN — BUDESONIDE AND FORMOTEROL FUMARATE DIHYDRATE 2 PUFF: 160; 4.5 AEROSOL RESPIRATORY (INHALATION) at 20:35

## 2021-01-01 RX ADMIN — POTASSIUM CHLORIDE 10 MEQ: 7.46 INJECTION, SOLUTION INTRAVENOUS at 01:22

## 2021-01-01 RX ADMIN — SODIUM CHLORIDE, PRESERVATIVE FREE 10 ML: 5 INJECTION INTRAVENOUS at 20:16

## 2021-01-01 RX ADMIN — Medication 10 ML: at 08:22

## 2021-01-01 RX ADMIN — ENOXAPARIN SODIUM 30 MG: 30 INJECTION SUBCUTANEOUS at 07:41

## 2021-01-01 RX ADMIN — PROPOFOL 50 MCG/KG/MIN: 10 INJECTION, EMULSION INTRAVENOUS at 01:53

## 2021-01-01 RX ADMIN — GUAIFENESIN 400 MG: 200 TABLET ORAL at 03:21

## 2021-01-01 RX ADMIN — Medication 40 UNITS: at 21:04

## 2021-01-01 RX ADMIN — TRAMADOL HYDROCHLORIDE 50 MG: 50 TABLET, FILM COATED ORAL at 20:19

## 2021-01-01 RX ADMIN — BUDESONIDE AND FORMOTEROL FUMARATE DIHYDRATE 2 PUFF: 160; 4.5 AEROSOL RESPIRATORY (INHALATION) at 20:19

## 2021-01-01 RX ADMIN — PROPOFOL 50 MCG/KG/MIN: 10 INJECTION, EMULSION INTRAVENOUS at 02:39

## 2021-01-01 RX ADMIN — ACETAMINOPHEN 650 MG: 325 TABLET ORAL at 00:05

## 2021-01-01 RX ADMIN — IPRATROPIUM BROMIDE AND ALBUTEROL SULFATE 1 AMPULE: 2.5; .5 SOLUTION RESPIRATORY (INHALATION) at 00:52

## 2021-01-01 RX ADMIN — IPRATROPIUM BROMIDE AND ALBUTEROL SULFATE 1 AMPULE: 2.5; .5 SOLUTION RESPIRATORY (INHALATION) at 20:23

## 2021-01-01 RX ADMIN — SODIUM CHLORIDE, PRESERVATIVE FREE 10 ML: 5 INJECTION INTRAVENOUS at 20:42

## 2021-01-01 RX ADMIN — Medication 10 ML: at 08:08

## 2021-01-01 RX ADMIN — ZINC SULFATE 220 MG (50 MG) CAPSULE 50 MG: CAPSULE at 10:02

## 2021-01-01 RX ADMIN — Medication 10 MG: at 08:43

## 2021-01-01 RX ADMIN — ANTACID TABLETS 500 MG: 500 TABLET, CHEWABLE ORAL at 00:06

## 2021-01-01 RX ADMIN — ENOXAPARIN SODIUM 30 MG: 30 INJECTION SUBCUTANEOUS at 10:20

## 2021-01-01 RX ADMIN — PROPOFOL 50 MCG/KG/MIN: 10 INJECTION, EMULSION INTRAVENOUS at 11:33

## 2021-01-01 RX ADMIN — ALBUTEROL SULFATE 2 PUFF: 90 AEROSOL, METERED RESPIRATORY (INHALATION) at 12:17

## 2021-01-01 RX ADMIN — ASPIRIN 325 MG: 325 TABLET ORAL at 08:10

## 2021-01-01 RX ADMIN — GUAIFENESIN 400 MG: 200 TABLET ORAL at 11:42

## 2021-01-01 RX ADMIN — Medication 2000 UNITS: at 08:43

## 2021-01-01 RX ADMIN — DEXAMETHASONE SODIUM PHOSPHATE 6 MG: 10 INJECTION, SOLUTION INTRAMUSCULAR; INTRAVENOUS at 13:30

## 2021-01-01 RX ADMIN — ALBUTEROL SULFATE 2 PUFF: 90 AEROSOL, METERED RESPIRATORY (INHALATION) at 20:35

## 2021-01-01 RX ADMIN — Medication 2000 UNITS: at 07:55

## 2021-01-01 RX ADMIN — DEXAMETHASONE SODIUM PHOSPHATE 6 MG: 10 INJECTION, SOLUTION INTRAMUSCULAR; INTRAVENOUS at 00:48

## 2021-01-01 RX ADMIN — TOCILIZUMAB 600 MG: 20 INJECTION, SOLUTION, CONCENTRATE INTRAVENOUS at 16:15

## 2021-01-01 RX ADMIN — SODIUM CHLORIDE, PRESERVATIVE FREE 10 ML: 5 INJECTION INTRAVENOUS at 08:38

## 2021-01-01 RX ADMIN — SODIUM CHLORIDE, PRESERVATIVE FREE 10 ML: 5 INJECTION INTRAVENOUS at 08:18

## 2021-01-01 RX ADMIN — ACETAZOLAMIDE 250 MG: 500 INJECTION, POWDER, LYOPHILIZED, FOR SOLUTION INTRAVENOUS at 12:13

## 2021-01-01 RX ADMIN — SODIUM PHOSPHATE, MONOBASIC, MONOHYDRATE 20 MMOL: 276; 142 INJECTION, SOLUTION INTRAVENOUS at 06:02

## 2021-01-01 RX ADMIN — GUAIFENESIN 400 MG: 200 TABLET ORAL at 20:23

## 2021-01-01 RX ADMIN — VECURONIUM BROMIDE 10 MG: 1 INJECTION, POWDER, LYOPHILIZED, FOR SOLUTION INTRAVENOUS at 06:31

## 2021-01-01 RX ADMIN — ALBUTEROL SULFATE 2 PUFF: 90 AEROSOL, METERED RESPIRATORY (INHALATION) at 12:23

## 2021-01-01 RX ADMIN — PROPOFOL 50 MCG/KG/MIN: 10 INJECTION, EMULSION INTRAVENOUS at 06:48

## 2021-01-01 RX ADMIN — FAMOTIDINE 20 MG: 10 INJECTION, SOLUTION INTRAVENOUS at 20:00

## 2021-01-01 RX ADMIN — ENOXAPARIN SODIUM 30 MG: 30 INJECTION SUBCUTANEOUS at 19:57

## 2021-01-01 RX ADMIN — POTASSIUM BICARBONATE 40 MEQ: 782 TABLET, EFFERVESCENT ORAL at 05:45

## 2021-01-01 RX ADMIN — Medication 200 MCG/HR: at 09:37

## 2021-01-01 RX ADMIN — Medication 200 MCG/HR: at 05:27

## 2021-01-01 RX ADMIN — ALBUTEROL SULFATE 2 PUFF: 90 AEROSOL, METERED RESPIRATORY (INHALATION) at 12:02

## 2021-01-01 RX ADMIN — IPRATROPIUM BROMIDE AND ALBUTEROL SULFATE 1 AMPULE: 2.5; .5 SOLUTION RESPIRATORY (INHALATION) at 04:00

## 2021-01-01 RX ADMIN — IPRATROPIUM BROMIDE AND ALBUTEROL SULFATE 1 AMPULE: 2.5; .5 SOLUTION RESPIRATORY (INHALATION) at 04:24

## 2021-01-01 RX ADMIN — POTASSIUM CHLORIDE 10 MEQ: 7.46 INJECTION, SOLUTION INTRAVENOUS at 04:47

## 2021-01-01 RX ADMIN — VANCOMYCIN HYDROCHLORIDE 1500 MG: 10 INJECTION, POWDER, LYOPHILIZED, FOR SOLUTION INTRAVENOUS at 23:55

## 2021-01-01 RX ADMIN — Medication 10 ML: at 09:35

## 2021-01-01 RX ADMIN — GUAIFENESIN AND DEXTROMETHORPHAN 5 ML: 100; 10 SYRUP ORAL at 13:39

## 2021-01-01 RX ADMIN — INSULIN LISPRO 1 UNITS: 100 INJECTION, SOLUTION INTRAVENOUS; SUBCUTANEOUS at 20:36

## 2021-01-01 RX ADMIN — ALBUTEROL SULFATE 2 PUFF: 90 AEROSOL, METERED RESPIRATORY (INHALATION) at 17:25

## 2021-01-01 RX ADMIN — PROPOFOL 50 MCG/KG/MIN: 10 INJECTION, EMULSION INTRAVENOUS at 03:27

## 2021-01-01 RX ADMIN — GUAIFENESIN 400 MG: 200 TABLET ORAL at 12:36

## 2021-01-01 RX ADMIN — Medication 40 UNITS: at 08:45

## 2021-01-01 RX ADMIN — Medication 10 ML: at 09:30

## 2021-01-01 RX ADMIN — MONTELUKAST 10 MG: 10 TABLET, FILM COATED ORAL at 22:21

## 2021-01-01 RX ADMIN — GUAIFENESIN 600 MG: 100 SOLUTION ORAL at 14:19

## 2021-01-01 RX ADMIN — PROPOFOL 40 MCG/KG/MIN: 10 INJECTION, EMULSION INTRAVENOUS at 18:30

## 2021-01-01 RX ADMIN — IPRATROPIUM BROMIDE AND ALBUTEROL SULFATE 1 AMPULE: 2.5; .5 SOLUTION RESPIRATORY (INHALATION) at 00:10

## 2021-01-01 RX ADMIN — METFORMIN HYDROCHLORIDE 500 MG: 500 TABLET ORAL at 07:57

## 2021-01-01 RX ADMIN — TRAMADOL HYDROCHLORIDE 50 MG: 50 TABLET, FILM COATED ORAL at 16:51

## 2021-01-01 RX ADMIN — ENOXAPARIN SODIUM 30 MG: 30 INJECTION SUBCUTANEOUS at 20:17

## 2021-01-01 RX ADMIN — MONTELUKAST 10 MG: 10 TABLET, FILM COATED ORAL at 20:13

## 2021-01-01 RX ADMIN — LORAZEPAM 1 MG: 2 INJECTION INTRAMUSCULAR; INTRAVENOUS at 08:06

## 2021-01-01 RX ADMIN — PROPOFOL 30 MCG/KG/MIN: 10 INJECTION, EMULSION INTRAVENOUS at 10:06

## 2021-01-01 RX ADMIN — LORAZEPAM 1 MG: 2 INJECTION INTRAMUSCULAR; INTRAVENOUS at 16:20

## 2021-01-01 RX ADMIN — ASPIRIN 325 MG: 325 TABLET ORAL at 08:41

## 2021-01-01 RX ADMIN — INSULIN GLARGINE 20 UNITS: 100 INJECTION, SOLUTION SUBCUTANEOUS at 21:39

## 2021-01-01 RX ADMIN — ACETAZOLAMIDE 250 MG: 500 INJECTION, POWDER, LYOPHILIZED, FOR SOLUTION INTRAVENOUS at 03:15

## 2021-01-01 RX ADMIN — ZINC SULFATE 220 MG (50 MG) CAPSULE 50 MG: CAPSULE at 21:48

## 2021-01-01 RX ADMIN — LORAZEPAM 2 MG: 2 INJECTION INTRAMUSCULAR; INTRAVENOUS at 02:05

## 2021-01-01 RX ADMIN — TRAMADOL HYDROCHLORIDE 50 MG: 50 TABLET, FILM COATED ORAL at 11:42

## 2021-01-01 RX ADMIN — ALBUTEROL SULFATE 2 PUFF: 90 AEROSOL, METERED RESPIRATORY (INHALATION) at 12:04

## 2021-01-01 RX ADMIN — ASPIRIN 325 MG: 325 TABLET ORAL at 08:06

## 2021-01-01 RX ADMIN — ASPIRIN 325 MG: 325 TABLET ORAL at 09:15

## 2021-01-01 RX ADMIN — IPRATROPIUM BROMIDE AND ALBUTEROL SULFATE 1 AMPULE: 2.5; .5 SOLUTION RESPIRATORY (INHALATION) at 17:48

## 2021-01-01 RX ADMIN — SODIUM CHLORIDE, PRESERVATIVE FREE 10 ML: 5 INJECTION INTRAVENOUS at 08:14

## 2021-01-01 RX ADMIN — Medication 10 MG: at 08:10

## 2021-01-01 RX ADMIN — METFORMIN HYDROCHLORIDE 500 MG: 500 TABLET ORAL at 18:05

## 2021-01-01 RX ADMIN — Medication 40 UNITS: at 20:17

## 2021-01-01 RX ADMIN — Medication 2000 UNITS: at 08:04

## 2021-01-01 RX ADMIN — PROPOFOL 50 MCG/KG/MIN: 10 INJECTION, EMULSION INTRAVENOUS at 01:16

## 2021-01-01 RX ADMIN — ACETAMINOPHEN 650 MG: 325 TABLET ORAL at 16:51

## 2021-01-01 RX ADMIN — PROPOFOL 50 MCG/KG/MIN: 10 INJECTION, EMULSION INTRAVENOUS at 08:32

## 2021-01-01 RX ADMIN — Medication 200 MCG/HR: at 08:01

## 2021-01-01 RX ADMIN — FAMOTIDINE 20 MG: 10 INJECTION, SOLUTION INTRAVENOUS at 20:55

## 2021-01-01 RX ADMIN — ENOXAPARIN SODIUM 30 MG: 30 INJECTION SUBCUTANEOUS at 21:23

## 2021-01-01 RX ADMIN — GUAIFENESIN AND DEXTROMETHORPHAN 5 ML: 100; 10 SYRUP ORAL at 00:15

## 2021-01-01 RX ADMIN — INSULIN LISPRO 2 UNITS: 100 INJECTION, SOLUTION INTRAVENOUS; SUBCUTANEOUS at 20:21

## 2021-01-01 RX ADMIN — Medication 1 MG/HR: at 09:49

## 2021-01-01 RX ADMIN — Medication 40 UNITS: at 08:04

## 2021-01-01 RX ADMIN — LORAZEPAM 1 MG: 2 INJECTION INTRAMUSCULAR; INTRAVENOUS at 21:33

## 2021-01-01 RX ADMIN — INSULIN LISPRO 2 UNITS: 100 INJECTION, SOLUTION INTRAVENOUS; SUBCUTANEOUS at 20:31

## 2021-01-01 RX ADMIN — METOPROLOL TARTRATE 5 MG: 5 INJECTION INTRAVENOUS at 06:48

## 2021-01-01 RX ADMIN — Medication 2000 UNITS: at 08:28

## 2021-01-01 RX ADMIN — VANCOMYCIN HYDROCHLORIDE 1500 MG: 10 INJECTION, POWDER, LYOPHILIZED, FOR SOLUTION INTRAVENOUS at 00:00

## 2021-01-01 RX ADMIN — SODIUM CHLORIDE, PRESERVATIVE FREE 10 ML: 5 INJECTION INTRAVENOUS at 08:28

## 2021-01-01 RX ADMIN — PROPOFOL 50 MCG/KG/MIN: 10 INJECTION, EMULSION INTRAVENOUS at 12:03

## 2021-01-01 RX ADMIN — INSULIN LISPRO 2 UNITS: 100 INJECTION, SOLUTION INTRAVENOUS; SUBCUTANEOUS at 20:20

## 2021-01-01 RX ADMIN — Medication 10 MG: at 07:43

## 2021-01-01 RX ADMIN — MONTELUKAST 10 MG: 10 TABLET, FILM COATED ORAL at 20:32

## 2021-01-01 RX ADMIN — METOCLOPRAMIDE 10 MG: 5 INJECTION, SOLUTION INTRAMUSCULAR; INTRAVENOUS at 08:10

## 2021-01-01 RX ADMIN — FAMOTIDINE 20 MG: 10 INJECTION, SOLUTION INTRAVENOUS at 21:48

## 2021-01-01 RX ADMIN — ASPIRIN 325 MG: 325 TABLET ORAL at 09:29

## 2021-01-01 RX ADMIN — Medication 2000 UNITS: at 07:58

## 2021-01-01 RX ADMIN — ZINC SULFATE 220 MG (50 MG) CAPSULE 50 MG: CAPSULE at 07:36

## 2021-01-01 RX ADMIN — MONTELUKAST 10 MG: 10 TABLET, FILM COATED ORAL at 20:55

## 2021-01-01 RX ADMIN — ASPIRIN 325 MG: 325 TABLET ORAL at 07:57

## 2021-01-01 RX ADMIN — Medication 10 ML: at 20:21

## 2021-01-01 RX ADMIN — BUDESONIDE AND FORMOTEROL FUMARATE DIHYDRATE 2 PUFF: 160; 4.5 AEROSOL RESPIRATORY (INHALATION) at 10:03

## 2021-01-01 RX ADMIN — ENOXAPARIN SODIUM 30 MG: 30 INJECTION SUBCUTANEOUS at 20:00

## 2021-01-01 RX ADMIN — ZINC SULFATE 220 MG (50 MG) CAPSULE 50 MG: CAPSULE at 07:53

## 2021-01-01 RX ADMIN — ALBUTEROL SULFATE 2 PUFF: 90 AEROSOL, METERED RESPIRATORY (INHALATION) at 10:16

## 2021-01-01 RX ADMIN — Medication 10 ML: at 20:19

## 2021-01-01 RX ADMIN — ENOXAPARIN SODIUM 30 MG: 30 INJECTION SUBCUTANEOUS at 07:54

## 2021-01-01 RX ADMIN — Medication 40 UNITS: at 08:09

## 2021-01-01 RX ADMIN — IPRATROPIUM BROMIDE AND ALBUTEROL SULFATE 1 AMPULE: 2.5; .5 SOLUTION RESPIRATORY (INHALATION) at 04:26

## 2021-01-01 RX ADMIN — PROPOFOL 40 MCG/KG/MIN: 10 INJECTION, EMULSION INTRAVENOUS at 14:50

## 2021-01-01 RX ADMIN — ENOXAPARIN SODIUM 30 MG: 30 INJECTION SUBCUTANEOUS at 23:00

## 2021-01-01 RX ADMIN — Medication 2000 UNITS: at 07:34

## 2021-01-01 RX ADMIN — INSULIN GLARGINE 20 UNITS: 100 INJECTION, SOLUTION SUBCUTANEOUS at 07:35

## 2021-01-01 RX ADMIN — IPRATROPIUM BROMIDE AND ALBUTEROL SULFATE 1 AMPULE: 2.5; .5 SOLUTION RESPIRATORY (INHALATION) at 12:38

## 2021-01-01 RX ADMIN — Medication 10 ML: at 20:29

## 2021-01-01 RX ADMIN — VANCOMYCIN HYDROCHLORIDE 1500 MG: 10 INJECTION, POWDER, LYOPHILIZED, FOR SOLUTION INTRAVENOUS at 00:16

## 2021-01-01 RX ADMIN — ACETAZOLAMIDE 250 MG: 500 INJECTION, POWDER, LYOPHILIZED, FOR SOLUTION INTRAVENOUS at 10:52

## 2021-01-01 RX ADMIN — DEXMEDETOMIDINE HYDROCHLORIDE 0.5 MCG/KG/HR: 400 INJECTION INTRAVENOUS at 20:55

## 2021-01-01 RX ADMIN — BUDESONIDE AND FORMOTEROL FUMARATE DIHYDRATE 2 PUFF: 160; 4.5 AEROSOL RESPIRATORY (INHALATION) at 08:42

## 2021-01-01 RX ADMIN — BUDESONIDE AND FORMOTEROL FUMARATE DIHYDRATE 2 PUFF: 160; 4.5 AEROSOL RESPIRATORY (INHALATION) at 07:15

## 2021-01-01 RX ADMIN — METFORMIN HYDROCHLORIDE 500 MG: 500 TABLET ORAL at 08:07

## 2021-01-01 RX ADMIN — IPRATROPIUM BROMIDE AND ALBUTEROL SULFATE 1 AMPULE: 2.5; .5 SOLUTION RESPIRATORY (INHALATION) at 00:16

## 2021-01-01 RX ADMIN — IPRATROPIUM BROMIDE AND ALBUTEROL SULFATE 1 AMPULE: 2.5; .5 SOLUTION RESPIRATORY (INHALATION) at 07:36

## 2021-01-01 RX ADMIN — Medication 10 MCG/MIN: at 20:22

## 2021-01-01 RX ADMIN — DEXAMETHASONE SODIUM PHOSPHATE 6 MG: 10 INJECTION, SOLUTION INTRAMUSCULAR; INTRAVENOUS at 12:32

## 2021-01-01 RX ADMIN — LORAZEPAM 1 MG: 2 INJECTION INTRAMUSCULAR; INTRAVENOUS at 00:27

## 2021-01-01 RX ADMIN — SODIUM CHLORIDE, PRESERVATIVE FREE 10 ML: 5 INJECTION INTRAVENOUS at 20:26

## 2021-01-01 RX ADMIN — IPRATROPIUM BROMIDE AND ALBUTEROL SULFATE 1 AMPULE: 2.5; .5 SOLUTION RESPIRATORY (INHALATION) at 13:34

## 2021-01-01 RX ADMIN — GUAIFENESIN 400 MG: 200 TABLET ORAL at 04:07

## 2021-01-01 RX ADMIN — Medication 175 MCG/HR: at 17:15

## 2021-01-01 RX ADMIN — METOCLOPRAMIDE 10 MG: 5 INJECTION, SOLUTION INTRAMUSCULAR; INTRAVENOUS at 20:55

## 2021-01-01 RX ADMIN — ASPIRIN 325 MG: 325 TABLET ORAL at 08:22

## 2021-01-01 RX ADMIN — FAMOTIDINE 20 MG: 10 INJECTION, SOLUTION INTRAVENOUS at 20:21

## 2021-01-01 RX ADMIN — GUAIFENESIN AND DEXTROMETHORPHAN 5 ML: 100; 10 SYRUP ORAL at 20:15

## 2021-01-01 RX ADMIN — SODIUM CHLORIDE: 9 INJECTION, SOLUTION INTRAVENOUS at 22:46

## 2021-01-01 RX ADMIN — INSULIN GLARGINE 50 UNITS: 100 INJECTION, SOLUTION SUBCUTANEOUS at 20:27

## 2021-01-01 RX ADMIN — IPRATROPIUM BROMIDE AND ALBUTEROL SULFATE 1 AMPULE: 2.5; .5 SOLUTION RESPIRATORY (INHALATION) at 00:32

## 2021-01-01 RX ADMIN — INSULIN GLARGINE 50 UNITS: 100 INJECTION, SOLUTION SUBCUTANEOUS at 20:00

## 2021-01-01 RX ADMIN — METOCLOPRAMIDE 10 MG: 5 INJECTION, SOLUTION INTRAMUSCULAR; INTRAVENOUS at 03:38

## 2021-01-01 RX ADMIN — SODIUM POLYSTYRENE SULFONATE 45 G: 15 SUSPENSION ORAL; RECTAL at 06:35

## 2021-01-01 RX ADMIN — LORAZEPAM 1 MG: 2 INJECTION INTRAMUSCULAR; INTRAVENOUS at 01:11

## 2021-01-01 RX ADMIN — GUAIFENESIN AND DEXTROMETHORPHAN 5 ML: 100; 10 SYRUP ORAL at 20:21

## 2021-01-01 RX ADMIN — FAMOTIDINE 20 MG: 10 INJECTION, SOLUTION INTRAVENOUS at 21:39

## 2021-01-01 RX ADMIN — SODIUM CHLORIDE, PRESERVATIVE FREE 2000 MG: 5 INJECTION INTRAVENOUS at 08:48

## 2021-01-01 RX ADMIN — SALINE NASAL SPRAY 2 SPRAY: 1.5 SOLUTION NASAL at 06:01

## 2021-01-01 RX ADMIN — GUAIFENESIN 400 MG: 200 TABLET ORAL at 12:02

## 2021-01-01 RX ADMIN — METOPROLOL TARTRATE 5 MG: 5 INJECTION INTRAVENOUS at 06:14

## 2021-01-01 RX ADMIN — DEXAMETHASONE SODIUM PHOSPHATE 6 MG: 10 INJECTION, SOLUTION INTRAMUSCULAR; INTRAVENOUS at 23:56

## 2021-01-01 RX ADMIN — IPRATROPIUM BROMIDE AND ALBUTEROL SULFATE 1 AMPULE: 2.5; .5 SOLUTION RESPIRATORY (INHALATION) at 20:47

## 2021-01-01 RX ADMIN — Medication 10 ML: at 08:07

## 2021-01-01 RX ADMIN — METFORMIN HYDROCHLORIDE 500 MG: 500 TABLET ORAL at 08:04

## 2021-01-01 RX ADMIN — METFORMIN HYDROCHLORIDE 500 MG: 500 TABLET ORAL at 16:51

## 2021-01-01 RX ADMIN — PROPOFOL 40 MCG/KG/MIN: 10 INJECTION, EMULSION INTRAVENOUS at 04:36

## 2021-01-01 RX ADMIN — PROPOFOL 50 MCG/KG/MIN: 10 INJECTION, EMULSION INTRAVENOUS at 04:00

## 2021-01-01 RX ADMIN — FAMOTIDINE 20 MG: 10 INJECTION, SOLUTION INTRAVENOUS at 20:36

## 2021-01-01 RX ADMIN — METOCLOPRAMIDE 10 MG: 5 INJECTION, SOLUTION INTRAMUSCULAR; INTRAVENOUS at 14:19

## 2021-01-01 RX ADMIN — ALBUTEROL SULFATE 2 PUFF: 90 AEROSOL, METERED RESPIRATORY (INHALATION) at 20:31

## 2021-01-01 RX ADMIN — Medication 40 UNITS: at 07:54

## 2021-01-01 RX ADMIN — Medication 40 UNITS: at 09:29

## 2021-01-01 RX ADMIN — GUAIFENESIN AND DEXTROMETHORPHAN 5 ML: 100; 10 SYRUP ORAL at 12:54

## 2021-01-01 RX ADMIN — ENOXAPARIN SODIUM 30 MG: 30 INJECTION SUBCUTANEOUS at 07:48

## 2021-01-01 RX ADMIN — SODIUM CHLORIDE, PRESERVATIVE FREE 10 ML: 5 INJECTION INTRAVENOUS at 22:53

## 2021-01-01 RX ADMIN — SODIUM ZIRCONIUM CYCLOSILICATE 5 G: 5 POWDER, FOR SUSPENSION ORAL at 16:45

## 2021-01-01 RX ADMIN — MONTELUKAST 10 MG: 10 TABLET, FILM COATED ORAL at 21:39

## 2021-01-01 RX ADMIN — PROPOFOL 50 MCG/KG/MIN: 10 INJECTION, EMULSION INTRAVENOUS at 21:01

## 2021-01-01 RX ADMIN — METFORMIN HYDROCHLORIDE 500 MG: 500 TABLET ORAL at 16:30

## 2021-01-01 RX ADMIN — ALBUTEROL SULFATE 2 PUFF: 90 AEROSOL, METERED RESPIRATORY (INHALATION) at 21:04

## 2021-01-01 RX ADMIN — GUAIFENESIN 400 MG: 200 TABLET ORAL at 20:36

## 2021-01-01 RX ADMIN — ENOXAPARIN SODIUM 30 MG: 30 INJECTION SUBCUTANEOUS at 09:30

## 2021-01-01 RX ADMIN — Medication 10 MG: at 07:48

## 2021-01-01 RX ADMIN — Medication 10 ML: at 12:02

## 2021-01-01 RX ADMIN — ALBUTEROL SULFATE 2 PUFF: 90 AEROSOL, METERED RESPIRATORY (INHALATION) at 08:39

## 2021-01-01 RX ADMIN — ALBUTEROL SULFATE 2 PUFF: 90 AEROSOL, METERED RESPIRATORY (INHALATION) at 08:30

## 2021-01-01 RX ADMIN — PROPOFOL 50 MCG/KG/MIN: 10 INJECTION, EMULSION INTRAVENOUS at 10:56

## 2021-01-01 RX ADMIN — ACETAMINOPHEN 650 MG: 325 TABLET ORAL at 09:08

## 2021-01-01 RX ADMIN — ENOXAPARIN SODIUM 30 MG: 30 INJECTION SUBCUTANEOUS at 21:48

## 2021-01-01 RX ADMIN — ZINC SULFATE 220 MG (50 MG) CAPSULE 50 MG: CAPSULE at 08:04

## 2021-01-01 RX ADMIN — ZINC SULFATE 220 MG (50 MG) CAPSULE 50 MG: CAPSULE at 09:30

## 2021-01-01 RX ADMIN — FAMOTIDINE 20 MG: 10 INJECTION, SOLUTION INTRAVENOUS at 12:35

## 2021-01-01 RX ADMIN — ALBUTEROL SULFATE 2 PUFF: 90 AEROSOL, METERED RESPIRATORY (INHALATION) at 16:56

## 2021-01-01 RX ADMIN — ENOXAPARIN SODIUM 30 MG: 30 INJECTION SUBCUTANEOUS at 09:55

## 2021-01-01 RX ADMIN — VECURONIUM BROMIDE 20 MG: 1 INJECTION, POWDER, LYOPHILIZED, FOR SOLUTION INTRAVENOUS at 05:11

## 2021-01-01 RX ADMIN — DEXAMETHASONE SODIUM PHOSPHATE 6 MG: 10 INJECTION, SOLUTION INTRAMUSCULAR; INTRAVENOUS at 03:01

## 2021-01-01 RX ADMIN — ZINC SULFATE 220 MG (50 MG) CAPSULE 50 MG: CAPSULE at 08:22

## 2021-01-01 RX ADMIN — ALBUTEROL SULFATE 2 PUFF: 90 AEROSOL, METERED RESPIRATORY (INHALATION) at 13:39

## 2021-01-01 RX ADMIN — ZINC SULFATE 220 MG (50 MG) CAPSULE 50 MG: CAPSULE at 08:08

## 2021-01-01 RX ADMIN — Medication 10 MG: at 07:53

## 2021-01-01 RX ADMIN — ENOXAPARIN SODIUM 30 MG: 30 INJECTION SUBCUTANEOUS at 10:01

## 2021-01-01 RX ADMIN — BUDESONIDE AND FORMOTEROL FUMARATE DIHYDRATE 2 PUFF: 160; 4.5 AEROSOL RESPIRATORY (INHALATION) at 20:26

## 2021-01-01 RX ADMIN — ALBUTEROL SULFATE 2 PUFF: 90 AEROSOL, METERED RESPIRATORY (INHALATION) at 20:56

## 2021-01-01 RX ADMIN — ASPIRIN 325 MG: 325 TABLET ORAL at 07:48

## 2021-01-01 RX ADMIN — GUAIFENESIN AND DEXTROMETHORPHAN 5 ML: 100; 10 SYRUP ORAL at 08:04

## 2021-01-01 RX ADMIN — ASPIRIN 325 MG: 325 TABLET ORAL at 07:55

## 2021-01-01 RX ADMIN — CHLORHEXIDINE GLUCONATE 15 ML: 1.2 RINSE ORAL at 20:14

## 2021-01-01 RX ADMIN — VECURONIUM BROMIDE 20 MG: 1 INJECTION, POWDER, LYOPHILIZED, FOR SOLUTION INTRAVENOUS at 14:42

## 2021-01-01 RX ADMIN — FAMOTIDINE 20 MG: 20 TABLET ORAL at 20:55

## 2021-01-01 RX ADMIN — ZINC SULFATE 220 MG (50 MG) CAPSULE 50 MG: CAPSULE at 08:10

## 2021-01-01 RX ADMIN — PROPOFOL 50 MCG/KG/MIN: 10 INJECTION, EMULSION INTRAVENOUS at 22:02

## 2021-01-01 RX ADMIN — IBUPROFEN 400 MG: 400 TABLET ORAL at 14:13

## 2021-01-01 RX ADMIN — MONTELUKAST 10 MG: 10 TABLET, FILM COATED ORAL at 20:17

## 2021-01-01 RX ADMIN — PROPOFOL 50 MCG/KG/MIN: 10 INJECTION, EMULSION INTRAVENOUS at 06:39

## 2021-01-01 RX ADMIN — BUMETANIDE 0.2 MG/HR: 0.25 INJECTION INTRAMUSCULAR; INTRAVENOUS at 10:00

## 2021-01-01 RX ADMIN — PROPOFOL 50 MCG/KG/MIN: 10 INJECTION, EMULSION INTRAVENOUS at 14:57

## 2021-01-01 RX ADMIN — WATER: 1 INJECTION INTRAMUSCULAR; INTRAVENOUS; SUBCUTANEOUS at 18:08

## 2021-01-01 RX ADMIN — BUDESONIDE AND FORMOTEROL FUMARATE DIHYDRATE 2 PUFF: 160; 4.5 AEROSOL RESPIRATORY (INHALATION) at 07:46

## 2021-01-01 RX ADMIN — POTASSIUM CHLORIDE 20 MEQ: 1500 TABLET, EXTENDED RELEASE ORAL at 20:41

## 2021-01-01 RX ADMIN — BUDESONIDE AND FORMOTEROL FUMARATE DIHYDRATE 2 PUFF: 160; 4.5 AEROSOL RESPIRATORY (INHALATION) at 07:17

## 2021-01-01 RX ADMIN — ENOXAPARIN SODIUM 30 MG: 30 INJECTION SUBCUTANEOUS at 08:22

## 2021-01-01 RX ADMIN — Medication 200 MCG/HR: at 08:46

## 2021-01-01 RX ADMIN — Medication 10 ML: at 22:01

## 2021-01-01 RX ADMIN — VECURONIUM BROMIDE 20 MG: 1 INJECTION, POWDER, LYOPHILIZED, FOR SOLUTION INTRAVENOUS at 04:42

## 2021-01-01 RX ADMIN — VANCOMYCIN HYDROCHLORIDE 1500 MG: 10 INJECTION, POWDER, LYOPHILIZED, FOR SOLUTION INTRAVENOUS at 12:38

## 2021-01-01 RX ADMIN — Medication 10 MG: at 07:36

## 2021-01-01 RX ADMIN — INSULIN GLARGINE 50 UNITS: 100 INJECTION, SOLUTION SUBCUTANEOUS at 08:16

## 2021-01-01 RX ADMIN — GUAIFENESIN 400 MG: 200 TABLET ORAL at 21:48

## 2021-01-01 RX ADMIN — METFORMIN HYDROCHLORIDE 500 MG: 500 TABLET ORAL at 08:08

## 2021-01-01 RX ADMIN — ZINC SULFATE 220 MG (50 MG) CAPSULE 50 MG: CAPSULE at 09:15

## 2021-01-01 RX ADMIN — DEXMEDETOMIDINE HYDROCHLORIDE 0.1 MCG/HR: 400 INJECTION INTRAVENOUS at 04:40

## 2021-01-01 RX ADMIN — Medication 10 MG: at 07:57

## 2021-01-01 RX ADMIN — MONTELUKAST 10 MG: 10 TABLET, FILM COATED ORAL at 20:46

## 2021-01-01 RX ADMIN — Medication 200 MCG/HR: at 01:30

## 2021-01-01 RX ADMIN — INSULIN LISPRO 2 UNITS: 100 INJECTION, SOLUTION INTRAVENOUS; SUBCUTANEOUS at 20:56

## 2021-01-01 RX ADMIN — LORAZEPAM 1 MG: 2 INJECTION INTRAMUSCULAR; INTRAVENOUS at 14:20

## 2021-01-01 RX ADMIN — Medication 200 MCG/HR: at 15:54

## 2021-01-01 RX ADMIN — DEXAMETHASONE SODIUM PHOSPHATE 6 MG: 10 INJECTION, SOLUTION INTRAMUSCULAR; INTRAVENOUS at 13:39

## 2021-01-01 RX ADMIN — SODIUM CHLORIDE, PRESERVATIVE FREE 10 ML: 5 INJECTION INTRAVENOUS at 21:23

## 2021-01-01 RX ADMIN — VECURONIUM BROMIDE 20 MG: 1 INJECTION, POWDER, LYOPHILIZED, FOR SOLUTION INTRAVENOUS at 10:52

## 2021-01-01 RX ADMIN — METFORMIN HYDROCHLORIDE 500 MG: 500 TABLET ORAL at 07:59

## 2021-01-01 RX ADMIN — FAMOTIDINE 20 MG: 10 INJECTION, SOLUTION INTRAVENOUS at 08:04

## 2021-01-01 RX ADMIN — Medication 30 UNITS: at 20:36

## 2021-01-01 RX ADMIN — INSULIN LISPRO 1 UNITS: 100 INJECTION, SOLUTION INTRAVENOUS; SUBCUTANEOUS at 20:42

## 2021-01-01 RX ADMIN — ENOXAPARIN SODIUM 30 MG: 30 INJECTION SUBCUTANEOUS at 20:28

## 2021-01-01 RX ADMIN — Medication 200 MCG/HR: at 22:37

## 2021-01-01 RX ADMIN — LORAZEPAM 2 MG: 2 INJECTION INTRAMUSCULAR at 02:05

## 2021-01-01 RX ADMIN — SODIUM CHLORIDE, PRESERVATIVE FREE 10 ML: 5 INJECTION INTRAVENOUS at 20:48

## 2021-01-01 RX ADMIN — PROPOFOL 50 MCG/KG/MIN: 10 INJECTION, EMULSION INTRAVENOUS at 04:12

## 2021-01-01 RX ADMIN — WATER 10 ML: 1 INJECTION INTRAMUSCULAR; INTRAVENOUS; SUBCUTANEOUS at 14:42

## 2021-01-01 RX ADMIN — Medication 40 UNITS: at 20:36

## 2021-01-01 RX ADMIN — Medication 10 ML: at 20:35

## 2021-01-01 RX ADMIN — ENOXAPARIN SODIUM 30 MG: 30 INJECTION SUBCUTANEOUS at 20:19

## 2021-01-01 RX ADMIN — PROPOFOL 50 MCG/KG/MIN: 10 INJECTION, EMULSION INTRAVENOUS at 17:31

## 2021-01-01 RX ADMIN — FAMOTIDINE 20 MG: 10 INJECTION, SOLUTION INTRAVENOUS at 07:59

## 2021-01-01 RX ADMIN — PROPOFOL 50 MCG/KG/MIN: 10 INJECTION, EMULSION INTRAVENOUS at 06:52

## 2021-01-01 RX ADMIN — PROPOFOL 50 MCG/KG/MIN: 10 INJECTION, EMULSION INTRAVENOUS at 10:16

## 2021-01-01 RX ADMIN — GUAIFENESIN 400 MG: 200 TABLET ORAL at 02:40

## 2021-01-01 RX ADMIN — GUAIFENESIN 400 MG: 200 TABLET ORAL at 05:40

## 2021-01-01 RX ADMIN — HEPARIN SODIUM 7220 UNITS: 1000 INJECTION INTRAVENOUS; SUBCUTANEOUS at 15:17

## 2021-01-01 RX ADMIN — GUAIFENESIN 400 MG: 200 TABLET ORAL at 20:18

## 2021-01-01 RX ADMIN — BUDESONIDE AND FORMOTEROL FUMARATE DIHYDRATE 2 PUFF: 160; 4.5 AEROSOL RESPIRATORY (INHALATION) at 20:48

## 2021-01-01 RX ADMIN — POTASSIUM CHLORIDE 10 MEQ: 7.46 INJECTION, SOLUTION INTRAVENOUS at 02:32

## 2021-01-01 RX ADMIN — IPRATROPIUM BROMIDE AND ALBUTEROL SULFATE 1 AMPULE: 2.5; .5 SOLUTION RESPIRATORY (INHALATION) at 08:10

## 2021-01-01 RX ADMIN — Medication 10 MG: at 09:15

## 2021-01-01 RX ADMIN — BUDESONIDE AND FORMOTEROL FUMARATE DIHYDRATE 2 PUFF: 160; 4.5 AEROSOL RESPIRATORY (INHALATION) at 09:29

## 2021-01-01 RX ADMIN — ALBUTEROL SULFATE 2 PUFF: 90 AEROSOL, METERED RESPIRATORY (INHALATION) at 07:36

## 2021-01-01 RX ADMIN — ZINC SULFATE 220 MG (50 MG) CAPSULE 50 MG: CAPSULE at 08:28

## 2021-01-01 RX ADMIN — INSULIN GLARGINE 50 UNITS: 100 INJECTION, SOLUTION SUBCUTANEOUS at 21:12

## 2021-01-01 RX ADMIN — Medication 2000 UNITS: at 08:23

## 2021-01-01 RX ADMIN — DEXAMETHASONE SODIUM PHOSPHATE 6 MG: 10 INJECTION, SOLUTION INTRAMUSCULAR; INTRAVENOUS at 11:42

## 2021-01-01 RX ADMIN — PROPOFOL 50 MCG/KG/MIN: 10 INJECTION, EMULSION INTRAVENOUS at 18:22

## 2021-01-01 RX ADMIN — Medication 2000 UNITS: at 08:13

## 2021-01-01 RX ADMIN — METFORMIN HYDROCHLORIDE 500 MG: 500 TABLET ORAL at 17:19

## 2021-01-01 RX ADMIN — SODIUM CHLORIDE, PRESERVATIVE FREE 2000 MG: 5 INJECTION INTRAVENOUS at 20:55

## 2021-01-01 RX ADMIN — ALBUMIN (HUMAN) 50 G: 0.25 INJECTION, SOLUTION INTRAVENOUS at 05:59

## 2021-01-01 RX ADMIN — SODIUM CHLORIDE, PRESERVATIVE FREE 2000 MG: 5 INJECTION INTRAVENOUS at 20:29

## 2021-01-01 RX ADMIN — ENOXAPARIN SODIUM 30 MG: 30 INJECTION SUBCUTANEOUS at 20:29

## 2021-01-01 RX ADMIN — FAMOTIDINE 20 MG: 10 INJECTION, SOLUTION INTRAVENOUS at 20:16

## 2021-01-01 RX ADMIN — IPRATROPIUM BROMIDE AND ALBUTEROL SULFATE 1 AMPULE: 2.5; .5 SOLUTION RESPIRATORY (INHALATION) at 12:32

## 2021-01-01 RX ADMIN — Medication 40 UNITS: at 20:41

## 2021-01-01 RX ADMIN — ENOXAPARIN SODIUM 30 MG: 30 INJECTION SUBCUTANEOUS at 08:07

## 2021-01-01 RX ADMIN — Medication 10 ML: at 22:00

## 2021-01-01 RX ADMIN — PROPOFOL 50 MCG/KG/MIN: 10 INJECTION, EMULSION INTRAVENOUS at 18:43

## 2021-01-01 RX ADMIN — METOCLOPRAMIDE 10 MG: 5 INJECTION, SOLUTION INTRAMUSCULAR; INTRAVENOUS at 14:34

## 2021-01-01 RX ADMIN — Medication 10 ML: at 21:49

## 2021-01-01 RX ADMIN — ENOXAPARIN SODIUM 30 MG: 30 INJECTION SUBCUTANEOUS at 07:57

## 2021-01-01 RX ADMIN — IPRATROPIUM BROMIDE AND ALBUTEROL SULFATE 1 AMPULE: 2.5; .5 SOLUTION RESPIRATORY (INHALATION) at 04:12

## 2021-01-01 RX ADMIN — ETOMIDATE 20 MG: 20 INJECTION, SOLUTION INTRAVENOUS at 02:00

## 2021-01-01 RX ADMIN — Medication 40 UNITS: at 20:23

## 2021-01-01 RX ADMIN — ALBUTEROL SULFATE 2 PUFF: 90 AEROSOL, METERED RESPIRATORY (INHALATION) at 20:55

## 2021-01-01 RX ADMIN — ACETAZOLAMIDE 250 MG: 500 INJECTION, POWDER, LYOPHILIZED, FOR SOLUTION INTRAVENOUS at 18:38

## 2021-01-01 RX ADMIN — FAMOTIDINE 20 MG: 10 INJECTION, SOLUTION INTRAVENOUS at 20:28

## 2021-01-01 RX ADMIN — VANCOMYCIN HYDROCHLORIDE 1500 MG: 10 INJECTION, POWDER, LYOPHILIZED, FOR SOLUTION INTRAVENOUS at 14:34

## 2021-01-01 RX ADMIN — DEXAMETHASONE SODIUM PHOSPHATE 6 MG: 10 INJECTION, SOLUTION INTRAMUSCULAR; INTRAVENOUS at 23:55

## 2021-01-01 RX ADMIN — IPRATROPIUM BROMIDE AND ALBUTEROL SULFATE 1 AMPULE: 2.5; .5 SOLUTION RESPIRATORY (INHALATION) at 16:26

## 2021-01-01 RX ADMIN — IPRATROPIUM BROMIDE AND ALBUTEROL SULFATE 1 AMPULE: 2.5; .5 SOLUTION RESPIRATORY (INHALATION) at 00:30

## 2021-01-01 RX ADMIN — PROPOFOL 50 MCG/KG/MIN: 10 INJECTION, EMULSION INTRAVENOUS at 06:31

## 2021-01-01 RX ADMIN — LORAZEPAM 1 MG: 2 INJECTION INTRAMUSCULAR; INTRAVENOUS at 21:16

## 2021-01-01 RX ADMIN — ALBUTEROL SULFATE 2 PUFF: 90 AEROSOL, METERED RESPIRATORY (INHALATION) at 20:15

## 2021-01-01 RX ADMIN — Medication 3 MG/HR: at 11:43

## 2021-01-01 RX ADMIN — FAMOTIDINE 20 MG: 10 INJECTION, SOLUTION INTRAVENOUS at 08:08

## 2021-01-01 RX ADMIN — METOCLOPRAMIDE 10 MG: 5 INJECTION, SOLUTION INTRAMUSCULAR; INTRAVENOUS at 14:26

## 2021-01-01 RX ADMIN — PROPOFOL 50 MCG/KG/MIN: 10 INJECTION, EMULSION INTRAVENOUS at 19:27

## 2021-01-01 RX ADMIN — GUAIFENESIN 400 MG: 200 TABLET ORAL at 20:15

## 2021-01-01 RX ADMIN — PROPOFOL 50 MCG/KG/MIN: 10 INJECTION, EMULSION INTRAVENOUS at 18:33

## 2021-01-01 RX ADMIN — SODIUM CHLORIDE, PRESERVATIVE FREE 10 ML: 5 INJECTION INTRAVENOUS at 09:16

## 2021-01-01 RX ADMIN — SODIUM CHLORIDE, PRESERVATIVE FREE 2000 MG: 5 INJECTION INTRAVENOUS at 19:56

## 2021-01-01 RX ADMIN — Medication 10 MG: at 08:13

## 2021-01-01 RX ADMIN — Medication 10 ML: at 07:53

## 2021-01-01 RX ADMIN — Medication 5 MCG/MIN: at 10:20

## 2021-01-01 RX ADMIN — Medication 2000 UNITS: at 09:30

## 2021-01-01 RX ADMIN — IPRATROPIUM BROMIDE AND ALBUTEROL SULFATE 1 AMPULE: 2.5; .5 SOLUTION RESPIRATORY (INHALATION) at 11:42

## 2021-01-01 RX ADMIN — MONTELUKAST 10 MG: 10 TABLET, FILM COATED ORAL at 21:48

## 2021-01-01 RX ADMIN — Medication 125 MCG/HR: at 03:00

## 2021-01-01 RX ADMIN — ENOXAPARIN SODIUM 30 MG: 30 INJECTION SUBCUTANEOUS at 20:23

## 2021-01-01 RX ADMIN — ALBUTEROL SULFATE 2 PUFF: 90 AEROSOL, METERED RESPIRATORY (INHALATION) at 21:23

## 2021-01-01 RX ADMIN — METFORMIN HYDROCHLORIDE 500 MG: 500 TABLET ORAL at 16:47

## 2021-01-01 RX ADMIN — INSULIN LISPRO 2 UNITS: 100 INJECTION, SOLUTION INTRAVENOUS; SUBCUTANEOUS at 20:37

## 2021-01-01 RX ADMIN — IPRATROPIUM BROMIDE AND ALBUTEROL SULFATE 1 AMPULE: 2.5; .5 SOLUTION RESPIRATORY (INHALATION) at 16:17

## 2021-01-01 RX ADMIN — ACETAMINOPHEN 650 MG: 325 TABLET ORAL at 09:37

## 2021-01-01 RX ADMIN — ENOXAPARIN SODIUM 30 MG: 30 INJECTION SUBCUTANEOUS at 20:13

## 2021-01-01 RX ADMIN — MONTELUKAST 10 MG: 10 TABLET, FILM COATED ORAL at 20:36

## 2021-01-01 RX ADMIN — SODIUM CHLORIDE 8.85 UNITS/HR: 9 INJECTION, SOLUTION INTRAVENOUS at 20:10

## 2021-01-01 RX ADMIN — Medication 10 ML: at 09:02

## 2021-01-01 RX ADMIN — METOCLOPRAMIDE 10 MG: 5 INJECTION, SOLUTION INTRAMUSCULAR; INTRAVENOUS at 09:10

## 2021-01-01 RX ADMIN — INSULIN LISPRO 1 UNITS: 100 INJECTION, SOLUTION INTRAVENOUS; SUBCUTANEOUS at 21:39

## 2021-01-01 RX ADMIN — ENOXAPARIN SODIUM 30 MG: 30 INJECTION SUBCUTANEOUS at 20:47

## 2021-01-01 RX ADMIN — PROPOFOL 50 MCG/KG/MIN: 10 INJECTION, EMULSION INTRAVENOUS at 14:21

## 2021-01-01 RX ADMIN — BUDESONIDE AND FORMOTEROL FUMARATE DIHYDRATE 2 PUFF: 160; 4.5 AEROSOL RESPIRATORY (INHALATION) at 20:55

## 2021-01-01 RX ADMIN — BUDESONIDE AND FORMOTEROL FUMARATE DIHYDRATE 2 PUFF: 160; 4.5 AEROSOL RESPIRATORY (INHALATION) at 13:32

## 2021-01-01 RX ADMIN — VANCOMYCIN HYDROCHLORIDE 1500 MG: 10 INJECTION, POWDER, LYOPHILIZED, FOR SOLUTION INTRAVENOUS at 15:58

## 2021-01-01 RX ADMIN — WATER 10 ML: 1 INJECTION INTRAMUSCULAR; INTRAVENOUS; SUBCUTANEOUS at 11:21

## 2021-01-01 RX ADMIN — ALBUTEROL SULFATE 2 PUFF: 90 AEROSOL, METERED RESPIRATORY (INHALATION) at 16:22

## 2021-01-01 RX ADMIN — DEXAMETHASONE SODIUM PHOSPHATE 6 MG: 10 INJECTION, SOLUTION INTRAMUSCULAR; INTRAVENOUS at 12:26

## 2021-01-01 RX ADMIN — Medication 10 ML: at 20:17

## 2021-01-01 RX ADMIN — ASPIRIN 325 MG: 325 TABLET ORAL at 10:02

## 2021-01-01 RX ADMIN — POTASSIUM CHLORIDE, DEXTROSE MONOHYDRATE AND SODIUM CHLORIDE: 150; 5; 900 INJECTION, SOLUTION INTRAVENOUS at 00:34

## 2021-01-01 RX ADMIN — ENOXAPARIN SODIUM 30 MG: 30 INJECTION SUBCUTANEOUS at 21:04

## 2021-01-01 RX ADMIN — INSULIN GLARGINE 15 UNITS: 100 INJECTION, SOLUTION SUBCUTANEOUS at 00:40

## 2021-01-01 RX ADMIN — DEXAMETHASONE SODIUM PHOSPHATE 6 MG: 10 INJECTION, SOLUTION INTRAMUSCULAR; INTRAVENOUS at 00:28

## 2021-01-01 RX ADMIN — GUAIFENESIN AND DEXTROMETHORPHAN 5 ML: 100; 10 SYRUP ORAL at 16:43

## 2021-01-01 RX ADMIN — SODIUM PHOSPHATE, MONOBASIC, MONOHYDRATE 20 MMOL: 276; 142 INJECTION, SOLUTION INTRAVENOUS at 16:18

## 2021-01-01 RX ADMIN — DEXAMETHASONE SODIUM PHOSPHATE 6 MG: 10 INJECTION, SOLUTION INTRAMUSCULAR; INTRAVENOUS at 12:02

## 2021-01-01 RX ADMIN — Medication 10 ML: at 08:16

## 2021-01-01 RX ADMIN — VANCOMYCIN HYDROCHLORIDE 1500 MG: 10 INJECTION, POWDER, LYOPHILIZED, FOR SOLUTION INTRAVENOUS at 00:10

## 2021-01-01 RX ADMIN — Medication 40 UNITS: at 20:20

## 2021-01-01 RX ADMIN — SODIUM ZIRCONIUM CYCLOSILICATE 10 G: 10 POWDER, FOR SUSPENSION ORAL at 20:23

## 2021-01-01 RX ADMIN — INSULIN GLARGINE 50 UNITS: 100 INJECTION, SOLUTION SUBCUTANEOUS at 08:11

## 2021-01-01 RX ADMIN — FAMOTIDINE 20 MG: 10 INJECTION, SOLUTION INTRAVENOUS at 20:19

## 2021-01-01 RX ADMIN — Medication 40 UNITS: at 08:39

## 2021-01-01 RX ADMIN — METFORMIN HYDROCHLORIDE 500 MG: 500 TABLET ORAL at 16:57

## 2021-01-01 RX ADMIN — FAMOTIDINE 20 MG: 10 INJECTION, SOLUTION INTRAVENOUS at 20:17

## 2021-01-01 RX ADMIN — METFORMIN HYDROCHLORIDE 500 MG: 500 TABLET ORAL at 08:22

## 2021-01-01 RX ADMIN — IPRATROPIUM BROMIDE AND ALBUTEROL SULFATE 1 AMPULE: 2.5; .5 SOLUTION RESPIRATORY (INHALATION) at 07:53

## 2021-01-01 RX ADMIN — BUDESONIDE AND FORMOTEROL FUMARATE DIHYDRATE 2 PUFF: 160; 4.5 AEROSOL RESPIRATORY (INHALATION) at 19:57

## 2021-01-01 RX ADMIN — SODIUM CHLORIDE, PRESERVATIVE FREE 10 ML: 5 INJECTION INTRAVENOUS at 10:04

## 2021-01-01 RX ADMIN — Medication 40 UNITS: at 20:16

## 2021-01-01 RX ADMIN — GUAIFENESIN 600 MG: 100 SOLUTION ORAL at 14:48

## 2021-01-01 RX ADMIN — ASPIRIN 325 MG: 325 TABLET ORAL at 08:28

## 2021-01-01 RX ADMIN — FAMOTIDINE 20 MG: 10 INJECTION, SOLUTION INTRAVENOUS at 20:22

## 2021-01-01 RX ADMIN — IPRATROPIUM BROMIDE AND ALBUTEROL SULFATE 1 AMPULE: 2.5; .5 SOLUTION RESPIRATORY (INHALATION) at 03:38

## 2021-01-01 RX ADMIN — TRAMADOL HYDROCHLORIDE 50 MG: 50 TABLET, FILM COATED ORAL at 12:20

## 2021-01-01 RX ADMIN — VECURONIUM BROMIDE 10 MG: 1 INJECTION, POWDER, LYOPHILIZED, FOR SOLUTION INTRAVENOUS at 11:21

## 2021-01-01 RX ADMIN — PROPOFOL 40 MCG/KG/MIN: 10 INJECTION, EMULSION INTRAVENOUS at 23:17

## 2021-01-01 RX ADMIN — Medication 10 ML: at 20:56

## 2021-01-01 RX ADMIN — LORAZEPAM 1 MG: 2 INJECTION INTRAMUSCULAR; INTRAVENOUS at 09:40

## 2021-01-01 RX ADMIN — Medication 10 MG: at 08:22

## 2021-01-01 RX ADMIN — MONTELUKAST 10 MG: 10 TABLET, FILM COATED ORAL at 20:15

## 2021-01-01 RX ADMIN — DEXAMETHASONE SODIUM PHOSPHATE 6 MG: 10 INJECTION, SOLUTION INTRAMUSCULAR; INTRAVENOUS at 12:35

## 2021-01-01 RX ADMIN — INSULIN GLARGINE 50 UNITS: 100 INJECTION, SOLUTION SUBCUTANEOUS at 20:16

## 2021-01-01 RX ADMIN — SODIUM CHLORIDE 1000 ML: 9 INJECTION, SOLUTION INTRAVENOUS at 20:13

## 2021-01-01 RX ADMIN — METFORMIN HYDROCHLORIDE 500 MG: 500 TABLET ORAL at 17:48

## 2021-01-01 RX ADMIN — ALBUTEROL SULFATE 2 PUFF: 90 AEROSOL, METERED RESPIRATORY (INHALATION) at 11:03

## 2021-01-01 RX ADMIN — ACETAMINOPHEN 650 MG: 325 TABLET ORAL at 04:24

## 2021-01-01 RX ADMIN — ALBUTEROL SULFATE 2 PUFF: 90 AEROSOL, METERED RESPIRATORY (INHALATION) at 07:17

## 2021-01-01 RX ADMIN — METOPROLOL TARTRATE 5 MG: 5 INJECTION INTRAVENOUS at 11:09

## 2021-01-01 RX ADMIN — ALBUTEROL SULFATE 2 PUFF: 90 AEROSOL, METERED RESPIRATORY (INHALATION) at 12:38

## 2021-01-01 RX ADMIN — Medication 150 MCG/HR: at 01:15

## 2021-01-01 RX ADMIN — SODIUM CHLORIDE 6.7 UNITS/HR: 9 INJECTION, SOLUTION INTRAVENOUS at 22:47

## 2021-01-01 RX ADMIN — SODIUM CHLORIDE, PRESERVATIVE FREE 2000 MG: 5 INJECTION INTRAVENOUS at 08:09

## 2021-01-01 RX ADMIN — ALBUTEROL SULFATE 2 PUFF: 90 AEROSOL, METERED RESPIRATORY (INHALATION) at 10:02

## 2021-01-01 RX ADMIN — IPRATROPIUM BROMIDE AND ALBUTEROL SULFATE 1 AMPULE: 2.5; .5 SOLUTION RESPIRATORY (INHALATION) at 12:14

## 2021-01-01 RX ADMIN — ADENOSINE 6 MG: 3 INJECTION INTRAVENOUS at 14:25

## 2021-01-01 RX ADMIN — Medication 1 MG/HR: at 02:59

## 2021-01-01 RX ADMIN — ALBUTEROL SULFATE 2 PUFF: 90 AEROSOL, METERED RESPIRATORY (INHALATION) at 08:12

## 2021-01-01 RX ADMIN — PROPOFOL 50 MCG/KG/MIN: 10 INJECTION, EMULSION INTRAVENOUS at 07:40

## 2021-01-01 RX ADMIN — FAMOTIDINE 20 MG: 10 INJECTION, SOLUTION INTRAVENOUS at 08:27

## 2021-01-01 RX ADMIN — SODIUM PHOSPHATE, MONOBASIC, MONOHYDRATE 15 MMOL: 276; 142 INJECTION, SOLUTION INTRAVENOUS at 10:20

## 2021-01-01 RX ADMIN — PROPOFOL 50 MCG/KG/MIN: 10 INJECTION, EMULSION INTRAVENOUS at 01:02

## 2021-01-01 RX ADMIN — GUAIFENESIN 400 MG: 200 TABLET ORAL at 19:57

## 2021-01-01 RX ADMIN — CHLORHEXIDINE GLUCONATE 15 ML: 1.2 RINSE ORAL at 12:18

## 2021-01-01 RX ADMIN — Medication 10 MG: at 07:58

## 2021-01-01 RX ADMIN — ALBUTEROL SULFATE 2 PUFF: 90 AEROSOL, METERED RESPIRATORY (INHALATION) at 09:11

## 2021-01-01 RX ADMIN — DEXAMETHASONE SODIUM PHOSPHATE 6 MG: 10 INJECTION, SOLUTION INTRAMUSCULAR; INTRAVENOUS at 13:31

## 2021-01-01 RX ADMIN — ENOXAPARIN SODIUM 30 MG: 30 INJECTION SUBCUTANEOUS at 07:59

## 2021-01-01 RX ADMIN — BUDESONIDE AND FORMOTEROL FUMARATE DIHYDRATE 2 PUFF: 160; 4.5 AEROSOL RESPIRATORY (INHALATION) at 08:12

## 2021-01-01 RX ADMIN — LORAZEPAM 1 MG: 2 INJECTION INTRAMUSCULAR; INTRAVENOUS at 20:20

## 2021-01-01 RX ADMIN — MONTELUKAST 10 MG: 10 TABLET, FILM COATED ORAL at 20:19

## 2021-01-01 RX ADMIN — IPRATROPIUM BROMIDE AND ALBUTEROL SULFATE 1 AMPULE: 2.5; .5 SOLUTION RESPIRATORY (INHALATION) at 20:29

## 2021-01-01 RX ADMIN — PIPERACILLIN AND TAZOBACTAM 3375 MG: 3; .375 INJECTION, POWDER, LYOPHILIZED, FOR SOLUTION INTRAVENOUS at 22:06

## 2021-01-01 RX ADMIN — SODIUM CHLORIDE, PRESERVATIVE FREE 2000 MG: 5 INJECTION INTRAVENOUS at 20:05

## 2021-01-01 RX ADMIN — FAMOTIDINE 20 MG: 10 INJECTION, SOLUTION INTRAVENOUS at 08:22

## 2021-01-01 RX ADMIN — SODIUM CHLORIDE, PRESERVATIVE FREE 10 ML: 5 INJECTION INTRAVENOUS at 11:03

## 2021-01-01 RX ADMIN — Medication 200 MCG/HR: at 13:35

## 2021-01-01 RX ADMIN — ENOXAPARIN SODIUM 30 MG: 30 INJECTION SUBCUTANEOUS at 20:15

## 2021-01-01 RX ADMIN — FAMOTIDINE 20 MG: 10 INJECTION, SOLUTION INTRAVENOUS at 20:13

## 2021-01-01 RX ADMIN — VECURONIUM BROMIDE 20 MG: 1 INJECTION, POWDER, LYOPHILIZED, FOR SOLUTION INTRAVENOUS at 17:05

## 2021-01-01 RX ADMIN — LORAZEPAM 1 MG: 2 INJECTION INTRAMUSCULAR; INTRAVENOUS at 17:23

## 2021-01-01 RX ADMIN — METFORMIN HYDROCHLORIDE 500 MG: 500 TABLET ORAL at 16:43

## 2021-01-01 RX ADMIN — DEXMEDETOMIDINE HYDROCHLORIDE 0.4 MCG/KG/HR: 400 INJECTION INTRAVENOUS at 07:39

## 2021-01-01 RX ADMIN — BUDESONIDE AND FORMOTEROL FUMARATE DIHYDRATE 2 PUFF: 160; 4.5 AEROSOL RESPIRATORY (INHALATION) at 09:10

## 2021-01-01 RX ADMIN — INSULIN GLARGINE 20 UNITS: 100 INJECTION, SOLUTION SUBCUTANEOUS at 20:19

## 2021-01-01 RX ADMIN — ALBUTEROL SULFATE 2 PUFF: 90 AEROSOL, METERED RESPIRATORY (INHALATION) at 12:32

## 2021-01-01 RX ADMIN — Medication 2000 UNITS: at 08:10

## 2021-01-01 RX ADMIN — PIPERACILLIN AND TAZOBACTAM 3375 MG: 3; .375 INJECTION, POWDER, LYOPHILIZED, FOR SOLUTION INTRAVENOUS at 06:16

## 2021-01-01 RX ADMIN — ACETAMINOPHEN 650 MG: 325 TABLET ORAL at 11:58

## 2021-01-01 RX ADMIN — VANCOMYCIN HYDROCHLORIDE 1500 MG: 10 INJECTION, POWDER, LYOPHILIZED, FOR SOLUTION INTRAVENOUS at 12:04

## 2021-01-01 RX ADMIN — ACETAMINOPHEN 650 MG: 325 TABLET ORAL at 18:36

## 2021-01-01 RX ADMIN — ACETAMINOPHEN 650 MG: 650 SUPPOSITORY RECTAL at 05:51

## 2021-01-01 RX ADMIN — PROPOFOL 50 MCG/KG/MIN: 10 INJECTION, EMULSION INTRAVENOUS at 20:45

## 2021-01-01 RX ADMIN — SODIUM CHLORIDE, PRESERVATIVE FREE 10 ML: 5 INJECTION INTRAVENOUS at 07:38

## 2021-01-01 RX ADMIN — Medication 2000 UNITS: at 10:02

## 2021-01-01 RX ADMIN — BUDESONIDE AND FORMOTEROL FUMARATE DIHYDRATE 2 PUFF: 160; 4.5 AEROSOL RESPIRATORY (INHALATION) at 08:09

## 2021-01-01 RX ADMIN — FAMOTIDINE 20 MG: 10 INJECTION, SOLUTION INTRAVENOUS at 07:41

## 2021-01-01 RX ADMIN — LORAZEPAM 1 MG: 2 INJECTION INTRAMUSCULAR; INTRAVENOUS at 13:40

## 2021-01-01 RX ADMIN — PROPOFOL 50 MCG/KG/MIN: 10 INJECTION, EMULSION INTRAVENOUS at 17:34

## 2021-01-01 RX ADMIN — FAMOTIDINE 20 MG: 10 INJECTION, SOLUTION INTRAVENOUS at 08:42

## 2021-01-01 RX ADMIN — DEXAMETHASONE SODIUM PHOSPHATE 6 MG: 10 INJECTION, SOLUTION INTRAMUSCULAR; INTRAVENOUS at 02:40

## 2021-01-01 RX ADMIN — SODIUM CHLORIDE, PRESERVATIVE FREE 2000 MG: 5 INJECTION INTRAVENOUS at 07:49

## 2021-01-01 RX ADMIN — IPRATROPIUM BROMIDE AND ALBUTEROL SULFATE 1 AMPULE: 2.5; .5 SOLUTION RESPIRATORY (INHALATION) at 07:41

## 2021-01-01 RX ADMIN — ATROPINE SULFATE 1 MG: 0.1 INJECTION INTRAVENOUS at 17:25

## 2021-01-01 RX ADMIN — ENOXAPARIN SODIUM 30 MG: 30 INJECTION SUBCUTANEOUS at 20:35

## 2021-01-01 RX ADMIN — BUDESONIDE AND FORMOTEROL FUMARATE DIHYDRATE 2 PUFF: 160; 4.5 AEROSOL RESPIRATORY (INHALATION) at 19:59

## 2021-01-01 RX ADMIN — Medication 2000 UNITS: at 07:57

## 2021-01-01 RX ADMIN — PROPOFOL 50 MCG/KG/MIN: 10 INJECTION, EMULSION INTRAVENOUS at 14:50

## 2021-01-01 RX ADMIN — ALBUTEROL SULFATE 2 PUFF: 90 AEROSOL, METERED RESPIRATORY (INHALATION) at 07:56

## 2021-01-01 RX ADMIN — DEXAMETHASONE SODIUM PHOSPHATE 6 MG: 10 INJECTION, SOLUTION INTRAMUSCULAR; INTRAVENOUS at 00:52

## 2021-01-01 RX ADMIN — IPRATROPIUM BROMIDE AND ALBUTEROL SULFATE 1 AMPULE: 2.5; .5 SOLUTION RESPIRATORY (INHALATION) at 03:28

## 2021-01-01 RX ADMIN — SODIUM CHLORIDE, PRESERVATIVE FREE 2000 MG: 5 INJECTION INTRAVENOUS at 20:00

## 2021-01-01 RX ADMIN — ENOXAPARIN SODIUM 30 MG: 30 INJECTION SUBCUTANEOUS at 09:14

## 2021-01-01 RX ADMIN — METFORMIN HYDROCHLORIDE 500 MG: 500 TABLET ORAL at 08:43

## 2021-01-01 RX ADMIN — METFORMIN HYDROCHLORIDE 500 MG: 500 TABLET ORAL at 07:35

## 2021-01-01 RX ADMIN — Medication 40 UNITS: at 20:22

## 2021-01-01 RX ADMIN — GUAIFENESIN AND DEXTROMETHORPHAN 5 ML: 100; 10 SYRUP ORAL at 18:05

## 2021-01-01 RX ADMIN — DEXAMETHASONE SODIUM PHOSPHATE 6 MG: 10 INJECTION, SOLUTION INTRAMUSCULAR; INTRAVENOUS at 23:49

## 2021-01-01 RX ADMIN — Medication 2000 UNITS: at 09:15

## 2021-01-01 RX ADMIN — ALBUTEROL SULFATE 2 PUFF: 90 AEROSOL, METERED RESPIRATORY (INHALATION) at 13:19

## 2021-01-01 RX ADMIN — Medication 10 ML: at 21:08

## 2021-01-01 RX ADMIN — DEXAMETHASONE SODIUM PHOSPHATE 6 MG: 10 INJECTION, SOLUTION INTRAMUSCULAR; INTRAVENOUS at 12:41

## 2021-01-01 RX ADMIN — ACETAMINOPHEN 650 MG: 325 TABLET ORAL at 00:58

## 2021-01-01 RX ADMIN — IPRATROPIUM BROMIDE AND ALBUTEROL SULFATE 1 AMPULE: 2.5; .5 SOLUTION RESPIRATORY (INHALATION) at 16:22

## 2021-01-01 RX ADMIN — DEXMEDETOMIDINE HYDROCHLORIDE 1.2 MCG/KG/HR: 400 INJECTION INTRAVENOUS at 14:28

## 2021-01-01 RX ADMIN — PROPOFOL 35 MCG/KG/MIN: 10 INJECTION, EMULSION INTRAVENOUS at 13:00

## 2021-01-01 RX ADMIN — Medication 10 MG: at 12:35

## 2021-01-01 RX ADMIN — SODIUM ZIRCONIUM CYCLOSILICATE 5 G: 5 POWDER, FOR SUSPENSION ORAL at 20:46

## 2021-01-01 RX ADMIN — ASPIRIN 325 MG: 325 TABLET ORAL at 12:35

## 2021-01-01 RX ADMIN — GUAIFENESIN 400 MG: 200 TABLET ORAL at 04:45

## 2021-01-01 RX ADMIN — WATER 10 ML: 1 INJECTION INTRAMUSCULAR; INTRAVENOUS; SUBCUTANEOUS at 18:38

## 2021-01-01 RX ADMIN — ZINC SULFATE 220 MG (50 MG) CAPSULE 50 MG: CAPSULE at 07:43

## 2021-01-01 RX ADMIN — IPRATROPIUM BROMIDE AND ALBUTEROL SULFATE 1 AMPULE: 2.5; .5 SOLUTION RESPIRATORY (INHALATION) at 16:30

## 2021-01-01 RX ADMIN — Medication 5 MCG/MIN: at 10:32

## 2021-01-01 RX ADMIN — ALBUTEROL SULFATE 2 PUFF: 90 AEROSOL, METERED RESPIRATORY (INHALATION) at 13:32

## 2021-01-01 RX ADMIN — PROPOFOL 40 MCG/KG/MIN: 10 INJECTION, EMULSION INTRAVENOUS at 03:27

## 2021-01-01 RX ADMIN — PIPERACILLIN AND TAZOBACTAM 3375 MG: 3; .375 INJECTION, POWDER, LYOPHILIZED, FOR SOLUTION INTRAVENOUS at 14:33

## 2021-01-01 RX ADMIN — VECURONIUM BROMIDE 20 MG: 1 INJECTION, POWDER, LYOPHILIZED, FOR SOLUTION INTRAVENOUS at 06:34

## 2021-01-01 RX ADMIN — ZINC SULFATE 220 MG (50 MG) CAPSULE 50 MG: CAPSULE at 07:41

## 2021-01-01 RX ADMIN — ENOXAPARIN SODIUM 30 MG: 30 INJECTION SUBCUTANEOUS at 20:54

## 2021-01-01 RX ADMIN — INSULIN LISPRO 1 UNITS: 100 INJECTION, SOLUTION INTRAVENOUS; SUBCUTANEOUS at 20:17

## 2021-01-01 RX ADMIN — ZINC SULFATE 220 MG (50 MG) CAPSULE 50 MG: CAPSULE at 08:06

## 2021-01-01 RX ADMIN — DEXAMETHASONE SODIUM PHOSPHATE 6 MG: 10 INJECTION, SOLUTION INTRAMUSCULAR; INTRAVENOUS at 12:17

## 2021-01-01 RX ADMIN — GUAIFENESIN AND DEXTROMETHORPHAN 5 ML: 100; 10 SYRUP ORAL at 05:20

## 2021-01-01 RX ADMIN — METOCLOPRAMIDE 10 MG: 5 INJECTION, SOLUTION INTRAMUSCULAR; INTRAVENOUS at 07:41

## 2021-01-01 RX ADMIN — SODIUM CHLORIDE 10.15 UNITS/HR: 9 INJECTION, SOLUTION INTRAVENOUS at 04:57

## 2021-01-01 RX ADMIN — PROPOFOL 45 MCG/KG/MIN: 10 INJECTION, EMULSION INTRAVENOUS at 00:33

## 2021-01-01 RX ADMIN — IPRATROPIUM BROMIDE AND ALBUTEROL SULFATE 1 AMPULE: 2.5; .5 SOLUTION RESPIRATORY (INHALATION) at 10:04

## 2021-01-01 ASSESSMENT — PULMONARY FUNCTION TESTS
PIF_VALUE: 21.3
PIF_VALUE: 7.2
PIF_VALUE: 36
PIF_VALUE: 30.3
PIF_VALUE: 32.8
PIF_VALUE: 16.9
PIF_VALUE: 28
PIF_VALUE: 28.7
PIF_VALUE: 9.6
PIF_VALUE: 30.4
PIF_VALUE: 29.4
PIF_VALUE: 23.6
PIF_VALUE: 25.9
PIF_VALUE: 28.6
PIF_VALUE: 28.8
PIF_VALUE: 25.2
PIF_VALUE: 29
PIF_VALUE: 39.6
PIF_VALUE: 23.5
PIF_VALUE: 36
PIF_VALUE: 31.1
PIF_VALUE: 23.6
PIF_VALUE: 32.3
PIF_VALUE: 27.3
PIF_VALUE: 24.1
PIF_VALUE: 29.3
PIF_VALUE: 25.1
PIF_VALUE: 25.8
PIF_VALUE: 24.6
PIF_VALUE: 35.3
PIF_VALUE: 49.5
PIF_VALUE: 25.9
PIF_VALUE: 40.5
PIF_VALUE: 22.7
PIF_VALUE: 26.1
PIF_VALUE: 47
PIF_VALUE: 23.3
PIF_VALUE: 37.6
PIF_VALUE: 24.4
PIF_VALUE: 32.1
PIF_VALUE: 27.2
PIF_VALUE: 12.9
PIF_VALUE: 35.4
PIF_VALUE: 50.8
PIF_VALUE: 22.8
PIF_VALUE: 29.8
PIF_VALUE: 22
PIF_VALUE: 23.4
PIF_VALUE: 16.7
PIF_VALUE: 25.9
PIF_VALUE: 35.8
PIF_VALUE: 25.6
PIF_VALUE: 21.9
PIF_VALUE: 21.4
PIF_VALUE: 44.3
PIF_VALUE: 24.9
PIF_VALUE: 36.1
PIF_VALUE: 23.2
PIF_VALUE: 20
PIF_VALUE: 30.2
PIF_VALUE: 23.3
PIF_VALUE: 24.4
PIF_VALUE: 29
PIF_VALUE: 12.2
PIF_VALUE: 23.1
PIF_VALUE: 21.6
PIF_VALUE: 22.8
PIF_VALUE: 29.2
PIF_VALUE: 30
PIF_VALUE: 24.6
PIF_VALUE: 30.2
PIF_VALUE: 27.6
PIF_VALUE: 16.5
PIF_VALUE: 25.1
PIF_VALUE: 7.1
PIF_VALUE: 33.7
PIF_VALUE: 37.3
PIF_VALUE: 43.6
PIF_VALUE: 18.4
PIF_VALUE: 29.1
PIF_VALUE: 31.9
PIF_VALUE: 25.1
PIF_VALUE: 24.9
PIF_VALUE: 28.5
PIF_VALUE: 36.7
PIF_VALUE: 13
PIF_VALUE: 23.1
PIF_VALUE: 23.3
PIF_VALUE: 44.6
PIF_VALUE: 24.9
PIF_VALUE: 31.6
PIF_VALUE: 23.6
PIF_VALUE: 22.8
PIF_VALUE: 35.3
PIF_VALUE: 29.7
PIF_VALUE: 26.4
PIF_VALUE: 7.4
PIF_VALUE: 16.2
PIF_VALUE: 28.4
PIF_VALUE: 26.3
PIF_VALUE: 25.1
PIF_VALUE: 23.3
PIF_VALUE: 26.8
PIF_VALUE: 23.1
PIF_VALUE: 22.2
PIF_VALUE: 23.6
PIF_VALUE: 24.1
PIF_VALUE: 26
PIF_VALUE: 21.7
PIF_VALUE: 29.7
PIF_VALUE: 28.7
PIF_VALUE: 23.7
PIF_VALUE: 38.5
PIF_VALUE: 28.8
PIF_VALUE: 32.7
PIF_VALUE: 39.6
PIF_VALUE: 14.4
PIF_VALUE: 34.3
PIF_VALUE: 24.1
PIF_VALUE: 27.1
PIF_VALUE: 23.4
PIF_VALUE: 29.1
PIF_VALUE: 35.6
PIF_VALUE: 44.7
PIF_VALUE: 24.5
PIF_VALUE: 26.2
PIF_VALUE: 22.6
PIF_VALUE: 27
PIF_VALUE: 28.8
PIF_VALUE: 27.5
PIF_VALUE: 38
PIF_VALUE: 23.6
PIF_VALUE: 22.2
PIF_VALUE: 13
PIF_VALUE: 19.8
PIF_VALUE: 32
PIF_VALUE: 39.7
PIF_VALUE: 24.4
PIF_VALUE: 28
PIF_VALUE: 30
PIF_VALUE: 27.9
PIF_VALUE: 25.5
PIF_VALUE: 28.3
PIF_VALUE: 21.3
PIF_VALUE: 25.1
PIF_VALUE: 29.4
PIF_VALUE: 23.3
PIF_VALUE: 34.1
PIF_VALUE: 22.1
PIF_VALUE: 28.5
PIF_VALUE: 15
PIF_VALUE: 13.4
PIF_VALUE: 13.2
PIF_VALUE: 18.4
PIF_VALUE: 23.3
PIF_VALUE: 25.2
PIF_VALUE: 42.2
PIF_VALUE: 14.7
PIF_VALUE: 25.9
PIF_VALUE: 29
PIF_VALUE: 26
PIF_VALUE: 36.3
PIF_VALUE: 23.4
PIF_VALUE: 28.8
PIF_VALUE: 18
PIF_VALUE: 29.8
PIF_VALUE: 31.6
PIF_VALUE: 13.5
PIF_VALUE: 29.1
PIF_VALUE: 29.9
PIF_VALUE: 23.2
PIF_VALUE: 13.8
PIF_VALUE: 17.7
PIF_VALUE: 27.5
PIF_VALUE: 28.9
PIF_VALUE: 24.8
PIF_VALUE: 17.9
PIF_VALUE: 27.7
PIF_VALUE: 25
PIF_VALUE: 44.4
PIF_VALUE: 12.9
PIF_VALUE: 29.9
PIF_VALUE: 42.6
PIF_VALUE: 43.6
PIF_VALUE: 21.6
PIF_VALUE: 21.8
PIF_VALUE: 20.7
PIF_VALUE: 28.4
PIF_VALUE: 45.2
PIF_VALUE: 40.6
PIF_VALUE: 28.6
PIF_VALUE: 26.1
PIF_VALUE: 22.6
PIF_VALUE: 26.6
PIF_VALUE: 15.4
PIF_VALUE: 28.7
PIF_VALUE: 38.1
PIF_VALUE: 37.4
PIF_VALUE: 36.3
PIF_VALUE: 32.4
PIF_VALUE: 18.3
PIF_VALUE: 25.6
PIF_VALUE: 28.4
PIF_VALUE: 44.9
PIF_VALUE: 29
PIF_VALUE: 34.7
PIF_VALUE: 29.3
PIF_VALUE: 16.3
PIF_VALUE: 26.3
PIF_VALUE: 25.5
PIF_VALUE: 26.2
PIF_VALUE: 25.1
PIF_VALUE: 54.3
PIF_VALUE: 45.6
PIF_VALUE: 29.5
PIF_VALUE: 23.4
PIF_VALUE: 23.5
PIF_VALUE: 30
PIF_VALUE: 28.8

## 2021-01-01 ASSESSMENT — PAIN SCALES - GENERAL
PAINLEVEL_OUTOF10: 3
PAINLEVEL_OUTOF10: 0
PAINLEVEL_OUTOF10: 3
PAINLEVEL_OUTOF10: 0
PAINLEVEL_OUTOF10: 5
PAINLEVEL_OUTOF10: 0
PAINLEVEL_OUTOF10: 3
PAINLEVEL_OUTOF10: 0
PAINLEVEL_OUTOF10: 3
PAINLEVEL_OUTOF10: 0
PAINLEVEL_OUTOF10: 5
PAINLEVEL_OUTOF10: 0
PAINLEVEL_OUTOF10: 0
PAINLEVEL_OUTOF10: 4
PAINLEVEL_OUTOF10: 0
PAINLEVEL_OUTOF10: 3
PAINLEVEL_OUTOF10: 0
PAINLEVEL_OUTOF10: 4
PAINLEVEL_OUTOF10: 0
PAINLEVEL_OUTOF10: 0
PAINLEVEL_OUTOF10: 3
PAINLEVEL_OUTOF10: 0
PAINLEVEL_OUTOF10: 3
PAINLEVEL_OUTOF10: 7
PAINLEVEL_OUTOF10: 0
PAINLEVEL_OUTOF10: 3
PAINLEVEL_OUTOF10: 0
PAINLEVEL_OUTOF10: 5
PAINLEVEL_OUTOF10: 3
PAINLEVEL_OUTOF10: 0
PAINLEVEL_OUTOF10: 7
PAINLEVEL_OUTOF10: 0

## 2021-01-01 ASSESSMENT — ENCOUNTER SYMPTOMS
COLOR CHANGE: 0
VOMITING: 0
DIARRHEA: 0
COUGH: 1
DIARRHEA: 0
VOMITING: 0
SHORTNESS OF BREATH: 1
BACK PAIN: 0
CONSTIPATION: 0
NAUSEA: 0
SHORTNESS OF BREATH: 1
SHORTNESS OF BREATH: 1
COLOR CHANGE: 0
NAUSEA: 1
SHORTNESS OF BREATH: 0
VOICE CHANGE: 0
NAUSEA: 0
VOMITING: 0
CONSTIPATION: 0
NAUSEA: 0
DIARRHEA: 0
NAUSEA: 0
CONSTIPATION: 0
BACK PAIN: 0
COLOR CHANGE: 0
COUGH: 1
CONSTIPATION: 0
VOMITING: 0
COUGH: 1
VOICE CHANGE: 0
BACK PAIN: 0
VOMITING: 0
COLOR CHANGE: 0
BACK PAIN: 0
SHORTNESS OF BREATH: 1
COUGH: 1
VOMITING: 0
VOICE CHANGE: 0
COUGH: 1
SHORTNESS OF BREATH: 0
ABDOMINAL PAIN: 0
VOICE CHANGE: 0
COLOR CHANGE: 0
DIARRHEA: 0
VOICE CHANGE: 0
VOMITING: 1
SHORTNESS OF BREATH: 1
DIARRHEA: 0
ABDOMINAL PAIN: 0
BACK PAIN: 0
CONSTIPATION: 0
CONSTIPATION: 0
BACK PAIN: 0
NAUSEA: 0
NAUSEA: 0
DIARRHEA: 0
VOICE CHANGE: 0
COUGH: 1
COLOR CHANGE: 0

## 2021-01-01 ASSESSMENT — PAIN DESCRIPTION - FREQUENCY
FREQUENCY: CONTINUOUS
FREQUENCY: INTERMITTENT
FREQUENCY: CONTINUOUS

## 2021-01-01 ASSESSMENT — PAIN DESCRIPTION - LOCATION
LOCATION: GENERALIZED
LOCATION: HEAD
LOCATION: GENERALIZED
LOCATION: GENERALIZED;FLANK
LOCATION: HEAD
LOCATION: FACE;HEAD;NECK
LOCATION: GENERALIZED;HEAD

## 2021-01-01 ASSESSMENT — PAIN DESCRIPTION - PAIN TYPE
TYPE: ACUTE PAIN
TYPE: OTHER (COMMENT)
TYPE: ACUTE PAIN

## 2021-01-01 ASSESSMENT — PAIN - FUNCTIONAL ASSESSMENT
PAIN_FUNCTIONAL_ASSESSMENT: ACTIVITIES ARE NOT PREVENTED
PAIN_FUNCTIONAL_ASSESSMENT: ACTIVITIES ARE NOT PREVENTED

## 2021-01-01 ASSESSMENT — PAIN DESCRIPTION - ORIENTATION
ORIENTATION: MID
ORIENTATION: RIGHT
ORIENTATION: RIGHT;LEFT
ORIENTATION: MID

## 2021-01-01 ASSESSMENT — PAIN DESCRIPTION - DESCRIPTORS
DESCRIPTORS: HEADACHE
DESCRIPTORS: DISCOMFORT;DULL;HEADACHE
DESCRIPTORS: CONSTANT;DISCOMFORT;DULL
DESCRIPTORS: DULL;DISCOMFORT
DESCRIPTORS: DULL;DISCOMFORT
DESCRIPTORS: HEADACHE

## 2021-07-26 PROBLEM — U07.1 COVID-19: Status: ACTIVE | Noted: 2021-01-01

## 2021-07-27 PROBLEM — E11.10 DKA, TYPE 2, NOT AT GOAL (HCC): Status: ACTIVE | Noted: 2021-01-01

## 2021-07-27 NOTE — PROGRESS NOTES
Results for Baljit Ni (MRN [id number]) as of 7/27/2021 16:31   Ref.  Range 7/27/2021 16:26   Hemoglobin, Art, Extended Latest Ref Range: 14.0 - 18.0 g/dL 18.7 (H)   pH, Arterial Latest Ref Range: 7.350 - 7.450  7.030 (LL)   pCO2, Arterial Latest Ref Range: 35.0 - 45.0 mmHg 14.0 (LL)   pO2, Arterial Latest Ref Range: 80.0 - 100.0 mmHg 87.0   HCO3, Arterial Latest Ref Range: 22.0 - 26.0 mmol/L 3.7 (L)   Base Excess, Arterial Latest Ref Range: -2.0 - 2.0 mmol/L -25.0 (L)   O2 Sat, Arterial Latest Ref Range: >92 % 94.6   O2 Content, Arterial Latest Ref Range: Not Established mL/dL 24.9   Methemoglobin, Arterial Latest Ref Range: <1.5 % 1.4   Carboxyhgb, Arterial Latest Ref Range: 0.0 - 5.0 % 1.5   Pt on room air, LR, AT+

## 2021-07-27 NOTE — PROGRESS NOTES
Patient arrived for COVID-19 monoclonal antibody infusion. Prior to arrival, pt's spouse called and stated the patient was so weak she was unsure if he could walk into the infusion clinic. Patient arrived at 1400 was assisted into infusion site with wheelchair. Pt very SOA and using accessory muscles for breathing. SATO2 95% on RA. /105. . Resp 24. Pt afebrile. Due to [facility] and difficulty breathing, patient referred to ER for further evaluation. Spoke with pts wife who verbalized understanding. Pt's wife did state she too had tested positive for COVID-19. Patient assisted back to family vehicle by RN. Wife present and stated she would drive pt to ER.   Electronically signed by Thierry Salguero RN on 7/27/2021 at 2:25 PM

## 2021-07-28 NOTE — CONSULTS
**Physician Signature**  This document was electronically signed by: Ivan Peterson MD  2021   11:24 AM    **Consult Information**  Member Facility: [address] [place] [address] MRN: [id number]  Visit/Encounter Number: [id number]  Consult ID: [id number]  Facility Time Zone: CT  Date and Time of Request: 2021 09:44 AM  CT  Requesting Clinician: DR. Sherren Avena  Patient Name: Mikhail Snowden  YOB: 1970  Gender: Male  Patient identity was confirmed at the beginning of the consult with the   patient/family/staff using two personal identifiers: Patient name and       **Reason for Consult**  Reason for Consult: [facility]    **Admission**  Admission Date: 2021  Chief reason for ICU admission: Diabetic Ketoacidosis  Secondary reasons for ICU admission: COVID - 19    **Core Metrics**  General orienting sentence for patient: 50 yo man admitted for DKA. He was   Covid positive on . . Now on insulin infusion.   Chief physiologic deterioration: None - Stable patient  Is the patient on DVT prophylaxis?: Yes  Prophylaxis type: Pharmacological  Is the patient on GI prophylaxis?: Not Indicated  Has this patient reached their nutritional goal?: No and issues are being   addressed  Are there current issues with pain management in this patient?:   No  Are there issues with skin integrity?: No  Are there issues with delirium?: No  Has the patient been mobilized?: No  Is this patient currently intubated?: No  Are there ethical or care philosophy or family issues?: No  Do you recommend an in depth evaluation?: No  Disposition Recommendations: Continue ICU level of care    **Physician Signature**  This document was electronically signed by: Ivan Peterson MD  2021   11:24 AM normal performance

## 2021-07-28 NOTE — ACP (ADVANCE CARE PLANNING)
Advance Care Planning     Advance Care Planning Activator (Inpatient)  Conversation Note      Date of ACP Conversation: 7/28/2021     Conversation Conducted with: Spouse, Angela Andrade. ACP Activator: Naya Castorena RN    Health Care Decision Maker:     Current Designated Health Care Decision Maker:     Primary Decision Maker: Ben Ott - [phone]      Care Preferences    Ventilation: \"If you were in your present state of health and suddenly became very ill and were unable to breathe on your own, what would your preference be about the use of a ventilator (breathing machine) if it were available to you? \"      Would the patient desire the use of ventilator (breathing machine)?: Yes        Resuscitation  \"In the event your heart stopped as a result of an underlying serious health condition, would you want attempts to be made to restart your heart (answer \"yes\" for attempt to resuscitate) or would you prefer a natural death (answer \"no\" for do not attempt to resuscitate)? \" Yes         Conversation Outcomes:  [x] ACP discussion completed  [] Existing advance directive reviewed with patient; no changes to patient's previously recorded wishes  [] New Advance Directive completed  [] Portable Do Not Rescitate prepared for Provider review and signature  [] POLST/POST/MOLST/MOST prepared for Provider review and signature      Follow-up plan:    [] Schedule follow-up conversation to continue planning  [] Referred individual to Provider for additional questions/concerns   [] Advised patient/agent/surrogate to review completed ACP document and update if needed with changes in condition, patient preferences or care setting    [] This note routed to one or more involved healthcare providers         Electronically signed by Naya Castorena RN on 7/28/2021 at 1:51 PM

## 2021-07-28 NOTE — PROGRESS NOTES
4 Eyes Skin Assessment    Robert Guevara is being assessed upon: Admission    I agree that Rosa M Kirby, RN, along with Flo Aiken RN (either 2 RN's or 1 LPN and 1 RN) have performed a thorough Head to Toe Skin Assessment on the patient. ALL assessment sites listed below have been assessed. Areas assessed by both nurses:     [x]   Head, Face, and Ears   [x]   Shoulders, Back, and Chest  [x]   Arms, Elbows, and Hands   [x]   Coccyx, Sacrum, and Ischium  [x]   Legs, Feet, and Heels    Does the Patient have Skin Breakdown?  No    David Prevention initiated: Yes  Wound Care Orders initiated: No    [facility] nurse consulted for Pressure Injury (Stage 3,4, Unstageable, DTI, NWPT, and Complex wounds) and New or Established Ostomies: No        Primary Nurse eSignature: Leander Rosales RN on 7/28/2021 at 5:25 AM      Co-Signer eSignature: Electronically signed by Brittany Jacome RN on 7/29/21 at 12:42 AM CDT

## 2021-07-28 NOTE — PROGRESS NOTES
Hospitalist Progress Note    Patient:  Mick Fowler  YOB: 1970  Date of Service: 7/28/2021  MRN: [id number]   Acct: [de-identified]   Primary Care Physician: Fritz Najera MD  Advance Directive: Full Code  Admit Date: 7/27/2021       Hospital Day: 1  Referring Provider: Patricia Garcia DO    Patient Seen, Chart, Consults, Notes, Labs, Radiology studies reviewed. Subjective:  Mick Fowler is a 49 y.o. male  whom we are following for COVID-19 infection, DKA. He is doing significantly better than on admission. He is maintaining his saturations at 92% on room air. He did not receive vaccination for COVID-19. He is now off of his insulin infusion.     Allergies:  Bee venom, Mushroom extract complex, and Other    Medicines:  Current Facility-Administered Medications   Medication Dose Route Frequency Provider Last Rate Last Admin    sodium zirconium cyclosilicate (LOKELMA) oral suspension 5 g  5 g Oral TID Patricia Garcia DO   5 g at 07/28/21 1434    sodium chloride flush 0.9 % injection 5-40 mL  5-40 mL Intravenous 2 times per day Arnav Corrales MD   10 mL at 07/27/21 2200    sodium chloride flush 0.9 % injection 5-40 mL  5-40 mL Intravenous PRN Arnav Corrales MD        0.9 % sodium chloride infusion  25 mL Intravenous PRN Arnav Corrales MD        enoxaparin (LOVENOX) injection 30 mg  30 mg Subcutaneous BID Arnav Corrales MD   30 mg at 07/28/21 0741    ondansetron (ZOFRAN-ODT) disintegrating tablet 4 mg  4 mg Oral Q8H PRN Arnav Corrales MD        Or    ondansetron [facility]) injection 4 mg  4 mg Intravenous Q6H PRN Arnav Corrales MD        acetaminophen (TYLENOL) tablet 650 mg  650 mg Oral Q6H PRN Arnav Corrales MD   650 mg at 07/28/21 1734    Or    acetaminophen (TYLENOL) suppository 650 mg  650 mg Rectal Q6H PRN Arnav Corrales MD        dextrose 50 % IV solution  12.5 g Intravenous PRN Arnav Corrales MD        magnesium sulfate 1000 mg in dextrose 5% 100 mL IVPB  1,000 mg Intravenous PRN David Young MD        sodium phosphate 10 mmol in dextrose 5 % 250 mL IVPB  10 mmol Intravenous PRN David Young MD        Or    sodium phosphate 15 mmol in dextrose 5 % 250 mL IVPB  15 mmol Intravenous PRN David Young MD        Or    sodium phosphate 20 mmol in dextrose 5 % 500 mL IVPB  20 mmol Intravenous PRN David Young MD 83.3 mL/hr at 07/28/21 1618 20 mmol at 07/28/21 1618    polyethylene glycol (GLYCOLAX) packet 17 g  17 g Oral Daily PRN David Young MD        melatonin disintegrating tablet 5 mg  5 mg Oral Nightly PRN David Young MD        calcium carbonate (TUMS) chewable tablet 500 mg  500 mg Oral TID PRN David Young MD   500 mg at 07/28/21 0006    naloxone [facility]) injection 0.4 mg  0.4 mg Intravenous PRN David Young MD        0.9 % sodium chloride infusion   Intravenous Continuous David Young MD   Stopped at 07/28/21 0034    guaiFENesin-dextromethorphan (ROBITUSSIN DM) 100-10 MG/5ML syrup 5 mL  5 mL Oral Q4H PRN David Young MD        Vitamin D (CHOLECALCIFEROL) tablet 2,000 Units  2,000 Units Oral Daily David Young MD   2,000 Units at 07/28/21 0741    insulin regular (HUMULIN R;NOVOLIN R) 100 Units in sodium chloride 0.9 % 100 mL infusion  0.1 Units/kg/hr Intravenous Continuous David Young MD 6.1 mL/hr at 07/28/21 1727 6.06 Units/hr at 07/28/21 1727    insulin glargine (LANTUS) injection vial 15 Units  15 Units Subcutaneous Nightly David Young MD   15 Units at 07/28/21 0040       Past Medical History:  Past Medical History:   Diagnosis Date    Colitis     Diabetes mellitus ([facility] [address]     Erythrocytosis     Intermittent    Gallbladder disease     Hyperlipidemia     Hypertension     Migraine     Sleep apnea     snores       Past Surgical History:  Past Surgical History:   Procedure Laterality Date    CHOLECYSTECTOMY, LAPAROSCOPIC N/A 3/27/2019    CHOLECYSTECTOMY LAPAROSCOPIC performed by Marivel Juarez MD at [facility]     VASECTOMY         Family History  Family History   Problem Relation Age of Onset    Diabetes Mother     Heart Disease Mother     High Blood Pressure Mother     Diabetes Sister        Social History  Social History     Socioeconomic History    Marital status:      Spouse name: Not on file    Number of children: Not on file    Years of education: Not on file    Highest education level: Not on file   Occupational History    Not on file   Tobacco Use    Smoking status: Former Smoker     Types: Cigarettes    Smokeless tobacco: Never Used   Vaping Use    Vaping Use: Never used   Substance and Sexual Activity    Alcohol use: Yes     Comment: occ.  Drug use: Never    Sexual activity: Not on file   Other Topics Concern    Not on file   Social History Narrative    Not on file     Social Determinants of Health     Financial Resource Strain:     Difficulty of Paying Living Expenses:    Food Insecurity:     Worried About Running Out of Food in the Last Year:     [address] in the Last Year:    Transportation Needs:     Lack of Transportation (Medical):  Lack of Transportation (Non-Medical):    Physical Activity:     Days of Exercise per Week:     Minutes of Exercise per Session:    Stress:     Feeling of Stress :    Social Connections:     Frequency of Communication with Friends and Family:     Frequency of Social Gatherings with Friends and Family:     Attends L[employer] Services:     Active Member of Clubs or Organizations:     Attends Club or Organization Meetings:     Marital Status:    Intimate Partner Violence:     Fear of Current or Ex-Partner:     Emotionally Abused:     Physically Abused:     Sexually Abused:          Review of Systems:    Review of Systems   Constitutional: Negative for activity change and fever. HENT: Negative for congestion and voice change. Eyes: Negative for visual disturbance. Respiratory: Negative for shortness of breath. Cardiovascular: Negative for chest pain and leg swelling. Gastrointestinal: Negative for constipation, diarrhea, nausea and vomiting. Endocrine: Negative for polyuria. Genitourinary: Negative for difficulty urinating and dysuria. Musculoskeletal: Negative for back pain and neck pain. Skin: Negative for color change. Allergic/Immunologic: Negative for immunocompromised state. Neurological: Negative for dizziness and light-headedness. Psychiatric/Behavioral: The patient is not nervous/anxious. Objective:  Blood pressure (!) 108/59, pulse 116, temperature 99.3 °F (37.4 °C), temperature source Temporal, resp. rate 25, height 5' 9\" (1.753 m), weight 192 lb 3.2 oz (87.2 kg), SpO2 93 %. Intake/Output Summary (Last 24 hours) at 7/28/2021 1826  Last data filed at 7/28/2021 1600  Gross per 24 hour   Intake 6049.3 ml   Output 3050 ml   Net 2999.3 ml       Physical Exam  Vitals and nursing note reviewed. HENT:      Head: Normocephalic and atraumatic. Right Ear: External ear normal.      Left Ear: External ear normal.      Nose: Nose normal.      Mouth/Throat:      Mouth: Mucous membranes are moist.   Eyes:      Conjunctiva/sclera: Conjunctivae normal.      Pupils: Pupils are equal, round, and reactive to light. Cardiovascular:      Rate and Rhythm: Normal rate and regular rhythm. Heart sounds: Normal heart sounds. Pulmonary:      Effort: Pulmonary effort is normal.      Breath sounds: Normal breath sounds. Abdominal:      General: Abdomen is flat. Palpations: Abdomen is soft. Musculoskeletal:         General: No swelling. Cervical back: Neck supple. No rigidity. Skin:     General: Skin is warm and dry. Neurological:      Mental Status: He is oriented to person, place, and time. Psychiatric:         Mood and Affect: Mood normal.         Thought Content:  Thought content normal.         Labs:  BMP:   Recent Labs     07/28/21  0755 07/28/21  1130 07/28/21  1515 * 131* 132*   K 6.2* 3.5 3.1*   CL 99 101 102   CO2 13* 17* 16*   PHOS 2.5 1.4* 1.2*   BUN 20 18 16   CREATININE 1.0 1.0 0.9   CALCIUM 7.7* 7.7* 7.6*     CBC:   Recent Labs     07/27/21  1523 07/28/21  0345   WBC 9.7 7.5   HGB 20.4* 18.0   HCT 60.0* 53.7*   MCV 90.2 89.4    124*     LIVER PROFILE:   Recent Labs     07/27/21  1821 07/28/21  0345   AST 32 26   ALT 36 27   BILITOT 0.4 0.3   ALKPHOS 85 63     PT/INR: No results for input(s): PROTIME, INR in the last 72 hours. APTT: No results for input(s): APTT in the last 72 hours. BNP:  No results for input(s): BNP in the last 72 hours. Ionized Calcium:No results for input(s): IONCA in the last 72 hours. Magnesium:  Recent Labs     07/28/21  0755 07/28/21  1130 07/28/21  1515   MG 2.0 2.1 2.0     Phosphorus:  Recent Labs     07/28/21  0755 07/28/21  1130 07/28/21  1515   PHOS 2.5 1.4* 1.2*     HgbA1C: No results for input(s): LABA1C in the last 72 hours. Hepatic:   Recent Labs     07/27/21  1821 07/28/21  0345   ALKPHOS 85 63   ALT 36 27   AST 32 26   PROT 8.4 6.7   BILITOT 0.4 0.3   LABALBU 4.2 3.3*     Lactic Acid:   Recent Labs     07/28/21  0345   LACTA 1.4     Troponin: No results for input(s): CKTOTAL, CKMB, TROPONINT in the last 72 hours. ABGs: No results for input(s): PH, PCO2, PO2, HCO3, O2SAT in the last 72 hours. CRP:    Recent Labs     07/27/21  1824   CRP 2.11*     Sed Rate:  No results for input(s): SEDRATE in the last 72 hours. Cultures:   No results for input(s): CULTURE in the last 72 hours. Recent Labs     07/27/21  1503 07/27/21  1550   BC No Growth to date. Any change in status will be called. --    BLOODCULT2  --  No Growth to date. Any change in status will be called. No results for input(s): CXSURG in the last 72 hours. Radiology reports as per the Radiologist  Radiology: XR CHEST PORTABLE    Result Date: 7/27/2021  XR CHEST PORTABLE 7/27/2021 3:16 PM HISTORY: History: Dyspnea, covid positive COMPARISON: None. CXR: A single frontal view of the chest is performed. Findings: Diminished level of inspiration with basilar atelectasis. No dense consolidation or radiographic evidence of edema. The heart appears normal in size. No pleural effusion or pneumothorax. Acute appearing bony pathology. 1.. Diminished level of inspiration with basilar atelectasis. No lobar consolidation or radiographic evidence for edema. Signed by Dr Shirin CHILEL. Resolved. COVID-19 infection. Supportive care at present. Not displaying any pulmonary symptoms. Please document 38 minutes of critical care time for patient assessment, chart review, discussion with staff, .       Edwardo Cervantes DO

## 2021-07-28 NOTE — PLAN OF CARE
Problem: Falls - Risk of:  Goal: Will remain free from falls  Description: Will remain free from falls  7/28/2021 1641 by Elizabeth Huber RN  Outcome: Ongoing  7/28/2021 0242 by Santa Navarro RN  Outcome: Ongoing  Goal: Absence of physical injury  Description: Absence of physical injury  7/28/2021 1641 by Elizabeth Huber RN  Outcome: Ongoing  7/28/2021 0242 by Santa Naavrro RN  Outcome: Ongoing     Problem: Discharge Planning:  Goal: Discharged to appropriate level of care  Description: Discharged to appropriate level of care  Outcome: Ongoing     Problem: Fluid Volume - Imbalance:  Goal: Will remain free of signs and symptoms of dehydration  Description: Will remain free of signs and symptoms of dehydration  Outcome: Ongoing  Goal: Absence of imbalanced fluid volume signs and symptoms  Description: Absence of imbalanced fluid volume signs and symptoms  Outcome: Ongoing     Problem: Serum Glucose Level - Abnormal:  Goal: Ability to maintain appropriate glucose levels will improve  Description: Ability to maintain appropriate glucose levels will improve  Outcome: Ongoing     Problem: Injury - Acid Base Imbalance:  Goal: Acid-base balance  Description: Acid-base balance  Outcome: Ongoing     Problem: Gas Exchange - Impaired  Goal: Absence of hypoxia  Outcome: Ongoing  Goal: Promote optimal lung function  Outcome: Ongoing     Problem: Breathing Pattern - Ineffective  Goal: Ability to achieve and maintain a regular respiratory rate  Outcome: Ongoing     Problem:  Body Temperature -  Risk of, Imbalanced  Goal: Ability to maintain a body temperature within defined limits  Outcome: Ongoing  Goal: Will regain or maintain usual level of consciousness  Outcome: Ongoing  Goal: Complications related to the disease process, condition or treatment will be avoided or minimized  Outcome: Ongoing     Problem: Isolation Precautions - Risk of Spread of Infection  Goal: Prevent transmission of infection  Outcome: Ongoing     Problem: Nutrition Deficits  Goal: Optimize nutritional status  Outcome: Ongoing     Problem: Risk for Fluid Volume Deficit  Goal: Maintain normal heart rhythm  Outcome: Ongoing  Goal: Maintain absence of muscle cramping  Outcome: Ongoing  Goal: Maintain normal serum potassium, sodium, calcium, phosphorus, and pH  Outcome: Ongoing     Problem: Loneliness or Risk for Loneliness  Goal: Demonstrate positive use of time alone when socialization is not possible  Outcome: Ongoing     Problem: Fatigue  Goal: Verbalize increase energy and improved vitality  Outcome: Ongoing     Problem: Patient Education: Go to Patient Education Activity  Goal: Patient/Family Education  Outcome: Ongoing

## 2021-07-28 NOTE — ED PROVIDER NOTES
[facility] EMERGENCY DEPT  eMERGENCY dEPARTMENT eNCOUnter      Pt Name: Hailee Shrestha  MRN: [id number]  [place] 1970  Date of evaluation: 7/27/2021  Provider: Ayala Chavarria MD    CHIEF COMPLAINT       Chief Complaint   Patient presents with    Positive For Covid-19         HISTORY OF PRESENT ILLNESS   (Location/Symptom, Timing/Onset,Context/Setting, Quality, Duration, Modifying Factors, Severity)  Note limiting factors. Hailee Shrestha is a 49 y.o. male who presents to the emergency department for evaluation of increasing shortness of breath, tachycardia and not feeling well. Patient reports that he started having symptoms about 4 days previously and was seen in his PMDs office yesterday and underwent a test for COVID-19 which was positive. He has not been previously vaccinated against COVID-19. Tells me that today he has had decreased p.o. intake, malaise and associated body aches. His symptoms are described as moderate to severe in nature and without relieving factors. He does not have any prior history of chronic lung disease and is not maintained on home oxygen therapy. Patient was referred as an outpatient for monoclonal antibody infusion but was noted to have an abnormal heart rate and sent here to the ED for further evaluation. HPI    NursingNotes were reviewed. REVIEW OF SYSTEMS    (2-9 systems for level 4, 10 or more for level 5)     Review of Systems   Constitutional: Positive for chills, diaphoresis and fever. HENT: Positive for congestion. Respiratory: Positive for cough and shortness of breath. Cardiovascular: Positive for palpitations. Negative for chest pain. Gastrointestinal: Positive for nausea and vomiting. Negative for abdominal pain. Neurological: Positive for weakness. Negative for dizziness and syncope. All other systems reviewed and are negative.            PAST MEDICALHISTORY     Past Medical History:   Diagnosis Date    Colitis     Diabetes mellitus ([facility] [address]     Erythrocytosis     Intermittent    Gallbladder disease     Hyperlipidemia     Hypertension     Migraine     Sleep apnea     snores         SURGICAL HISTORY       Past Surgical History:   Procedure Laterality Date    CHOLECYSTECTOMY, LAPAROSCOPIC N/A 3/27/2019    CHOLECYSTECTOMY LAPAROSCOPIC performed by Francisco J Cavazos MD at [facility]     VASECTOMY           CURRENT MEDICATIONS     Previous Medications    TRAMADOL-ACETAMINOPHEN (ULTRACET) 37.5-325 MG PER TABLET    Take 1 tablet by mouth every 6 hours as needed. ALLERGIES     Bee venom, Mushroom extract complex, and Other    FAMILY HISTORY       Family History   Problem Relation Age of Onset    Diabetes Mother     Heart Disease Mother     High Blood Pressure Mother     Diabetes Sister           SOCIAL HISTORY       Social History     Socioeconomic History    Marital status:      Spouse name: None    Number of children: None    Years of education: None    Highest education level: None   Occupational History    None   Tobacco Use    Smoking status: Former Smoker     Types: Cigarettes    Smokeless tobacco: Never Used   Vaping Use    Vaping Use: Never used   Substance and Sexual Activity    Alcohol use: Yes     Comment: occ.  Drug use: Never    Sexual activity: None   Other Topics Concern    None   Social History Narrative    None     Social Determinants of Health     Financial Resource Strain:     Difficulty of Paying Living Expenses:    Food Insecurity:     Worried About Running Out of Food in the Last Year:     [address] in the Last Year:    Transportation Needs:     Lack of Transportation (Medical):      Lack of Transportation (Non-Medical):    Physical Activity:     Days of Exercise per Week:     Minutes of Exercise per Session:    Stress:     Feeling of Stress :    Social Connections:     Frequency of Communication with Friends and Family:     Frequency of Social Gatherings with Friends the absence of a cardiologist.    [phone]: Sinus tach @ 127, no ST elevation, QTc: 440 ms    RADIOLOGY:  Non-plain film images such as CT, Ultrasound and MRI are read by the radiologist. Plain radiographic images are visualized and preliminarily interpreted bythe emergency physician with the below findings:        XR CHEST PORTABLE   Final Result   1.. Diminished level of inspiration with basilar atelectasis. No lobar   consolidation or radiographic evidence for edema. Signed by Dr Merrill Wallis:  Labs Reviewed   CBC WITH AUTO DIFFERENTIAL - Abnormal; Notable for the following components:       Result Value    RBC 6.65 (*)     Hemoglobin 20.4 (*)     Hematocrit 60.0 (*)     MPV 12.6 (*)     Neutrophils % 81.3 (*)     Lymphocytes % 6.7 (*)     Monocytes % 10.6 (*)     Neutrophils Absolute 7.9 (*)     Lymphocytes Absolute 0.7 (*)     Monocytes Absolute 1.00 (*)     All other components within normal limits   LACTIC ACID, PLASMA - Abnormal; Notable for the following components:    Lactic Acid 4.3 (*)     All other components within normal limits    Narrative:     CALL  [employer] ,  Chemistry results called to and read back by Kathrine Matute RN in ED, 07/27/2021 16:16,  by [facility]   BLOOD GAS, ARTERIAL - Abnormal; Notable for the following components:    pH, Arterial 7.030 (*)     pCO2, Arterial 14.0 (*)     HCO3, Arterial 3.7 (*)     Base Excess, Arterial -25.0 (*)     Hemoglobin, Art, Extended 18.7 (*)     All other components within normal limits    Narrative:     CALL  [employer] ,  benjamin catalan rn, 07/27/2021 16:30, by [facility]   COMPREHENSIVE METABOLIC PANEL W/ REFLEX TO MG FOR LOW K - Abnormal; Notable for the following components:    Sodium 124 (*)     Chloride 83 (*)     CO2 6 (*)     Anion Gap 35 (*)     Glucose 467 (*)     BUN 31 (*)     CREATININE 1.5 (*)     GFR Non-African American 50 (*)     Calcium 8.5 (*)     All other components within normal limits    Narrative:     CALL  [employer] ,  Chemistry results called to and read back by Jose Raul Thurman RN in ED, 07/27/2021  19:14, by [facility] - Abnormal; Notable for the following components:    Acetone, Bld Large (*)     All other components within normal limits   POCT GLUCOSE - Abnormal; Notable for the following components:    POC Glucose 501 (*)     All other components within normal limits   CULTURE, BLOOD 1   CULTURE, BLOOD 2   COVID-19, RAPID   POTASSIUM, WHOLE BLOOD    Narrative:     CALL  [employer]. ,  benjamin catalan rn, 07/27/2021 16:30, by [place]       All other labs were within normal range or not returned as of this dictation. EMERGENCY DEPARTMENT COURSE and DIFFERENTIAL DIAGNOSIS/MDM:   Vitals:    Vitals:    07/27/21 1431 07/27/21 2007   BP: (!) 158/94 (!) 168/101   Pulse: 126 122   Resp: 28 29   Temp: 98.9 °F (37.2 °C)    SpO2: 94% 95%   Weight: 195 lb (88.5 kg)    Height: 5' 9\" (1.753 m)        MDM    Reassessment    Patient tested positive for COVID-19 infection yesterday as an outpatient. At this time he is not requiring supplemental oxygen with room air oxygen saturation of 92 to 93%. Chest radiograph does not reveal evidence of overt pneumonia. His laboratory studies do reveal evidence of acidosis with a serum pH of 7.0, serum bicarb at 6. His serum glucose is 467 consistent with diabetic ketoacidosis. He does also have some evidence of acute kidney injury with elevated serum creatinine of 1.5/BUN 31. Serum potassium looks okay at 4.7. Patient will require inpatient admission to the intensive care unit for further evaluation and treatment. He is received IV fluids here in the ED, 2 L along with beginning an insulin infusion. CONSULTS:    Case was discussed with Dr. Piotr Oro regarding inpatient admission to the intensive care unit.     PROCEDURES:  Unless otherwise noted below, none     Procedures     CRITICAL CARE TIME   Total Critical Care time was 48 minutes, excluding separately reportable procedures. There was a high probability of clinically significant/life threatening deterioration in the patient's condition which required my urgent intervention. Multiple reexaminations were undertaken throughout ED course of care. Time was spent in discussion with patient's family regarding treatment options and plan of care. Time was spent reviewing case with the admitting hospitalist service to the intensive care unit. Time is inclusive of documentation along with . FINAL IMPRESSION      1. Diabetic ketoacidosis without coma associated with type 2 diabetes mellitus ([facility] [address]    2.  LITTLE (acute kidney injury) ([facility] [address]    3. COVID-19 virus infection          DISPOSITION/PLAN   DISPOSITION Admitted 07/27/2021 08:07:04 PM      (Please note that portions of this note were completed with a voice recognition program.  Efforts were made to edit thedictations but occasionally words are mis-transcribed.)    Reyna Rollins MD (electronically signed)  Attending Emergency Physician          Reyna Rollins MD  07/27/21 7080

## 2021-07-28 NOTE — H&P
Patient Information:  Patient: Johanne Mckeon  MRN: [id number]   [place]: [de-identified]  YOB: 1970  Admit Date: 7/27/2021      Primary Care Physician: Juancarlos Mendez MD  Advance Directive: Full Code        SUBJECTIVE:    Chief Complaint   Patient presents with    Positive For Covid-19     EP Sign Out:  Has stopped all his medcations other than Ultracet  DKA with pH 7.03 and AG 35 and glucose 467  COVID+ , 95% on RA with [address] [facility] Staff agreed policy  Dr Dena Ramirez patient, medicine hospitlaist team will accept as per agreement for COVID+ status patients    HPI:  Mr. Johanne Mckeon is a pleasant 49year old  american gentleman from home. He states that he has lost almost 50 pounds and was able to come off insulin. He denies any recent changes in his medications list. He has been able to keep his blood sugars under 140 at home. He states that he has had a cough, chills, malaise and decreased PO intake. He was found to have COVID-19 infection as an OP but being tachycardiac when he arrived he was redirected to the ED. He has screening labs done nd was found to be in DKA. Review of Systems:   Review of Systems   Constitutional: Positive for appetite change, chills and fatigue. Negative for diaphoresis and fever. HAS malaise   Respiratory: Positive for cough. Negative for shortness of breath. HAS sneesing   Cardiovascular: Negative for chest pain. Gastrointestinal: Negative for abdominal pain, constipation and diarrhea. Neurological: Positive for weakness. Negative for light-headedness.         No change in gustation, no change in olfaction   Psychiatric/Behavioral: Negative for confusion.     (following subjective sections as per chart review)    Past Medical History:   Diagnosis Date    Colitis     Diabetes mellitus ([facility] [address]     Erythrocytosis     Intermittent    Gallbladder disease     Hyperlipidemia     Hypertension     Migraine     Sleep apnea snkalie     Past Surgical History:   Procedure Laterality Date    CHOLECYSTECTOMY, LAPAROSCOPIC N/A 3/27/2019    CHOLECYSTECTOMY LAPAROSCOPIC performed by Miladis Ballard MD at [facility]     VASECTOMY       Social History     Tobacco History     Smoking Status  Former Smoker Smoking Tobacco Type  Cigarettes    Smokeless Tobacco Use  Never Used          Alcohol History     Alcohol Use Status  Yes Comment  occ. Drug Use     Drug Use Status  Never          Sexual Activity     Sexually Active  Not Asked           Family History   Problem Relation Age of Onset    Diabetes Mother     Heart Disease Mother     High Blood Pressure Mother     Diabetes Sister      Allergies   Allergen Reactions    Bee Venom     Mushroom Extract Complex     Other      mushrooms     Home Medications:  Prior to Admission medications    Medication Sig Start Date End Date Taking? Authorizing Provider   traMADol-acetaminophen (ULTRACET) 37.5-325 MG per tablet Take 1 tablet by mouth every 6 hours as needed. 1/21/19  Yes Historical Provider, MD   Had numerous other medication in past but has since stopped them. OBJECTIVE:    Vitals:    07/28/21 0500   BP: (!) 142/84   Pulse: 106   Resp: 26   Temp:    SpO2: 95%   breathing on room ar    BP (!) 142/84   Pulse 106   Temp 98.7 °F (37.1 °C) (Temporal)   Resp 26   Ht 5' 9\" (1.753 m)   Wt 192 lb 3.2 oz (87.2 kg)   SpO2 95%   BMI 28.38 kg/m²       Intake/Output Summary (Last 24 hours) at 7/28/2021 0553  Last data filed at 7/28/2021 0400  Gross per 24 hour   Intake 2129.7 ml   Output 2400 ml   Net -270.3 ml     Physical Exam  Vitals reviewed. Constitutional:       General: He is not in acute distress. Appearance: Normal appearance. He is normal weight. He is not ill-appearing or toxic-appearing. HENT:      Head: Normocephalic and atraumatic. Cardiovascular:      Rate and Rhythm: Normal rate and regular rhythm.    Pulmonary:      Effort: No respiratory distress. Breath sounds: No stridor. No wheezing, rhonchi or rales. Chest:      Chest wall: No tenderness. Abdominal:      General: Bowel sounds are normal.      Palpations: Abdomen is soft. Tenderness: There is no abdominal tenderness. There is no guarding or rebound. Neurological:      General: No focal deficit present. Mental Status: He is alert. Cranial Nerves: No dysarthria. Motor: No tremor or seizure activity. Psychiatric:         Mood and Affect: Mood normal.         Behavior: Behavior normal.       LABORATORY DATA:    CBC:   Recent Labs     07/27/21  1523 07/28/21  0345   WBC 9.7 7.5   HGB 20.4* 18.0   HCT 60.0* 53.7*    124*     BMP:   Recent Labs     07/27/21  1821 07/27/21  2315 07/28/21  0345   * 126* 129*   K 4.7 4.2 5.1*   CL 83* 90* 95*   CO2 6* 8* 12*   BUN 31* 28* 24*   CREATININE 1.5* 1.5* 1.3*   CALCIUM 8.5* 7.7* 8.1*   PHOS  --  2.9 1.3*     Hepatic Profile:   Recent Labs     07/27/21  1821 07/28/21  0345   AST 32 26   ALT 36 27   BILITOT 0.4 0.3   ALKPHOS 85 63     Lipid Panel: pending    A1C: No results for input(s): LABA1C in the last 72 hours. TSH: Invalid input(s): LABTSH    ABG:   Recent Labs     07/27/21  1626   PHART 7.030*   LAO7HCJ 14.0*   PO2ART 87.0   YUD4YWX 3.7*   BEART -25.0*   HGBAE 18.7*   Q1KKTEML 94.6   CARBOXHGBART 1.5     Urinalysis:   Lab Results   Component Value Date    NITRU Negative 08/10/2019    WBCUA 1 08/07/2019    BACTERIA NEGATIVE 08/07/2019    RBCUA 1 08/07/2019    BLOODU Negative 08/10/2019    SPECGRAV 1.015 08/10/2019    GLUCOSEU 250 08/10/2019       IMAGING:  XR CHEST PORTABLE  Result Date: 7/27/2021  1. . Diminished level of inspiration with basilar atelectasis. No lobar consolidation or radiographic evidence for edema.  Signed by Dr Hiro Gaitan:    COVID-19 Infection withOUT hypoxemia:  Admit to the medical COVID unit under hopsitalist team  Labs as per COVID protocols  Decadron not needed as not on O2  \"PPE Instructions\" and \"Droplet +\" Precautions as per COVID protocol  AC with Lovenox as per COVID protocol  Oxygen as per protocol, should it become needed  Vitamin D 2,000 units PO Day    DKA:  Admit to ICU under hopsitlaist team  LABS as per DKA protocol  [facility]  Lantus 15 units QHS, including tonight  D50 as needed for any jhypoglycemia  NS IVF for now, will change to D5 1/2 NS with 20meq KCl per liter when Glu<250 as needed  Electrolyte replacements as per DKA protocols  POCT testing as per protocol    Chronic Medical Problems:  Continue home regimen as indicated  Puffers will NOT be subbed to NEBS, rather the opposite if indicated as per COVID protocols  NOT Speedy Chapin as per ED RN review with him EXCEPT for Ultracet  Ultracet script to be checked with his pharmacy in the AM  Narcan PRN in case he does get Ultracet    Supportive and Prophylactic Txx:  DVT PPx: AC with Lovenox as per COVID protocols  GI (PUD) PPx: not indicated  PT: defer for now  Diet NPO  dextrose 5% and 0.9% NaCl with KCl 20 mEq, sodium chloride flush, sodium chloride, ondansetron **OR** ondansetron, acetaminophen **OR** acetaminophen, dextrose, potassium chloride, magnesium sulfate, sodium phosphate IVPB **OR** sodium phosphate IVPB **OR** sodium phosphate IVPB, polyethylene glycol, melatonin, calcium carbonate, naloxone, guaiFENesin-dextromethorphan      Critical Care time of >45 minutes  Pt seen/examined and admitted to Inpatient status. Inpatient status is used for patients with an expected LOS extending past two midnights due to medical therapy and or critical care needs, otherwise patients are placed to OBServation status. Signed:  Electronically signed by David Nuñez MD on 7/28/21 at 6:08 AM CDT.

## 2021-07-29 NOTE — PROGRESS NOTES
Pt's O2 sat dropping down to 88-89% while pt sleeping flat on his back. O2 @ 2L NC placed on pt while he is napping this afternoon. O2 sat now reading 91%. MD aware. Will continue to assess.      Electronically signed by Rhianna Ca RN on 7/29/2021 at 1:48 PM

## 2021-07-29 NOTE — PROGRESS NOTES
Hospitalist Progress Note    Patient:  Bimal Winters  YOB: 1970  Date of Service: 7/29/2021  MRN: [id number]   Acct: [de-identified]   Primary Care Physician: Purvi Bell MD  Advance Directive: Full Code  Admit Date: 7/27/2021       Hospital Day: 2  Referring Provider: Amadeo Whelan DO    Patient Seen, Chart, Consults, Notes, Labs, Radiology studies reviewed. Subjective:  Bimal Winters is a 49 y.o. male  whom we are following for COVID-19 infection, DKA. He is doing significantly better. Blood glucose is now under the 200 range. GFR has now normalized. Anion gap has also normalized. He has been transitioned to subcu insulin. His appetite is much improved. No symptoms from a pulmonary standpoint.     Allergies:  Bee venom, Mushroom extract complex, and Other    Medicines:  Current Facility-Administered Medications   Medication Dose Route Frequency Provider Last Rate Last Admin    traMADol (ULTRAM) tablet 50 mg  50 mg Oral Q6H PRN Fatmata Barrera MD   50 mg at 07/29/21 1220    And    acetaminophen (TYLENOL) tablet 325 mg  325 mg Oral Q6H PRN Fatmata Barrera MD        insulin glargine (LANTUS) injection vial 20 Units  20 Units Subcutaneous BID Amadeo Whelna,         metFORMIN (GLUCOPHAGE) tablet 500 mg  500 mg Oral BID  Amadeo Whelan, DO   500 mg at 07/29/21 1630    glucose (GLUTOSE) 40 % oral gel 15 g  15 g Oral PRN Amadeo Josepha, DO        dextrose 50 % IV solution  12.5 g Intravenous PRN Amadeo Whelan, DO        glucagon (rDNA) injection 1 mg  1 mg Intramuscular PRN Amadeo Josepha, DO        dextrose 5 % solution  100 mL/hr Intravenous PRN Amadeo Whelan, DO        insulin lispro (HUMALOG) injection vial 0-6 Units  0-6 Units Subcutaneous TID  Fatmata Barrera MD   1 Units at 07/29/21 1630    insulin lispro (HUMALOG) injection vial 0-3 Units  0-3 Units Subcutaneous Nightly Fatmata Barrera MD        sodium chloride flush 0.9 % injection 5-40 mL  5-40 mL Intravenous 2 times per day Fatmata Barrera MD   10 mL at 07/29/21 0816    sodium chloride flush 0.9 % injection 5-40 mL  5-40 mL Intravenous PRN Fatmata Barrera MD        0.9 % sodium chloride infusion  25 mL Intravenous PRN Fatmata Barrera MD        enoxaparin (LOVENOX) injection 30 mg  30 mg Subcutaneous BID Fatmata Barrera MD   30 mg at 07/28/21 2028    ondansetron (ZOFRAN-ODT) disintegrating tablet 4 mg  4 mg Oral Q8H PRN Fatmata Barrera MD        Or    ondansetron [facility]) injection 4 mg  4 mg Intravenous Q6H PRN Fatmata Barrera MD        acetaminophen (TYLENOL) tablet 650 mg  650 mg Oral Q6H PRN Fatmata Barrera MD   650 mg at 07/29/21 1651    Or    acetaminophen (TYLENOL) suppository 650 mg  650 mg Rectal Q6H PRN Fatmata Barrera MD        magnesium sulfate 1000 mg in dextrose 5% 100 mL IVPB  1,000 mg Intravenous PRN Fatmata Barrera MD        sodium phosphate 10 mmol in dextrose 5 % 250 mL IVPB  10 mmol Intravenous PRN Fatmata Barrera MD        Or    sodium phosphate 15 mmol in dextrose 5 % 250 mL IVPB  15 mmol Intravenous PRN Fatmata Barrera MD   Stopped at 07/29/21 1420    Or    sodium phosphate 20 mmol in dextrose 5 % 500 mL IVPB  20 mmol Intravenous PRN Fatmata Barrera MD 83.3 mL/hr at 07/28/21 2350 Restarted at 07/28/21 2350    polyethylene glycol (GLYCOLAX) packet 17 g  17 g Oral Daily PRN Fatmata Barrera MD        melatonin disintegrating tablet 5 mg  5 mg Oral Nightly PRN Fatmata Barrera MD        calcium carbonate (TUMS) chewable tablet 500 mg  500 mg Oral TID PRN Fatmata Barrera MD   500 mg at 07/28/21 0006    naloxone (NARCAN) injection 0.4 mg  0.4 mg Intravenous PRN Fatmata Barrera MD        guaiFENesin-dextromethorphan [facility]) 100-10 MG/5ML syrup 5 mL  5 mL Oral Q4H PRN Fatmata Barrera MD   5 mL at 07/29/21 0015    Vitamin D (CHOLECALCIFEROL) tablet 2,000 Units  2,000 Units Oral Daily Fatmata Barrera MD   2,000 Units at 07/29/21 0816       Past Medical History:  Past Medical History:   Diagnosis Date    Colitis     Diabetes mellitus ([facility] [address]     Erythrocytosis     Intermittent    Gallbladder disease     Hyperlipidemia     Hypertension     Migraine     Sleep apnea     snores       Past Surgical History:  Past Surgical History:   Procedure Laterality Date    CHOLECYSTECTOMY, LAPAROSCOPIC N/A 3/27/2019    CHOLECYSTECTOMY [place]AROSCOPIC [place] [address] [facility] [place]     VASECTOMY         Family History  Family History   Problem Relation Age of Onset    Diabetes Mother     Heart Disease Mother     High Blood Pressure Mother     Diabetes Sister        Social History  Social History     Socioeconomic History    Marital status:      Spouse name: Not on file    Number of children: Not on file    Years of education: Not on file    Highest education level: Not on file   Occupational History    Not on file   Tobacco Use    Smoking status: Former Smoker     Types: Cigarettes    Smokeless tobacco: Never Used   Vaping Use    Vaping Use: Never used   Substance and Sexual Activity    Alcohol use: Yes     Comment: occ.  Drug use: Never    Sexual activity: Not on file   Other Topics Concern    Not on file   Social History Narrative    Not on file     Social Determinants of Health     Financial Resource Strain:     Difficulty of Paying Living Expenses:    Food Insecurity:     Worried About Running Out of Food in the Last Year:     [address] in the Last Year:    Transportation Needs:     Lack of Transportation (Medical):      Lack of Transportation (Non-Medical):    Physical Activity:     Days of Exercise per Week:     Minutes of Exercise per Session:    Stress:     Feeling of Stress :    Social Connections:     Frequency of Communication with Friends and Family:     Frequency of Social Gatherings with Friends and Family:     Attends Orthodoxy Services:     Active Member of Clubs or Organizations:     Attends Club or Organization Meetings:     Marital Status:    Intimate Partner Violence:     Fear of Current or Ex-Partner:     Emotionally Abused:     Physically Abused:     Sexually Abused:          Review of Systems:    Review of Systems   Constitutional: Negative for activity change and fever. HENT: Negative for congestion and voice change. Eyes: Negative for visual disturbance. Respiratory: Negative for shortness of breath. Cardiovascular: Negative for chest pain and leg swelling. Gastrointestinal: Negative for constipation, diarrhea, nausea and vomiting. Endocrine: Negative for polyuria. Genitourinary: Negative for difficulty urinating and dysuria. Musculoskeletal: Negative for back pain and neck pain. Skin: Negative for color change. Allergic/Immunologic: Negative for immunocompromised state. Neurological: Negative for dizziness and light-headedness. Psychiatric/Behavioral: The patient is not nervous/anxious. Objective:  Blood pressure 131/79, pulse 113, temperature 99.9 °F (37.7 °C), temperature source Temporal, resp. rate (!) 35, height 5' 9\" (1.753 m), weight 192 lb (87.1 kg), SpO2 91 %. Intake/Output Summary (Last 24 hours) at 7/29/2021 1843  Last data filed at 7/29/2021 1600  Gross per 24 hour   Intake 3817.26 ml   Output 2750 ml   Net 1067.26 ml       Physical Exam  Vitals and nursing note reviewed. HENT:      Head: Normocephalic and atraumatic. Right Ear: External ear normal.      Left Ear: External ear normal.      Nose: Nose normal.      Mouth/Throat:      Mouth: Mucous membranes are moist.   Eyes:      Conjunctiva/sclera: Conjunctivae normal.      Pupils: Pupils are equal, round, and reactive to light. Cardiovascular:      Rate and Rhythm: Normal rate and regular rhythm. Heart sounds: Normal heart sounds. Pulmonary:      Effort: Pulmonary effort is normal.      Breath sounds: Normal breath sounds. Abdominal:      General: Abdomen is flat. SEDRATE in the last 72 hours. Cultures:   No results for input(s): CULTURE in the last 72 hours. Recent Labs     07/27/21  1503 07/27/21  1550   BC No Growth to date. Any change in status will be called. --    BLOODCULT2  --  No Growth to date. Any change in status will be called. No results for input(s): CXSURG in the last 72 hours. Radiology reports as per the Radiologist  Radiology: XR CHEST PORTABLE    Result Date: 7/27/2021  XR CHEST PORTABLE 7/27/2021 3:16 PM HISTORY: History: Dyspnea, covid positive COMPARISON: None. CXR: A single frontal view of the chest is performed. Findings: Diminished level of inspiration with basilar atelectasis. No dense consolidation or radiographic evidence of edema. The heart appears normal in size. No pleural effusion or pneumothorax. Acute appearing bony pathology. 1.. Diminished level of inspiration with basilar atelectasis. No lobar consolidation or radiographic evidence for edema. Signed by Dr Wilfrid CHILEL. Resolved.     COVID-19 infection. Supportive care at present. Not displaying any pulmonary symptoms.     Please document 36 minutes of critical care time for patient assessment, chart review, discussion with staff, .       Courtney Steele DO

## 2021-07-29 NOTE — CONSULTS
**Physician Signature**  This document was electronically signed by: Hilda Reilly MD  2021   10:28 AM    **Consult Information**  Member Facility: [address] [place] [address] MRN: [id number]  Visit/Encounter Number: [id number]  Consult ID: [id number]  Facility Time Zone: CT  Date and Time of Request: 2021 05:56 AM  CT  Requesting Clinician: DR. Marlene Alberto  Patient Name: Ad Bates  YOB: 1970  Gender: Male  Patient identity was confirmed at the beginning of the consult with the   patient/family/staff using two personal identifiers: Patient name and       **Reason for Consult**  Reason for Consult: [facility]    **Admission**  Admission Date: 2021  Chief reason for ICU admission: Diabetic Ketoacidosis  Secondary reasons for ICU admission: COVID - 19    **Core Metrics**  General orienting sentence for patient: 48 yo man admitted for DKA. He was   Covid positive on . . Now on insulin infusion. Held lantus so sugar   up. Nothing else new today. On room air.   Chief physiologic deterioration: None - Stable patient  Is the patient on DVT prophylaxis?: Yes  Prophylaxis type: Pharmacological  Is the patient on GI prophylaxis?: Not Indicated  Has this patient reached their nutritional goal?: No and issues are being   addressed  Are there current issues with pain management in this patient?:   No  Are there issues with skin integrity?: No  Are there issues with delirium?: No  Has the patient been mobilized?: No  Is this patient currently intubated?: No  Are there ethical or care philosophy or family issues?: No  Do you recommend an in depth evaluation?: No  Disposition Recommendations: Downgrade/transition to ICU    **Physician Signature**  This document was electronically signed by: Hilda Reilly MD  2021   10:28 AM

## 2021-07-30 NOTE — PROGRESS NOTES
[facility] [address] [facility] to verify pt's home prescription for tramadol. Spoke with pharmacy techs at each pharmacy; neither could locate record of pt's tramadol prescription on file- although they did state that their computer records only go back to dates within the last 2 years. Discussed with pt who stated that it 'has been quite a while' since he had that prescription filled. Was able to locate a [facility] [employer] dispense record in care everywhere where pt was prescribed Tramadol-acetaminophen 37.5-325 on two separate occasions by Dr. Roya Gallardo practice. 30 tramadol tablets prescribed to and picked up by pt on 06/20/2018 and most recently, 01/21/2019.      Electronically signed by Debby Giles RN on 7/29/2021 at 7:27 PM

## 2021-07-30 NOTE — PROGRESS NOTES
Pt assisted up x1 to the bathroom. O2 sat dropped to 83% with activity. Salter HF NC increased to 15L while pt in bathroom and assisted pt back to bed. Pt c/o mild of shortness of breath with activity but denies once back in bed. Pt O2 sat slowly recovered and now reading 93%. Will continue to monitor.      Electronically signed by Cydney Kayser, RN on 7/30/2021 at 10:55 AM

## 2021-07-30 NOTE — CONSULTS
**Physician Signature**  This document was electronically signed by: Norma Silver MD  2021   10:48 AM    **Consult Information**  Member Facility: [address] [place] [address] MRN: [id number]  Visit/Encounter Number: [id number]  Consult ID: [id number]  Facility Time Zone: CT  Date and Time of Request: 2021 06:13 AM  CT  Requesting Clinician: DR. Sherren Avena  Patient Name: Mikhail Snowden  YOB: 1970  Gender: Male  Patient identity was confirmed at the beginning of the consult with the   patient/family/staff using two personal identifiers: Patient name and       **Reason for Consult**  Reason for Consult: [facility]    **Admission**  Admission Date: 2021  Chief reason for ICU admission: Diabetic Ketoacidosis  Secondary reasons for ICU admission: COVID - 19    **Core Metrics**  General orienting sentence for patient: 50 yo man admitted for DKA. He was   Covid positive on . . Now on insulin infusion. Held lantus so sugar   up. Nothing else new today. On room air. dropping now on 50 liters.   I have   suggested bipap cycle started on steroids   last night  Chief physiologic deterioration: None - Stable patient  Is the patient on DVT prophylaxis?: Yes  Prophylaxis type: Pharmacological  Is the patient on GI prophylaxis?: Not Indicated  Has this patient reached their nutritional goal?: No and issues are being   addressed  Are there current issues with pain management in this patient?:   No  Are there issues with skin integrity?: No  Are there issues with delirium?: No  Has the patient been mobilized?: No  Is this patient currently intubated?: No  Are there ethical or care philosophy or family issues?: No  Do you recommend an in depth evaluation?: No  Disposition Recommendations: Continue ICU level of care    **Physician Signature**  This document was electronically signed by: Norma Silver MD  2021   10:48 AM

## 2021-07-30 NOTE — PROGRESS NOTES
The Pharmacist should review the patient information to make sure criteria for use is met prior to dispensing. If the documentation does not support the criteria, the pharmacist should call the MD to verify the criteria is met. It is preferred that the provider is ID, intensivist, or pulmonary specialist, however the pharmacist can use clinical judgement as to the appropriateness of the order. [facility] Tocilizumab Criteria for Use    Criteria **All criteria need to be met to receive Tocilizumab for COVID-19 patients**   Age  >22 years old    Clinical Status  Within 7 days of symptom onset OR within 2 days of hospital admission   If requiring initiation of vital organ (respiratory/cardiovascular) support, must start tocilizumab within 24 hours of initiation*   Concomitant Therapy Receiving systemic corticosteroids    Oxygen Saturation  <92% OR requiring oxygen    Laboratory Results  Positive COVID-19 test within 72 hours of admission  CRP >7.5 mg/dL   (>75mg/L)   Contraindications Invasive active mycobacterial or fungal infection  Platelets < 636,404 or active bleeding   Not receiving systemic steroids   Significant immunosuppression   Ordering Provider Type  ID, intensivists, pulmonology (where available)     *Respiratory support includes but is not limited to: Non-invasive/invasive ventilation   *Cardiovascular support includes but is not limited to: Vasopressor support       Weight-Based Tocilizumab Dose (x 1 dose only)  Patient Weight (kg) Tocilizumab (Actemra) Dose   <= 40 kg 8 mg/kg  x 1 dose   >40 kg - <= 65 kg 400 mg x 1 dose   > 65 - <=90 kg 600 mg  x 1 dose   > 90 kg  800mg  x 1 dose        Alternative Therapies if Tocilizumab Unavailable   Dexamethasone (PO/IV) 20mg daily taper   Methylprednisolone 125mg IV daily taper      Notes:  Platelet count is 74, but has no active bleeding so will give. CRP resulted as 24.38. Pt is on 15 Liter O2. Dose for wt 88.3 qd=078ya X 1 dose.     Electronically signed by

## 2021-07-30 NOTE — PROGRESS NOTES
Hospitalist Progress Note    Patient:  Hailee Shrestha  YOB: 1970  Date of Service: 7/30/2021  MRN: [id number]   Acct: [de-identified]   Primary Care Physician: Shant De La Rosa MD  Advance Directive: Full Code  Admit Date: 7/27/2021       Hospital Day: 3  Referring Provider: Donna Knapp DO    Patient Seen, Chart, Consults, Notes, Labs, Radiology studies reviewed. Subjective:  Hailee Shrestha is a 49 y.o. male  whom we are following for DKA, COVID-19 pneumonia, worsening hypoxia. Last evening he began requiring O2 support. Now he desaturates with movement. I repeated a chest x-ray this morning. Unfortunately he is now developing pulmonary infiltrates consistent with COVID-19. His chest x-ray 2 days ago was completely normal.  I will start him on dexamethasone and tocilizumab. This is a very ominous development.     Allergies:  Bee venom, Mushroom extract complex, and Other    Medicines:  Current Facility-Administered Medications   Medication Dose Route Frequency Provider Last Rate Last Admin    potassium chloride (KLOR-CON M) extended release tablet 40 mEq  40 mEq Oral PRN Shane Still MD   40 mEq at 07/30/21 8432    Or    potassium bicarb-citric acid (EFFER-K) effervescent tablet 40 mEq  40 mEq Oral PRN Shane Still MD        Or    potassium chloride 10 mEq/100 mL IVPB (Peripheral Line)  10 mEq Intravenous PRSIVA Still MD        dexamethasone (PF) (DECADRON) injection 6 mg  6 mg Intravenous Q12H Donna [place], DO   6 mg at 07/30/21 1235    montelukast (SINGULAIR) tablet 10 mg  10 mg Oral Nightly Donna [place], DO        melatonin disintegrating tablet 10 mg  10 mg Oral Daily Donna [place], DO   10 mg at 07/30/21 1235    famotidine (PEPCID) injection 20 mg  20 mg Intravenous BID Donna [place], DO   20 mg at 07/30/21 1235    aspirin tablet 325 mg  325 mg Oral Daily Donna [place], DO   325 mg at 07/30/21 1235    insulin glargine (LANTUS) injection vial 20 Units  20 Units Subcutaneous BID Jose Kings, DO   20 Units at 07/30/21 5040    metFORMIN (GLUCOPHAGE) tablet 500 mg  500 mg Oral BID  Jose Kings, DO   500 mg at 07/30/21 0735    glucose (GLUTOSE) 40 % oral gel 15 g  15 g Oral PRN Jose Kings, DO        dextrose 50 % IV solution  12.5 g Intravenous PRN Jose Kings, DO        glucagon (rDNA) injection 1 mg  1 mg Intramuscular PRN Jose Kings, DO        dextrose 5 % solution  100 mL/hr Intravenous PRN Jose Kings, DO        traMADol Cedric Cross) tablet 50 mg  50 mg Oral Q6H PRN Didi Thornton MD   50 mg at 07/29/21 2019    insulin lispro (HUMALOG) injection vial 0-6 Units  0-6 Units Subcutaneous TID  Didi Thornton MD   3 Units at 07/30/21 1152    insulin lispro (HUMALOG) injection vial 0-3 Units  0-3 Units Subcutaneous Nightly Didi Thornton MD   2 Units at 07/29/21 2020    sodium chloride flush 0.9 % injection 5-40 mL  5-40 mL Intravenous 2 times per day Didi Thornton MD   10 mL at 07/30/21 0935    sodium chloride flush 0.9 % injection 5-40 mL  5-40 mL Intravenous PRN Didi Thornton MD        0.9 % sodium chloride infusion  25 mL Intravenous PRN Didi Thornton MD        enoxaparin (LOVENOX) injection 30 mg  30 mg Subcutaneous BID Didi Thornton MD   30 mg at 07/29/21 2019    ondansetron (ZOFRAN-ODT) disintegrating tablet 4 mg  4 mg Oral Q8H PRN Didi Thornton MD        Or    ondansetron [facility]) injection 4 mg  4 mg Intravenous Q6H PRN Didi Thornton MD        acetaminophen (TYLENOL) tablet 650 mg  650 mg Oral Q6H PRN Didi Thornton MD   650 mg at 07/30/21 0424    Or    acetaminophen (TYLENOL) suppository 650 mg  650 mg Rectal Q6H PRN Didi Thornton MD        magnesium sulfate 1000 mg in dextrose 5% 100 mL IVPB  1,000 mg Intravenous PRN Didi Thornton MD        sodium phosphate 10 mmol in dextrose 5 % 250 mL IVPB  10 mmol Intravenous PRN Didi Thornton MD        Or    sodium phosphate 15 mmol in dextrose 5 % 250 mL IVPB  15 mmol Intravenous PRN Shane Still MD   Stopped at 07/29/21 1420    Or    sodium phosphate 20 mmol in dextrose 5 % 500 mL IVPB  20 mmol Intravenous PRN Shane Still MD 83.3 mL/hr at 07/28/21 2350 Restarted at 07/28/21 2350    polyethylene glycol (GLYCOLAX) packet 17 g  17 g Oral Daily PRN Shane Still MD        calcium carbonate (TUMS) chewable tablet 500 mg  500 mg Oral TID PRN Shane Still MD   500 mg at 07/28/21 0006    naloxone (NARCAN) injection 0.4 mg  0.4 mg Intravenous PRN Shane Still MD        guaiFENesin-dextromethorphan [facility] DM) 100-10 MG/5ML syrup 5 mL  5 mL Oral Q4H PRSIVA Still MD   5 mL at 07/29/21 0015    Vitamin D (CHOLECALCIFEROL) tablet 2,000 Units  2,000 Units Oral Daily Shane Still MD   2,000 Units at 07/30/21 0734       Past Medical History:  Past Medical History:   Diagnosis Date    Colitis     Diabetes mellitus ([address]     Erythrocytosis     Intermittent    Gallbladder disease     Hyperlipidemia     Hypertension     Migraine     Sleep apnea     snores       Past Surgical History:  Past Surgical History:   Procedure Laterality Date    CHOLECYSTECTOMY, LAPAROSCOPIC N/A 3/27/2019    CHOLECYSTECTOMY LAPAROSCOPIC performed by Shasta Noel MD at [facility]     VASECTOMY         Family History  Family History   Problem Relation Age of Onset    Diabetes Mother     Heart Disease Mother     High Blood Pressure Mother     Diabetes Sister        Social History  Social History     Socioeconomic History    Marital status:      Spouse name: Not on file    Number of children: Not on file    Years of education: Not on file    Highest education level: Not on file   Occupational History    Not on file   Tobacco Use    Smoking status: Former Smoker     Types: Cigarettes    Smokeless tobacco: Never Used   Vaping Use    Vaping Use: Never used   Substance and Sexual Activity    Alcohol use: Yes     Comment: occ.  Drug use: Never    Sexual activity: Not on file   Other Topics Concern    Not on file   Social History Narrative    Not on file     Social Determinants of Health     Financial Resource Strain:     Difficulty of Paying Living Expenses:    Food Insecurity:     Worried About Running Out of Food in the Last Year:     [address] in the Last Year:    Transportation Needs:     Lack of Transportation (Medical):  Lack of Transportation (Non-Medical):    Physical Activity:     Days of Exercise per Week:     Minutes of Exercise per Session:    Stress:     Feeling of Stress :    Social Connections:     Frequency of Communication with Friends and Family:     Frequency of Social Gatherings with Friends and Family:     Attends [employer] Services:     Active Member of Clubs or Organizations:     Attends Club or Organization Meetings:     Marital Status:    Intimate Partner Violence:     Fear of Current or Ex-Partner:     Emotionally Abused:     Physically Abused:     Sexually Abused:          Review of Systems:    Review of Systems   Constitutional: Negative for activity change and fever. HENT: Negative for congestion and voice change. Eyes: Negative for visual disturbance. Respiratory: Positive for cough and shortness of breath. Cardiovascular: Negative for chest pain and leg swelling. Gastrointestinal: Negative for constipation, diarrhea, nausea and vomiting. Endocrine: Negative for polyuria. Genitourinary: Negative for difficulty urinating and dysuria. Musculoskeletal: Negative for back pain and neck pain. Skin: Negative for color change. Allergic/Immunologic: Negative for immunocompromised state. Neurological: Negative for dizziness and light-headedness. Psychiatric/Behavioral: The patient is not nervous/anxious. Objective:  Blood pressure 121/77, pulse 92, temperature 98.6 °F (37 °C), temperature source Temporal, resp.  rate 28, height 5' 9\" (1.753 m), weight 194 lb 9.6 oz (88.3 kg), SpO2 93 %. Intake/Output Summary (Last 24 hours) at 7/30/2021 1716  Last data filed at 7/30/2021 0800  Gross per 24 hour   Intake 1718.03 ml   Output 2250 ml   Net -531.97 ml       Physical Exam  Vitals and nursing note reviewed. HENT:      Head: Normocephalic and atraumatic. Right Ear: External ear normal.      Left Ear: External ear normal.      Nose: Nose normal.      Mouth/Throat:      Mouth: Mucous membranes are moist.   Eyes:      Conjunctiva/sclera: Conjunctivae normal.      Pupils: Pupils are equal, round, and reactive to light. Cardiovascular:      Rate and Rhythm: Normal rate and regular rhythm. Heart sounds: Normal heart sounds. Pulmonary:      Effort: Pulmonary effort is normal.      Breath sounds: Rhonchi present. Abdominal:      General: Abdomen is flat. Palpations: Abdomen is soft. Musculoskeletal:         General: No swelling. Cervical back: Neck supple. No rigidity. Skin:     General: Skin is warm and dry. Neurological:      Mental Status: He is oriented to person, place, and time. Psychiatric:         Mood and Affect: Mood normal.         Thought Content: Thought content normal.         Labs:  BMP:   Recent Labs     07/29/21  0345 07/29/21  0800 07/30/21  0454   * 129* 132*   K 3.6 3.6 3.1*   CL 98 97* 95*   CO2 17* 16* 22   PHOS 2.4* 2.1* 2.1*   BUN 11 11 7   CREATININE 0.8 0.8 0.8   CALCIUM 7.4* 7.6* 7.7*     CBC:   Recent Labs     07/28/21  0345 07/29/21  0345 07/30/21  0454   WBC 7.5 4.9 4.8   HGB 18.0 14.4 15.1   HCT 53.7* 41.2* 43.9   MCV 89.4 86.0 87.5   * 77* 74*     LIVER PROFILE:   Recent Labs     07/28/21 0345 07/29/21  0345 07/30/21  0454   AST 26 32 29   ALT 27 21 18   BILITOT 0.3 0.5 0.5   ALKPHOS 63 45 45     PT/INR: No results for input(s): PROTIME, INR in the last 72 hours. APTT: No results for input(s): APTT in the last 72 hours.   BNP:  No results for input(s): BNP in the last 72 hours. Ionized Calcium:No results for input(s): IONCA in the last 72 hours. Magnesium:  Recent Labs     07/29/21  0345 07/29/21  0800 07/30/21  0454   MG 2.0 2.1 2.3     Phosphorus:  Recent Labs     07/29/21  0345 07/29/21  0800 07/30/21  0454   PHOS 2.4* 2.1* 2.1*     HgbA1C: No results for input(s): LABA1C in the last 72 hours. Hepatic:   Recent Labs     07/28/21  0345 07/29/21  0345 07/30/21  0454   ALKPHOS 63 45 45   ALT 27 21 18   AST 26 32 29   PROT 6.7 5.2* 5.7*   BILITOT 0.3 0.5 0.5   LABALBU 3.3* 2.6* 2.5*     Lactic Acid:   Recent Labs     07/28/21  0345   LACTA 1.4     Troponin: No results for input(s): CKTOTAL, CKMB, TROPONINT in the last 72 hours. ABGs: No results for input(s): PH, PCO2, PO2, HCO3, O2SAT in the last 72 hours. CRP:    Recent Labs     07/27/21  1824 07/30/21  0454   CRP 2.11* 24.38*     Sed Rate:  No results for input(s): SEDRATE in the last 72 hours. Cultures:   No results for input(s): CULTURE in the last 72 hours. No results for input(s): BC, [place] in the last 72 hours. No results for input(s): CXSURG in the last 72 hours. Radiology reports as per the Radiologist  Radiology: XR CHEST PORTABLE    Result Date: 7/27/2021  XR CHEST PORTABLE 7/27/2021 3:16 PM HISTORY: History: Dyspnea, covid positive COMPARISON: None. CXR: A single frontal view of the chest is performed. Findings: Diminished level of inspiration with basilar atelectasis. No dense consolidation or radiographic evidence of edema. The heart appears normal in size. No pleural effusion or pneumothorax. Acute appearing bony pathology. 1.. Diminished level of inspiration with basilar atelectasis. No lobar consolidation or radiographic evidence for edema. Signed by Dr Caroline CHILEL. Resolved. Intensify subcu insulin. Blood glucose will be more difficult to control now that we are starting dexamethasone. Evolving COVID-19 pneumonia.   Status post tocilizumab today.  Initiate dexamethasone. Hypoxemic respiratory failure secondary to #2. Continue O2 support. Please document 42 minutes of critical care time for patient assessment, chart review, discussion with staff, .       Wilbert Rivera, DO

## 2021-07-31 NOTE — PROGRESS NOTES
Hospitalist Progress Note    Patient:  Jolene Martinez  YOB: 1970  Date of Service: 7/31/2021  MRN: [id number]   Acct: [de-identified]   Primary Care Physician: Deven Davenport MD  Advance Directive: Full Code  Admit Date: 7/27/2021       Hospital Day: 4  Referring Provider: Jagruti Arredondo DO    Patient Seen, Chart, Consults, Notes, Labs, Radiology studies reviewed. Subjective:  Jolene Martinez is a 49 y.o. male  whom we are following for COVID-19 pneumonia, hypoxemic respiratory failure, DKA. His O2 requirements continued to increase. He does not look well today. He will desaturate with activity.     Allergies:  Bee venom, Mushroom extract complex, and Other    Medicines:  Current Facility-Administered Medications   Medication Dose Route Frequency Provider Last Rate Last Admin    potassium chloride (KLOR-CON M) extended release tablet 40 mEq  40 mEq Oral PRN Refugio Rodriguez MD   40 mEq at 07/30/21 4590    Or    potassium bicarb-citric acid (EFFER-K) effervescent tablet 40 mEq  40 mEq Oral PRN Refugio Rodriguez MD        Or    potassium chloride 10 mEq/100 mL IVPB (Peripheral Line)  10 mEq Intravenous PRN Refugio Rodriguez MD        dexamethasone (PF) (DECADRON) injection 6 mg  6 mg Intravenous Q12H Jagruti Doles, DO   6 mg at 07/31/21 1241    montelukast (SINGULAIR) tablet 10 mg  10 mg Oral Nightly Jagruti Doles, DO   10 mg at 07/30/21 2139    melatonin disintegrating tablet 10 mg  10 mg Oral Daily Jagruti Doles, DO   10 mg at 07/31/21 0813    famotidine (PEPCID) injection 20 mg  20 mg Intravenous BID Jagruti Doles, DO   20 mg at 07/31/21 0813    aspirin tablet 325 mg  325 mg Oral Daily Jagruti Doles, DO   325 mg at 07/31/21 0813    insulin glargine (LANTUS) injection vial 20 Units  20 Units Subcutaneous BID Jagruti Doles, DO   20 Units at 07/31/21 2980    metFORMIN (GLUCOPHAGE) tablet 500 mg  500 mg Oral BID WC Jagruti Doles, DO   500 mg at 07/31/21 1647    glucose (GLUTOSE) 40 % oral gel 15 g  15 g Oral PRN Wilbert Rivera, DO        dextrose 50 % IV solution  12.5 g Intravenous PRN Wilbert Rivera, DO        glucagon (rDNA) injection 1 mg  1 mg Intramuscular PRN Wilbert Rivera, DO        dextrose 5 % solution  100 mL/hr Intravenous PRN Wilbert Rivera, DO        traMADol Krista Amen) tablet 50 mg  50 mg Oral Q6H PRN David Nuñez MD   50 mg at 07/29/21 2019    insulin lispro (HUMALOG) injection vial 0-6 Units  0-6 Units Subcutaneous TID WC David Nuñez MD   3 Units at 07/31/21 1657    insulin lispro (HUMALOG) injection vial 0-3 Units  0-3 Units Subcutaneous Nightly David Nuñez MD   1 Units at 07/30/21 2139    sodium chloride flush 0.9 % injection 5-40 mL  5-40 mL Intravenous 2 times per day David Nuñez MD   10 mL at 07/31/21 0842    sodium chloride flush 0.9 % injection 5-40 mL  5-40 mL Intravenous PRN David Nuñez MD        0.9 % sodium chloride infusion  25 mL Intravenous PRN David Nuñez MD        enoxaparin (LOVENOX) injection 30 mg  30 mg Subcutaneous BID David Nuñez MD   30 mg at 07/29/21 2019    ondansetron (ZOFRAN-ODT) disintegrating tablet 4 mg  4 mg Oral Q8H PRN David Nuñez MD        Or    ondansetron [facility]) injection 4 mg  4 mg Intravenous Q6H PRN David Nuñez MD        acetaminophen (TYLENOL) tablet 650 mg  650 mg Oral Q6H PRN David Nuñez MD   650 mg at 07/30/21 0424    Or    acetaminophen (TYLENOL) suppository 650 mg  650 mg Rectal Q6H PRN David Nuñez MD        magnesium sulfate 1000 mg in dextrose 5% 100 mL IVPB  1,000 mg Intravenous PRN David Nuñez MD        sodium phosphate 10 mmol in dextrose 5 % 250 mL IVPB  10 mmol Intravenous PRN David Nuñez MD        Or    sodium phosphate 15 mmol in dextrose 5 % 250 mL IVPB  15 mmol Intravenous PRN David Nuñez MD   Stopped at 07/29/21 1420    Or    sodium phosphate 20 mmol in dextrose 5 % 500 mL IVPB  20 mmol Intravenous PRN Alysa Del Valle Marixa Bashir MD 83.3 mL/hr at 07/28/21 2350 Restarted at 07/28/21 2350    polyethylene glycol (GLYCOLAX) packet 17 g  17 g Oral Daily PRN Refugio Rodriguez MD        calcium carbonate (TUMS) chewable tablet 500 mg  500 mg Oral TID PRN Refugio Rodriguez MD   500 mg at 07/28/21 0006    naloxone (NARCAN) injection 0.4 mg  0.4 mg Intravenous PRN Refugio Rodriguez MD        guaiFENesin-dextromethorphan [facility] DM) 100-10 MG/5ML syrup 5 mL  5 mL Oral Q4H PRN Refugio Rodriguez MD   5 mL at 07/29/21 0015    Vitamin D (CHOLECALCIFEROL) tablet 2,000 Units  2,000 Units Oral Daily Refugio Rodriguez MD   2,000 Units at 07/31/21 0813       Past Medical History:  Past Medical History:   Diagnosis Date    Colitis     Diabetes mellitus ([facility] [address]     Erythrocytosis     Intermittent    Gallbladder disease     Hyperlipidemia     Hypertension     Migraine     Sleep apnea     snores       Past Surgical History:  Past Surgical History:   Procedure Laterality Date    CHOLECYSTECTOMY, LAPAROSCOPIC N/A 3/27/2019    CHOLECYSTECTOMY LAPAROSCOPIC performed by Feli Akers MD at [facility]     VASECTOMY         Family History  Family History   Problem Relation Age of Onset    Diabetes Mother     Heart Disease Mother     High Blood Pressure Mother     Diabetes Sister        Social History  Social History     Socioeconomic History    Marital status:      Spouse name: Not on file    Number of children: Not on file    Years of education: Not on file    Highest education level: Not on file   Occupational History    Not on file   Tobacco Use    Smoking status: Former Smoker     Types: Cigarettes    Smokeless tobacco: Never Used   Vaping Use    Vaping Use: Never used   Substance and Sexual Activity    Alcohol use: Yes     Comment: occ.     Drug use: Never    Sexual activity: Not on file   Other Topics Concern    Not on file   Social History Narrative    Not on file     Social Determinants of Health Financial Resource Strain:     Difficulty of Paying Living Expenses:    Food Insecurity:     Worried About Running Out of Food in the Last Year:     [address] in the Last Year:    Transportation Needs:     Lack of Transportation (Medical):  Lack of Transportation (Non-Medical):    Physical Activity:     Days of Exercise per Week:     Minutes of Exercise per Session:    Stress:     Feeling of Stress :    Social Connections:     Frequency of Communication with Friends and Family:     Frequency of Social Gatherings with Friends and Family:     Attends Baptism Services:     Active Member of Clubs or Organizations:     Attends Club or Organization Meetings:     Marital Status:    Intimate Partner Violence:     Fear of Current or Ex-Partner:     Emotionally Abused:     Physically Abused:     Sexually Abused:          Review of Systems:    Review of Systems   Constitutional: Negative for activity change and fever. HENT: Negative for congestion and voice change. Eyes: Negative for visual disturbance. Respiratory: Positive for cough and shortness of breath. Cardiovascular: Negative for chest pain and leg swelling. Gastrointestinal: Negative for constipation, diarrhea, nausea and vomiting. Endocrine: Negative for polyuria. Genitourinary: Negative for difficulty urinating and dysuria. Musculoskeletal: Positive for gait problem. Negative for back pain and neck pain. Skin: Negative for color change. Allergic/Immunologic: Negative for immunocompromised state. Neurological: Positive for weakness. Negative for dizziness and light-headedness. Psychiatric/Behavioral: The patient is not nervous/anxious. Objective:  Blood pressure 123/80, pulse 93, temperature 98.4 °F (36.9 °C), temperature source Axillary, resp. rate (!) 32, height 5' 9\" (1.753 m), weight 194 lb 9.6 oz (88.3 kg), SpO2 (!) 81 %.     Intake/Output Summary (Last 24 hours) at 7/31/2021 8827  Last data filed at 7/31/2021 1500  Gross per 24 hour   Intake 420 ml   Output 3025 ml   Net -2605 ml       Physical Exam  Vitals and nursing note reviewed. Constitutional:       Appearance: He is ill-appearing. HENT:      Head: Normocephalic and atraumatic. Right Ear: External ear normal.      Left Ear: External ear normal.      Nose: Nose normal.      Mouth/Throat:      Mouth: Mucous membranes are moist.   Eyes:      Conjunctiva/sclera: Conjunctivae normal.      Pupils: Pupils are equal, round, and reactive to light. Cardiovascular:      Rate and Rhythm: Normal rate and regular rhythm. Heart sounds: Normal heart sounds. Pulmonary:      Effort: Respiratory distress present. Breath sounds: Rhonchi present. Abdominal:      General: Abdomen is flat. Palpations: Abdomen is soft. Musculoskeletal:         General: No swelling. Cervical back: Neck supple. No rigidity. Skin:     General: Skin is warm and dry. Neurological:      Mental Status: He is oriented to person, place, and time. Psychiatric:         Mood and Affect: Mood normal.         Thought Content: Thought content normal.         Labs:  BMP:   Recent Labs     07/29/21  0800 07/30/21  0454 07/31/21  0418   * 132* 137   K 3.6 3.1* 4.0   CL 97* 95* 99   CO2 16* 22 22   PHOS 2.1* 2.1* 2.9   BUN 11 7 11   CREATININE 0.8 0.8 0.6   CALCIUM 7.6* 7.7* 8.4*     CBC:   Recent Labs     07/29/21  0345 07/30/21  0454 07/31/21  0418   WBC 4.9 4.8 4.5*   HGB 14.4 15.1 15.7   HCT 41.2* 43.9 45.3   MCV 86.0 87.5 87.6   PLT 77* 74* 99*     LIVER PROFILE:   Recent Labs     07/29/21  0345 07/30/21  0454 07/31/21  0418   AST 32 29 27   ALT 21 18 17   BILITOT 0.5 0.5 0.4   ALKPHOS 45 45 45     PT/INR: No results for input(s): PROTIME, INR in the last 72 hours. APTT: No results for input(s): APTT in the last 72 hours. BNP:  No results for input(s): BNP in the last 72 hours.   Ionized Calcium:No results for input(s): IONCA in the last 72 hours.  Magnesium:  Recent Labs     07/29/21  0800 07/30/21  0454 07/31/21  0418   MG 2.1 2.3 2.5     Phosphorus:  Recent Labs     07/29/21  0800 07/30/21  0454 07/31/21  0418   PHOS 2.1* 2.1* 2.9     HgbA1C: No results for input(s): LABA1C in the last 72 hours. Hepatic:   Recent Labs     07/29/21  0345 07/30/21  0454 07/31/21  0418   ALKPHOS 45 45 45   ALT 21 18 17   AST 32 29 27   PROT 5.2* 5.7* 5.9*   BILITOT 0.5 0.5 0.4   LABALBU 2.6* 2.5* 2.6*     Lactic Acid: No results for input(s): LACTA in the last 72 hours. Troponin: No results for input(s): CKTOTAL, CKMB, TROPONINT in the last 72 hours. ABGs: No results for input(s): PH, PCO2, PO2, HCO3, O2SAT in the last 72 hours. CRP:    Recent Labs     07/30/21 0454   CRP 24.38*     Sed Rate:  No results for input(s): SEDRATE in the last 72 hours. Cultures:   No results for input(s): CULTURE in the last 72 hours. Recent Labs     07/29/21  1950   BC No Growth to date. Any change in status will be called. BLOODCULT2 No Growth to date. Any change in status will be called. No results for input(s): CXSURG in the last 72 hours. Radiology reports as per the Radiologist  Radiology: XR CHEST PORTABLE    Result Date: 7/27/2021  XR CHEST PORTABLE 7/27/2021 3:16 PM HISTORY: History: Dyspnea, covid positive COMPARISON: None. CXR: A single frontal view of the chest is performed. Findings: Diminished level of inspiration with basilar atelectasis. No dense consolidation or radiographic evidence of edema. The heart appears normal in size. No pleural effusion or pneumothorax. Acute appearing bony pathology. 1.. Diminished level of inspiration with basilar atelectasis. No lobar consolidation or radiographic evidence for edema. Signed by Dr Monica CHILEL. Resolved. Increase Lantus. Hyperglycemia secondary to dexamethasone at this point.     Evolving COVID-19 pneumonia. Status post tocilizumab 7/30.   Increase dexamethasone.     Hypoxemic respiratory failure secondary to #2. Continue O2 support.     Please document 43 minutes of critical care time for patient assessment, chart review, discussion with staff, .       Leatha Malhotra, DO

## 2021-07-31 NOTE — CONSULTS
**Physician Signature**  This document was electronically signed by: Saundra Mitchell DO  2021 12:03   PM    **Consult Information**  Member Facility: [address] [place] [address] Drive MRN: [id number]  Visit/Encounter Number: [id number]  Consult ID: [id number]  Facility Time Zone: CT  Date and Time of Request: 2021 05:58 AM  CT  Requesting Clinician: DR. Yakelin Sepulveda  Patient Name: Juan Manuel Naranjo  YOB: 1970  Gender: Male  Patient identity was confirmed at the beginning of the consult with the   patient/family/staff using two personal identifiers: Patient name and       **Reason for Consult**  Reason for Consult: [facility]    **Admission**  Admission Date: 2021  Chief reason for ICU admission: Diabetic Ketoacidosis  Secondary reasons for ICU admission: COVID - 19    **Core Metrics**  General orienting sentence for patient: 50 yo man admitted for DKA. He was   Covid positive on . . Now on an insulin infusion. off. Held Lantus   so sugar up. Nothing else is new today. On room air. O2 sats dropping now on   15  liters. I have suggested the BiPAP cycle   started on steroids last night HFNC.   Chief physiologic deterioration: Increase in FiO2 required by   30%  Is the patient on DVT prophylaxis?: Yes  Prophylaxis type: Pharmacological  Is the patient on GI prophylaxis?: Not Indicated  Has this patient reached their nutritional goal?: Yes  Are there current issues with pain management in this patient?:   No  Are there issues with skin integrity?: No  Are there issues with delirium?: No  Has the patient been mobilized?: No  Is this patient currently intubated?: No  Are there ethical or care philosophy or family issues?: No  Do you recommend an in depth evaluation?: No  Disposition Recommendations: Continue ICU level of care    **Physician Signature**  This document was electronically signed by: Saundra Mitchell DO  2021 12:03   PM

## 2021-08-01 NOTE — PLAN OF CARE
Problem: Falls - Risk of:  Goal: Will remain free from falls  Description: Will remain free from falls  Outcome: Ongoing  Goal: Absence of physical injury  Description: Absence of physical injury  Outcome: Ongoing     Problem: Discharge Planning:  Goal: Discharged to appropriate level of care  Description: Discharged to appropriate level of care  Outcome: Ongoing     Problem: Fluid Volume - Imbalance:  Goal: Will remain free of signs and symptoms of dehydration  Description: Will remain free of signs and symptoms of dehydration  Outcome: Ongoing  Goal: Absence of imbalanced fluid volume signs and symptoms  Description: Absence of imbalanced fluid volume signs and symptoms  Outcome: Ongoing     Problem: Serum Glucose Level - Abnormal:  Goal: Ability to maintain appropriate glucose levels will improve  Description: Ability to maintain appropriate glucose levels will improve  Outcome: Ongoing     Problem: Injury - Acid Base Imbalance:  Goal: Acid-base balance  Description: Acid-base balance  Outcome: Ongoing     Problem: Gas Exchange - Impaired  Goal: Absence of hypoxia  Outcome: Ongoing  Goal: Promote optimal lung function  Outcome: Ongoing     Problem: Breathing Pattern - Ineffective  Goal: Ability to achieve and maintain a regular respiratory rate  Outcome: Ongoing     Problem:  Body Temperature -  Risk of, Imbalanced  Goal: Ability to maintain a body temperature within defined limits  Outcome: Ongoing  Goal: Will regain or maintain usual level of consciousness  Outcome: Ongoing  Goal: Complications related to the disease process, condition or treatment will be avoided or minimized  Outcome: Ongoing     Problem: Isolation Precautions - Risk of Spread of Infection  Goal: Prevent transmission of infection  Outcome: Ongoing     Problem: Nutrition Deficits  Goal: Optimize nutritional status  Outcome: Ongoing     Problem: Risk for Fluid Volume Deficit  Goal: Maintain normal heart rhythm  Outcome: Ongoing  Goal: Maintain absence of muscle cramping  Outcome: Ongoing  Goal: Maintain normal serum potassium, sodium, calcium, phosphorus, and pH  Outcome: Ongoing     Problem: Loneliness or Risk for Loneliness  Goal: Demonstrate positive use of time alone when socialization is not possible  Outcome: Ongoing     Problem: Fatigue  Goal: Verbalize increase energy and improved vitality  Outcome: Ongoing     Problem: Patient Education: Go to Patient Education Activity  Goal: Patient/Family Education  Outcome: Ongoing     Problem: Pain:  Goal: Pain level will decrease  Description: Pain level will decrease  Outcome: Ongoing  Goal: Control of acute pain  Description: Control of acute pain  Outcome: Ongoing  Goal: Control of chronic pain  Description: Control of chronic pain  Outcome: Ongoing

## 2021-08-01 NOTE — PROGRESS NOTES
Hospitalist Progress Note    Patient:  Jesus Ruffin  YOB: 1970  Date of Service: 8/1/2021  MRN: [id number]   Acct: [de-identified]   Primary Care Physician: Little Lucio MD  Advance Directive: Full Code  Admit Date: 7/27/2021       Hospital Day: 5  Referring Provider: Rosa Maria Deshpande DO    Patient Seen, Chart, Consults, Notes, Labs, Radiology studies reviewed. Subjective:  Jesus Ruffin is a 49 y.o. male  whom we are following for COVID-19 pneumonia, hypoxemic respiratory failure. He is having a better day today. He is feeling better. Breathing is better. He is not as labored. His color is improved. Hopefully we are seeing the plateau and begin to see improvement from a respiratory standpoint.     Allergies:  Bee venom, Mushroom extract complex, and Other    Medicines:  Current Facility-Administered Medications   Medication Dose Route Frequency Provider Last Rate Last Admin    albuterol sulfate  (90 Base) MCG/ACT inhaler 2 puff  2 puff Inhalation 4x daily Rosa Maria Deshpande DO        budesonide-formoterol (SYMBICORT) 160-4.5 MCG/ACT inhaler 2 puff  2 puff Inhalation BID Rosa Maria Deshpande, DO   2 puff at 08/01/21 1455    insulin glargine (LANTUS) injection vial 30 Units  30 Units Subcutaneous BID Rosa Maria Deshpande, DO   30 Units at 08/01/21 9950    LORazepam (ATIVAN) injection 1 mg  1 mg Intravenous Q4H PRN Sami Bailey MD   1 mg at 07/31/21 2020    potassium chloride (KLOR-CON M) extended release tablet 40 mEq  40 mEq Oral PRN Virginia Dietz MD   40 mEq at 07/30/21 4639    Or    potassium bicarb-citric acid (EFFER-K) effervescent tablet 40 mEq  40 mEq Oral PRN Virginia Dietz MD        Or    potassium chloride 10 mEq/100 mL IVPB (Peripheral Line)  10 mEq Intravenous PRN Virginia Dietz MD        dexamethasone (PF) (DECADRON) injection 6 mg  6 mg Intravenous Q12H Rosa Maria Deshpande, DO   6 mg at 08/01/21 1246    montelukast (SINGULAIR) tablet 10 mg  10 mg Oral Nightly Raydell Fought, DO   10 mg at 07/31/21 2036    melatonin disintegrating tablet 10 mg  10 mg Oral Daily Raydell Fought, DO   10 mg at 08/01/21 2776    famotidine (PEPCID) injection 20 mg  20 mg Intravenous BID Raydell Fought, DO   20 mg at 08/01/21 7797    aspirin tablet 325 mg  325 mg Oral Daily Raydell Fought, DO   325 mg at 08/01/21 0841    metFORMIN (GLUCOPHAGE) tablet 500 mg  500 mg Oral BID  Raydell Fought, DO   500 mg at 08/01/21 0843    glucose (GLUTOSE) 40 % oral gel 15 g  15 g Oral PRN Raydell Fought, DO        dextrose 50 % IV solution  12.5 g Intravenous PRN Raydell Fought, DO        glucagon (rDNA) injection 1 mg  1 mg Intramuscular PRN Raydell Fought, DO        dextrose 5 % solution  100 mL/hr Intravenous PRN Raydell Fought, DO        traMADol Endy Frater) tablet 50 mg  50 mg Oral Q6H PRN Lashae Vergara MD   50 mg at 07/29/21 2019    insulin lispro (HUMALOG) injection vial 0-6 Units  0-6 Units Subcutaneous TID  Lashae Vergara MD   4 Units at 08/01/21 1304    insulin lispro (HUMALOG) injection vial 0-3 Units  0-3 Units Subcutaneous Nightly Lashae Vergara MD   2 Units at 07/31/21 2037    sodium chloride flush 0.9 % injection 5-40 mL  5-40 mL Intravenous 2 times per day Lashae Vergara MD   10 mL at 08/01/21 0902    sodium chloride flush 0.9 % injection 5-40 mL  5-40 mL Intravenous PRN Lashae Vergara MD        0.9 % sodium chloride infusion  25 mL Intravenous AJ Vergara MD        enoxaparin (LOVENOX) injection 30 mg  30 mg Subcutaneous BID Lashae Vergara MD   30 mg at 07/29/21 2019    ondansetron (ZOFRAN-ODT) disintegrating tablet 4 mg  4 mg Oral Q8H PRN Lashae Vergara MD        Or    [facility] injection 4 mg  4 mg Intravenous Q6H PRSIVA Vergara MD        acetaminophen (TYLENOL) tablet 650 mg  650 mg Oral Q6H PRN Lashae Vergara MD   650 mg at 07/30/21 0424    Or    acetaminophen (TYLENOL) suppository 650 mg on file    Highest education level: Not on file   Occupational History    Not on file   Tobacco Use    Smoking status: Former Smoker     Types: Cigarettes    Smokeless tobacco: Never Used   Vaping Use    Vaping Use: Never used   Substance and Sexual Activity    Alcohol use: Yes     Comment: occ.  Drug use: Never    Sexual activity: Not on file   Other Topics Concern    Not on file   Social History Narrative    Not on file     Social Determinants of Health     Financial Resource Strain:     Difficulty of Paying Living Expenses:    Food Insecurity:     Worried About Running Out of Food in the Last Year:     [address] in the Last Year:    Transportation Needs:     Lack of Transportation (Medical):  Lack of Transportation (Non-Medical):    Physical Activity:     Days of Exercise per Week:     Minutes of Exercise per Session:    Stress:     Feeling of Stress :    Social Connections:     Frequency of Communication with Friends and Family:     Frequency of Social Gatherings with Friends and Family:     Attends Zoroast[employer] Services:     Active Member of Clubs or Organizations:     Attends Club or Organization Meetings:     Marital Status:    Intimate Partner Violence:     Fear of Current or Ex-Partner:     Emotionally Abused:     Physically Abused:     Sexually Abused:          Review of Systems:    Review of Systems   Constitutional: Negative for activity change and fever. HENT: Negative for congestion and voice change. Eyes: Negative for visual disturbance. Respiratory: Positive for cough and shortness of breath. Cardiovascular: Negative for chest pain and leg swelling. Gastrointestinal: Negative for constipation, diarrhea, nausea and vomiting. Endocrine: Negative for polyuria. Genitourinary: Negative for difficulty urinating and dysuria. Musculoskeletal: Positive for gait problem. Negative for back pain and neck pain. Skin: Negative for color change. Allergic/Immunologic: Negative for immunocompromised state. Neurological: Positive for weakness. Negative for dizziness and light-headedness. Psychiatric/Behavioral: The patient is not nervous/anxious. Objective:  Blood pressure 108/65, pulse 83, temperature 97.2 °F (36.2 °C), temperature source Axillary, resp. rate 16, height 5' 9\" (1.753 m), weight 194 lb 9.6 oz (88.3 kg), SpO2 (!) 88 %. Intake/Output Summary (Last 24 hours) at 8/1/2021 1520  Last data filed at 8/1/2021 1200  Gross per 24 hour   Intake 600 ml   Output 2475 ml   Net -1875 ml       Physical Exam  Vitals and nursing note reviewed. Constitutional:       Appearance: He is ill-appearing. HENT:      Head: Normocephalic and atraumatic. Right Ear: External ear normal.      Left Ear: External ear normal.      Nose: Nose normal.      Mouth/Throat:      Mouth: Mucous membranes are moist.   Eyes:      Conjunctiva/sclera: Conjunctivae normal.      Pupils: Pupils are equal, round, and reactive to light. Cardiovascular:      Rate and Rhythm: Normal rate and regular rhythm. Heart sounds: Normal heart sounds. Pulmonary:      Effort: Pulmonary effort is normal.      Breath sounds: Rhonchi present. Abdominal:      General: Abdomen is flat. Palpations: Abdomen is soft. Musculoskeletal:         General: No swelling. Cervical back: Neck supple. No rigidity. Skin:     General: Skin is warm and dry. Neurological:      Mental Status: He is oriented to person, place, and time. Psychiatric:         Mood and Affect: Mood normal.         Thought Content:  Thought content normal.         Labs:  BMP:   Recent Labs     07/30/21 0454 07/31/21 0418 08/01/21 0425   * 137 139   K 3.1* 4.0 4.0   CL 95* 99 99   CO2 22 22 24   PHOS 2.1* 2.9  --    BUN 7 11 19   CREATININE 0.8 0.6 0.6   CALCIUM 7.7* 8.4* 8.6     CBC:   Recent Labs     07/30/21 0454 07/31/21 0418 08/01/21 0425   WBC 4.8 4.5* 7.4   HGB 15.1 15.7 15.6 HCT 43.9 45.3 46.2   MCV 87.5 87.6 89.4   PLT 74* 99* 195     LIVER PROFILE:   Recent Labs     07/30/21 0454 07/31/21 0418 08/01/21 0425   AST 29 27 19   ALT 18 17 15   BILITOT 0.5 0.4 0.4   ALKPHOS 45 45 45     PT/INR: No results for input(s): PROTIME, INR in the last 72 hours. APTT: No results for input(s): APTT in the last 72 hours. BNP:  No results for input(s): BNP in the last 72 hours. Ionized Calcium:No results for input(s): IONCA in the last 72 hours. Magnesium:  Recent Labs     07/30/21 0454 07/31/21 0418   MG 2.3 2.5     Phosphorus:  Recent Labs     07/30/21 0454 07/31/21 0418   PHOS 2.1* 2.9     HgbA1C: No results for input(s): LABA1C in the last 72 hours. Hepatic:   Recent Labs     07/30/21 0454 07/31/21 0418 08/01/21 0425   ALKPHOS 45 45 45   ALT 18 17 15   AST 29 27 19   PROT 5.7* 5.9* 5.8*   BILITOT 0.5 0.4 0.4   LABALBU 2.5* 2.6* 2.6*     Lactic Acid: No results for input(s): LACTA in the last 72 hours. Troponin: No results for input(s): CKTOTAL, CKMB, TROPONINT in the last 72 hours. ABGs: No results for input(s): PH, PCO2, PO2, HCO3, O2SAT in the last 72 hours. CRP:    Recent Labs     07/30/21 0454   CRP 24.38*     Sed Rate:  No results for input(s): SEDRATE in the last 72 hours. Cultures:   No results for input(s): CULTURE in the last 72 hours. Recent Labs     07/29/21 1950   BC No Growth to date. Any change in status will be called. BLOODCULT2 No Growth to date. Any change in status will be called. No results for input(s): CXSURG in the last 72 hours. Radiology reports as per the Radiologist  Radiology: XR CHEST PORTABLE    Result Date: 7/27/2021  XR CHEST PORTABLE 7/27/2021 3:16 PM HISTORY: History: Dyspnea, covid positive COMPARISON: None. CXR: A single frontal view of the chest is performed. Findings: Diminished level of inspiration with basilar atelectasis. No dense consolidation or radiographic evidence of edema. The heart appears normal in size.  No

## 2021-08-01 NOTE — CONSULTS
**Physician Signature**  This document was electronically signed by: Kal Davies MD  2021   10:48 AM    **Consult Information**  Member Facility: [address] [place] [address] MRN: [id number]  Visit/Encounter Number: [id number]  Consult ID: [id number]  Facility Time Zone: CT  Date and Time of Request: 2021 06:31 AM  CT  Requesting Clinician: DR. Dajuan Sampson  Patient Name: Roberto Snow  YOB: 1970  Gender: Male  Patient identity was confirmed at the beginning of the consult with the   patient/family/staff using two personal identifiers: Patient name and       **Reason for Consult**  Reason for Consult: [facility]    **Admission**  Admission Date: 2021  Chief reason for ICU admission: Diabetic Ketoacidosis  Secondary reasons for ICU admission: COVID - 19    **Core Metrics**  General orienting sentence for patient: 48 yo man admitted for DKA. He was   Covid positive on . . Now on an insulin infusion. off. Held Lantus   so sugar up. Nothing else is new today. On room air. O2 sats dropping now on   15  liters. I have suggested the BiPAP cycle   started on steroids last night HFNC. far. Chief physiologic deterioration: Increase in FiO2 required by   30%  Is the patient on DVT prophylaxis?: Yes  Prophylaxis type: Mechanical, Pharmacological  DVT Prophylaxis Comments: Lovenox held b/o low platelets.  SCDs  Is the patient on GI prophylaxis?: Not Indicated  Gi Prophylaxis Comments: Pepcid anyway  Has this patient reached their nutritional goal?: Yes  Are there current issues with pain management in this patient?:   No  Are there issues with skin integrity?: No  Are there issues with delirium?: No  Has the patient been mobilized?: No  Is this patient currently intubated?: No  Are there ethical or care philosophy or family issues?: No  Do you recommend an in depth evaluation?: No  Disposition Recommendations: Continue ICU level of care    **Physician Signature**  This document was electronically signed by: Yolanda Barr MD  08/01/2021   10:48 AM

## 2021-08-02 NOTE — CARE COORDINATION
Called patient's spouse, Viviana Winchester, to discuss possibility of sending a referral to [facility]. Kittery Both requested that this CM speak with patient. Discussed option with patient. He is agreeable and MD is agreeable to send a referral.    Referral sent to [facility] 8/2/2021. Awaiting response. Requires pre-cert.   [facility] [phone]   [phone]  Electronically signed by Josue Corcoran RN on 8/2/2021 at 4:50 PM

## 2021-08-02 NOTE — CONSULTS
**Physician Signature**  This document was electronically signed by: Jessie Ny MD  2021   10:45 AM    **Consult Information**  Member Facility: [address] [place] [address] MRN: [id number]  Visit/Encounter Number: [id number]  Consult ID: [id number]  Facility Time Zone: CT  Date and Time of Request: 2021 06:21 AM  CT  Requesting Clinician: DR. Domi Gracia  Patient Name: Lorna Chatman  YOB: 1970  Gender: Male  Patient identity was confirmed at the beginning of the consult with the   patient/family/staff using two personal identifiers: Patient name and       **Reason for Consult**  Reason for Consult: [facility]    **Admission**  Admission Date: 2021  Chief reason for ICU admission: Diabetic Ketoacidosis  Secondary reasons for ICU admission: COVID - 19    **Core Metrics**  General orienting sentence for patient: 50 yo man admitted for DKA. He was   Covid positive on . . Now on an insulin infusion. off. Held Lantus   so sugar up. Nothing else is new today. On room air. O2 sats dropping now on   15  liters. I have suggested the BiPAP cycle   started on steroids last night HFNC. far. Chief physiologic deterioration:   Increase in FiO2 required by   30%  Is the patient on DVT prophylaxis?: Yes  Prophylaxis type: Mechanical, Pharmacological  DVT Prophylaxis Comments: Lovenox held b/o low platelets.  SCDs  Is the patient on GI prophylaxis?: Not Indicated  Gi Prophylaxis Comments: Pepcid anyway  Has this patient reached their nutritional goal?: Yes  Are there current issues with pain management in this patient?:   No  Are there issues with skin integrity?: No  Are there issues with delirium?: No  Has the patient been mobilized?: No  Is this patient currently intubated?: No  Are there ethical or care philosophy or family issues?: No  Do you recommend an in depth evaluation?: No  Disposition Recommendations: Continue ICU level of care    **Physician Signature**  This document was electronically signed by: Cristina Molina MD  08/02/2021   10:45 AM

## 2021-08-03 NOTE — PROGRESS NOTES
Patient ambulated to the bathroom after eating breakfast, off the bipap using salter high flow. Patient tolerated but became anxious. Patient placed back on BiPAP, ativan has been given to help patient relax.  Electronically signed by Jesus Rodriguez RN on 8/3/2021 at 10:06 AM

## 2021-08-03 NOTE — PROGRESS NOTES
Palliative care follow up note. Report from pt RN. Pt was sent in by PCP d/t DKA and found to have COVID. DKA improved. Pt was on NRB, heated hi flow, now on Bi Pap 45L. O2 sats remain in 90's with Bi Pap. Pt O2 sats will drop with exertion. Pt became anxious earlier and given Ativan per RN note. Palliative following for support.   Electronically signed by Jorge Boogie RN on 8/3/2021 at 11:49 AM

## 2021-08-03 NOTE — PROGRESS NOTES
Hospitalist Progress Note    Patient:  Hector Jacobo  YOB: 1970  Date of Service: 8/2/2021  MRN: [id number]   Acct: [de-identified]   Primary Care Physician: Dominik Bae MD  Advance Directive: Full Code  Admit Date: 7/27/2021       Hospital Day: 6  Referring Provider: Ann Marie Mackenzie DO    Patient Seen, Chart, Consults, Notes, Labs, Radiology studies reviewed. Subjective:  Hector Jacobo is a 49 y.o. male  whom we are following for COVID-19 pneumonia, hypoxemic respiratory failure. He is clinically about the same. Breathing is stable. Oxygenation is stable.   We are going to make a referral to LTAC at [facility].    Allergies:  Bee venom, Mushroom extract complex, and Other    Medicines:  Current Facility-Administered Medications   Medication Dose Route Frequency Provider Last Rate Last Admin    albuterol sulfate  (90 Base) MCG/ACT inhaler 2 puff  2 puff Inhalation 4x daily Ann Marie Mackenzie DO   2 puff at 08/02/21 1725    budesonide-formoterol (SYMBICORT) 160-4.5 MCG/ACT inhaler 2 puff  2 puff Inhalation BID Ann Marie Mackenzie DO   2 puff at 08/02/21 5602    insulin glargine (LANTUS) injection vial 40 Units  40 Units Subcutaneous BID Ann Marie Mackenzie DO   40 Units at 08/02/21 0759    LORazepam (ATIVAN) injection 1 mg  1 mg Intravenous Q4H PRN Zak Haskins MD   1 mg at 07/31/21 2020    potassium chloride (KLOR-CON M) extended release tablet 40 mEq  40 mEq Oral PRN Jose L Goldman MD   40 mEq at 07/30/21 2431    Or    potassium bicarb-citric acid (EFFER-K) effervescent tablet 40 mEq  40 mEq Oral PRN Jose L Goldman MD        Or    potassium chloride 10 mEq/100 mL IVPB (Peripheral Line)  10 mEq Intravenous PRN Jose L Goldman MD        dexamethasone (PF) (DECADRON) injection 6 mg  6 mg Intravenous Q12H Ann Marie Mackenzie DO   6 mg at 08/02/21 1339    montelukast (SINGULAIR) tablet 10 mg  10 mg Oral Nightly Ann Marie Mackenzie DO   10 mg at 08/01/21 2021    melatonin disintegrating tablet 10 mg  10 mg Oral Daily Beachbrien Taylorant, DO   10 mg at 08/02/21 0758    famotidine (PEPCID) injection 20 mg  20 mg Intravenous BID [facility], DO   20 mg at 08/02/21 4175    aspirin tablet 325 mg  325 mg [facility] Daily [facility]   325 mg at 08/02/21 0758    metFORMIN (GLUCOPHAGE) tablet 500 mg  500 mg Oral BID  Mateusz Figueroa, DO   500 mg at 08/02/21 1805    glucose (GLUTOSE) 40 % oral gel 15 g  15 g Oral PRN Mateusz Figueroa, DO        dextrose 50 % IV solution  12.5 g Intravenous PRN Mateusz Figueroa, DO        glucagon (rDNA) injection 1 mg  1 mg Intramuscular PRN Mateusz Figueroa, DO        dextrose 5 % solution  100 mL/hr Intravenous PRN Mateusz Figueroa, DO        traMADol Giovanni Bur) tablet 50 mg  50 mg Oral Q6H PRN Karri Mendoza MD   50 mg at 08/01/21 2022    insulin lispro (HUMALOG) injection vial 0-6 Units  0-6 Units Subcutaneous TID  Karri Mendoza MD   1 Units at 08/02/21 1725    insulin lispro (HUMALOG) injection vial 0-3 Units  0-3 Units Subcutaneous Nightly Karri Mendoza MD   2 Units at 08/01/21 2031    sodium chloride flush 0.9 % injection 5-40 mL  5-40 mL Intravenous 2 times per day Karri Mendoza MD   10 mL at 08/01/21 2021    sodium chloride flush 0.9 % injection 5-40 mL  5-40 mL Intravenous PRN Karri Mendoza MD        0.9 % sodium chloride infusion  25 mL Intravenous PRN Karri Mendoza MD        enoxaparin (LOVENOX) injection 30 mg  30 mg Subcutaneous BID Karri Mendoza MD   30 mg at 08/02/21 0759    ondansetron (ZOFRAN-ODT) disintegrating tablet 4 mg  4 mg Oral Q8H PRN Karri Mendoza MD        Or    ondansetron [facility]) injection 4 mg  4 mg Intravenous Q6H PRN Karri Mendoza MD        acetaminophen (TYLENOL) tablet 650 mg  650 mg Oral Q6H PRN Karri Mendoza MD   650 mg at 07/30/21 0424    Or    acetaminophen (TYLENOL) suppository 650 mg  650 mg Rectal Q6H PRN Karri Mendoza MD        magnesium sulfate 1000 mg in dextrose 5% 100 mL IVPB  1,000 mg Intravenous PRN Dominick Lau MD        sodium phosphate 10 mmol in dextrose 5 % 250 mL IVPB  10 mmol Intravenous PRN Dominick Lau MD        Or    sodium phosphate 15 mmol in dextrose 5 % 250 mL IVPB  15 mmol Intravenous PRN Dominick Lau MD   Stopped at 07/29/21 1420    Or    sodium phosphate 20 mmol in dextrose 5 % 500 mL IVPB  20 mmol Intravenous PRN Dominick Lau MD 83.3 mL/hr at 07/28/21 2350 Restarted at 07/28/21 2350    polyethylene glycol (GLYCOLAX) packet 17 g  17 g Oral Daily PRN Dominick Lau MD        calcium carbonate (TUMS) chewable tablet 500 mg  500 mg Oral TID PRN Dominick Lau MD   500 mg at 07/28/21 0006    naloxone (NARCAN) injection 0.4 mg  0.4 mg Intravenous PRN Dominick Lau MD        guaiFENesin-dextromethorphan (ROBITUSSIN DM) 100-10 MG/5ML syrup 5 mL  5 mL Oral Q4H PRN Dominick Lau MD   5 mL at 08/02/21 1805    Vitamin D (CHOLECALCIFEROL) tablet 2,000 Units  2,000 Units Oral Daily Dominick Lau MD   2,000 Units at 08/02/21 0758       Past Medical History:  Past Medical History:   Diagnosis Date    Colitis     Diabetes mellitus ([address]     Erythrocytosis     Intermittent    Gallbladder disease     Hyperlipidemia     Hypertension     Migraine     Sleep apnea     snores       Past Surgical History:  Past Surgical History:   Procedure Laterality Date    CHOLECYSTECTOMY, LAPAROSCOPIC N/A 3/27/2019    CHOLECYSTECTOMY LAPAROSCOPIC performed by Laurell Closs, MD at [facility]     VASECTOMY         Family History  Family History   Problem Relation Age of Onset    Diabetes Mother     Heart Disease Mother     High Blood Pressure Mother     Diabetes Sister        Social History  Social History     Socioeconomic History    Marital status:      Spouse name: Not on file    Number of children: Not on file    Years of education: Not on file    Highest education level: Not on file   Occupational History    Not on file   Tobacco Use    Smoking status: Former Smoker     Types: Cigarettes    Smokeless tobacco: Never Used   Vaping Use    Vaping Use: Never used   Substance and Sexual Activity    Alcohol use: Yes     Comment: occ.  Drug use: Never    Sexual activity: Not on file   Other Topics Concern    Not on file   Social History Narrative    Not on file     Social Determinants of Health     Financial Resource Strain:     Difficulty of Paying Living Expenses:    Food Insecurity:     Worried About Running Out of Food in the Last Year:     [address] in the Last Year:    Transportation Needs:     Lack of Transportation (Medical):  Lack of Transportation (Non-Medical):    Physical Activity:     Days of Exercise per Week:     Minutes of Exercise per Session:    Stress:     Feeling of Stress :    Social Connections:     Frequency of Communication with Friends and Family:     Frequency of Social Gatherings with Friends and Family:     Attends [employer] Services:     Active Member of Clubs or Organizations:     Attends Club or Organization Meetings:     Marital Status:    Intimate Partner Violence:     Fear of Current or Ex-Partner:     Emotionally Abused:     Physically Abused:     Sexually Abused:          Review of Systems:    Review of Systems   Constitutional: Negative for activity change and fever. HENT: Negative for congestion and voice change. Eyes: Negative for visual disturbance. Respiratory: Positive for cough and shortness of breath. Cardiovascular: Negative for chest pain and leg swelling. Gastrointestinal: Negative for constipation, diarrhea, nausea and vomiting. Endocrine: Negative for polyuria. Genitourinary: Negative for difficulty urinating and dysuria. Musculoskeletal: Negative for back pain and neck pain. Skin: Negative for color change. Allergic/Immunologic: Negative for immunocompromised state.    Neurological: Negative for dizziness and light-headedness. Psychiatric/Behavioral: The patient is not nervous/anxious. Objective:  Blood pressure 124/75, pulse 70, temperature 97.3 °F (36.3 °C), temperature source Skin, resp. rate 21, height 5' 9\" (1.753 m), weight 194 lb 9.6 oz (88.3 kg), SpO2 (!) 85 %. Intake/Output Summary (Last 24 hours) at 8/2/2021 1902  Last data filed at 8/2/2021 1600  Gross per 24 hour   Intake 485 ml   Output 2000 ml   Net -1515 ml       Physical Exam  Vitals and nursing note reviewed. HENT:      Head: Normocephalic and atraumatic. Right Ear: External ear normal.      Left Ear: External ear normal.      Nose: Nose normal.      Mouth/Throat:      Mouth: Mucous membranes are moist.   Eyes:      Conjunctiva/sclera: Conjunctivae normal.      Pupils: Pupils are equal, round, and reactive to light. Cardiovascular:      Rate and Rhythm: Normal rate and regular rhythm. Heart sounds: Normal heart sounds. Pulmonary:      Effort: Pulmonary effort is normal.      Breath sounds: Rhonchi present. Abdominal:      General: Abdomen is flat. Palpations: Abdomen is soft. Musculoskeletal:         General: No swelling. Cervical back: Neck supple. No rigidity. Skin:     General: Skin is warm and dry. Neurological:      Mental Status: He is oriented to person, place, and time. Psychiatric:         Mood and Affect: Mood normal.         Thought Content:  Thought content normal.         Labs:  BMP:   Recent Labs     07/31/21 0418 08/01/21 0425 08/02/21 0430    139 135*   K 4.0 4.0 4.2   CL 99 99 95*   CO2 22 24 32*   PHOS 2.9  --   --    BUN 11 19 18   CREATININE 0.6 0.6 0.7   CALCIUM 8.4* 8.6 8.7     CBC:   Recent Labs     07/31/21 0418 08/01/21 0425 08/02/21 0430   WBC 4.5* 7.4 8.2   HGB 15.7 15.6 15.6   HCT 45.3 46.2 45.2   MCV 87.6 89.4 88.5   PLT 99* 195 247     LIVER PROFILE:   Recent Labs     07/31/21 0418 08/01/21 0425 08/02/21 0430   AST 27 19 19   ALT 17 15 16   BILITOT 0.4 0.4 0.5 ALKPHOS 45 45 51     PT/INR: No results for input(s): PROTIME, INR in the last 72 hours. APTT: No results for input(s): APTT in the last 72 hours. BNP:  No results for input(s): BNP in the last 72 hours. Ionized Calcium:No results for input(s): IONCA in the last 72 hours. Magnesium:  Recent Labs     07/31/21  0418   MG 2.5     Phosphorus:  Recent Labs     07/31/21  0418   PHOS 2.9     HgbA1C: No results for input(s): LABA1C in the last 72 hours. Hepatic:   Recent Labs     07/31/21  0418 08/01/21  0425 08/02/21  0430   ALKPHOS 45 45 51   ALT 17 15 16   AST 27 19 19   PROT 5.9* 5.8* 5.6*   BILITOT 0.4 0.4 0.5   LABALBU 2.6* 2.6* 2.5*     Lactic Acid: No results for input(s): LACTA in the last 72 hours. Troponin: No results for input(s): CKTOTAL, CKMB, TROPONINT in the last 72 hours. ABGs: No results for input(s): PH, PCO2, PO2, HCO3, O2SAT in the last 72 hours. CRP:  No results for input(s): CRP in the last 72 hours. Sed Rate:  No results for input(s): SEDRATE in the last 72 hours. Cultures:   No results for input(s): CULTURE in the last 72 hours. No results for input(s): BC, [place] in the last 72 hours. No results for input(s): CXSURG in the last 72 hours. Radiology reports as per the Radiologist  Radiology: XR CHEST PORTABLE    Result Date: 7/27/2021  XR CHEST PORTABLE 7/27/2021 3:16 PM HISTORY: History: Dyspnea, covid positive COMPARISON: None. CXR: A single frontal view of the chest is performed. Findings: Diminished level of inspiration with basilar atelectasis. No dense consolidation or radiographic evidence of edema. The heart appears normal in size. No pleural effusion or pneumothorax. Acute appearing bony pathology. 1.. Diminished level of inspiration with basilar atelectasis. No lobar consolidation or radiographic evidence for edema. Signed by Dr Camelia Rile     DKA. Resolved. Increase Lantus.   Hyperglycemia secondary to dexamethasone at this point.     Evolving COVID-19 pneumonia. Status post tocilizumab 7/30. Continue dexamethasone.     Hypoxemic respiratory failure secondary to #2. Continue O2 support.     Please document 40 minutes of critical care time for patient assessment, chart review, discussion with staff, .       Adan Alexander,

## 2021-08-03 NOTE — PROGRESS NOTES
Pharmacy Intravenous to Oral Protocol    Medication changed per [facility] IV to PO protocol: Famotidine    Patient meets the following inclusion criteria and none of the exclusion criteria:    Inclusion criteria:  - IV therapy > 24 hours (antibiotics only)  - Tolerating diet more advanced than clear liquids  - Tolerating PO medications  - No vasopressor blood pressure support (ie no signs of shock)  - Patient hasn't had a seizure for 72 hrs (antiepileptic medications only)    Exclusion criteria:  - Infections requiring IV therapy (ie meningitis, endocarditis, osteomyelitis, pancreatitis)   - Nausea and/or vomiting or severe diarrhea within past 24 hours   - Has gastrectomy, ileus, gastric outlet or bowel obstruction, or malabsorption syndromes   - Has significant painful oral ulceration   - TPN with an NPO order   - Active GI bleed   - Unable to swallow   - NPO   - Febrile in the last 24 hours (antibiotics only)   - Clinical deteriorating or unstable (antibiotics only)   - Pediatric patients and patients who are not euthyroid (not on oral levothyroxine/not stabilized on oral levothyroxine)- Levothyroxine only     Electronically signed by Adriano Carter RPH on 8/3/2021 at 1:32 PM

## 2021-08-03 NOTE — CARE COORDINATION
[facility] pre-cert initiated 2/3/2799.   [facility] [phone]   [phone]  Electronically signed by Sapphire Cazares RN on 8/3/2021 at 12:36 PM

## 2021-08-03 NOTE — SIGNIFICANT EVENT
Repeated phone calls about patient and oxygenation levels that are fluctuating. Which upon reviewing the chart and going over the trend since admission has been fluctuating. It has always been at a lower levels of 80 percent to 85percent. Nothing has changed or is different tonight. Nasal congestion, use saline flushes, (or ocean spray or whatever is readily available and useful in ccu with our covid patients ). Repeat phone calls, about we will need to intubate if this is not addressed. It is addressed with the above. Respiratory therapy, there and assessing the patient has placed patient on bipap and his oxygen saturation is now 96 percent. He improves for a short while and then declines. This is not alarming for him, given the trend of what we are seeing since admission. Then what saturation are you comfortable with :   I am comfortable with what parameters are established by the intensivist in the CCU since the inception of covid outbreak, which is not new to any one of us. If not then what has pulmonary and respiratory helped to establish as guidelines for acceptable oxgyen saturations. Upon discussing this with RT it becomes clear, that 85 percent is acceptable. The patient is at that level or better. I am not understanding the need for anxiety provoking repeated phone calls. This parameter has been long established with the ccu. For covid patients. In the future, if my answers are not adequate enough or satisfactory enough for staff, they of course, always have virtual ICU to call, and should turn to them for advise regarding these critically ill patients. That is why we have the ability to access that service. This was communicated to charge nurse.

## 2021-08-03 NOTE — PROGRESS NOTES
Hospitalist Progress Note    Patient:  Jacqueline Ulrich  YOB: 1970  Date of Service: 8/3/2021  MRN: [id number]   Acct: [de-identified]   Primary Care Physician: Koki Corrigan MD  Advance Directive: Full Code  Admit Date: 7/27/2021       Hospital Day: 7  Referring Provider: Adan Alexander DO    Patient Seen, Chart, Consults, Notes, Labs, Radiology studies reviewed. Subjective:  Jacqueline Ulrich is a 49 y.o. male  whom we are following for COVID-19 pneumonia, hypoxemic respiratory failure. His O2 requirements increased today. He seems to have taken a step backward. He is able to maintain his oxygenation at present. However he may need intubation.     Allergies:  Bee venom, Mushroom extract complex, and Other    Medicines:  Current Facility-Administered Medications   Medication Dose Route Frequency Provider Last Rate Last Admin    famotidine (PEPCID) tablet 20 mg  20 mg Oral BID Adan Mooreus, DO        sodium chloride (OCEAN, BABY AYR) 0.65 % nasal spray 2 spray  2 spray Each Nostril PRN Thad Valencia MD   2 spray at 08/03/21 0601    albuterol sulfate  (90 Base) MCG/ACT inhaler 2 puff  2 puff Inhalation 4x daily Adan Primus, DO   2 puff at 08/03/21 1624    budesonide-formoterol (SYMBICORT) 160-4.5 MCG/ACT inhaler 2 puff  2 puff Inhalation BID Berenicee Primus, DO   2 puff at 08/03/21 1001    insulin glargine (LANTUS) injection vial 40 Units  40 Units Subcutaneous BID Adan Alexander, DO   40 Units at 08/03/21 0758    LORazepam (ATIVAN) injection 1 mg  1 mg Intravenous Q4H PRN Thad Valencia MD   1 mg at 08/03/21 0910    potassium chloride (KLOR-CON M) extended release tablet 40 mEq  40 mEq Oral PRN Jaylen Chávez MD   40 mEq at 07/30/21 0735    Or    potassium bicarb-citric acid (EFFER-K) effervescent tablet 40 mEq  40 mEq Oral PRN Jaylen Chávez MD        Or    potassium chloride 10 mEq/100 mL IVPB (Peripheral Line)  10 mEq Intravenous PRN David Young MD        dexamethasone (PF) (DECADRON) injection 6 mg  6 mg Intravenous Q12H Hima Cohens, DO   6 mg at 08/03/21 1339    montelukast (SINGULAIR) tablet 10 mg  10 mg Oral Nightly Hima Cohens, DO   10 mg at 08/02/21 2017    melatonin disintegrating tablet 10 mg  10 mg Oral Daily Hima Cohens, DO   10 mg at 08/03/21 4131    aspirin tablet 325 mg  325 mg Oral Daily Hima Cohens, DO   325 mg at 08/03/21 0757    metFORMIN (GLUCOPHAGE) tablet 500 mg  500 mg Oral BID  Hima Cohens, DO   500 mg at 08/03/21 0757    glucose (GLUTOSE) 40 % oral gel 15 g  15 g Oral PRN Hima Cohens, DO        dextrose 50 % IV solution  12.5 g Intravenous PRN Hima Cohens, DO        glucagon (rDNA) injection 1 mg  1 mg Intramuscular PRN Hima Cohens, DO        dextrose 5 % solution  100 mL/hr Intravenous PRN Hima Cohens, DO        traMADol Stoney Razia) tablet 50 mg  50 mg Oral Q6H PRN David Young MD   50 mg at 08/01/21 2022    insulin lispro (HUMALOG) injection vial 0-6 Units  0-6 Units Subcutaneous TID  David Young MD   1 Units at 08/03/21 1624    insulin lispro (HUMALOG) injection vial 0-3 Units  0-3 Units Subcutaneous Nightly David Young MD   1 Units at 08/02/21 2017    sodium chloride flush 0.9 % injection 5-40 mL  5-40 mL Intravenous 2 times per day David Young MD   10 mL at 08/02/21 2017    sodium chloride flush 0.9 % injection 5-40 mL  5-40 mL Intravenous PRN David Young MD        0.9 % sodium chloride infusion  25 mL Intravenous PRN David Young MD        enoxaparin (LOVENOX) injection 30 mg  30 mg Subcutaneous BID David Young MD   30 mg at 08/03/21 0757    ondansetron (ZOFRAN-ODT) disintegrating tablet 4 mg  4 mg Oral Q8H PRN David Young MD        Or    [facility]) injection 4 mg  4 mg Intravenous Q6H PRN David Young MD        acetaminophen (TYLENOL) tablet 650 mg  650 mg Oral Q6H PRN David Young MD 650 mg at 07/30/21 0424    Or    acetaminophen (TYLENOL) suppository 650 mg  650 mg Rectal Q6H PRN Claudean Close, MD        magnesium sulfate 1000 mg in dextrose 5% 100 mL IVPB  1,000 mg Intravenous PRN Claudean Close, MD        sodium phosphate 10 mmol in dextrose 5 % 250 mL IVPB  10 mmol Intravenous PRN Claudean Close, MD        Or    sodium phosphate 15 mmol in dextrose 5 % 250 mL IVPB  15 mmol Intravenous PRN Claudean Close, MD   Stopped at 07/29/21 1420    Or    sodium phosphate 20 mmol in dextrose 5 % 500 mL IVPB  20 mmol Intravenous PRN Claudean Close, MD 83.3 mL/hr at 07/28/21 2350 Restarted at 07/28/21 2350    polyethylene glycol (GLYCOLAX) packet 17 g  17 g Oral Daily PRN Claudean Close, MD        calcium carbonate (TUMS) chewable tablet 500 mg  500 mg Oral TID PRN Claudean Close, MD   500 mg at 07/28/21 0006    naloxone (NARCAN) injection 0.4 mg  0.4 mg Intravenous PRN Claudean Close, MD        guaiFENesin-dextromethorphan (ROBITUSSIN DM) 100-10 MG/5ML syrup 5 mL  5 mL Oral Q4H PRN Claudean Close, MD   5 mL at 08/03/21 1339    Vitamin D (CHOLECALCIFEROL) tablet 2,000 Units  2,000 Units Oral Daily Claudean Close, MD   2,000 Units at 08/03/21 0757       Past Medical History:  Past Medical History:   Diagnosis Date    Colitis     Diabetes mellitus ([facility] [address]     Erythrocytosis     Intermittent    Gallbladder disease     Hyperlipidemia     Hypertension     Migraine     Sleep apnea     snores       Past Surgical History:  Past Surgical History:   Procedure Laterality Date    CHOLECYSTECTOMY, LAPAROSCOPIC N/A 3/27/2019    CHOLECYSTECTOMY LAPAROSCOPIC performed by Zi Perry MD at [facility]     VASECTOMY         Family History  Family History   Problem Relation Age of Onset    Diabetes Mother     Heart Disease Mother     High Blood Pressure Mother     Diabetes Sister        Social History  Social History     Socioeconomic History    Marital status:      Spouse name: Not on file    Number of children: Not on file    Years of education: Not on file    Highest education level: Not on file   Occupational History    Not on file   Tobacco Use    Smoking status: Former Smoker     Types: Cigarettes    Smokeless tobacco: Never Used   Vaping Use    Vaping Use: Never used   Substance and Sexual Activity    Alcohol use: Yes     Comment: occ.  Drug use: Never    Sexual activity: Not on file   Other Topics Concern    Not on file   Social History Narrative    Not on file     Social Determinants of Health     Financial Resource Strain:     Difficulty of Paying Living Expenses:    Food Insecurity:     Worried About Running Out of Food in the Last Year:     [address] in the Last Year:    Transportation Needs:     Lack of Transportation (Medical):  Lack of Transportation (Non-Medical):    Physical Activity:     Days of Exercise per Week:     Minutes of Exercise per Session:    Stress:     Feeling of Stress :    Social Connections:     Frequency of Communication with Friends and Family:     Frequency of Social Gatherings with Friends and Family:     Attends Jewish Services:     Active Member of Clubs or Organizations:     Attends Club or Organization Meetings:     Marital Status:    Intimate Partner Violence:     Fear of Current or Ex-Partner:     Emotionally Abused:     Physically Abused:     Sexually Abused:          Review of Systems:    Review of Systems   Unable to perform ROS: Acuity of condition           Objective:  Blood pressure (!) 98/58, pulse 89, temperature 98.2 °F (36.8 °C), temperature source Axillary, resp. rate 26, height 5' 9\" (1.753 m), weight 198 lb 3.2 oz (89.9 kg), SpO2 90 %. Intake/Output Summary (Last 24 hours) at 8/3/2021 1843  Last data filed at 8/3/2021 1500  Gross per 24 hour   Intake --   Output 1725 ml   Net -1725 ml       Physical Exam  Vitals and nursing note reviewed.    Constitutional:       Appearance: He is 08/01/21  0425 08/02/21  0430 08/03/21  0445   ALKPHOS 45 51 49   ALT 15 16 20   AST 19 19 26   PROT 5.8* 5.6* 6.1*   BILITOT 0.4 0.5 0.5   LABALBU 2.6* 2.5* 3.1*     Lactic Acid: No results for input(s): LACTA in the last 72 hours. Troponin: No results for input(s): CKTOTAL, CKMB, TROPONINT in the last 72 hours. ABGs: No results for input(s): PH, PCO2, PO2, HCO3, O2SAT in the last 72 hours. CRP:  No results for input(s): CRP in the last 72 hours. Sed Rate:  No results for input(s): SEDRATE in the last 72 hours. Cultures:   No results for input(s): CULTURE in the last 72 hours. No results for input(s): BC, Caralee Roller in the last 72 hours. No results for input(s): CXSURG in the last 72 hours. Radiology reports as per the Radiologist  Radiology: XR CHEST PORTABLE    Result Date: 7/27/2021  XR CHEST PORTABLE 7/27/2021 3:16 PM HISTORY: History: Dyspnea, covid positive COMPARISON: None. CXR: A single frontal view of the chest is performed. Findings: Diminished level of inspiration with basilar atelectasis. No dense consolidation or radiographic evidence of edema. The heart appears normal in size. No pleural effusion or pneumothorax. Acute appearing bony pathology. 1.. Diminished level of inspiration with basilar atelectasis. No lobar consolidation or radiographic evidence for edema. Signed by Dr Sunny CHILEL. Resolved. Continue Lantus. Hyperglycemia secondary to dexamethasone at this point.     Evolving COVID-19 pneumonia. Status post tocilizumab 7/30. Continue dexamethasone.     Hypoxemic respiratory failure secondary to #2. Continue O2 support. He may require intubation.     Please document 42 minutes of critical care time for patient assessment, chart review, discussion with staff, .       Melanie Polk DO

## 2021-08-03 NOTE — CONSULTS
**Physician Signature**  This document was electronically signed by: Cynthia Owusu MD  2021   10:50 AM    **Consult Information**  Member Facility: [address] [place] [address] MRN: [id number]  Visit/Encounter Number: [id number]  Consult ID: [id number]  Facility Time Zone: CT  Date and Time of Request: 2021 06:16 AM  CT  Requesting Clinician: DR. Ori Franklin  Patient Name: Jose Raul Haider  YOB: 1970  Gender: Male  Patient identity was confirmed at the beginning of the consult with the   patient/family/staff using two personal identifiers: Patient name and       **Reason for Consult**  Reason for Consult: [facility]    **Admission**  Admission Date: 2021  Chief reason for ICU admission: Diabetic Ketoacidosis  Secondary reasons for ICU admission: COVID - 19    **Core Metrics**  General orienting sentence for patient: 48 yo man admitted for DKA. He was   Covid positive on . . Now on an insulin infusion. off. Held Lantus   so sugar up. Nothing else is new today. On room air. O2 sats dropping now on   15  liters. I have suggested the BiPAP cycle   started on steroids last night HFNC. far. Chief physiologic deterioration:   Increase in FiO2 required by   30%  Is the patient on DVT prophylaxis?: Yes  Prophylaxis type: Mechanical, Pharmacological  DVT Prophylaxis Comments: Lovenox held b/o low platelets.  SCDs  Is the patient on GI prophylaxis?: Not Indicated  Gi Prophylaxis Comments: Pepcid anyway  Has this patient reached their nutritional goal?: Yes  Are there current issues with pain management in this patient?:   No  Are there issues with skin integrity?: No  Are there issues with delirium?: No  Has the patient been mobilized?: No  Is this patient currently intubated?: No  Are there ethical or care philosophy or family issues?: No  Do you recommend an in depth evaluation?: No  Disposition Recommendations: Continue ICU level of care    **Physician Signature**  This document was electronically signed by: Cristina Molina MD  08/03/2021   10:50 AM

## 2021-08-03 NOTE — PROGRESS NOTES
Low Risk Nutrition Note    Reason for Visit:  Initial, RD Nutrition Re-Screen/LOS    Current Nutrition Therapies:  ADULT DIET; Regular; 5 carb choices (75 gm/meal); No Added Salt (3-4 gm); Banana with breakfast    Height:  5' 9\" (175.3 cm)    Current Body Weight:  198 lb 3 oz (89.9 kg)       Diagnosis:  No nutrition diagnosis at this time. Monitoring and Evaluation:  Patient will be monitored per nutrition standards of care. Consult Dietitian if nutrition intervention essential to patient care is needed.      Discharge Planning:  No needs      Electronically signed by Demetria Falk MS, RD, LD on 8/3/21 at 1:32 PM CDT    Contact:  [phone]

## 2021-08-04 NOTE — PROGRESS NOTES
Hospitalist Progress Note  [facility]     Patient: Lu Dawson  : 1970  MRN: [id number]  Code Status: Full Code    Hospital Day: 8   Date of Service: 2021    Subjective:   Patient seen in [place] [address] critical care unit. BiPAP . Past Medical History:   Diagnosis Date    Colitis     Diabetes mellitus ([address]     Erythrocytosis     Intermittent    Gallbladder disease     Hyperlipidemia     Hypertension     Migraine     Sleep apnea     snores       Past Surgical History:   Procedure Laterality Date    CHOLECYSTECTOMY, LAPAROSCOPIC N/A 3/27/2019    CHOLECYSTECTOMY LAPAROSCOPIC performed by Nael Franklin MD at [address] [place] [address]     VASECTOMY         Family History   Problem Relation Age of Onset    Diabetes Mother     Heart Disease Mother     High Blood Pressure Mother     Diabetes Sister        Social History     Socioeconomic History    Marital status:      Spouse name: Not on file    Number of children: Not on file    Years of education: Not on file    Highest education level: Not on file   Occupational History    Not on file   Tobacco Use    Smoking status: Former Smoker     Types: Cigarettes    Smokeless tobacco: Never Used   Vaping Use    Vaping Use: Never used   Substance and Sexual Activity    Alcohol use: Yes     Comment: occ.  Drug use: Never    Sexual activity: Not on file   Other Topics Concern    Not on file   Social History Narrative    Not on file     Social Determinants of Health     Financial Resource Strain:     Difficulty of Paying Living Expenses:    Food Insecurity:     Worried About Running Out of Food in the Last Year:     [address] in the Last Year:    Transportation Needs:     Lack of Transportation (Medical):      Lack of Transportation (Non-Medical):    Physical Activity:     Days of Exercise per Week:     Minutes of Exercise per Session:    Stress:     Feeling of Stress :    Social Connections:  Frequency of Communication with Friends and Family:     Frequency of Social Gatherings with Friends and Family:     Attends Muslim Services:     Active Member of Clubs or Organizations:     Attends Club or Organization Meetings:     Marital Status:    Intimate Partner Violence:     Fear of Current or Ex-Partner:     Emotionally Abused:     Physically Abused:     Sexually Abused:        Current Facility-Administered Medications   Medication Dose Route Frequency Provider Last Rate Last Admin    [START ON 8/5/2021] melatonin disintegrating tablet 10 mg  10 mg Oral Daily Leatha Heman, DO        famotidine (PEPCID) tablet 20 mg  20 mg Oral BID Leatha Heman, DO   20 mg at 08/04/21 0755    sodium chloride (OCEAN, BABY AYR) 0.65 % nasal spray 2 spray  2 spray Each Nostril PRN Joseph Cee MD   2 spray at 08/03/21 0601    albuterol sulfate  (90 Base) MCG/ACT inhaler 2 puff  2 puff Inhalation 4x daily Leatha Heman, DO   2 puff at 08/04/21 1223    budesonide-formoterol (SYMBICORT) 160-4.5 MCG/ACT inhaler 2 puff  2 puff Inhalation BID Leatha Heman, DO   2 puff at 08/04/21 0756    insulin glargine (LANTUS) injection vial 40 Units  40 Units Subcutaneous BID Leatha Heman, DO   40 Units at 08/04/21 0754    LORazepam (ATIVAN) injection 1 mg  1 mg Intravenous Q4H PRN Joseph Cee MD   1 mg at 08/04/21 1226    potassium chloride (KLOR-CON M) extended release tablet 40 mEq  40 mEq Oral PRN Morgan Castillo MD   40 mEq at 07/30/21 2261    Or    potassium bicarb-citric acid (EFFER-K) effervescent tablet 40 mEq  40 mEq Oral PRN Morgan Castillo MD        Or    potassium chloride 10 mEq/100 mL IVPB (Peripheral Line)  10 mEq Intravenous PRN Morgan Castillo MD        dexamethasone (PF) (DECADRON) injection 6 mg  6 mg Intravenous Q12H Leatha Heman, DO   6 mg at 08/04/21 1226    montelukast (SINGULAIR) tablet 10 mg  10 mg Oral Nightly Rolando Raymond Kip Viramontes MD        Or    sodium phosphate 15 mmol in dextrose 5 % 250 mL IVPB  15 mmol Intravenous PRN Fredy Denney MD   Stopped at 07/29/21 1420    Or    sodium phosphate 20 mmol in dextrose 5 % 500 mL IVPB  20 mmol Intravenous PRN Fredy Denney MD 83.3 mL/hr at 07/28/21 2350 Restarted at 07/28/21 2350    polyethylene glycol (GLYCOLAX) packet 17 g  17 g Oral Daily PRN Fredy Denney MD        calcium carbonate (TUMS) chewable tablet 500 mg  500 mg Oral TID PRN Fredy Denney MD   500 mg at 07/28/21 0006    naloxone (NARCAN) injection 0.4 mg  0.4 mg Intravenous PRN Fredy Denney MD        guaiFENesin-dextromethorphan [facility] DM) 100-10 MG/5ML syrup 5 mL  5 mL Oral Q4H PRN Fredy Denney MD   5 mL at 08/03/21 1339    Vitamin D (CHOLECALCIFEROL) tablet 2,000 Units  2,000 Units Oral Daily Fredy Denney MD   2,000 Units at 08/04/21 0755         dextrose      sodium chloride          Objective:   BP 97/69   Pulse 71   Temp 96.7 °F (35.9 °C) (Axillary)   Resp 16   Ht 5' 9\" (1.753 m)   Wt 198 lb 3.2 oz (89.9 kg)   SpO2 (!) 89%   BMI 29.27 kg/m²     In an effort to reduce COVID-19 exposure, patient seen from doorway and case discussed in detail with unit staff    Recent Labs     08/02/21 0430 08/03/21 0445 08/04/21  0440   WBC 8.2 8.5 8.6   RBC 5.11 5.57 4.94   HGB 15.6 16.7 15.2   HCT 45.2 49.8 45.1   MCV 88.5 89.4 91.3   MCH 30.5 30.0 30.8   MCHC 34.5 33.5 33.7    281 274     Recent Labs     08/02/21 0430 08/03/21 0445 08/04/21  0440   * 139 140   K 4.2 3.7 3.5   ANIONGAP 8 10 7   CL 95* 98 98   CO2 32* 31* 35*   BUN 18 15 18   CREATININE 0.7 0.7 0.8   GLUCOSE 293* 120* 89   CALCIUM 8.7 8.8 8.2*     No results for input(s): MG, PHOS in the last 72 hours. Recent Labs     08/02/21  0430 08/03/21  0445 08/04/21  0440   AST 19 26 24   ALT 16 20 18   BILITOT 0.5 0.5 0.4   ALKPHOS 51 49 44     No results for input(s): PH, PO2, PCO2, HCO3, BE, O2SAT in the last 72 hours.   No results for input(s): TROPONINI in the last 72 hours. No results for input(s): INR in the last 72 hours. No results for input(s): LACTA in the last 72 hours. Intake/Output Summary (Last 24 hours) at 8/4/2021 1531  Last data filed at 8/4/2021 0800  Gross per 24 hour   Intake 460 ml   Output 900 ml   Net -440 ml       No results found.      Assessment and Plan:   COVID-19 bilateral pneumonia  Dexamethasone  S/p Tocilizumab  Adjuvant therapy  Encouraged self proning    Acute hypoxic respiratory failure  Secondary to above  BiPAP 12/6  Follow ABG    DM2  Meds on board    DVT prophylaxis  Lovenox    Total critical care time: 44 minutes    Lidia Calvillo MD   8/4/2021 3:31 PM

## 2021-08-05 NOTE — PLAN OF CARE
Problem: Falls - Risk of:  Goal: Will remain free from falls  Description: Will remain free from falls  8/5/2021 1750 by Amish Garcia RN  Outcome: Met This Shift  8/5/2021 0930 by Amish Garcia RN  Outcome: Met This Shift  Goal: Absence of physical injury  Description: Absence of physical injury  8/5/2021 1750 by Amish Garcia RN  Outcome: Met This Shift  8/5/2021 0930 by Amish Garcia RN  Outcome: Met This Shift     Problem: Discharge Planning:  Goal: Discharged to appropriate level of care  Description: Discharged to appropriate level of care  8/5/2021 1750 by Amish Garcia RN  Outcome: Ongoing  8/5/2021 0930 by Amish Garcia RN  Outcome: Ongoing     Problem: Fluid Volume - Imbalance:  Goal: Will remain free of signs and symptoms of dehydration  Description: Will remain free of signs and symptoms of dehydration  8/5/2021 1750 by Amish Garcia RN  Outcome: Met This Shift  8/5/2021 0930 by Amish Garcia RN  Outcome: Ongoing  Goal: Absence of imbalanced fluid volume signs and symptoms  Description: Absence of imbalanced fluid volume signs and symptoms  8/5/2021 1750 by Amish Garcia RN  Outcome: Met This Shift  8/5/2021 0930 by Amish Garcia RN  Outcome: Ongoing     Problem: Serum Glucose Level - Abnormal:  Goal: Ability to maintain appropriate glucose levels will improve  Description: Ability to maintain appropriate glucose levels will improve  8/5/2021 1750 by Amish Garcia RN  Outcome: Ongoing  8/5/2021 0930 by Amish Garcia RN  Outcome: Met This Shift     Problem: Injury - Acid Base Imbalance:  Goal: Acid-base balance  Description: Acid-base balance  8/5/2021 1750 by Amish Garcia RN  Outcome: Met This Shift  8/5/2021 0930 by Amish Garcia RN  Outcome: Ongoing     Problem: Gas Exchange - Impaired  Goal: Absence of hypoxia  8/5/2021 1750 by Amish Garcia RN  Outcome: Ongoing  8/5/2021 0930 by Amish Garcia RN  Outcome: Ongoing  Goal: Promote optimal lung function  8/5/2021 1750 by Odalys Martines RN  Outcome: Ongoing  8/5/2021 0930 by Odalys Martines RN  Outcome: Ongoing     Problem: Breathing Pattern - Ineffective  Goal: Ability to achieve and maintain a regular respiratory rate  8/5/2021 1750 by Odalys Martines RN  Outcome: Ongoing  8/5/2021 0930 by Odalys Martines RN  Outcome: Ongoing     Problem:  Body Temperature -  Risk of, Imbalanced  Goal: Ability to maintain a body temperature within defined limits  8/5/2021 1750 by Odalys Martines RN  Outcome: Met This Shift  8/5/2021 0930 by Odalys Martines RN  Outcome: Met This Shift  Goal: Will regain or maintain usual level of consciousness  8/5/2021 1750 by Odalys Martines RN  Outcome: Met This Shift  8/5/2021 0930 by Odalys Martines RN  Outcome: Met This Shift  Goal: Complications related to the disease process, condition or treatment will be avoided or minimized  8/5/2021 1750 by Odalys Martines RN  Outcome: Ongoing  8/5/2021 0930 by Odalys Martines RN  Outcome: Ongoing     Problem: Isolation Precautions - Risk of Spread of Infection  Goal: Prevent transmission of infection  8/5/2021 1750 by Odalys Martines RN  Outcome: Ongoing  8/5/2021 0930 by Odalys Martines RN  Outcome: Ongoing     Problem: Nutrition Deficits  Goal: Optimize nutritional status  8/5/2021 1750 by Odalys Martines RN  Outcome: Met This Shift  8/5/2021 0930 by Odalys Martines RN  Outcome: Ongoing     Problem: Risk for Fluid Volume Deficit  Goal: Maintain normal heart rhythm  8/5/2021 1750 by Odalys Martines RN  Outcome: Met This Shift  8/5/2021 0930 by Odalys Martines RN  Outcome: Met This Shift  Goal: Maintain absence of muscle cramping  8/5/2021 1750 by Odalys Martines RN  Outcome: Met This Shift  8/5/2021 0930 by Odalys Martines RN  Outcome: Ongoing  Goal: Maintain normal serum potassium, sodium, calcium, phosphorus, and pH  Outcome: Ongoing     Problem: Loneliness or Risk for Loneliness  Goal: Demonstrate positive use of time alone when socialization is not possible  8/5/2021 1750 by Suri Cantrell RN  Outcome: Ongoing  8/5/2021 0930 by Suri Cantrell RN  Outcome: Ongoing     Problem: Fatigue  Goal: Verbalize increase energy and improved vitality  8/5/2021 1750 by Suri Cantrell RN  Outcome: Ongoing  8/5/2021 0930 by Suri Cantrell RN  Outcome: Ongoing     Problem: Patient Education: Go to Patient Education Activity  Goal: Patient/Family Education  8/5/2021 1750 by Suri Cantrell RN  Outcome: Ongoing  8/5/2021 0930 by Suri Cantrell RN  Outcome: Ongoing     Problem: Pain:  Goal: Pain level will decrease  Description: Pain level will decrease  8/5/2021 1750 by Suri Cantrell RN  Outcome: Ongoing  8/5/2021 0930 by Suri Cantrell RN  Outcome: Ongoing  Goal: Control of acute pain  Description: Control of acute pain  8/5/2021 1750 by Suri Cantrell RN  Outcome: Ongoing  8/5/2021 0930 by Suri Cantrell RN  Outcome: Ongoing  Goal: Control of chronic pain  Description: Control of chronic pain  8/5/2021 1750 by Suri Cantrell RN  Outcome: Met This Shift  8/5/2021 0930 by Suri Cantrell RN  Outcome: Ongoing     Problem: Skin Integrity:  Goal: Will show no infection signs and symptoms  Description: Will show no infection signs and symptoms  8/5/2021 1750 by Suri Cantrell RN  Outcome: Ongoing  8/5/2021 0930 by Suri Cantrell RN  Outcome: Met This Shift  Goal: Absence of new skin breakdown  Description: Absence of new skin breakdown  8/5/2021 1750 by Suri Cantrell RN  Outcome: Met This Shift  8/5/2021 0930 by Suri Cantrell RN  Outcome: Met This Shift

## 2021-08-05 NOTE — CONSULTS
care    **Physician Signature**  This document was electronically signed by: Joaquin Guerin DO  08/05/2021 03:00   PM

## 2021-08-05 NOTE — PLAN OF CARE
rhythm  Outcome: Met This Shift  Goal: Maintain absence of muscle cramping  Outcome: Ongoing  Goal: Maintain normal serum potassium, sodium, calcium, phosphorus, and pH  Outcome: Ongoing     Problem: Loneliness or Risk for Loneliness  Goal: Demonstrate positive use of time alone when socialization is not possible  Outcome: Ongoing     Problem: Fatigue  Goal: Verbalize increase energy and improved vitality  Outcome: Ongoing     Problem: Patient Education: Go to Patient Education Activity  Goal: Patient/Family Education  Outcome: Ongoing     Problem: Pain:  Goal: Pain level will decrease  Description: Pain level will decrease  Outcome: Ongoing  Goal: Control of acute pain  Description: Control of acute pain  Outcome: Ongoing  Goal: Control of chronic pain  Description: Control of chronic pain  Outcome: Ongoing     Problem: Skin Integrity:  Goal: Will show no infection signs and symptoms  Description: Will show no infection signs and symptoms  Outcome: Met This Shift  Goal: Absence of new skin breakdown  Description: Absence of new skin breakdown  Outcome: Met This Shift

## 2021-08-05 NOTE — PROGRESS NOTES
Hospitalist Progress Note  [facility]     Patient: Arley Fontenot  : 1970  MRN: [id number]  Code Status: Full Code    Hospital Day: 9   Date of Service: 2021    Subjective:   Patient seen in [place] [address] critical care unit. BiPAP  with 60 L O2. Past Medical History:   Diagnosis Date    Colitis     Diabetes mellitus ([address]     Erythrocytosis     Intermittent    Gallbladder disease     Hyperlipidemia     Hypertension     Migraine     Sleep apnea     snores       Past Surgical History:   Procedure Laterality Date    CHOLECYSTECTOMY, LAPAROSCOPIC N/A 3/27/2019    CHOLECYSTECTOMY LAPAROSCOPIC performed by Davide Corral MD at [facility]     VASECTOMY         Family History   Problem Relation Age of Onset    Diabetes Mother     Heart Disease Mother     High Blood Pressure Mother     Diabetes Sister        Social History     Socioeconomic History    Marital status:      Spouse name: Not on file    Number of children: Not on file    Years of education: Not on file    Highest education level: Not on file   Occupational History    Not on file   Tobacco Use    Smoking status: Former Smoker     Types: Cigarettes    Smokeless tobacco: Never Used   Vaping Use    Vaping Use: Never used   Substance and Sexual Activity    Alcohol use: Yes     Comment: occ.  Drug use: Never    Sexual activity: Not on file   Other Topics Concern    Not on file   Social History Narrative    Not on file     Social Determinants of Health     Financial Resource Strain:     Difficulty of Paying Living Expenses:    Food Insecurity:     Worried About Running Out of Food in the Last Year:     [address] in the Last Year:    Transportation Needs:     Lack of Transportation (Medical):      Lack of Transportation (Non-Medical):    Physical Activity:     Days of Exercise per Week:     Minutes of Exercise per Session:    Stress:     Feeling of Stress :    Social Conrado Morris MD        dexamethasone (PF) (DECADRON) injection 6 mg  6 mg Intravenous Q12H Hima Cohens, DO   6 mg at 08/05/21 1103    montelukast (SINGULAIR) tablet 10 mg  10 mg Oral Nightly Hima Cohens, DO   10 mg at 08/04/21 2148    aspirin tablet 325 mg  325 mg Oral Daily Hima Cohens, DO   325 mg at 08/05/21 4544    metFORMIN (GLUCOPHAGE) tablet 500 mg  500 mg Oral BID  Hima Cohens, DO   500 mg at 08/05/21 9395    glucose (GLUTOSE) 40 % oral gel 15 g  15 g Oral PRN Hima Cohens, DO        dextrose 50 % IV solution  12.5 g Intravenous PRN Hima Cohens, DO        glucagon (rDNA) injection 1 mg  1 mg Intramuscular PRN Hima Cohens, DO        dextrose 5 % solution  100 mL/hr Intravenous PRN Hima Cohens, DO        traMADol Stoney Razia) tablet 50 mg  50 mg Oral Q6H PRN David Young MD   50 mg at 08/04/21 1651    insulin lispro (HUMALOG) injection vial 0-6 Units  0-6 Units Subcutaneous TID  David Young MD   2 Units at 08/05/21 1110    insulin lispro (HUMALOG) injection vial 0-3 Units  0-3 Units Subcutaneous Nightly David Young MD   2 Units at 08/03/21 2056    sodium chloride flush 0.9 % injection 5-40 mL  5-40 mL Intravenous 2 times per day David Young MD   10 mL at 08/05/21 0807    sodium chloride flush 0.9 % injection 5-40 mL  5-40 mL Intravenous PRN David Young MD   10 mL at 08/05/21 1103    0.9 % sodium chloride infusion  25 mL Intravenous PRN David Young MD        enoxaparin (LOVENOX) injection 30 mg  30 mg Subcutaneous BID David Young MD   30 mg at 08/05/21 0804    ondansetron (ZOFRAN-ODT) disintegrating tablet 4 mg  4 mg Oral Q8H PRN David Young MD        Or    ondansetron [facility]) injection 4 mg  4 mg Intravenous Q6H PRN David Young MD        acetaminophen (TYLENOL) tablet 650 mg  650 mg Oral Q6H PRN David Young MD   650 mg at 07/30/21 0424    Or    acetaminophen (TYLENOL) suppository 650 mg  650 mg Rectal Q6H PRN Kami Strong MD        magnesium sulfate 1000 mg in dextrose 5% 100 mL IVPB  1,000 mg Intravenous PRN Kami Strong MD        sodium phosphate 10 mmol in dextrose 5 % 250 mL IVPB  10 mmol Intravenous PRN Kami Strong MD        Or    sodium phosphate 15 mmol in dextrose 5 % 250 mL IVPB  15 mmol Intravenous PRN Kami Strong MD   Stopped at 07/29/21 1420    Or    sodium phosphate 20 mmol in dextrose 5 % 500 mL IVPB  20 mmol Intravenous PRN Kami Strong MD 83.3 mL/hr at 07/28/21 2350 Restarted at 07/28/21 2350    polyethylene glycol (GLYCOLAX) packet 17 g  17 g Oral Daily PRN Kami Strong MD        calcium carbonate (TUMS) chewable tablet 500 mg  500 mg Oral TID PRN Kami Strong MD   500 mg at 07/28/21 0006    naloxone (NARCAN) injection 0.4 mg  0.4 mg Intravenous PRN Kami Strong MD        guaiFENesin-dextromethorphan [facility] DM) 100-10 MG/5ML syrup 5 mL  5 mL Oral Q4H PRN Kami Strong MD   5 mL at 08/03/21 1339    Vitamin D (CHOLECALCIFEROL) tablet 2,000 Units  2,000 Units Oral Daily Kami Strong MD   2,000 Units at 08/05/21 0806         dextrose      sodium chloride          Objective:   BP (!) 88/57   Pulse 87   Temp 96.9 °F (36.1 °C) (Axillary)   Resp 19   Ht 5' 9\" (1.753 m)   Wt 198 lb 3.2 oz (89.9 kg)   SpO2 96%   BMI 29.27 kg/m²     In an effort to reduce COVID-19 exposure, patient seen from doorway and case discussed in detail with unit staff    Recent Labs     08/03/21  0445 08/04/21  0440 08/05/21  0300   WBC 8.5 8.6 9.6   RBC 5.57 4.94 5.28   HGB 16.7 15.2 15.8   HCT 49.8 45.1 50.2   MCV 89.4 91.3 95.1*   MCH 30.0 30.8 29.9   MCHC 33.5 33.7 31.5*    274 285     Recent Labs     08/03/21 0445 08/04/21  0440 08/05/21  0300    140 137   K 3.7 3.5 4.6   ANIONGAP 10 7 12   CL 98 98 99   CO2 31* 35* 26   BUN 15 18 25*   CREATININE 0.7 0.8 0.7   GLUCOSE 120* 89 199*   CALCIUM 8.8 8.2* 8.5*     Recent Labs     08/05/21  0300   MG 2.4     Recent Labs 08/03/21  0445 08/04/21  0440 08/05/21  0300   AST 26 24 21   ALT 20 18 24   BILITOT 0.5 0.4 0.4   ALKPHOS 49 44 44     No results for input(s): PH, PO2, PCO2, HCO3, BE, O2SAT in the last 72 hours. No results for input(s): TROPONINI in the last 72 hours. No results for input(s): INR in the last 72 hours. No results for input(s): LACTA in the last 72 hours. Intake/Output Summary (Last 24 hours) at 8/5/2021 1528  Last data filed at 8/5/2021 1103  Gross per 24 hour   Intake 1142 ml   Output 1475 ml   Net -333 ml       No results found.      Assessment and Plan:   COVID-19 bilateral pneumonia  Dexamethasone  S/p Tocilizumab  Adjuvant therapy  Encouraged self proning     Acute hypoxic respiratory failure  Secondary to above  BiPAP 12/6 with 60 L O2  Follow ABG     DM2  Meds on board     DVT prophylaxis  Lovenox    Total critical care time: 45 minutes    Miriam Ruiz MD   8/5/2021 3:28 PM

## 2021-08-05 NOTE — PROGRESS NOTES
Provided spiritual care for pt outside of the door. Pt was unable to respond; he was resting. This  provided spiritual care with prayer and support. Pt unable to respond.     Electronically signed by Bibi Nicholson on 8/5/2021 at 11:05 AM

## 2021-08-06 NOTE — CONSULTS
**Physician Signature**  This document was electronically signed by: Alda Parks MD  2021   10:44 AM    **Consult Information**  Member Facility: [address] [place] [address] MRN: [id number]  Visit/Encounter Number: [id number]  Consult ID: [id number]  Facility Time Zone: CT  Date and Time of Request: 2021 03:50 AM  CT  Requesting Clinician: DR. Katty Hoyt  Patient Name: Tracy Gustafson  YOB: 1970  Gender: Male  Patient identity was confirmed at the beginning of the consult with the   patient/family/staff using two personal identifiers: Patient name and       **Reason for Consult**  Reason for Consult: [facility]    **Admission**  Admission Date: 2021  Chief reason for ICU admission: Diabetic Ketoacidosis  Secondary reasons for ICU admission: Respiratory Failure, COVID -   19    **Core Metrics**  General orienting sentence for patient: 48 yo man admitted for DKA. He was   Covid positive on . . Now on an insulin infusion. off. Held Lantus   so sugar up. Nothing else is new today. On room air. O2 sats dropping now on   15  liters. I have suggested the BiPAP cycle   started on steroids last night HFNC. far. holding his own on BiPAP HFNC   today.  bed  Chief physiologic deterioration: Increase in FiO2 required by   30%  Is the patient on DVT prophylaxis?: Yes  Prophylaxis type: Mechanical, Pharmacological  DVT Prophylaxis Comments: Lovenox,  SCDs  Is the patient on GI prophylaxis?: Yes  Gi Prophylaxis Comments: Pepcid  Has this patient reached their nutritional goal?: Yes  Are there current issues with pain management in this patient?:   No  Are there issues with skin integrity?: No  Are there issues with delirium?: No  Has the patient been mobilized?: Yes  Is this patient currently intubated?: No  Are there ethical or care philosophy or family issues?: No  Do you recommend an in depth evaluation?: No  Disposition Recommendations: Continue ICU level of care    **Physician Signature**  This document was electronically signed by: Romario Tai MD  08/06/2021   10:44 AM

## 2021-08-06 NOTE — PROGRESS NOTES
8/6/2021  4:48 AM - Nirav Velázquez Incoming Lab Results From [facility] Value Ref Range & Units Status Collected Lab   pH, Arterial 7.480High   7.350 - 7.450 Final 08/06/2021  4:47 AM [facility]   pCO2, Arterial 39.0  35.0 - 45.0 mmHg Final 08/06/2021  4:47 AM [facility] Lab   pO2, Arterial 53. 0Low   80.0 - 100.0 mmHg Final 08/06/2021  4:47 AM [facility] Lab   HCO3, Arterial 29. 0High   22.0 - 26.0 mmol/L Final 08/06/2021  4:47 AM [facility] Excess, Arterial 5. 2High   -2.0 - 2.0 mmol/L Final 08/06/2021  4:47 AM [facility] Lab   Hemoglobin, Art, Extended 16.4  14.0 - 18.0 g/dL Final 08/06/2021  4:47 AM [facility]   O2 Sat, Arterial 89.8Low   >92 % Final 08/06/2021  4:47 AM [facility]   Carboxyhgb, Arterial 1.7  0.0 - 5.0 % Final 08/06/2021  4:47 AM [facility]        0.0-1.5   (Smokers 1.5-5.0)    Methemoglobin, Arterial 1.1  <1.5 % Final 08/06/2021  4:47 AM [facility]   O2 Content, Arterial 20.6  Not Established mL/dL Final 08/06/2021  4:47 AM [facility]     Pt on HHFNC 100% 60 lpm  resp rate 24  Right radial puncture AT+

## 2021-08-06 NOTE — PROGRESS NOTES
Hospitalist Progress Note  [facility]     Patient: Aroldo Delarosa  : 1970  MRN: [id number]  Code Status: Full Code    Hospital Day: 10   Date of Service: 2021    Subjective:   Patient seen in [place] [address] critical care unit. Heated high flow 100% / 60 L. Past Medical History:   Diagnosis Date    Colitis     Diabetes mellitus ([address]     Erythrocytosis     Intermittent    Gallbladder disease     Hyperlipidemia     Hypertension     Migraine     Sleep apnea     snores       Past Surgical History:   Procedure Laterality Date    CHOLECYSTECTOMY, LAPAROSCOPIC N/A 3/27/2019    CHOLECYSTECTOMY LAPAROSCOPIC performed by Silke Caballero MD at [facility]     VASECTOMY         Family History   Problem Relation Age of Onset    Diabetes Mother     Heart Disease Mother     High Blood Pressure Mother     Diabetes Sister        Social History     Socioeconomic History    Marital status:      Spouse name: Not on file    Number of children: Not on file    Years of education: Not on file    Highest education level: Not on file   Occupational History    Not on file   Tobacco Use    Smoking status: Former Smoker     Types: Cigarettes    Smokeless tobacco: Never Used   Vaping Use    Vaping Use: Never used   Substance and Sexual Activity    Alcohol use: Yes     Comment: occ.  Drug use: Never    Sexual activity: Not on file   Other Topics Concern    Not on file   Social History Narrative    Not on file     Social Determinants of Health     Financial Resource Strain:     Difficulty of Paying Living Expenses:    Food Insecurity:     Worried About Running Out of Food in the Last Year:     [address] in the Last Year:    Transportation Needs:     Lack of Transportation (Medical):      Lack of Transportation (Non-Medical):    Physical Activity:     Days of Exercise per Week:     Minutes of Exercise per Session:    Stress:     Feeling of Stress : Social Connections:     Frequency of Communication with Friends and Family:     Frequency of Social Gatherings with Friends and Family:     Attends [employer]oration[employer] Services:     Active Member of Clubs or Organizations:     Attends Club or Organization Meetings:     Marital Status:    Intimate Partner Violence:     Fear of Current or Ex-Partner:     Emotionally Abused:     Physically Abused:     Sexually Abused:        Current Facility-Administered Medications   Medication Dose Route Frequency Provider Last Rate Last Admin    melatonin disintegrating tablet 10 mg  10 mg Oral Daily Jermaine Gaspar DO   10 mg at 08/06/21 3051    famotidine (PEPCID) injection 20 mg  20 mg Intravenous BID Hollie Boeck, MD   20 mg at 08/06/21 4170    guaiFENesin tablet 400 mg  400 mg Oral Q8H Hollie Boeck, MD   400 mg at 08/06/21 1236    zinc sulfate (ZINCATE) capsule 50 mg  50 mg Oral Daily Hollie Boeck, MD   50 mg at 08/06/21 3266    sodium chloride (OCEAN, BABY AYR) 0.65 % nasal spray 2 spray  2 spray Each Nostril PRN Donal Romeo MD   2 spray at 08/03/21 0601    albuterol sulfate  (90 Base) MCG/ACT inhaler 2 puff  2 puff Inhalation 4x daily Jermaine Gaspar DO   2 puff at 08/06/21 1229    budesonide-formoterol (SYMBICORT) 160-4.5 MCG/ACT inhaler 2 puff  2 puff Inhalation BID Jermaine Gaspar DO   2 puff at 08/06/21 0717    insulin glargine (LANTUS) injection vial 40 Units  40 Units Subcutaneous BID Jermaine Gaspar DO   40 Units at 08/06/21 1974    LORazepam (ATIVAN) injection 1 mg  1 mg Intravenous Q4H PRN Donal Romeo MD   1 mg at 08/06/21 1236    potassium chloride (KLOR-CON M) extended release tablet 40 mEq  40 mEq Oral PRN Magan Shannon MD   40 mEq at 07/30/21 0735    Or    potassium bicarb-citric acid (EFFER-K) effervescent tablet 40 mEq  40 mEq Oral PRN Magan Shannon MD        Or    potassium chloride 10 mEq/100 mL IVPB (Peripheral Line)  10 mEq Intravenous Rectal Q6H PRN Fariba Sheridan MD        magnesium sulfate 1000 mg in dextrose 5% 100 mL IVPB  1,000 mg Intravenous PRN Fariba Sheridan MD        sodium phosphate 10 mmol in dextrose 5 % 250 mL IVPB  10 mmol Intravenous PRN Fariba Sheridan MD        Or    sodium phosphate 15 mmol in dextrose 5 % 250 mL IVPB  15 mmol Intravenous PRN Fariba Sheridan MD   Stopped at 07/29/21 1420    Or    sodium phosphate 20 mmol in dextrose 5 % 500 mL IVPB  20 mmol Intravenous PRN Fariba Sheridan MD 83.3 mL/hr at 07/28/21 2350 Restarted at 07/28/21 2350    polyethylene glycol (GLYCOLAX) packet 17 g  17 g Oral Daily PRN Fariba Sheridan MD        calcium carbonate (TUMS) chewable tablet 500 mg  500 mg Oral TID PRN Fariba Sheridan MD   500 mg at 07/28/21 0006    naloxone (NARCAN) injection 0.4 mg  0.4 mg Intravenous PRN Fariba Sheridan MD        guaiFENesin-dextromethorphan [facility] DM) 100-10 MG/5ML syrup 5 mL  5 mL Oral Q4H PRSIVA Sheridan MD   5 mL at 08/03/21 1339    Vitamin D (CHOLECALCIFEROL) tablet 2,000 Units  2,000 Units Oral Daily Fariba Sheridan MD   2,000 Units at 08/06/21 0808         dextrose      sodium chloride          Objective:   BP (!) 74/43   Pulse 93   Temp 97 °F (36.1 °C) (Axillary)   Resp 27   Ht 5' 9\" (1.753 m)   Wt 199 lb (90.3 kg)   SpO2 (!) 80%   BMI 29.39 kg/m²     In an effort to reduce COVID-19 exposure, patient seen from doorway and case discussed in detail with unit staff    Recent Labs     08/04/21  0440 08/05/21  0300 08/06/21  0307   WBC 8.6 9.6 11.0*   RBC 4.94 5.28 5.23   HGB 15.2 15.8 16.1   HCT 45.1 50.2 47.4   MCV 91.3 95.1* 90.6   MCH 30.8 29.9 30.8   MCHC 33.7 31.5* 34.0    285 263     Recent Labs     08/04/21  0440 08/04/21 0440 08/05/21  0300 08/06/21 0307 08/06/21 0447     --  137 136  --    K 3.5   < > 4.6 4.1 4.1   ANIONGAP 7  --  12 11  --    CL 98  --  99 99  --    CO2 35*  --  26 26  --    BUN 18  --  25* 20  --    CREATININE 0.8  --  0.7 0.6  --    GLUCOSE

## 2021-08-07 NOTE — PROGRESS NOTES
Patient is instructed on importance of incentive spirometry. Patient tolerates poorly and only able to perform twice at 500.   Will continue to encourage

## 2021-08-07 NOTE — PROGRESS NOTES
Hospitalist Progress Note  [facility]     Patient: Ubaldo Mcdowell  : 1970  MRN: [id number]  Code Status: Full Code    Hospital Day: 11   Date of Service: 2021    Subjective:   Patient seen in [place] [address] critical care unit. No acute distress. No current complaints. All questions sought and answered. Past Medical History:   Diagnosis Date    Colitis     Diabetes mellitus ([address]     Erythrocytosis     Intermittent    Gallbladder disease     Hyperlipidemia     Hypertension     Migraine     Sleep apnea     snores       Past Surgical History:   Procedure Laterality Date    CHOLECYSTECTOMY, LAPAROSCOPIC N/A 3/27/2019    CHOLECYSTECTOMY LAPAROSCOPIC performed by Rasheed Dover MD at [facility]     VASECTOMY         Family History   Problem Relation Age of Onset    Diabetes Mother     Heart Disease Mother     High Blood Pressure Mother     Diabetes Sister        Social History     Socioeconomic History    Marital status:      Spouse name: Not on file    Number of children: Not on file    Years of education: Not on file    Highest education level: Not on file   Occupational History    Not on file   Tobacco Use    Smoking status: Former Smoker     Types: Cigarettes    Smokeless tobacco: Never Used   Vaping Use    Vaping Use: Never used   Substance and Sexual Activity    Alcohol use: Yes     Comment: occ.  Drug use: Never    Sexual activity: Not on file   Other Topics Concern    Not on file   Social History Narrative    Not on file     Social Determinants of Health     Financial Resource Strain:     Difficulty of Paying Living Expenses:    Food Insecurity:     Worried About Running Out of Food in the Last Year:     [address] in the Last Year:    Transportation Needs:     Lack of Transportation (Medical):      Lack of Transportation (Non-Medical):    Physical Activity:     Days of Exercise per Week:     Minutes of Exercise per Session:    Stress:     Feeling of Stress :    Social Connections:     Frequency of Communication with Friends and Family:     Frequency of Social Gatherings with Friends and Family:     Attends Jehovah's witness Services:     Active Member of Clubs or Organizations:     Attends Club or Organization Meetings:     Marital Status:    Intimate Partner Violence:     Fear of Current or Ex-Partner:     Emotionally Abused:     Physically Abused:     Sexually Abused:        Current Facility-Administered Medications   Medication Dose Route Frequency Provider Last Rate Last Admin    melatonin disintegrating tablet 10 mg  10 mg Oral Daily Dakotavaleria Martin, DO   10 mg at 08/07/21 6523    famotidine (PEPCID) injection 20 mg  20 mg Intravenous BID Aldo Hussein MD   20 mg at 08/07/21 3515    guaiFENesin tablet 400 mg  400 mg Oral Q8H Aldo Hussein MD   400 mg at 08/07/21 1217    zinc sulfate (ZINCATE) capsule 50 mg  50 mg Oral Daily Aldo Hussein MD   50 mg at 08/07/21 7087    sodium chloride (OCEAN, BABY AYR) 0.65 % nasal spray 2 spray  2 spray Each Nostril PRN Aba Victor MD   2 spray at 08/03/21 0601    albuterol sulfate  (90 Base) MCG/ACT inhaler 2 puff  2 puff Inhalation 4x daily Dakota Martin, DO   2 puff at 08/07/21 1217    budesonide-formoterol (SYMBICORT) 160-4.5 MCG/ACT inhaler 2 puff  2 puff Inhalation BID Dakota Martin, DO   2 puff at 08/07/21 0839    insulin glargine (LANTUS) injection vial 40 Units  40 Units Subcutaneous BID Dakota Martin, DO   40 Units at 08/07/21 6736    LORazepam (ATIVAN) injection 1 mg  1 mg Intravenous Q4H PRN Aba Victor MD   1 mg at 08/06/21 8787    potassium chloride (KLOR-CON M) extended release tablet 40 mEq  40 mEq Oral PRN Dominick Lau MD   40 mEq at 07/30/21 0735    Or    potassium bicarb-citric acid (EFFER-K) effervescent tablet 40 mEq  40 mEq Oral PRN Dominick Lau MD        Or    potassium chloride 10 mEq/100 mL IVPB (Peripheral Line)  10 mEq Intravenous PRN Jeneal Pallas, MD        dexamethasone (PF) (DECADRON) injection 6 mg  6 mg Intravenous Q12H Carmie Heater, DO   6 mg at 08/07/21 1217    montelukast (SINGULAIR) tablet 10 mg  10 mg Oral Nightly Carmie Heater, DO   10 mg at 08/06/21 2036    aspirin tablet 325 mg  325 mg Oral Daily Carmie Heater, DO   325 mg at 08/07/21 5055    metFORMIN (GLUCOPHAGE) tablet 500 mg  500 mg Oral BID WC Carmie Heater, DO   500 mg at 08/07/21 7709    glucose (GLUTOSE) 40 % oral gel 15 g  15 g Oral PRN Carmie Heater, DO        dextrose 50 % IV solution  12.5 g Intravenous PRN Carmie Heater, DO        glucagon (rDNA) injection 1 mg  1 mg Intramuscular PRN Carmie Heater, DO        dextrose 5 % solution  100 mL/hr Intravenous PRN Carmie Heater, DO        traMADol Floresita Ripper) tablet 50 mg  50 mg Oral Q6H PRN Jeneal Pallas, MD   50 mg at 08/04/21 1651    insulin lispro (HUMALOG) injection vial 0-6 Units  0-6 Units Subcutaneous TID  Jeneal Pallas, MD   1 Units at 08/07/21 1225    insulin lispro (HUMALOG) injection vial 0-3 Units  0-3 Units Subcutaneous Nightly Jeneal Pallas, MD   1 Units at 08/06/21 2036    sodium chloride flush 0.9 % injection 5-40 mL  5-40 mL Intravenous 2 times per day Jeneal Pallas, MD   10 mL at 08/07/21 1984    sodium chloride flush 0.9 % injection 5-40 mL  5-40 mL Intravenous PRN Jeneal Pallas, MD   10 mL at 08/05/21 1103    0.9 % sodium chloride infusion  25 mL Intravenous PRN Jeneal Pallas, MD        enoxaparin (LOVENOX) injection 30 mg  30 mg Subcutaneous BID Jeneal Pallas, MD   30 mg at 08/07/21 4190    ondansetron (ZOFRAN-ODT) disintegrating tablet 4 mg  4 mg Oral Q8H PRN Jeneal Pallas, MD        Or    ond[facility]etron [facility]) injection 4 mg  4 mg Intravenous Q6H PRN Jeneal Pallas, MD        acetaminophen (TYLENOL) tablet 650 mg  650 mg Oral Q6H PRN Jeneal Pallas, MD   650 mg at 07/30/21 0424    Or    acetaminophen (TYLENOL) suppository 650 mg  650 mg Rectal Q6H PRN Ras Pennington MD        magnesium sulfate 1000 mg in dextrose 5% 100 mL IVPB  1,000 mg Intravenous PRN Ras Pennington MD        sodium phosphate 10 mmol in dextrose 5 % 250 mL IVPB  10 mmol Intravenous PRN Ras Pennington MD        Or    sodium phosphate 15 mmol in dextrose 5 % 250 mL IVPB  15 mmol Intravenous PRN Ras Pennington MD   Stopped at 07/29/21 1420    Or    sodium phosphate 20 mmol in dextrose 5 % 500 mL IVPB  20 mmol Intravenous PRN Ras Pennington MD 83.3 mL/hr at 07/28/21 2350 Restarted at 07/28/21 2350    polyethylene glycol (GLYCOLAX) packet 17 g  17 g Oral Daily PRN Ras Pennington MD        calcium carbonate (TUMS) chewable tablet 500 mg  500 mg Oral TID PRN Ras Pennington MD   500 mg at 07/28/21 0006    naloxone (NARCAN) injection 0.4 mg  0.4 mg Intravenous PRN Ras Pennington MD        guaiFENesin-dextromethorphan [facility] DM) 100-10 MG/5ML syrup 5 mL  5 mL Oral Q4H PRN Ras Pennington MD   5 mL at 08/03/21 1339    Vitamin D (CHOLECALCIFEROL) tablet 2,000 Units  2,000 Units Oral Daily Ras Pennington MD   2,000 Units at 08/07/21 0823         dextrose      sodium chloride          Objective:   /75   Pulse 105   Temp 98.6 °F (37 °C) (Temporal)   Resp 25   Ht 5' 9\" (1.753 m)   Wt 199 lb (90.3 kg)   SpO2 90%   BMI 29.39 kg/m²     In an effort to reduce COVID-19 exposure, patient seen from doorway and case discussed in detail with unit staff    Recent Labs     08/05/21  0300 08/06/21  0307 08/07/21  0427   WBC 9.6 11.0* 15.7*   RBC 5.28 5.23 5.80   HGB 15.8 16.1 17.6   HCT 50.2 47.4 56.1*   MCV 95.1* 90.6 96.7*   MCH 29.9 30.8 30.3   MCHC 31.5* 34.0 31.4*    263 243     Recent Labs     08/05/21  0300 08/05/21  0300 08/06/21  0307 08/06/21  0447 08/07/21  0427     --  136  --  135*   K 4.6   < > 4.1 4.1 5.0   ANIONGAP 12  --  11  --  15   CL 99  --  99  --  100   CO2 26  --  26  --  20*   BUN 25*  --  20 --  18   CREATININE 0.7  --  0.6  --  0.5   GLUCOSE 199*  --  114*  --  171*   CALCIUM 8.5*  --  8.6  --  8.7    < > = values in this interval not displayed. Recent Labs     08/05/21  0300 08/06/21  0307 08/07/21  0427   MG 2.4 2.1 2.2     Recent Labs     08/05/21  0300 08/06/21 0307 08/07/21  0427   AST 21 28 30   ALT 24 35 44*   BILITOT 0.4 0.4 0.5   ALKPHOS 44 46 54     No results for input(s): PH, PO2, PCO2, HCO3, BE, O2SAT in the last 72 hours. No results for input(s): TROPONINI in the last 72 hours. No results for input(s): INR in the last 72 hours. No results for input(s): LACTA in the last 72 hours. Intake/Output Summary (Last 24 hours) at 8/7/2021 1418  Last data filed at 8/7/2021 1039  Gross per 24 hour   Intake 1110 ml   Output 1975 ml   Net -865 ml       No results found.      Assessment and Plan:   COVID-19 bilateral pneumonia  Dexamethasone  S/p Tocilizumab  Adjuvant therapy  Encouraged self proning  Unvaccinated     Acute hypoxic respiratory failure  Secondary to above  Heated high flow 100% / 65 L  Follow ABG     DM2  Meds on board     DVT prophylaxis  Lovenox    Total critical care time: 45 minutes    Akilah Gamez MD   8/7/2021 2:18 PM

## 2021-08-07 NOTE — CONSULTS
**Physician Signature**  This document was electronically signed by: Jocelyn Patel MD  2021   11:10 AM    **Consult Information**  Member Facility: [address] [place] [address] MRN: [id number]  Visit/Encounter Number: [id number]  Consult ID: [id number]  Facility Time Zone: CT  Date and Time of Request: 2021 04:48 AM  CT  Requesting Clinician: DR. Sadi Gao  Patient Name: Venkat Ornelas  YOB: 1970  Gender: Male  Patient identity was confirmed at the beginning of the consult with the   patient/family/staff using two personal identifiers: Patient name and       **Reason for Consult**  Reason for Consult: [facility]    **Admission**  Admission Date: 2021  Chief reason for ICU admission: Diabetic Ketoacidosis  Secondary reasons for ICU admission: Respiratory Failure, COVID -   19    **Core Metrics**  General orienting sentence for patient: 50 yo man admitted for DKA. He was   Covid positive on . . Now on an insulin infusion. off. Held Lantus   so sugar up. Nothing else is new today. On room air. O2 sats dropping now on   15  liters. I have suggested the BiPAP cycle   started on steroids last night HFNC. far. holding his own on BiPAP HFNC   today.  bedChief physiologic deterioration: Increase in FiO2 required by   30%  Is the patient on DVT prophylaxis?: Yes  Prophylaxis type: Mechanical, Pharmacological  DVT Prophylaxis Comments: Lovenox,  SCDs  Is the patient on GI prophylaxis?: Yes  Gi Prophylaxis Comments: Pepcid  Has this patient reached their nutritional goal?: Yes  Are there current issues with pain management in this patient?:   No  Are there issues with skin integrity?: No  Are there issues with delirium?: No  Has the patient been mobilized?: No  Mobilized Details: Not OOB   Is this patient currently intubated?: No  Are there ethical or care philosophy or family issues?: No  Do you recommend an in depth evaluation?: No  Disposition Recommendations: Continue ICU level of care    **Physician Signature**  This document was electronically signed by: Sofia Cervantes MD  08/07/2021   11:10 AM

## 2021-08-08 NOTE — PROGRESS NOTES
Hospitalist Progress Note  [facility]     Patient: Ruslan Logan  : 1970  MRN: [id number]  Code Status: Joseph Sams Day: 12   Date of Service: 2021    Subjective:   Patient seen in [place] [address] critical care unit. Mild distress. Past Medical History:   Diagnosis Date    Colitis     Diabetes mellitus ([address]     Erythrocytosis     Intermittent    Gallbladder disease     Hyperlipidemia     Hypertension     Migraine     Sleep apnea     snores       Past Surgical History:   Procedure Laterality Date    CHOLECYSTECTOMY, LAPAROSCOPIC N/A 3/27/2019    CHOLECYSTECTOMY LAPAROSCOPIC performed by Zi Perry MD at [facility]     VASECTOMY         Family History   Problem Relation Age of Onset    Diabetes Mother     Heart Disease Mother     High Blood Pressure Mother     Diabetes Sister        Social History     Socioeconomic History    Marital status:      Spouse name: Not on file    Number of children: Not on file    Years of education: Not on file    Highest education level: Not on file   Occupational History    Not on file   Tobacco Use    Smoking status: Former Smoker     Types: Cigarettes    Smokeless tobacco: Never Used   Vaping Use    Vaping Use: Never used   Substance and Sexual Activity    Alcohol use: Yes     Comment: occ.  Drug use: Never    Sexual activity: Not on file   Other Topics Concern    Not on file   Social History Narrative    Not on file     Social Determinants of Health     Financial Resource Strain:     Difficulty of Paying Living Expenses:    Food Insecurity:     Worried About Running Out of Food in the Last Year:     [address] in the Last Year:    Transportation Needs:     Lack of Transportation (Medical):      Lack of Transportation (Non-Medical):    Physical Activity:     Days of Exercise per Week:     Minutes of Exercise per Session:    Stress:     Feeling of Stress :    Social Connections:     Frequency of Communication with Friends and Family:     Frequency of Social Gatherings with Friends and Family:     Attends Gnosticism Services:     Active Member of Clubs or Organizations:     Attends Club or Organization Meetings:     Marital Status:    Intimate Partner Violence:     Fear of Current or Ex-Partner:     Emotionally Abused:     Physically Abused:     Sexually Abused:        Current Facility-Administered Medications   Medication Dose Route Frequency Provider Last Rate Last Admin    melatonin disintegrating tablet 10 mg  10 mg Oral Daily Blima Brightly, DO   10 mg at 08/08/21 0804    famotidine (PEPCID) injection 20 mg  20 mg Intravenous BID Skye Loya MD   20 mg at 08/08/21 0804    guaiFENesin tablet 400 mg  400 mg Oral Q8H Skye Loya MD   400 mg at 08/08/21 1202    zinc sulfate (ZINCATE) capsule 50 mg  50 mg Oral Daily Skye Loya MD   50 mg at 08/08/21 0804    sodium chloride (OCEAN, BABY AYR) 0.65 % nasal spray 2 spray  2 spray Each Nostril PRN Valeria Brooks MD   2 spray at 08/03/21 0601    albuterol sulfate  (90 Base) MCG/ACT inhaler 2 puff  2 puff Inhalation 4x daily Blima Brightly, DO   2 puff at 08/08/21 1202    budesonide-formoterol (SYMBICORT) 160-4.5 MCG/ACT inhaler 2 puff  2 puff Inhalation BID Blima Brightly, DO   2 puff at 08/08/21 0910    insulin glargine (LANTUS) injection vial 40 Units  40 Units Subcutaneous BID Blima Brightly, DO   40 Units at 08/08/21 0804    LORazepam (ATIVAN) injection 1 mg  1 mg Intravenous Q4H PRN Valeria Brooks MD   1 mg at 08/08/21 1420    potassium chloride (KLOR-CON M) extended release tablet 40 mEq  40 mEq Oral PRN Maria Dolores Powell MD   40 mEq at 07/30/21 0735    Or    potassium bicarb-citric acid (EFFER-K) effervescent tablet 40 mEq  40 mEq Oral PRN Maria Dolores Powell MD        Or    potassium chloride 10 mEq/100 mL IVPB (Peripheral Line)  10 mEq Intravenous PRN Vickii Bj Antonio La MD        dexamethasone (PF) (DECADRON) injection 6 mg  6 mg Intravenous Q12H Yadi Chafe, DO   6 mg at 08/08/21 1202    montelukast (SINGULAIR) tablet 10 mg  10 mg Oral Nightly Yadi Chafe, DO   10 mg at 08/07/21 2019    aspirin tablet 325 mg  325 mg Oral Daily Yadi Chafe, DO   325 mg at 08/08/21 0804    metFORMIN (GLUCOPHAGE) tablet 500 mg  500 mg Oral BID  Yadi Chafe, DO   500 mg at 08/08/21 0804    glucose (GLUTOSE) 40 % oral gel 15 g  15 g Oral PRN Yadi Chafe, DO        dextrose 50 % IV solution  12.5 g Intravenous PRN Yadi Chafe, DO        glucagon (rDNA) injection 1 mg  1 mg Intramuscular PRN Yadi Chafe, DO        dextrose 5 % solution  100 mL/hr Intravenous PRN Yadi Chafe, DO        traMADol Kel Loosen) tablet 50 mg  50 mg Oral Q6H PRN Hanh Anderson MD   50 mg at 08/04/21 1651    insulin lispro (HUMALOG) injection vial 0-6 Units  0-6 Units Subcutaneous TID  Hanh Anderson MD   2 Units at 08/08/21 1202    insulin lispro (HUMALOG) injection vial 0-3 Units  0-3 Units Subcutaneous Nightly Hanh Anderson MD   2 Units at 08/07/21 2021    sodium chloride flush 0.9 % injection 5-40 mL  5-40 mL Intravenous 2 times per day Hanh Anderson MD   10 mL at 08/08/21 1202    sodium chloride flush 0.9 % injection 5-40 mL  5-40 mL Intravenous PRN Hanh Anderson MD   10 mL at 08/05/21 1103    0.9 % sodium chloride infusion  25 mL Intravenous PRN Hanh Anderson MD        enoxaparin (LOVENOX) injection 30 mg  30 mg Subcutaneous BID Hanh Anderson MD   30 mg at 08/08/21 0804    ondansetron (ZOFRAN-ODT) disintegrating tablet 4 mg  4 mg Oral Q8H PRN Hanh Anderson MD        Or    [facility]etron [facility]) injection 4 mg  4 mg Intravenous Q6H PRN Hanh Anderson MD        acetaminophen (TYLENOL) tablet 650 mg  650 mg Oral Q6H PRN Hanh Anderson MD   650 mg at 07/30/21 0424    Or    acetaminophen (TYLENOL) suppository 650 mg  650 mg Rectal Q6H PRN Magan Shannon MD        magnesium sulfate 1000 mg in dextrose 5% 100 mL IVPB  1,000 mg Intravenous PRN Magan Shannon MD        sodium phosphate 10 mmol in dextrose 5 % 250 mL IVPB  10 mmol Intravenous PRN Magan Shannon MD        Or    sodium phosphate 15 mmol in dextrose 5 % 250 mL IVPB  15 mmol Intravenous PRN Magan Shannon MD   Stopped at 07/29/21 1420    Or    sodium phosphate 20 mmol in dextrose 5 % 500 mL IVPB  20 mmol Intravenous PRN Magan Shannon MD 83.3 mL/hr at 07/28/21 2350 Restarted at 07/28/21 2350    polyethylene glycol (GLYCOLAX) packet 17 g  17 g Oral Daily PRN Magan Shannon MD        calcium carbonate (TUMS) chewable tablet 500 mg  500 mg Oral TID PRN Magan Shannon MD   500 mg at 07/28/21 0006    naloxone (NARCAN) injection 0.4 mg  0.4 mg Intravenous PRN Magan Shannon MD        guaiFENesin-dextromethorphan [facility] DM) 100-10 MG/5ML syrup 5 mL  5 mL Oral Q4H PRN Magan Shannon MD   5 mL at 08/08/21 0520    Vitamin D (CHOLECALCIFEROL) tablet 2,000 Units  2,000 Units Oral Daily Magan Shannon MD   2,000 Units at 08/08/21 0804         dextrose      sodium chloride          Objective:   BP (!) 106/56   Pulse 96   Temp 98 °F (36.7 °C) (Axillary)   Resp 30   Ht 5' 9\" (1.753 m)   Wt 199 lb (90.3 kg)   SpO2 (!) 89%   BMI 29.39 kg/m²     In an effort to reduce COVID-19 exposure, patient seen from doorway and case discussed in detail with unit staff    Recent Labs     08/06/21  0307 08/07/21  0427 08/08/21  0454   WBC 11.0* 15.7* 18.5*   RBC 5.23 5.80 5.52   HGB 16.1 17.6 16.7   HCT 47.4 56.1* 50.1   MCV 90.6 96.7* 90.8   MCH 30.8 30.3 30.3   MCHC 34.0 31.4* 33.3    243 226     Recent Labs     08/06/21  0307 08/06/21  0447 08/07/21  0427 08/08/21  0454 08/08/21  3839     --  135* 134*  --    K 4.1   < > 5.0 4.7 4.8   ANIONGAP 11  --  15 7  --    CL 99  --  100 98  --    CO2 26  --  20* 29  --    BUN 20  --  18 21*  --    CREATININE 0.6  --  0.5 0.7  --    GLUCOSE

## 2021-08-08 NOTE — PROGRESS NOTES
Results From [facility]    Component Value Ref Range & Units Status Collected Lab   pH, Arterial 7.470High   7.350 - 7.450 Final 08/08/2021  3:39 PM [address] [place] [address] Lab   pCO2, Arterial 35.0  35.0 - 45.0 mmHg Final 08/08/2021  3:39 PM [facility] Lab   pO2, Arterial 47. 0Low Panic   80.0 - 100.0 mmHg Final 08/08/2021  3:39 PM [address] [place] [address] Lab   HCO3, Arterial 25.5  22.0 - 26.0 mmol/L Final 08/08/2021  3:39 PM [facility] Excess, Arterial 2.2High   -2.0 - 2.0 mmol/L Final 08/08/2021  3:39 PM [address] [place] [address] Lab   Hemoglobin, Art, Extended 16.9  14.0 - 18.0 g/dL Final 08/08/2021  3:39 PM [address] [place] [address] Lab   O2 Sat, Arterial 84. 4Low Panic   >92 % Final 08/08/2021  3:39 PM [address] [place] [address] Lab   Carboxyhgb, Arterial 2.1  0.0 - 5.0 % Final 08/08/2021  3:39 PM [facility] Lab        0.0-1.5   (Smokers 1.5-5.0)    Methemoglobin, Arterial 1.1  <1.5 % Final 08/08/2021  3:39 PM [address] [place] [address] Lab   O2 Content, Arterial 20.0  Not Established mL/dL Final 08/08/2021  3:39 PM [address] [place] [address] Lab   O2 Therapy Unknown   Final 08/08/2021  3:39 PM [address] [place] [address] Lab   Testing Performed By    [address] Name Director Address Valid Date Range   [id number] - [address] LAB Brenda Vu  [place],[address]   [address] [facility] [address] 08/30/17 0733-Present   Narrative  Performed by: [address] [place] [address] Lab  CALL  [facility]  [facility] tel. , verbal   N CARA SMITH, 08/08/2021 15:40, by [facility]   Lab and Collection    Blood Gas, Arterial - 8/8/2021  Result History    Blood Gas, Arterial on 8/8/2021   Result Information    Flag: CriticalPanic   Status: Final result (Collected: 8/8/2021 15:39) Provider Status: Ordered   Routing History    Priority Sent On From To Message Type    7/30/2021  5:50 AM Nirav Velázquez Incoming Lab Results From [facility] [email or web address] [facility], DO    Click to Print Result   View SmartLink Info    Blood Gas, Arterial (Order #[id number]) on 8/8/21   Order Report    Order Details  bipap 18/9 30 lpm

## 2021-08-08 NOTE — CONSULTS
**Physician Signature**  This document was electronically signed by: Norma Silver MD  2021   10:05 AM    **Consult Information**  Member Facility: [address] [place] [address] MRN: [id number]  Visit/Encounter Number: [id number]  Consult ID: [id number]  Facility Time Zone: CT  Date and Time of Request: 2021 06:25 AM  CT  Requesting Clinician: DR. Sherren Avena  Patient Name: Mikhail Snowden  YOB: 1970  Gender: Male  Patient identity was confirmed at the beginning of the consult with the   patient/family/staff using two personal identifiers: Patient name and       **Reason for Consult**  Reason for Consult: [facility]    **Admission**  Admission Date: 2021  Chief reason for ICU admission: Diabetic Ketoacidosis  Secondary reasons for ICU admission: Respiratory Failure, COVID -   19    **Core Metrics**  General orienting sentence for patient: 48 yo man admitted for DKA. He was   Covid positive on . . Now on an insulin infusion. off. Held Lantus   so sugar up. Nothing else is new today. On room air. O2 sats dropping now on   15  liters. I have suggested the BiPAP cycle   started on steroids last night HFNC. far. holding his own on BiPAP HFNC   today. bed 100% on 70L CXR showed worsening pneumonia. NO antibiotics yet.   Chief physiologic deterioration: Increase in FiO2 required by   30%  Is the patient on DVT prophylaxis?: Yes  Prophylaxis type: Mechanical, Pharmacological  DVT Prophylaxis Comments: Lovenox,  SCDs  Is the patient on GI prophylaxis?: Yes  Gi Prophylaxis Comments: Pepcid  Has this patient reached their nutritional goal?: Yes  Are there current issues with pain management in this patient?:   No  Are there issues with skin integrity?: No  Are there issues with delirium?: No  Has the patient been mobilized?: No  Mobilized Details: Not OOB   Is this patient currently intubated?: No  Are there ethical or care philosophy or family issues?: No  Do you recommend an in depth evaluation?: No  Disposition Recommendations: Continue ICU level of care    **Physician Signature**  This document was electronically signed by: Hilda Reilly MD  08/08/2021   10:05 AM 105

## 2021-08-08 NOTE — PROGRESS NOTES
Noted sats dropping on monitor. Entered room and found patient had removed high flow 02 to \"clean\". Replaced oxygen. Patient with rapid shallow breathing and sat dropped to 68%. Placed on bipap at 18/9 with 45 L. PRN ativan given IV. Discussed with Dr. Khushbu Hernández. He assessed patient and spoke with him regarding potential need for intubation. Patient is agreeable if it becomes necessary. Will allow time for bipap to take effect and do ABG's per order Dr. Khushbu Hernández. RT notified. Called and updated patient's wife and daughter. Offered visit. Wife is currently positive for covid and symptomatic so only the daughter is coming for a visit.

## 2021-08-09 NOTE — PROGRESS NOTES
Hospitalist Progress Note  [facility]     Patient: Boring Shock  : 1970  MRN: [id number]  Code Status: Full Code    Hospital Day: 13   Date of Service: 2021    Subjective:   Patient seen in [place] [address] critical care unit. Required intubation 2021. Sedated. Past Medical History:   Diagnosis Date    Colitis     Diabetes mellitus ([address]     Erythrocytosis     Intermittent    Gallbladder disease     Hyperlipidemia     Hypertension     Migraine     Sleep apnea     snores       Past Surgical History:   Procedure Laterality Date    CHOLECYSTECTOMY, LAPAROSCOPIC N/A 3/27/2019    CHOLECYSTECTOMY LAPAROSCOPIC performed by Kelsy Ashley MD at [facility]     VASECTOMY         Family History   Problem Relation Age of Onset    Diabetes Mother     Heart Disease Mother     High Blood Pressure Mother     Diabetes Sister        Social History     Socioeconomic History    Marital status:      Spouse name: Not on file    Number of children: Not on file    Years of education: Not on file    Highest education level: Not on file   Occupational History    Not on file   Tobacco Use    Smoking status: Former Smoker     Types: Cigarettes    Smokeless tobacco: Never Used   Vaping Use    Vaping Use: Never used   Substance and Sexual Activity    Alcohol use: Yes     Comment: occ.  Drug use: Never    Sexual activity: Not on file   Other Topics Concern    Not on file   Social History Narrative    Not on file     Social Determinants of Health     Financial Resource Strain:     Difficulty of Paying Living Expenses:    Food Insecurity:     Worried About Running Out of Food in the Last Year:     [address] in the Last Year:    Transportation Needs:     Lack of Transportation (Medical):      Lack of Transportation (Non-Medical):    Physical Activity:     Days of Exercise per Week:     Minutes of Exercise per Session:    Stress:     Feeling of Stress :    Social Connections:     Frequency of Communication with Friends and Family:     Frequency of Social Gatherings with Friends and Family:     Attends Taoism Services:     Active Member of Clubs or Organizations:     Attends Club or Organization Meetings:     Marital Status:    Intimate Partner Violence:     Fear of Current or Ex-Partner:     Emotionally Abused:     Physically Abused:     Sexually Abused:        Current Facility-Administered Medications   Medication Dose Route Frequency Provider Last Rate Last Admin    propofol injection  5-50 mcg/kg/min Intravenous Titrated Evaline MD Darrius 21.7 mL/hr at 08/09/21 1450 40 mcg/kg/min at 08/09/21 1450    fentanyl (SUBLIMAZE) infusion 10 mcg/mL  12.5-200 mcg/hr Intravenous Continuous Jazmynaline MD Darrius 12.5 mL/hr at 08/09/21 1027 125 mcg/hr at 08/09/21 1027    midazolam (VERSED) infusion 100mg/100mL  1-10 mg/hr Intravenous Continuous Parish Rizo MD 2 mL/hr at 08/09/21 0530 2 mg/hr at 08/09/21 0530    lidocaine PF 1 % injection 5 mL  5 mL Intradermal Once Zoraida Russ MD        sodium chloride flush 0.9 % injection 5-40 mL  5-40 mL Intravenous 2 times per day Zoraida Russ MD        sodium chloride flush 0.9 % injection 5-40 mL  5-40 mL Intravenous PRN Zoraida Russ MD        0.9 % sodium chloride infusion  25 mL Intravenous PRN Zoraida Russ MD        norepinephrine (LEVOPHED) 16 mg in sodium chloride 0.9 % 250 mL infusion  2-100 mcg/min Intravenous Continuous Zoraida Russ MD 4.7 mL/hr at 08/09/21 1020 5 mcg/min at 08/09/21 1020    vecuronium (NORCURON) injection 10 mg  10 mg Intravenous Q4H PRN Zoraida Russ MD   10 mg at 08/09/21 1121    ipratropium-albuterol (DUONEB) nebulizer solution 1 ampule  1 ampule Inhalation Q4H Zoraida Russ MD   1 ampule at 08/09/21 1334    melatonin disintegrating tablet 10 mg  10 mg Oral Daily Elmon Hof, DO   10 mg at 08/09/21 0930    famotidine (PEPCID) Intramuscular PRN Blima Brightly, DO        dextrose 5 % solution  100 mL/hr Intravenous PRN Blima Brightly, DO        traMADol Desean Lade) tablet 50 mg  50 mg Oral Q6H PRN Maria Dolores Powell MD   50 mg at 08/04/21 1651    insulin lispro (HUMALOG) injection vial 0-6 Units  0-6 Units Subcutaneous TID WC Maria Dolores Powell MD   1 Units at 08/09/21 0945    insulin lispro (HUMALOG) injection vial 0-3 Units  0-3 Units Subcutaneous Nightly Maria Dolores Powell MD   1 Units at 08/08/21 2023    0.9 % sodium chloride infusion  25 mL Intravenous PRN Maria Dolores Powell MD        enoxaparin (LOVENOX) injection 30 mg  30 mg Subcutaneous BID Maria Dolores Powell MD   30 mg at 08/09/21 0930    ondansetron (ZOFRAN-ODT) disintegrating tablet 4 mg  4 mg Oral Q8H PRN Maria Dolores Powell MD        Or    ondansetron [facility]) injection 4 mg  4 mg Intravenous Q6H PRSIVA Powell MD   4 mg at 08/09/21 0159    acetaminophen (TYLENOL) tablet 650 mg  650 mg Oral Q6H PRN Maria Dolores Powell MD   650 mg at 07/30/21 0424    Or    acetaminophen (TYLENOL) suppository 650 mg  650 mg Rectal Q6H PRN Maria Dolores Powell MD        magnesium sulfate 1000 mg in dextrose 5% 100 mL IVPB  1,000 mg Intravenous PRSIVA Powell MD        sodium phosphate 10 mmol in dextrose 5 % 250 mL IVPB  10 mmol Intravenous PRSIVA Powell MD        Or    sodium phosphate 15 mmol in dextrose 5 % 250 mL IVPB  15 mmol Intravenous AJ Powell MD   Stopped at 07/29/21 1420    Or    sodium phosphate 20 mmol in dextrose 5 % 500 mL IVPB  20 mmol Intravenous AJ Powell MD 83.3 mL/hr at 07/28/21 2350 Restarted at 07/28/21 2350    polyethylene glycol (GLYCOLAX) packet 17 g  17 g Oral Daily PRN Maria Dolores Powell MD        calcium carbonate (TUMS) chewable tablet 500 mg  500 mg Oral TID PRN Maria Dolores Powell MD   500 mg at 07/28/21 0006    naloxone (NARCAN) injection 0.4 mg  0.4 mg Intravenous PRN Maria Dolores Powell MD        guaiFENesin-dextromethorphan (ROBITUSSIN DM) 100-10 MG/5ML syrup 5 mL  5 mL Oral Q4H PRN Vanna Urbina MD   5 mL at 08/08/21 2015    Vitamin D (CHOLECALCIFEROL) tablet 2,000 Units  2,000 Units Oral Daily Vanna Urbina MD   2,000 Units at 08/09/21 0930         propofol 40 mcg/kg/min (08/09/21 1450)    fentaNYL 125 mcg/hr (08/09/21 1027)    midazolam 2 mg/hr (08/09/21 0530)    sodium chloride      norepinephrine-sodium chloride 5 mcg/min (08/09/21 1020)    dextrose      sodium chloride          Objective:   /85   Pulse 72   Temp 96 °F (35.6 °C) (Axillary)   Resp 18   Ht 5' 9\" (1.753 m)   Wt 199 lb (90.3 kg)   SpO2 94%   BMI 29.39 kg/m²     In an effort to reduce COVID-19 exposure, patient seen from doorway and case discussed in detail with unit staff    Recent Labs     08/07/21 0427 08/08/21 0454 08/09/21  0500   WBC 15.7* 18.5* 24.2*   RBC 5.80 5.52 5.33   HGB 17.6 16.7 16.2   HCT 56.1* 50.1 48.7   MCV 96.7* 90.8 91.4   MCH 30.3 30.3 30.4   MCHC 31.4* 33.3 33.3    226 166     Recent Labs     08/07/21 0427 08/07/21 0427 08/08/21  0454 08/08/21  1539 08/09/21  0434 08/09/21  0500   *  --  134*  --   --  135*   K 5.0   < > 4.7 4.8 5.1 5.4*   ANIONGAP 15  --  7  --   --  11     --  98  --   --  100   CO2 20*  --  29  --   --  24   BUN 18  --  21*  --   --  25*   CREATININE 0.5  --  0.7  --   --  0.7   GLUCOSE 171*  --  135*  --   --  206*   CALCIUM 8.7  --  9.1  --   --  8.3*    < > = values in this interval not displayed. Recent Labs     08/07/21 0427 08/08/21 0454 08/09/21  0500   MG 2.2 2.3 2.2     Recent Labs     08/07/21  0427 08/08/21  0454 08/09/21  0500   AST 30 23 73*   ALT 44* 34 85*   BILITOT 0.5 0.6 0.6   ALKPHOS 54 58 91     No results for input(s): PH, PO2, PCO2, HCO3, BE, O2SAT in the last 72 hours. No results for input(s): TROPONINI in the last 72 hours. No results for input(s): INR in the last 72 hours. No results for input(s): LACTA in the last 72 hours.       Intake/Output Summary (Last 24 hours) at 8/9/2021 1623  Last data filed at 8/9/2021 1200  Gross per 24 hour   Intake 287.16 ml   Output 800 ml   Net -512.84 ml       XR CHEST PORTABLE    Result Date: 8/9/2021  EXAMINATION: XR CHEST PORTABLE 8/9/2021 3:25 PM HISTORY: Verify PICC placement. Report: A semiupright portable frontal view the chest was obtained. COMPARISON: Portable chest x-ray 8/9/2021. There is a new left-sided PICC, the tip projects over the distal SVC in satisfactory position. Endotracheal tube and NG tube remain in good position. The lungs are grossly hypoaerated and there are bilateral pulmonary infiltrates, somewhat increased in the left upper lobe. This may be related to worsening of pneumonia or pulmonary edema. No pneumothorax is identified. Satisfactory position of the newly inserted left-sided PICC. Increasing infiltrates particularly in the left upper lobe as described. Satisfactory position of the endotracheal tube and NG tube. Signed by Dr Brendan To. Vanhoose    XR CHEST PORTABLE    Result Date: 8/9/2021  EXAMINATION: XR CHEST PORTABLE 8/9/2021 7:54 AM HISTORY: XR CHEST PORTABLE 8/9/2021 2:06 AM HISTORY: VD RF COMPARISON: August 8, 2021. FINDINGS: Bilateral groundglass infiltrates are present. This is unchanged from August 8, 2021. . Cardiac silhouettes normal. Nasogastric tube and endotracheal tube are satisfactorily positioned. . The osseous structures and surrounding soft tissues demonstrate no acute abnormality. 1. Stable chest persistent bilateral groundglass infiltrates most consistent with viral pneumonia. Signed by Dr Jenny Fletcher    XR CHEST PORTABLE    Result Date: 8/8/2021  EXAMINATION: XR CHEST PORTABLE 8/8/2021 9:50 AM HISTORY: XR CHEST PORTABLE 8/8/2021 4:47 AM HISTORY: Worsening pneumonia COMPARISON: July 30, 2021. FINDINGS: Groundglass opacities are noted in the pulmonary parenchyma. This is not significantly changed compared to July 30, 2021 left diaphragm is less distinct. This is atelectasis left lower lobe. The cardiomediastinal silhouette and pulmonary vascularity are within normal limits. The osseous structures and surrounding soft tissues demonstrate no acute abnormality. 1. Persistent groundglass infiltrates in the pulmonary parenchyma most consistent with viral pneumonia. . Signed by Dr Milly Morrison and Plan:   COVID-19 bilateral pneumonia/severe ARDS  PaO2/FiO2: 59  Prone ventilation  Dexamethasone  S/p Tocilizumab  Adjuvant therapy  Conservative fluid management  Paralytics as warranted  Unvaccinated    Ventilator dependent acute hypoxemic respiratory failure  Secondary to above  Required intubation 8/9/2021  Lung protective ventilation  Titrate minute ventilation for pH 7.35  Follow ABG/CXR  Follow plateau pressures     DM2  Meds on board     DVT prophylaxis  Lovenox    Total critical care time: 54 minutes    Abhinav Whyte MD   8/9/2021 4:23 PM

## 2021-08-09 NOTE — PROGRESS NOTES
Results for Octavia Blackman (MRN [id number]) as of 8/9/2021 04:35   Ref.  Range 8/9/2021 04:34   Hemoglobin, Art, Extended Latest Ref Range: 14.0 - 18.0 g/dL 16.4   pH, Arterial Latest Ref Range: 7.350 - 7.450  7.400   pCO2, Arterial Latest Ref Range: 35.0 - 45.0 mmHg 45.0   pO2, Arterial Latest Ref Range: 80.0 - 100.0 mmHg 59.0 (L)   HCO3, Arterial Latest Ref Range: 22.0 - 26.0 mmol/L 27.9 (H)   Base Excess, Arterial Latest Ref Range: -2.0 - 2.0 mmol/L 2.4 (H)   O2 Sat, Arterial Latest Ref Range: >92 % 90.1   O2 Content, Arterial Latest Ref Range: Not Established mL/dL 20.7   Methemoglobin, Arterial Latest Ref Range: <1.5 % 1.1   Carboxyhgb, Arterial Latest Ref Range: 0.0 - 5.0 % 2.4       PT ON AC\ 18 100 +10  RR AT+

## 2021-08-09 NOTE — PLAN OF CARE
Nutrition Problem #1: Inadequate oral intake  Intervention: Food and/or Nutrient Delivery: Continue NPO, Start Tube Feeding  Nutritional Goals: New goal: Pt will extubate to po diet or nutritional needs will be met through RENEE.

## 2021-08-09 NOTE — PROGRESS NOTES
Comprehensive Nutrition Assessment    Type and Reason for Visit:  Reassess    Nutrition Recommendations/Plan: If EN desired, recommend Glucerna 1.5 @ goal rate of 42 ml/hr with 40 m/hr free water flush and 1 Protienex flush qd. Start TF at 30 ml/hr and advance to goal after 8 hrs as tolerated. This regimen with current propofol rate provides: 2034 kcals,109 g PRO, 133 g CHO, 1719 mL H2O    Nutrition Assessment:  Pt mechanically ventilated, propofol 16.3 ml/hr which provides 430 NP kcals/day. Accuchecks elevated, 178-290. Pt previously with adequate po intake, most meals and ONS >75% when on po diet. Recommend initiate EN to meet nutritional needs. Malnutrition Assessment:  Malnutrition Status:  No malnutrition      Estimated Daily Nutrient Needs:  Energy (kcal):  7029-7137 kcals/day; Weight Used for Energy Requirements:  Current     Protein (g):  110-131 g/pro/day; Weight Used for Protein Requirements:  Ideal (1.5-1.8 g/kg)        Fluid (ml/day):  5125-3600 mL/fluid/day; Method Used for Fluid Requirements:  1 ml/kcal      Nutrition Related Findings:  on vent      Current Nutrition Therapies:    Diet NPO    Anthropometric Measures:  · Height: 5' 9\" (175.3 cm)  · Current Body Weight: 199 lb (90.3 kg)   · BMI: 29.4  · BMI Categories: Overweight (BMI 25.0-29. 9)       Nutrition Diagnosis:   · Inadequate oral intake related to impaired respiratory function as evidenced by NPO or clear liquid status due to medical condition, intubation    Nutrition Interventions:   Food and/or Nutrient Delivery:  Continue NPO, Start Tube Feeding  Nutrition Education/Counseling:  Education not indicated   Coordination of Nutrition Care:  Continue to monitor while inpatient    Goals:  New goal: Pt will extubate to po diet or nutritional needs will be met through [facility]ENEE.        Nutrition Monitoring and Evaluation:   Food/Nutrient Intake Outcomes:  Diet Advancement/Tolerance, Enteral Nutrition Intake/Tolerance  Physical Signs/Symptoms Outcomes:  Biochemical Data, Hemodynamic Status, Nutrition Focused Physical Findings, Weight     Discharge Planning:     Too soon to determine     Electronically signed by Michael Montiel MS, RD, LD on 8/9/21 at 8:39 AM CDT    Contact: [phone]

## 2021-08-09 NOTE — PROGRESS NOTES
Provided spiritual care for pt. This  provided spiritual care for pt by saying a prayer outside of the pt's door. Pt was unable to respond due to being on a ventilator.     Electronically signed by Odalis Cali on 8/9/2021 at 1:13 PM

## 2021-08-09 NOTE — PROGRESS NOTES
Patient's oxygenation status discussed with Dr. Carmelo Fontenot and proning patient was recommended. Patient proned at 1400 on 8/9.

## 2021-08-09 NOTE — CONSULTS
**Physician Signature**  This document was electronically signed by: Soy Hendrix DO  2021 12:34   PM    **Consult Information**  Member Facility: [address] [place] [address] MRN: [id number]  Visit/Encounter Number: [id number]  Consult ID: [id number]  Facility Time Zone: CT  Date and Time of Request: 2021 06:57 AM  CT  Requesting Clinician: DR. Usama Young  Patient Name: Marcelle Rocha  YOB: 1970  Gender: Male  Patient identity was confirmed at the beginning of the consult with the   patient/family/staff using two personal identifiers: Patient name and       **Reason for Consult**  Reason for Consult: [facility]    **Admission**  Admission Date: 2021  Chief reason for ICU admission: Diabetic Ketoacidosis  Secondary reasons for ICU admission: Respiratory Failure, COVID -   19    **Core Metrics**  General orienting sentence for patient: 48 yo man admitted for DKA. He was   Covid positive on . . Now on an insulin infusion. off. Held Lantus   so sugar up. Nothing else is new today. On room air. O2 sats dropping now on   15  liters. I have suggested the BiPAP cycle   started on steroids last night HFNC. far. holding his own on BiPAP HFNC   today. bed 100% on 70L CXR showed worsening pneumonia. NO antibiotics yet. Intubated at 2 AM.  100%, PEEP of 10. Prone today.   Chief physiologic deterioration: Increase in FiO2 required by   30%  Is the patient on DVT prophylaxis?: Yes  Prophylaxis type: Mechanical, Pharmacological  DVT Prophylaxis Comments: Lovenox,  SCDs  Is the patient on GI prophylaxis?: Yes  Gi Prophylaxis Comments: Pepcid  Has this patient reached their nutritional goal?: No and issues are being   addressed  Nutritional Goals Details: TFs today  Are there current issues with pain management in this patient?:   No  Are there issues with skin integrity?: No  Are there issues with delirium?: No  Has the patient been mobilized?: No  Is this patient currently intubated?: Yes  Has facility initiated vent bundle?: Yes  Are there ethical or care philosophy or family issues?: No  Do you recommend an in depth evaluation?: No  Disposition Recommendations: Continue ICU level of care    **Inserted/Removed Devices**  Device Name: Endotracheal Tube  Site of insertion: Trachea  Date of insertion: 08-  Device Name: Choi Catheter  Site of insertion: Urethra  Date of insertion: 08-  Device Name: Orogastric Tube  Site of insertion: Mouth  Date of insertion: 08-    **Physician Signature**  This document was electronically signed by: Marilyn Crowe DO  08/09/2021 12:34   PM

## 2021-08-09 NOTE — PROCEDURES
[facility] Vascular Access Team:  PICC Line Insertion Procedure Note      Procedure: Insertion of 6 Gabonese Triple Lumen PICC    Indications:    Long Term IV therapy, Poor Access    Procedure Details:  Informed consent was obtained for the procedure, including sedation. Risks of lung perforation, hemorrhage, and adverse drug reaction were discussed. 6 Gabonese Triple Lumen PICC inserted to the L Basilic vein per hospital protocol. Blood return: yes    Findings:  Catheter inserted to 46 cm, with 1 cm. Exposed. Mid upper arm circumference is 30 cm. There were no changes to vital signs. Catheter was flushed with 30 cc NS. Patient did tolerate procedure well. Tip location unable to be confirmed within the SVC by VPS technology. Recommendations:  CXR for confirmation of placement  PICC Line is ready to be used. Please change tubing prior to using new PICC line. Make sure to label, date and use alcohol caps on new tubing and alcohol caps on unused ports. EXAMINATION: XR CHEST PORTABLE 8/9/2021 3:25 PM   HISTORY: Verify PICC placement. Report: A semiupright portable frontal view the chest was obtained. COMPARISON: Portable chest x-ray 8/9/2021. There is a new left-sided PICC, the tip projects over the distal SVC   in satisfactory position. Endotracheal tube and NG tube remain in good   position. The lungs are grossly hypoaerated and there are bilateral   pulmonary infiltrates, somewhat increased in the left upper lobe. This   may be related to worsening of pneumonia or pulmonary edema. No   pneumothorax is identified.       Impression   Satisfactory position of the newly inserted left-sided   PICC. Increasing infiltrates particularly in the left upper lobe as   described. Satisfactory position of the endotracheal tube and NG tube. Signed by Dr Carla Damon.  Windy Haley

## 2021-08-10 NOTE — PROGRESS NOTES
Vent settings changed to Fi02 55% PEEP 10  and rate 20 per Dr. Iona Hurley during bedside rounds. Follow up ABG.

## 2021-08-10 NOTE — PROGRESS NOTES
8/10/2021  4:12 AM - Nirav Velázquez Incoming Lab Results From [facility] Value Ref Range & Units Status Collected Lab   pH, Arterial 7.340Low   7.350 - 7.450 Final 08/10/2021  4:10 AM [facility]   pCO2, Arterial 49. 0High   35.0 - 45.0 mmHg Final 08/10/2021  4:10 AM [facility]   pO2, Arterial 119.0High   80.0 - 100.0 mmHg Final 08/10/2021  4:10 AM [facility]   HCO3, Arterial 26. 4High   22.0 - 26.0 mmol/L Final 08/10/2021  4:10 AM [facility] Excess, Arterial -0.3  -2.0 - 2.0 mmol/L Final 08/10/2021  4:10 AM [facility]   Hemoglobin, Art, Extended 17.5  14.0 - 18.0 g/dL Final 08/10/2021  4:10 AM [facility]   O2 Sat, Arterial 96.2  >92 % Final 08/10/2021  4:10 AM [facility]   Carboxyhgb, Arterial 1.7  0.0 - 5.0 % Final 08/10/2021  4:10 AM [facility]        0.0-1.5   (Smokers 1.5-5.0)    Methemoglobin, Arterial 1.5  <1.5 % Final 08/10/2021  4:10 AM [facility]   O2 Content, Arterial 23.8  Not Established mL/dL Final 08/10/2021  4:10 AM [facility]     PT ON VENT VC,18,500,100% + 12 PEEP  LEFT RADIAL PUNCTURE AT+

## 2021-08-10 NOTE — CONSULTS
**Physician Signature**  This document was electronically signed by: Kal Davies MD  08/10/2021   11:11 AM    **Consult Information**  Member Facility: [address] [place] [address] MRN: [id number]  Visit/Encounter Number: [id number]  Consult ID: [id number]  Facility Time Zone: CT  Date and Time of Request: 08/10/2021 06:01 AM  CT  Requesting Clinician: DR. Dajuan Sampson  Patient Name: Roberto Snow  YOB: 1970  Gender: Male  Patient identity was confirmed at the beginning of the consult with the   patient/family/staff using two personal identifiers: Patient name and       **Reason for Consult**  Reason for Consult: [facility]    **Admission**  Admission Date: 2021  Chief reason for ICU admission: Diabetic Ketoacidosis  Secondary reasons for ICU admission: Respiratory Failure, COVID -   19    **Core Metrics**  General orienting sentence for patient: 50 yo man admitted for DKA. He was   Covid positive on . . Now on an insulin infusion. off. Held Lantus   so sugar up. Nothing else is new today. On room air. O2 sats dropping now on   15  liters. I have suggested the BiPAP cycle   started on steroids last night HFNC. far. holding his own on BiPAP HFNC   today. bed 100% on 70L CXR showed worsening pneumonia. NO antibiotics yet. Intubated at 2 AM.  100%, PEEP of 10. Prone today. after 18 hours prone. ?   ECMO being discussed. 80% FIO2, 11 PEEP.   Chief physiologic deterioration: Increase in FiO2 required by   30%  Is the patient on DVT prophylaxis?: Yes  Prophylaxis type: Mechanical, Pharmacological  DVT Prophylaxis Comments: Lovenox,  SCDs  Is the patient on GI prophylaxis?: Yes  Gi Prophylaxis Comments: Pepcid  Has this patient reached their nutritional goal?: No and issues are being   addressed  Nutritional Goals Details: TFs today  Are there current issues with pain management in this patient?:   No  Are there issues with skin integrity?: No  Are there issues with delirium?: Yes and issues are being addressed  Delirium Comments: Ativan on top of propofol and fentanyl  Has the patient been mobilized?: No  Is this patient currently intubated?: Yes  Has facility initiated vent bundle?: No  Intubation Details:  Too sick for SAT/SBT  Are there ethical or care philosophy or family issues?: No  Do you recommend an in depth evaluation?: No  Disposition Recommendations: Continue ICU level of care    **Inserted/Removed Devices**  Device Name: Endotracheal Tube  Site of insertion: Trachea  Date of insertion: 08-  Device Name: Choi Catheter  Site of insertion: Urethra  Date of insertion: 08-  Device Name: Orogastric Tube  Site of insertion: Mouth  Date of insertion: 08-  Device Name: PICC  Site of insertion: Left Upper Arm  Date of insertion: 08-    **Physician Signature**  This document was electronically signed by: Romario Tai MD  08/10/2021   11:11 AM

## 2021-08-10 NOTE — PROGRESS NOTES
Hospitalist Progress Note  [facility]     Patient: Lakisha Renteria  : 1970  MRN: [id number]  Code Status: Full Code    Hospital Day: 14   Date of Service: 8/10/2021    Subjective:   Patient seen in [place] [address] critical care unit. Intubated and sedated. Past Medical History:   Diagnosis Date    Colitis     Diabetes mellitus ([address]     Erythrocytosis     Intermittent    Gallbladder disease     Hyperlipidemia     Hypertension     Migraine     Sleep apnea     snores       Past Surgical History:   Procedure Laterality Date    CHOLECYSTECTOMY, LAPAROSCOPIC N/A 3/27/2019    CHOLECYSTECTOMY LAPAROSCOPIC performed by Nancy Power MD at [facility]     VASECTOMY         Family History   Problem Relation Age of Onset    Diabetes Mother     Heart Disease Mother     High Blood Pressure Mother     Diabetes Sister        Social History     Socioeconomic History    Marital status:      Spouse name: Not on file    Number of children: Not on file    Years of education: Not on file    Highest education level: Not on file   Occupational History    Not on file   Tobacco Use    Smoking status: Former Smoker     Types: Cigarettes    Smokeless tobacco: Never Used   Vaping Use    Vaping Use: Never used   Substance and Sexual Activity    Alcohol use: Yes     Comment: occ.  Drug use: Never    Sexual activity: Not on file   Other Topics Concern    Not on file   Social History Narrative    Not on file     Social Determinants of Health     Financial Resource Strain:     Difficulty of Paying Living Expenses:    Food Insecurity:     Worried About Running Out of Food in the Last Year:     [address] in the Last Year:    Transportation Needs:     Lack of Transportation (Medical):      Lack of Transportation (Non-Medical):    Physical Activity:     Days of Exercise per Week:     Minutes of Exercise per Session:    Stress:     Feeling of Stress :    Social Connections:     Frequency of Communication with Friends and Family:     Frequency of Social Gatherings with Friends and Family:     Attends Sikhism Services:     Active Member of Clubs or Organizations:     Attends Club or Organization Meetings:     Marital Status:    Intimate Partner Violence:     Fear of Current or Ex-Partner:     Emotionally Abused:     Physically Abused:     Sexually Abused:        Current Facility-Administered Medications   Medication Dose Route Frequency Provider Last Rate Last Admin    propofol injection  5-50 mcg/kg/min Intravenous Titrated Angelita Burgos MD 27.1 mL/hr at 08/10/21 1542 50 mcg/kg/min at 08/10/21 1542    fentanyl (SUBLIMAZE) infusion 10 mcg/mL  12.5-200 mcg/hr Intravenous Continuous Angelita Burgos MD 15 mL/hr at 08/10/21 1309 150 mcg/hr at 08/10/21 1309    midazolam (VERSED) infusion 100mg/100mL  1-10 mg/hr Intravenous Continuous Angelita Burgos MD   Stopped at 08/10/21 1647    lidocaine PF 1 % injection 5 mL  5 mL Intradermal Once Gabriela Loya MD        sodium chloride flush 0.9 % injection 5-40 mL  5-40 mL Intravenous 2 times per day Gabriela Loya MD   10 mL at 08/10/21 1004    sodium chloride flush 0.9 % injection 5-40 mL  5-40 mL Intravenous PRN Gabriela Loya MD        0.9 % sodium chloride infusion  25 mL Intravenous PRN Gabriela Loya MD        norepinephrine (LEVOPHED) 16 mg in sodium chloride 0.9 % 250 mL infusion  2-100 mcg/min Intravenous Continuous Gabriela Loya MD   Stopped at 08/10/21 1648    vecuronium (NORCURON) injection 10 mg  10 mg Intravenous Q4H PRN Gabriela Loya MD   10 mg at 08/09/21 1121    ipratropium-albuterol (DUONEB) nebulizer solution 1 ampule  1 ampule Inhalation Q4H Gabriela Loya MD   1 ampule at 08/10/21 1622    melatonin disintegrating tablet 10 mg  10 mg Oral Daily Charissa Matta DO   10 mg at 08/10/21 1002    famotidine (PEPCID) injection 20 mg  20 mg Intravenous BID [place] Onel Tucker MD   20 mg at 08/10/21 1002    guaiFENesin tablet 400 mg  400 mg Oral Q8H Lexi Brown MD   400 mg at 08/10/21 1002    zinc sulfate (ZINCATE) capsule 50 mg  50 mg Oral Daily Lexi Brown MD   50 mg at 08/10/21 1002    sodium chloride (OCEAN, BABY AYR) 0.65 % nasal spray 2 spray  2 spray Each Nostril PRN Daria Kocher, MD   2 spray at 08/03/21 0601    albuterol sulfate  (90 Base) MCG/ACT inhaler 2 puff  2 puff Inhalation 4x daily Dillon Chambers DO   2 puff at 08/10/21 1622    budesonide-formoterol (SYMBICORT) 160-4.5 MCG/ACT inhaler 2 puff  2 puff Inhalation BID Dillon Chambers DO   2 puff at 08/10/21 1003    insulin glargine (LANTUS) injection vial 40 Units  40 Units Subcutaneous BID Dillon Chambers DO   40 Units at 08/10/21 1003    LORazepam (ATIVAN) injection 1 mg  1 mg Intravenous Q4H PRN Daria Kocher, MD   1 mg at 08/10/21 1340    potassium chloride (KLOR-CON M) extended release tablet 40 mEq  40 mEq Oral PRN Kami Strong MD   40 mEq at 07/30/21 8223    Or    potassium bicarb-citric acid (EFFER-K) effervescent tablet 40 mEq  40 mEq Oral PRN Kami Strong MD        Or    potassium chloride 10 mEq/100 mL IVPB (Peripheral Line)  10 mEq Intravenous PRN Kami Strnog MD        dexamethasone (PF) (DECADRON) injection 6 mg  6 mg Intravenous Q12H Dillon Chambers, DO   6 mg at 08/10/21 1232    montelukast (SINGULAIR) tablet 10 mg  10 mg Oral Nightly Dillon Chambers DO   10 mg at 08/09/21 2123    aspirin tablet 325 mg  325 mg Oral Daily Dillon Chambers, DO   325 mg at 08/10/21 1002    glucose (GLUTOSE) 40 % oral gel 15 g  15 g Oral PRN Dillon Chambers, DO        dextrose 50 % IV solution  12.5 g Intravenous PRN Dillon Chambers DO        glucagon (rDNA) injection 1 mg  1 mg Intramuscular PRN Dillon Chambers, DO        dextrose 5 % solution  100 mL/hr Intravenous PRN Chick DO Trish        traMADol Vicky Corn) tablet 50 Sandeep Muller MD   2,000 Units at 08/10/21 1002         propofol 50 mcg/kg/min (08/10/21 1542)    fentaNYL 150 mcg/hr (08/10/21 1309)    midazolam Stopped (08/10/21 1647)    sodium chloride      norepinephrine-sodium chloride Stopped (08/10/21 1648)    dextrose      sodium chloride          Objective:   BP 99/72   Pulse 99   Temp 96.4 °F (35.8 °C) (Axillary)   Resp 23   Ht 5' 9\" (1.753 m)   Wt 199 lb (90.3 kg)   SpO2 91%   BMI 29.39 kg/m²     In an effort to reduce COVID-19 exposure, patient seen from doorway and case discussed in detail with unit staff    Recent Labs     08/08/21 0454 08/09/21  0500 08/10/21  0316   WBC 18.5* 24.2* 35.9*   RBC 5.52 5.33 5.53   HGB 16.7 16.2 16.8   HCT 50.1 48.7 51.9   MCV 90.8 91.4 93.9   MCH 30.3 30.4 30.4   MCHC 33.3 33.3 32.4*    166 170     Recent Labs     08/08/21 0454 08/08/21  1539 08/09/21  0500 08/10/21  0316 08/10/21  0410   *  --  135* 137  --    K 4.7   < > 5.4* 5.4* 5.7   ANIONGAP 7  --  11 17  --    CL 98  --  100 97*  --    CO2 29  --  24 23  --    BUN 21*  --  25* 27*  --    CREATININE 0.7  --  0.7 0.7  --    GLUCOSE 135*  --  206* 254*  --    CALCIUM 9.1  --  8.3* 8.3*  --     < > = values in this interval not displayed. Recent Labs     08/08/21 0454 08/09/21  0500 08/10/21  0316   MG 2.3 2.2 2.4     Recent Labs     08/08/21 0454 08/09/21  0500 08/10/21  0316   AST 23 73* 20   ALT 34 85* 55*   BILITOT 0.6 0.6 0.6   ALKPHOS 58 91 104     No results for input(s): PH, PO2, PCO2, HCO3, BE, O2SAT in the last 72 hours. No results for input(s): TROPONINI in the last 72 hours. No results for input(s): INR in the last 72 hours. No results for input(s): LACTA in the last 72 hours.       Intake/Output Summary (Last 24 hours) at 8/10/2021 1708  Last data filed at 8/10/2021 0800  Gross per 24 hour   Intake 1332.5 ml   Output 1175 ml   Net 157.5 ml       XR CHEST PORTABLE    Result Date: 8/10/2021  EXAMINATION: XR CHEST PORTABLE 8/10/2021 3:13 PM HISTORY: XR CHEST PORTABLE 8/10/2021 1:40 PM HISTORY: Ventilator-dependent respiratory failure COMPARISON: August 9, 2021. FINDINGS: Bilateral groundglass infiltrates are present. This has the appearance of a viral pneumonia. . Cardiac silhouette is normal. Endotracheal tube is proximal to the clavicles. Nasogastric tube is satisfactorily positioned. Left PIC catheter remain satisfactorily positioned. The osseous structures and surrounding soft tissues demonstrate no acute abnormality. 1. Bilateral groundglass infiltrates unchanged from August 9, 2021. 2. Endotracheal tube is now proximal to the level of clavicles. This is 8.8 cm proximal to the sarah. Signed by Dr Wilfrid Schilder    XR CHEST PORTABLE    Result Date: 8/9/2021  EXAMINATION: XR CHEST PORTABLE 8/9/2021 3:25 PM HISTORY: Verify PICC placement. Report: A semiupright portable frontal view the chest was obtained. COMPARISON: Portable chest x-ray 8/9/2021. There is a new left-sided PICC, the tip projects over the distal SVC in satisfactory position. Endotracheal tube and NG tube remain in good position. The lungs are grossly hypoaerated and there are bilateral pulmonary infiltrates, somewhat increased in the left upper lobe. This may be related to worsening of pneumonia or pulmonary edema. No pneumothorax is identified. Satisfactory position of the newly inserted left-sided PICC. Increasing infiltrates particularly in the left upper lobe as described. Satisfactory position of the endotracheal tube and NG tube. Signed by Dr Syd Field. Ty    XR CHEST PORTABLE    Result Date: 8/9/2021  EXAMINATION: XR CHEST PORTABLE 8/9/2021 7:54 AM HISTORY: XR CHEST PORTABLE 8/9/2021 2:06 AM HISTORY: VD RF COMPARISON: August 8, 2021. FINDINGS: Bilateral groundglass infiltrates are present. This is unchanged from August 8, 2021. . Cardiac silhouettes normal. Nasogastric tube and endotracheal tube are satisfactorily positioned. . The osseous structures and surrounding soft

## 2021-08-10 NOTE — PROGRESS NOTES
Procedure Component Value Units Date/Time     Blood Gas, Arterial [[id number]] (Abnormal) Collected: 08/10/21 1711     Specimen: Blood gases Updated: 08/10/21 1712      pH, Arterial 7.390      pCO2, Arterial 47.0 mmHg       pO2, Arterial 99.0 mmHg       HCO3, Arterial 28.5 mmol/L       Base Excess, Arterial 2.6 mmol/L       Hemoglobin, Art, Extended 16.1 g/dL       O2 Sat, Arterial 96.0 %       Carboxyhgb, Arterial 1.9 %       Methemoglobin, Arterial 1.4 %       O2 Content, Arterial 21.8 mL/dL       O2 Therapy Unknown     Potassium, Whole Blood [[id number]] Collected: 08/10/21 1711      Updated: 08/10/21 1712      Potassium, Whole Blood 5.5     POCT Glucose [[id number]] (Abnormal)      VC20 450 80% 12PEEP RB POS HALINA

## 2021-08-10 NOTE — PLAN OF CARE
Problem: Falls - Risk of:  Goal: Will remain free from falls  Description: Will remain free from falls  Outcome: Ongoing  Goal: Absence of physical injury  Description: Absence of physical injury  Outcome: Ongoing     Problem: Discharge Planning:  Goal: Discharged to appropriate level of care  Description: Discharged to appropriate level of care  Outcome: Ongoing     Problem: Fluid Volume - Imbalance:  Goal: Will remain free of signs and symptoms of dehydration  Description: Will remain free of signs and symptoms of dehydration  Outcome: Ongoing  Goal: Absence of imbalanced fluid volume signs and symptoms  Description: Absence of imbalanced fluid volume signs and symptoms  Outcome: Ongoing     Problem: Serum Glucose Level - Abnormal:  Goal: Ability to maintain appropriate glucose levels will improve  Description: Ability to maintain appropriate glucose levels will improve  Outcome: Ongoing     Problem: Injury - Acid Base Imbalance:  Goal: Acid-base balance  Description: Acid-base balance  Outcome: Ongoing     Problem: Gas Exchange - Impaired  Goal: Absence of hypoxia  Outcome: Ongoing  Goal: Promote optimal lung function  Outcome: Ongoing     Problem: Breathing Pattern - Ineffective  Goal: Ability to achieve and maintain a regular respiratory rate  Outcome: Ongoing     Problem:  Body Temperature -  Risk of, Imbalanced  Goal: Ability to maintain a body temperature within defined limits  Outcome: Ongoing  Goal: Will regain or maintain usual level of consciousness  Outcome: Ongoing  Goal: Complications related to the disease process, condition or treatment will be avoided or minimized  Outcome: Ongoing     Problem: Isolation Precautions - Risk of Spread of Infection  Goal: Prevent transmission of infection  Outcome: Ongoing     Problem: Nutrition Deficits  Goal: Optimize nutritional status  Outcome: Ongoing     Problem: Risk for Fluid Volume Deficit  Goal: Maintain normal heart rhythm  Outcome: Ongoing  Goal: Maintain absence of muscle cramping  Outcome: Ongoing  Goal: Maintain normal serum potassium, sodium, calcium, phosphorus, and pH  Outcome: Ongoing     Problem: Loneliness or Risk for Loneliness  Goal: Demonstrate positive use of time alone when socialization is not possible  Outcome: Ongoing     Problem: Fatigue  Goal: Verbalize increase energy and improved vitality  Outcome: Ongoing     Problem: Patient Education: Go to Patient Education Activity  Goal: Patient/Family Education  Outcome: Ongoing     Problem: Pain:  Goal: Pain level will decrease  Description: Pain level will decrease  Outcome: Ongoing  Goal: Control of acute pain  Description: Control of acute pain  Outcome: Ongoing  Goal: Control of chronic pain  Description: Control of chronic pain  Outcome: Ongoing     Problem: Skin Integrity:  Goal: Will show no infection signs and symptoms  Description: Will show no infection signs and symptoms  Outcome: Ongoing  Goal: Absence of new skin breakdown  Description: Absence of new skin breakdown  Outcome: Ongoing     Problem: Non-Violent Restraints  Goal: Removal from restraints as soon as assessed to be safe  Outcome: Ongoing  Goal: No harm/injury to patient while restraints in use  Outcome: Ongoing  Goal: Patient's dignity will be maintained  Outcome: Ongoing     Problem: Nutrition  Goal: Optimal nutrition therapy  Outcome: Ongoing     Problem: Infection - Central Venous Catheter-Associated Bloodstream Infection:  Goal: Will show no infection signs and symptoms  Description: Will show no infection signs and symptoms  Outcome: Ongoing  Goal: Absence of central venous catheter-associated infection  Description: Absence of central venous catheter-associated infection  Outcome: Ongoing

## 2021-08-11 NOTE — CONSULTS
**Physician Signature**  This document was electronically signed by: Robe Pedro DO  2021 10:59   AM    **Consult Information**  Member Facility: [address] [place] [address] Drive MRN: [id number]  Visit/Encounter Number: [id number]  Consult ID: [id number]  Facility Time Zone: CT  Date and Time of Request: 2021 06:07 AM  CT  Requesting Clinician: DR. ATKINS [facility]  Patient Name: Madisyn Palacios  YOB: 1970  Gender: Male  Patient identity was confirmed at the beginning of the consult with the   patient/family/staff using two personal identifiers: Patient name and       **Reason for Consult**  Reason for Consult: [facility]    **Admission**  Admission Date: 2021  Chief reason for ICU admission: Diabetic Ketoacidosis  Secondary reasons for ICU admission: Respiratory Failure, COVID -   19    **Core Metrics**  General orienting sentence for patient: 48 yo man admitted for DKA. He was   Covid positive on . . Now on an insulin infusion. off. Held Lantus   so sugar up. Nothing else is new today. On room air. O2 sats dropping now on   15  liters. I have suggested the BiPAP cycle   started on steroids last night HFNC. far. holding his own on BiPAP HFNC   today. bed 100% on 70L CXR showed worsening pneumonia. NO antibiotics yet. Intubated at 2 AM.  100%, PEEP of 10. Prone today. after 18 hours prone. ?   ECMO being discussed. 80% FIO2, 11 PEEP. Prone for 18 hours a day. 75%, PEEP   of 12.   Chief physiologic deterioration: Increase in FiO2 required by   30%  Is the patient on DVT prophylaxis?: Yes  Prophylaxis type: Mechanical, Pharmacological  DVT Prophylaxis Comments: Lovenox,  SCDs  Is the patient on GI prophylaxis?: Yes  Gi Prophylaxis Comments: Pepcid  Has this patient reached their nutritional goal?: Yes  Nutritional Goals Details: TFs  Are there current issues with pain management in this patient?:   No  Are there issues with skin integrity?: No  Are there issues with delirium?: Yes and issues are being addressed  Delirium Comments: Ativan on top of propofol and fentanyl  Has the patient been mobilized?: No  Is this patient currently intubated?: Yes  Has facility initiated vent bundle?: No  Intubation Details:  Too sick for SAT/SBT  Are there ethical or care philosophy or family issues?: No  Do you recommend an in depth evaluation?: No  Disposition Recommendations: Continue ICU level of care    **Inserted/Removed Devices**  Device Name: Endotracheal Tube  Site of insertion: Trachea  Date of insertion: 08-  Device Name: Choi Catheter  Site of insertion: Urethra  Date of insertion: 08-  Device Name: Orogastric Tube  Site of insertion: Mouth  Date of insertion: 08-  Device Name: PICC  Site of insertion: Left Upper Arm  Date of insertion: 08-    **Physician Signature**  This document was electronically signed by: Tracy Stephens DO  08/11/2021 10:59   AM

## 2021-08-11 NOTE — PROGRESS NOTES
Hospitalist Progress Note    Patient:  Cally Silva  YOB: 1970  Date of Service: 8/11/2021  MRN: [id number]   Acct: [de-identified]   Primary Care Physician: Noe Simmons MD  Advance Directive: Full Code  Admit Date: 7/27/2021       Hospital Day: 15  Referring Provider: Ronnie Diaz DO    Patient Seen, Chart, Consults, Notes, Labs, Radiology studies reviewed. Subjective:  Cally Silva is a 49 y.o. male  whom we are following for COVID-19 pneumonia, hypoxemic respiratory failure. Unfortunately he has required intubation since the last time I saw him. He has spiked a fever today. He has been pancultured. I will start him on broad-spectrum antibiotics. He is currently on assist-control rate of 20, tidal volume of 450, 12 of PEEP, FiO2 of 0.6.     Allergies:  Bee venom, Mushroom extract complex, and Other    Medicines:  Current Facility-Administered Medications   Medication Dose Route Frequency Provider Last Rate Last Admin    propofol injection  5-50 mcg/kg/min Intravenous Titrated Jeana Almazan MD 27.1 mL/hr at 08/11/21 1457 50 mcg/kg/min at 08/11/21 1457    fentanyl (SUBLIMAZE) infusion 10 mcg/mL  12.5-200 mcg/hr Intravenous Continuous Jeana Almazan MD 17.5 mL/hr at 08/11/21 1715 175 mcg/hr at 08/11/21 1715    midazolam (VERSED) infusion 100mg/100mL  1-10 mg/hr Intravenous Continuous Jeana Almazan MD   Stopped at 08/10/21 1647    lidocaine PF 1 % injection 5 mL  5 mL Intradermal Once Ganesh Hogue MD        sodium chloride flush 0.9 % injection 5-40 mL  5-40 mL Intravenous 2 times per day Ganesh Hogue MD   10 mL at 08/11/21 0844    sodium chloride flush 0.9 % injection 5-40 mL  5-40 mL Intravenous PRN Ganesh Hogue MD        0.9 % sodium chloride infusion  25 mL Intravenous PRN Ganesh Hogue MD        norepinephrine (LEVOPHED) 16 mg in sodium chloride 0.9 % 250 mL infusion  2-100 mcg/min Intravenous Continuous Ganesh Hogue MD   Stopped at 08/10/21 1648    vecuronium (NORCURON) injection 10 mg  10 mg Intravenous Q4H PRN Tommy Hickey MD   10 mg at 08/09/21 1121    ipratropium-albuterol (DUONEB) nebulizer solution 1 ampule  1 ampule Inhalation Q4H Tommy Hickey MD   1 ampule at 08/11/21 1630    melatonin disintegrating tablet 10 mg  10 mg Oral Daily Lamont Lewis, DO   10 mg at 08/11/21 0743    famotidine (PEPCID) injection 20 mg  20 mg Intravenous BID Tommy Hickey MD   20 mg at 08/11/21 0743    guaiFENesin tablet 400 mg  400 mg Oral Q8H Tommy Hickey MD   400 mg at 08/11/21 1142    zinc sulfate (ZINCATE) capsule 50 mg  50 mg Oral Daily Tommy Hickey MD   50 mg at 08/11/21 0743    sodium chloride (OCEAN, BABY AYR) 0.65 % nasal spray 2 spray  2 spray Each Nostril PRN Trudie Buerger, MD   2 spray at 08/03/21 0601    albuterol sulfate  (90 Base) MCG/ACT inhaler 2 puff  2 puff Inhalation 4x daily Lamont Lewis, DO   2 puff at 08/10/21 1622    budesonide-formoterol (SYMBICORT) 160-4.5 MCG/ACT inhaler 2 puff  2 puff Inhalation BID Lamont Lewis, DO   2 puff at 08/11/21 0746    insulin glargine (LANTUS) injection vial 40 Units  40 Units Subcutaneous BID Lamont Lewis, DO   40 Units at 08/11/21 0845    LORazepam (ATIVAN) injection 1 mg  1 mg Intravenous Q4H PRN Trudie Buerger, MD   1 mg at 08/10/21 1340    potassium chloride (KLOR-CON M) extended release tablet 40 mEq  40 mEq Oral PRN Claudean Close, MD   40 mEq at 07/30/21 9797    Or    potassium bicarb-citric acid (EFFER-K) effervescent tablet 40 mEq  40 mEq Oral PRN Claudean Close, MD        Or    potassium chloride 10 mEq/100 mL IVPB (Peripheral Line)  10 mEq Intravenous PRN Claudean Close, MD        dexamethasone (PF) (DECADRON) injection 6 mg  6 mg Intravenous Q12H Lamont Lewis, DO   6 mg at 08/11/21 1142    montelukast (SINGULAIR) tablet 10 mg  10 mg Oral Nightly Lamont Lewis, DO   10 mg at 08/10/21 2022    aspirin tablet 325 mg  325 mg Oral Daily Drakeine Brandon, DO   325 mg at 08/11/21 0744    glucose (GLUTOSE) 40 % oral gel 15 g  15 g Oral PRN Philippe Taylor, DO        dextrose 50 % IV solution  12.5 g Intravenous PRN Philippe Taylor DO        glucagon (rDNA) injection 1 mg  1 mg Intramuscular PRN Philippe Taylor, DO        dextrose 5 % solution  100 mL/hr Intravenous PRN Philippe Taylor DO        traMADol Sadaf Ryan) tablet 50 mg  50 mg Oral Q6H PRN Steve Walton MD   50 mg at 08/11/21 1142    insulin lispro (HUMALOG) injection vial 0-6 Units  0-6 Units Subcutaneous TID WC Steve Walton MD   2 Units at 08/11/21 1712    insulin lispro (HUMALOG) injection vial 0-3 Units  0-3 Units Subcutaneous Nightly Steve Walton MD   2 Units at 08/10/21 2023    0.9 % sodium chloride infusion  25 mL Intravenous PRN Steve Walton MD        enoxaparin (LOVENOX) injection 30 mg  30 mg Subcutaneous BID Steve Walton MD   30 mg at 08/11/21 1020    ondansetron (ZOFRAN-ODT) disintegrating tablet 4 mg  4 mg Oral Q8H PRN Steve Walton MD        Or    ondansetron [facility]) injection 4 mg  4 mg Intravenous Q6H PRN Steve Walton MD   4 mg at 08/09/21 0159    acetaminophen (TYLENOL) tablet 650 mg  650 mg Oral Q6H PRN Steve Walton MD   650 mg at 07/30/21 0424    Or    acetaminophen (TYLENOL) suppository 650 mg  650 mg Rectal Q6H PRN tSeve Walton MD        magnesium sulfate 1000 mg in dextrose 5% 100 mL IVPB  1,000 mg Intravenous PRN Steve Walton MD        sodium phosphate 10 mmol in dextrose 5 % 250 mL IVPB  10 mmol Intravenous PRSIVA Walton MD        Or    sodium phosphate 15 mmol in dextrose 5 % 250 mL IVPB  15 mmol Intravenous PRSIVA Walton MD   Stopped at 07/29/21 1420    Or    sodium phosphate 20 mmol in dextrose 5 % 500 mL IVPB  20 mmol Intravenous PRN Steve Walton MD 83.3 mL/hr at 07/28/21 2350 Restarted at 07/28/21 2350    polyethylene glycol (GLYCOLAX) packet 17 g  17 g Oral Daily PRN Kami Strong MD        calcium carbonate (TUMS) chewable tablet 500 mg  500 mg Oral TID PRN Kami Strong MD   500 mg at 07/28/21 0006    naloxone [facility]) injection 0.4 mg  0.4 mg Intravenous PRN Kami Strong MD        guaiFENesin-dextromethorphan [facility]) 100-10 MG/5ML syrup 5 mL  5 mL Oral Q4H PRN Kami Strong MD   5 mL at 08/08/21 2015    Vitamin D (CHOLECALCIFEROL) tablet 2,000 Units  2,000 Units Oral Daily Kami Strong MD   2,000 Units at 08/11/21 0743       Past Medical History:  Past Medical History:   Diagnosis Date    Colitis     Diabetes mellitus ([address]     Erythrocytosis     Intermittent    Gallbladder disease     Hyperlipidemia     Hypertension     Migraine     Sleep apnea     snores       Past Surgical History:  Past Surgical History:   Procedure Laterality Date    CHOLECYSTECTOMY, LAPAROSCOPIC N/A 3/27/2019    CHOLECYSTECTOMY LAPAROSCOPIC performed by Jasmina Serrano MD at [address] [place] [address]     VASECTOMY         Family History  Family History   Problem Relation Age of Onset    Diabetes Mother     Heart Disease Mother     High Blood Pressure Mother     Diabetes Sister        Social History  Social History     Socioeconomic History    Marital status:      Spouse name: Not on file    Number of children: Not on file    Years of education: Not on file    Highest education level: Not on file   Occupational History    Not on file   Tobacco Use    Smoking status: Former Smoker     Types: Cigarettes    Smokeless tobacco: Never Used   Vaping Use    Vaping Use: Never used   Substance and Sexual Activity    Alcohol use: Yes     Comment: occ.     Drug use: Never    Sexual activity: Not on file   Other Topics Concern    Not on file   Social History Narrative    Not on file     Social Determinants of Health     Financial Resource Strain:     Difficulty of Paying Living Expenses:    Food Insecurity:     Worried About Running Out of Food in the Last Year:     Ran Out of Food in the Last Year:    Transportation Needs:     Lack of Transportation (Medical):  Lack of Transportation (Non-Medical):    Physical Activity:     Days of Exercise per Week:     Minutes of Exercise per Session:    Stress:     Feeling of Stress :    Social Connections:     Frequency of Communication with Friends and Family:     Frequency of Social Gatherings with Friends and Family:     Attends Zoroastrian Services:     Active Member of Clubs or Organizations:     Attends Club or Organization Meetings:     Marital Status:    Intimate Partner Violence:     Fear of Current or Ex-Partner:     Emotionally Abused:     Physically Abused:     Sexually Abused:          Review of Systems:    Review of Systems   Unable to perform ROS: Intubated           Objective:  Blood pressure 102/65, pulse 100, temperature 99.8 °F (37.7 °C), temperature source Axillary, resp. rate 18, height 5' 9\" (1.753 m), weight 199 lb (90.3 kg), SpO2 93 %. Intake/Output Summary (Last 24 hours) at 8/11/2021 1844  Last data filed at 8/11/2021 1600  Gross per 24 hour   Intake 1768 ml   Output 1775 ml   Net -7 ml       Physical Exam  Vitals and nursing note reviewed. Constitutional:       Appearance: He is ill-appearing. Interventions: He is intubated. HENT:      Head: Normocephalic and atraumatic. Right Ear: External ear normal.      Left Ear: External ear normal.      Nose: Nose normal.      Mouth/Throat:      Mouth: Mucous membranes are moist.   Eyes:      Conjunctiva/sclera: Conjunctivae normal.      Pupils: Pupils are equal, round, and reactive to light. Cardiovascular:      Rate and Rhythm: Normal rate and regular rhythm. Heart sounds: Normal heart sounds. Pulmonary:      Effort: Pulmonary effort is normal. He is intubated. Breath sounds: Rhonchi present. Abdominal:      General: Abdomen is flat. Palpations: Abdomen is soft.    Musculoskeletal:         General: No SEDRATE in the last 72 hours. Cultures:   No results for input(s): CULTURE in the last 72 hours. No results for input(s): BC, Naila Eagle in the last 72 hours. No results for input(s): CXSURG in the last 72 hours. Radiology reports as per the Radiologist  Radiology: XR CHEST PORTABLE    Result Date: 7/27/2021  XR CHEST PORTABLE 7/27/2021 3:16 PM HISTORY: History: Dyspnea, covid positive COMPARISON: None. CXR: A single frontal view of the chest is performed. Findings: Diminished level of inspiration with basilar atelectasis. No dense consolidation or radiographic evidence of edema. The heart appears normal in size. No pleural effusion or pneumothorax. Acute appearing bony pathology. 1.. Diminished level of inspiration with basilar atelectasis. No lobar consolidation or radiographic evidence for edema. Signed by Dr Wilfrid Keating     COVID-19 pneumonia. Continue current medical management. Hypoxemic respiratory failure secondary to #1. Continue ventilatory support. FiO2 requirements are decreasing. Please document 42 minutes of critical care time for patient assessment, chart review, discussion with staff, .       Courtney Steele DO

## 2021-08-11 NOTE — PROGRESS NOTES
Comprehensive Nutrition Assessment    Type and Reason for Visit:  Reassess    Nutrition Recommendations/Plan: change EN to Vital High Protein goal rate 52ml/hr    Nutrition Assessment:  Remains on vent. EN continues at 30ml/hr and 20 ml free watere hourly. Propofol is now at 27ml/hr. Tolerating EN -- residual 10-15ml. Accuchek's remains elevated 206-310. To change current EN to Vital HighProtein d/t increased Propofl rate. Malnutrition Assessment:  Malnutrition Status:   At risk for malnutrition (Comment)    Context:  Acute Illness     Findings of the 6 clinical characteristics of malnutrition:  Energy Intake:  Mild decrease in energy intake (Comment)  Weight Loss:  No significant weight loss     Body Fat Loss:  No significant body fat loss     Muscle Mass Loss:  No significant muscle mass loss    Fluid Accumulation:  No significant fluid accumulation Extremities   Strength:  Not Performed    Estimated Daily Nutrient Needs:  Energy (kcal):  0271-8378 kcals/day; Weight Used for Energy Requirements:  Current     Protein (g):  110-131 g/pro/day; Weight Used for Protein Requirements:  Ideal (1.5-1.8 g/kg)        Fluid (ml/day):  1216-8357 mL/fluid/day; Method Used for Fluid Requirements:  1 ml/kcal      Nutrition Related Findings:  on vent      Wounds:  None       Current Nutrition Therapies:    Current Tube Feeding (TF) Orders:  · Feeding Route: Nasogastric  · Formula: Diabetic  · Schedule: Continuous  · Additives/Modulars: Protein  · Water Flushes: 25ml/hr  · Current TF & Flush Orders Provides: 30ml/hr Glucerna 1.5= 1080 kcals with 59 g protein, 95g CHO and 546 ml free water from formula and 480ml free water from flush  Proteinex adds another 104 kcals with 26g protein  · Goal TF & Flush Orders Provides: Vital Hi Protein goal rate 52ml/hr = 1243 kcals with 109g protein, 139 g CHO and 1043ml free water from formula, 600ml free water from flush and Propofol provides another 712 NP kcals      Anthropometric Measures:  · Height: 5' 9\" (175.3 cm)  · Current Body Weight: 199 lb (90.3 kg)   · Admission Body Weight: 195 lb (88.5 kg)    · Usual Body Weight: 245 lb (111.1 kg) (12/2019)     · Ideal Body Weight: 160 lbs; % Ideal Body Weight 123.9 %   · BMI: 29.4  · Adjusted Body Weight:  ; No Adjustment   · BMI Categories: Overweight (BMI 25.0-29. 9)       Nutrition Diagnosis:   · Inadequate oral intake related to acute injury/trauma, impaired respiratory function as evidenced by NPO or clear liquid status due to medical condition, intubation, nutrition support - enteral nutrition      Nutrition Interventions:   Food and/or Nutrient Delivery:  Continue NPO, Continue Current Tube Feeding  Nutrition Education/Counseling:  Education not indicated   Coordination of Nutrition Care:  Continue to monitor while inpatient    Goals:  New goal: Pt will extubate to po diet or nutritional needs will be met through RENEE. Nutrition Monitoring and Evaluation:   Behavioral-Environmental Outcomes:  None Identified   Food/Nutrient Intake Outcomes:  Enteral Nutrition Intake/Tolerance  Physical Signs/Symptoms Outcomes:  Biochemical Data, Weight, Skin, Nutrition Focused Physical Findings     Discharge Planning:     Too soon to determine     Electronically signed by Sridhar Alvarado MS, RD, LD on 8/11/21 at 1:03 PM CDT    Contact: [phone]

## 2021-08-11 NOTE — PLAN OF CARE
Nutrition Problem #1: Inadequate oral intake  Intervention: Food and/or Nutrient Delivery: Continue NPO, Continue Current Tube Feeding  Nutritional Goals: New goal: Pt will extubate to po diet or nutritional needs will be met through RENEE.

## 2021-08-12 NOTE — PROCEDURES
[facility] [place], Cardiothoracic Surgery  [address], [place] [address]  Phone: [phone]  Fax: [phone]      Procedure Note      Pre-Procedure Diagnosis:  1. Bilateral pneumothoraces      2. Resp failure. 3. Covid19      4. Pneumonia. Post-Procedure Diagnosis: Same    Procedure: Left-sided chest tube insertion. Surgeon: Miri Michelle MD    Anesthesia: Local. GETA. Blood Loss: Minimal.     Blood Products: None. Drains: 8FR pneumothorax kit placed. Indications: Pt is a 53yom intubated and sedated with Covid19 and bacterial pneumonia. He has bilateral pneumothoraces with the left side being more significant than the right with evidence of sq emphysema. Procedure in detail: Pt's wife was consented. The left chest was prepped and draped in sterile fashion. A time-out was performed. The anatomical landmarks of the inframammary crease and the mid axillary line were identified. Local anesthetic was injected at a spot that appeared to be at the 5th intercostal space. An incision was made and the tube was placed. The tube was secured to the skin with a silk suture and a CXR performed to confirm placement. Patient tolerated the procedure well.      Miri Michelle MD

## 2021-08-12 NOTE — CONSULTS
**Physician Signature**  This document was electronically signed by: Kal Davies MD  2021   11:18 AM    **Consult Information**  Member Facility: [address] [place] [address] MRN: [id number]  Visit/Encounter Number: [id number]  Consult ID: [id number]  Facility Time Zone: CT  Date and Time of Request: 2021 06:04 AM  CT  Requesting Clinician: DR. Dajuan Sampson  Patient Name: Roberto Snow  YOB: 1970  Gender: Male  Patient identity was confirmed at the beginning of the consult with the   patient/family/staff using two personal identifiers: Patient name and       **Reason for Consult**  Reason for Consult: [facility]    **Admission**  Admission Date: 2021  Chief reason for ICU admission: Diabetic Ketoacidosis  Secondary reasons for ICU admission: Respiratory Failure, COVID -   19    **Core Metrics**  General orienting sentence for patient: 50 yo man admitted for DKA. He was   Covid positive on . . Now on an insulin infusion. off. Held Lantus   so sugar up. Nothing else is new today. On room air. O2 sats dropping now on   15  liters. I have suggested the BiPAP cycle   started on steroids last night HFNC. far. holding his own on BiPAP HFNC   today. bed 100% on 70L CXR showed worsening pneumonia. NO antibiotics yet. Intubated at 2 AM.  100%, PEEP of 10. Prone today. after 18 hours prone. ?   ECMO being discussed. 80% FIO2, 11 PEEP. Prone for 18 hours a day.   75%, PEEP   of 12. blood cultures from central line, GPC clusters, + fever 101.1. + SQ   emphysema  Chief physiologic deterioration: Increase in FiO2 required by   30%  Is the patient on DVT prophylaxis?: Yes  Prophylaxis type: Mechanical, Pharmacological  DVT Prophylaxis Comments: Eliquis  Is the patient on GI prophylaxis?: Yes  Gi Prophylaxis Comments: Pepcid  Has this patient reached their nutritional goal?: Yes  Nutritional Goals Details: TFs  Are there current issues with pain management in this patient?:

## 2021-08-12 NOTE — PROGRESS NOTES
Versed drip remaining (approx 90mL) wasted with Carolina Albrecht RN.      Electronically signed by Sherren Najjar, RN on 8/12/2021 at 4:47 PM

## 2021-08-12 NOTE — PROGRESS NOTES
8/12/2021  2:57 PM - Eber Kyin Incoming Lab Results From [facility] Value Ref Range & Units Status Collected Lab   pH, Arterial 7.400  7.350 - 7.450 Final 08/12/2021  2:54 PM [facility] Lab   pCO2, Arterial 53. 0High   35.0 - 45.0 mmHg Final 08/12/2021  2:54 PM [facility] Lab   pO2, Arterial 179.0High   80.0 - 100.0 mmHg Final 08/12/2021  2:54 PM [facility] Lab   HCO3, Arterial 32. 8High   22.0 - 26.0 mmol/L Final 08/12/2021  2:54 PM [facility] Excess, Arterial 6. 4High   -2.0 - 2.0 mmol/L Final 08/12/2021  2:54 PM [address] [place] [address] Lab   Hemoglobin, Art, Extended 14.3  14.0 - 18.0 g/dL Final 08/12/2021  2:54 PM [address] [place] [address] Lab   O2 Sat, Arterial 96.7  >92 % Final 08/12/2021  2:54 PM [facility] Lab   Carboxyhgb, Arterial 1.8  0.0 - 5.0 % Final 08/12/2021  2:54 PM [facility] Lab        0.0-1.5   (Smokers 1.5-5.0)    Methemoglobin, Arterial 1.5  <1.5 % Final 08/12/2021  2:54 PM [address] [place] [address] Lab   O2 Content, Arterial 19.8  Not Established mL/dL Final 08/12/2021  2:54 PM [address] [place] [address] Lab   O2 Therapy Unknown   Final 08/12/2021  2:54 PM [address] [place] [address] Lab   Testing Performed By    Lab - Abbreviation Name Director Address Valid Date Range   [address]- - [id number] LAB MD LINA Ferreira/ Bret Lozano [phone] [id number] 08/30/17 0733-Present     Right Brachial, 100%  VC 20, 450, +10, 85%

## 2021-08-12 NOTE — CONSULTS
Pulmonary and Critical Care Consult Note    [address]    MRN# [id number]    Acct# [de-identified]  8/12/2021   5:45 PM CDT    Referring Deric Adkins DO      Chief Complaint: Respiratory failure on mechanical ventilation due to COVID-19 infection    Requesting physician: Dr. Fred Kraft    Reason for consult: Bilateral pneumothorax and pneumomediastinum      HPI: We have been consulted to see this 49y.o. year old male born on 1970. The patient initially was admitted to the hospital on 27 July due to DKA. He was diagnosed with Covid 19 at the same time. He was treated for DKA however his respiratory status started to deteriorate. He required oxygen supplementation that progressed to noninvasive ventilation and finally he was intubated and started on mechanical ventilation on the ninth. Apparently he developed subcutaneous emphysema and had a chest x-ray done that showed bilateral pneumothoraces and pneumomediastinum. I was asked to see him regarding the above. He had been hemodynamically stable. He is off pressors at this time. He was evaluated by thoracic surgery and small caliber chest tube was placed on the left. His gas exchange is adequate on current vent settings with no significant hypoxia. He had been on PEEP of 12 that was reduced gradually through the day to 6 at the current time. His oxygen saturations remain adequate. The patient is obviously sedated and unable to participate effectively in history.       Past Medical History      Past Medical History:   Diagnosis Date    Colitis     Diabetes mellitus ([address]     Erythrocytosis     Intermittent    Gallbladder disease     Hyperlipidemia     Hypertension     Migraine     Sleep apnea     snores     SurgicalHistory  Past Surgical History:   Procedure Laterality Date    CHOLECYSTECTOMY, [address] filed at 8/12/2021 1400  Gross per 24 hour   Intake 1955.46 ml   Output 1700 ml   Net 255.46 ml       General appearance: Intubated sedated on mechanical ventilation   HEENT:has subcutaneous crepitus over the neck and jaw and some areas of the face. Heart: Regular rate and rhythm. Distant sounds no murmur  Lungs: Diminished bilaterally. He does have a rub on right aspect of the chest anteriorly. Small caliber chest tube in place on the left  Abdomen: Soft nontender no organomegalies normal bowel sounds  Extremities: No clubbing or cyanosis he does have edema 1+ bilaterally  Neuro: Sedated on mechanical ventilation  Skin: Largely intact        Labs:  Recent Labs     08/10/21  0316 08/11/21  0430 08/12/21  0412   WBC 35.9* 18.4* 15.6*   RBC 5.53 5.02 4.90   HGB 16.8 15.1 14.9   HCT 51.9 47.7 47.8    119* 124*   MCV 93.9 95.0* 97.6*   MCH 30.4 30.1 30.4   MCHC 32.4* 31.7* 31.2*   RDW 12.9 13.1 13.2      Recent Labs     08/10/21  0316 08/10/21  0410 08/10/21  1711 08/11/21  0430 08/12/21  0412 08/12/21  1230 08/12/21  1454     --   --  138 140 137  --    K 5.4*   < > 5.5 5.4* 5.7* 5.9* 4.7   CL 97*  --   --  101 102 100  --    CO2 23  --   --  26 25 23  --    BUN 27*  --   --  37* 40* 39*  --    CREATININE 0.7  --   --  0.7 0.7 0.7  --    CALCIUM 8.3*  --   --  8.1* 7.8* 7.8*  --    GLUCOSE 254*  --   --  272* 310* 321*  --     < > = values in this interval not displayed. Recent Labs     08/10/21  0410 08/10/21  1711 08/12/21  1454   PHART 7.340* 7.390 7.400   WUB5IEJ 49.0* 47.0* 53.0*   PO2ART 119.0* 99.0 179.0*   WKJ1TDU 26.4* 28.5* 32.8*   B0SMWKAV 96.2 96.0 96.7   BEART -0.3 2.6* 6.4*     Recent Labs     08/11/21  0430 08/12/21  0412 08/12/21  1230   AST 16   < > 27   ALT 39   < > 45*   ALKPHOS 88   < > 89   BILITOT 0.4   < > 0.3   MG 2.9*  --   --    CALCIUM 8.1*   < > 7.8*    < > = values in this interval not displayed.      Recent Labs     08/11/21  1405 08/11/21  1525   BC Nancy    XR CHEST PORTABLE    Result Date: 8/8/2021  1. Persistent groundglass infiltrates in the pulmonary parenchyma most consistent with viral pneumonia. . Signed by Dr Mervin Bailey       My radiograph interpretation/independent review of imaging: Reviewed    Problem list generated by [facility] Problems         Last Modified POA    DKA, type 2, not at goal [facility]) 7/27/2021 Yes             Pulmonary Assessment/plan:    1. Acute hypoxic respiratory failure due to COVID-19 infection on mechanical ventilation at this time. Continue mechanical ventilation support. Adjust ventilator setting as necessary to maintain adequate gas exchange. 2. COVID-19 infection treated appropriately. 3. Bilateral pneumothorax and pneumomediastinum status post left small caliber chest tube placement per cardiothoracic surgery. Lowered the PEEP on the ventilator. He has adequate oxygenation. 4. Sepsis and urinary tract infection. Multifactorial bacterial infections. On antibiotic therapy. Continue. Will ask infectious disease to see. 5. DVT and GI prophylaxis. 6. DKA has clinically resolved. 7. Diabetes care and management per Dr. Fred Kraft. Cc time 36 min.         Kezia Whitaker MD, [facility], [facility]      Electronically signed by Kezia Whitaker MD on 08/12/21 at 5:45 PM

## 2021-08-12 NOTE — CONSULTS
Consultation History & Physical    Date of Admission:  7/27/2021  2:30 PM  Date of Consultation:  8/12/2021    Surgeon: Dr. Henrietta Padilla    PCP:  Krytsal Ochoa MD     Admitting Physician: Dr. David Nuñez, Hospitalist      Reason for Consultation:  Persistent Bilateral Pneumothorax with Subq Air    History of Present Illness:   Mr. Lu Dawson is a 49 y.o. diabetic male who presented to the ER on 07/27/2021 after being found to be COVID-19+. In the ER he was found to be tachycardic and in DKA. He was admitted to the CCU. Since admission his pulmonary status decreased, requiring intubation on 08/08/2021. Last night after being prone he was found to have subq air. CXR was done that demonstrated pneumomediastinum with subq air in left chest and bilateral pneumothorax. Hospitalist was called and patient was placed on 100% FiO2. This morning repeat CXR demonstrated persistent biapical small pneumothorax, pneumomediastinum and chest wall and neck soft tissue emphysema. Therefore, CT surgery consulted for evaluation and management. Past Medical History:    Past Medical History:   Diagnosis Date    Colitis     Diabetes mellitus ([address]     Erythrocytosis     Intermittent    Gallbladder disease     Hyperlipidemia     Hypertension     Migraine     Sleep apnea     snores       Past Surgical History:    Past Surgical History:   Procedure Laterality Date    CHOLECYSTECTOMY, LAPAROSCOPIC N/A 3/27/2019    CHOLECYSTECTOMY LAPAROSCOPIC performed by Nael Franklin MD at [id number] [place] [address] Medications:   Prior to Admission medications    Medication Sig Start Date End Date Taking? Authorizing Provider   traMADol-acetaminophen (ULTRACET) 37.5-325 MG per tablet Take 1 tablet by mouth every 6 hours as needed.   1/21/19  Yes Historical Provider, MD        Facility Administered Medications:    insulin glargine  50 Units Subcutaneous BID    sodium zirconium cyclosilicate  5 g Oral TID    vancomycin (VANCOCIN) intermittent dosing (placeholder)   Other RX Placeholder    vancomycin  2,500 mg Intravenous Once    [START ON 8/13/2021] vancomycin  1,500 mg Intravenous Q12H    cefepime  2,000 mg Intravenous Q12H    lidocaine 1 % injection  5 mL Intradermal Once    sodium chloride flush  5-40 mL Intravenous 2 times per day    ipratropium-albuterol  1 ampule Inhalation Q4H    melatonin  10 mg Oral Daily    famotidine (PEPCID) injection  20 mg Intravenous BID    guaiFENesin  400 mg Oral Q8H    zinc sulfate  50 mg Oral Daily    albuterol sulfate HFA  2 puff Inhalation 4x daily    budesonide-formoterol  2 puff Inhalation BID    dexamethasone  6 mg Intravenous Q12H    montelukast  10 mg Oral Nightly    aspirin  325 mg Oral Daily    insulin lispro  0-6 Units Subcutaneous TID WC    insulin lispro  0-3 Units Subcutaneous Nightly    enoxaparin  30 mg Subcutaneous BID    Vitamin D  2,000 Units Oral Daily       Allergies:  Bee venom, Mushroom extract complex, and Other     Social History:       Social History     Socioeconomic History    Marital status:      Spouse name: Not on file    Number of children: Not on file    Years of education: Not on file    Highest education level: Not on file   Occupational History    Not on file   Tobacco Use    Smoking status: Former Smoker     Types: Cigarettes    Smokeless tobacco: Never Used   Vaping Use    Vaping Use: Never used   Substance and Sexual Activity    Alcohol use: Yes     Comment: occ.     Drug use: Never    Sexual activity: Not on file   Other Topics Concern    Not on file   Social History Narrative    Not on file     Social Determinants of Health     Financial Resource Strain:     Difficulty of Paying Living Expenses:    Food Insecurity:     Worried About Running Out of Food in the Last Year:     [address] in the Last Year:    Transportation Needs:     Lack of Transportation 47.7 47.8   MCV 93.9 95.0* 97.6*    119* 124*     BMP:   Recent Labs     08/10/21  0316 08/10/21  0410 08/10/21  1711 08/11/21  0430 08/12/21  0412     --   --  138 140   K 5.4*   < > 5.5 5.4* 5.7*   CL 97*  --   --  101 102   CO2 23  --   --  26 25   BUN 27*  --   --  37* 40*   CREATININE 0.7  --   --  0.7 0.7    < > = values in this interval not displayed. Liver Profile:  Lab Results   Component Value Date    AST 26 08/12/2021    ALT 44 08/12/2021    BILITOT 0.3 08/12/2021    ALKPHOS 85 08/12/2021     UA:   Lab Results   Component Value Date    COLORU Yellow 08/10/2019    PHUR 6.0 08/10/2019    WBCUA 1 08/07/2019    RBCUA 1 08/07/2019    BACTERIA NEGATIVE 08/07/2019    CLARITYU Clear 08/10/2019    SPECGRAV 1.015 08/10/2019    LEUKOCYTESUR Negative 08/10/2019    UROBILINOGEN 0.2 08/10/2019    BILIRUBINUR SMALL 08/10/2019    BLOODU Negative 08/10/2019    GLUCOSEU 250 08/10/2019       Diagnosis:      1. Acute Hypoxic Respiratory Failure 2' to COVID-19 Pneumonia - Currently Intubated  2. COVID-19 Pneumonia  3. DKA on Admission with Underlying DM, Type 2  4. Bilateral Pneumothoraces 2' to Barotrauma from High PEEP   5. Essential HTN  6. Mixed Hyperlipidemia  7. Obstructive Sleep Apnea      Plan:     1. Proceed with Left Pneumothorax Catheter Placement    Dr. Aba Roca to review records/ images. He will discuss recommendations as well as R/B/A with patient and family. Further recommendations per Dr. Aba Roca.          USAMA Romero  8/12/2021  12:08 PM

## 2021-08-12 NOTE — PROGRESS NOTES
Pharmacy Note  Vancomycin Consult    Robert Guevara is a 49 y.o. male started on Vancomycin for Positive blood cultures/COVID PNA; consult received from Dr. Bladimir Bright to manage therapy. Also receiving the following antibiotics: Cefepime. Active Problems:    DKA, type 2, not at goal [facility])  Resolved Problems:    * No resolved hospital problems. *      Allergies:  Bee venom, Mushroom extract complex, and Other     Temp max: 101.1    Recent Labs     08/11/21  0430 08/12/21  0412   BUN 37* 40*       Recent Labs     08/11/21  0430 08/12/21  0412   CREATININE 0.7 0.7       Recent Labs     08/11/21  0430 08/12/21  0412   WBC 18.4* 15.6*         Intake/Output Summary (Last 24 hours) at 8/12/2021 1039  Last data filed at 8/12/2021 0800  Gross per 24 hour   Intake 2313.66 ml   Output 1825 ml   Net 488.66 ml       Culture Date Source Results   08/11/21 Blood Gram + cocci in clusters   08/11/21 Resp Gram + rods   08/11/21 Urine Sent       Ht Readings from Last 1 Encounters:   08/03/21 5' 9\" (1.753 m)        Wt Readings from Last 1 Encounters:   08/12/21 198 lb 1.6 oz (89.9 kg)         Body mass index is 29.25 kg/m². Estimated Creatinine Clearance: 140 mL/min (based on SCr of 0.7 mg/dL). Assessment/Plan:  Will initiate vancomycin 2500 mg IV load then 1500 mg IV every 12 hours. Timing of trough level will be determined based on culture results, renal function, and clinical response. Thank you for the consult. Will continue to follow.     Electronically signed by Jennifer Reich RPh, BCPS, 8/12/2021,10:43 AM

## 2021-08-12 NOTE — PROGRESS NOTES
Hospitalist Progress Note    Patient:  Hector Jacobo  YOB: 1970  Date of Service: 8/12/2021  MRN: [id number]   Acct: [de-identified]   Primary Care Physician: Dominik Bae MD  Advance Directive: Full Code  Admit Date: 7/27/2021       Hospital Day: 16  Referring Provider: Ann Marie Mackenzie DO    Patient Seen, Chart, Consults, Notes, Labs, Radiology studies reviewed. Subjective:  Hector Jacobo is a 49 y.o. male  whom we are following for COVID-19 pneumonia, hypoxemic respiratory failure. He remains critically ill. Still with very high O2 demands. He is febrile. T-max is 101.0. Blood cultures have grown out MRSA. Urine culture has grown out Enterococcus. He remains on assist-control rate of 20, tidal volume of 450, 8 of PEEP, FiO2 of 0.75.     Allergies:  Bee venom, Mushroom extract complex, and Other    Medicines:  Current Facility-Administered Medications   Medication Dose Route Frequency Provider Last Rate Last Admin    insulin glargine (LANTUS) injection vial 50 Units  50 Units Subcutaneous BID Ann Marie Mackenzie DO        sodium zirconium cyclosilicate [facility]) oral suspension 5 g  5 g Oral TID Ann Marie Mackenzie DO        vancomycin [facility]) intermittent dosing (placeholder)   Other RX Placeholder Ann Marie Mackenzie DO        [START ON 8/13/2021] vancomycin (VANCOCIN) 1,500 mg in dextrose 5 % 500 mL IVPB  1,500 mg Intravenous Q12H Ann Marie Mackenzie DO        ceFEPIme (MAXIPIME) 2,000 mg in sodium chloride (PF) 20 mL IV syringe  2,000 mg Intravenous Q12H Ann Marie Mackenzie DO   2,000 mg at 08/12/21 0749    propofol injection  5-50 mcg/kg/min Intravenous Titrated Zak Haskins MD 27.1 mL/hr at 08/12/21 1421 50 mcg/kg/min at 08/12/21 1421    fentanyl (SUBLIMAZE) infusion 10 mcg/mL  12.5-200 mcg/hr Intravenous Continuous Zak Haskins MD 20 mL/hr at 08/12/21 0801 200 mcg/hr at 08/12/21 0801    midazolam (VERSED) infusion 100mg/100mL  1-10 mg/hr Intravenous Continuous Shayna Presley MD   Stopped at 08/10/21 1647    lidocaine PF 1 % injection 5 mL  5 mL Intradermal Once Mike Crooks MD        sodium chloride flush 0.9 % injection 5-40 mL  5-40 mL Intravenous 2 times per day Mike Crooks MD   10 mL at 08/12/21 0838    sodium chloride flush 0.9 % injection 5-40 mL  5-40 mL Intravenous PRN Mike Crooks MD        0.9 % sodium chloride infusion  25 mL Intravenous PRN Mike Crooks MD        norepinephrine (LEVOPHED) 16 mg in sodium chloride 0.9 % 250 mL infusion  2-100 mcg/min Intravenous Continuous Mike Crooks MD   Stopped at 08/10/21 1648    vecuronium (NORCURON) injection 10 mg  10 mg Intravenous Q4H PRN Mike Crooks MD   10 mg at 08/09/21 1121    ipratropium-albuterol (DUONEB) nebulizer solution 1 ampule  1 ampule Inhalation Q4H Mike Crooks MD   1 ampule at 08/12/21 0753    melatonin disintegrating tablet 10 mg  10 mg Oral Daily Yadiyahaira Adkins, DO   10 mg at 08/12/21 0748    famotidine (PEPCID) injection 20 mg  20 mg Intravenous BID Mike Crooks MD   20 mg at 08/12/21 0753    guaiFENesin tablet 400 mg  400 mg Oral Q8H Mike Crooks MD   400 mg at 08/12/21 1330    zinc sulfate (ZINCATE) capsule 50 mg  50 mg Oral Daily Mike Crooks MD   50 mg at 08/12/21 0753    sodium chloride (OCEAN, BABY AYR) 0.65 % nasal spray 2 spray  2 spray Each Nostril PRN Shayna Presley MD   2 spray at 08/03/21 0601    albuterol sulfate  (90 Base) MCG/ACT inhaler 2 puff  2 puff Inhalation 4x daily Yadi Chafe, DO   2 puff at 08/10/21 1622    budesonide-formoterol (SYMBICORT) 160-4.5 MCG/ACT inhaler 2 puff  2 puff Inhalation BID Yadi Lucille, DO   2 puff at 08/12/21 0809    LORazepam (ATIVAN) injection 1 mg  1 mg Intravenous Q4H PRN Shayna Presley MD   1 mg at 08/12/21 0957    potassium chloride (KLOR-CON M) extended release tablet 40 mEq  40 mEq Oral PRN Hanh Anderson MD   40 mEq at 07/30/21 5609 Or    potassium bicarb-citric acid (EFFER-K) effervescent tablet 40 mEq  40 mEq Oral PRN Jose L Goldman MD        Or    potassium chloride 10 mEq/100 mL IVPB (Peripheral Line)  10 mEq Intravenous PRN Jose L Goldman MD        dexamethasone (PF) (DECADRON) injection 6 mg  6 mg Intravenous Q12H Pinkey Avril, DO   6 mg at 08/12/21 1330    montelukast (SINGULAIR) tablet 10 mg  10 mg Oral Nightly Pinkey Bretering, DO   10 mg at 08/11/21 1957    aspirin tablet 325 mg  325 mg Oral Daily Pinkey Hollering, DO   325 mg at 08/12/21 0748    glucose (GLUTOSE) 40 % oral gel 15 g  15 g Oral PRN Ann Marie Mackenzie, DO        dextrose 50 % IV solution  12.5 g Intravenous PRN Ann Marie Mackenzie, DO        glucagon (rDNA) injection 1 mg  1 mg Intramuscular PRN Ann Marie Mackenzie, DO        dextrose 5 % solution  100 mL/hr Intravenous PRN Ann Marie Mackenzie, DO        traMADol Norval Quan) tablet 50 mg  50 mg Oral Q6H PRN Jose L Goldman MD   50 mg at 08/12/21 0957    insulin lispro (HUMALOG) injection vial 0-6 Units  0-6 Units Subcutaneous TID WC Jose L Goldman MD   3 Units at 08/12/21 1305    insulin lispro (HUMALOG) injection vial 0-3 Units  0-3 Units Subcutaneous Nightly Jose L Goldman MD   1 Units at 08/11/21 2042    0.9 % sodium chloride infusion  25 mL Intravenous PRN Jose L Goldman MD        enoxaparin (LOVENOX) injection 30 mg  30 mg Subcutaneous BID Jose L Goldman MD   30 mg at 08/12/21 0748    ondansetron (ZOFRAN-ODT) disintegrating tablet 4 mg  4 mg Oral Q8H PRN Jose L Goldman MD        Or    ondansetron [facility]) injection 4 mg  4 mg Intravenous Q6H PRN Jose L Goldman MD   4 mg at 08/09/21 0159    acetaminophen (TYLENOL) tablet 650 mg  650 mg Oral Q6H PRN Jose L Goldman MD   650 mg at 08/12/21 0908    Or    acetaminophen (TYLENOL) suppository 650 mg  650 mg Rectal Q6H PRN Jose L Goldman MD        magnesium sulfate 1000 mg in dextrose 5% 100 mL IVPB  1,000 mg Intravenous PRN Jose L Goldman MD  sodium phosphate 10 mmol in dextrose 5 % 250 mL IVPB  10 mmol Intravenous PRN Colby Smith MD        Or    sodium phosphate 15 mmol in dextrose 5 % 250 mL IVPB  15 mmol Intravenous PRN Colby Smith MD   Stopped at 07/29/21 1420    Or    sodium phosphate 20 mmol in dextrose 5 % 500 mL IVPB  20 mmol Intravenous PRN Colby Smith MD 83.3 mL/hr at 07/28/21 2350 Restarted at 07/28/21 2350    polyethylene glycol (GLYCOLAX) packet 17 g  17 g Oral Daily PRN Colby Smith MD        calcium carbonate (TUMS) chewable tablet 500 mg  500 mg Oral TID PRN Colby Smith MD   500 mg at 07/28/21 0006    naloxone (NARCAN) injection 0.4 mg  0.4 mg Intravenous PRN Colby Smith MD        guaiFENesin-dextromethorphan [facility]) 100-10 MG/5ML syrup 5 mL  5 mL Oral Q4H PRN Colby Smith MD   5 mL at 08/08/21 2015    Vitamin D (CHOLECALCIFEROL) tablet 2,000 Units  2,000 Units Oral Daily Colby Smith MD   2,000 Units at 08/12/21 0753       Past Medical History:  Past Medical History:   Diagnosis Date    Colitis     Diabetes mellitus ([address]     Erythrocytosis     Intermittent    Gallbladder disease     Hyperlipidemia     Hypertension     Migraine     Sleep apnea     snores       Past Surgical History:  Past Surgical History:   Procedure Laterality Date    CHOLECYSTECTOMY, LAPAROSCOPIC N/A 3/27/2019    CHOLECYSTECTOMY LAPAROSCOPIC performed by Barron Rodrigues MD at [facility]     VASECTOMY         Family History  Family History   Problem Relation Age of Onset    Diabetes Mother     Heart Disease Mother     High Blood Pressure Mother     Diabetes Sister        Social History  Social History     Socioeconomic History    Marital status:      Spouse name: Not on file    Number of children: Not on file    Years of education: Not on file    Highest education level: Not on file   Occupational History    Not on file   Tobacco Use    Smoking status: Former Smoker     Types: Cigarettes    Smokeless tobacco: Never Used   Vaping Use    Vaping Use: Never used   Substance and Sexual Activity    Alcohol use: Yes     Comment: occ.  Drug use: Never    Sexual activity: Not on file   Other Topics Concern    Not on file   Social History Narrative    Not on file     Social Determinants of Health     Financial Resource Strain:     Difficulty of Paying Living Expenses:    Food Insecurity:     Worried About Running Out of Food in the Last Year:     [address] in the Last Year:    Transportation Needs:     Lack of Transportation (Medical):  Lack of Transportation (Non-Medical):    Physical Activity:     Days of Exercise per Week:     Minutes of Exercise per Session:    Stress:     Feeling of Stress :    Social Connections:     Frequency of Communication with Friends and Family:     Frequency of Social Gatherings with Friends and Family:     Attends [employer] Services:     Active Member of Clubs or Organizations:     Attends Club or Organization Meetings:     Marital Status:    Intimate Partner Violence:     Fear of Current or Ex-Partner:     Emotionally Abused:     Physically Abused:     Sexually Abused:          Review of Systems:    Review of Systems   Unable to perform ROS: Intubated           Objective:  Blood pressure 103/68, pulse 104, temperature 101.3 °F (38.5 °C), temperature source Axillary, resp. rate 21, height 5' 9\" (1.753 m), weight 198 lb 1.6 oz (89.9 kg), SpO2 97 %. Intake/Output Summary (Last 24 hours) at 8/12/2021 1629  Last data filed at 8/12/2021 1400  Gross per 24 hour   Intake 1955.46 ml   Output 1700 ml   Net 255.46 ml       Physical Exam  Vitals and nursing note reviewed. Constitutional:       Appearance: He is ill-appearing. Interventions: He is intubated. HENT:      Head: Normocephalic and atraumatic.       Right Ear: External ear normal.      Left Ear: External ear normal.      Nose: Nose normal.      Mouth/Throat:      Mouth: Mucous membranes are moist.   Eyes:      Conjunctiva/sclera: Conjunctivae normal.      Pupils: Pupils are equal, round, and reactive to light. Cardiovascular:      Rate and Rhythm: Normal rate and regular rhythm. Heart sounds: Normal heart sounds. Pulmonary:      Effort: Pulmonary effort is normal. He is intubated. Breath sounds: Rhonchi present. Abdominal:      General: Abdomen is flat. Palpations: Abdomen is soft. Musculoskeletal:         General: No swelling. Cervical back: Neck supple. No rigidity. Skin:     General: Skin is warm and dry. Labs:  BMP:   Recent Labs     08/10/21  0316 08/10/21  0410 08/11/21  0430 08/12/21  0412 08/12/21  1454     --  138 140  --    K 5.4*   < > 5.4* 5.7* 4.7   CL 97*  --  101 102  --    CO2 23  --  26 25  --    BUN 27*  --  37* 40*  --    CREATININE 0.7  --  0.7 0.7  --    CALCIUM 8.3*  --  8.1* 7.8*  --     < > = values in this interval not displayed. CBC:   Recent Labs     08/10/21  0316 08/11/21  0430 08/12/21  0412   WBC 35.9* 18.4* 15.6*   HGB 16.8 15.1 14.9   HCT 51.9 47.7 47.8   MCV 93.9 95.0* 97.6*    119* 124*     LIVER PROFILE:   Recent Labs     08/10/21  0316 08/11/21  0430 08/12/21  0412   AST 20 16 26   ALT 55* 39 44*   BILITOT 0.6 0.4 0.3   ALKPHOS 104 88 85     PT/INR: No results for input(s): PROTIME, INR in the last 72 hours. APTT: No results for input(s): APTT in the last 72 hours. BNP:  No results for input(s): BNP in the last 72 hours. Ionized Calcium:No results for input(s): IONCA in the last 72 hours. Magnesium:  Recent Labs     08/10/21  0316 08/11/21  0430   MG 2.4 2.9*     Phosphorus:No results for input(s): PHOS in the last 72 hours. HgbA1C: No results for input(s): LABA1C in the last 72 hours.   Hepatic:   Recent Labs     08/10/21  0316 08/11/21  0430 08/12/21  0412   ALKPHOS 104 88 85   ALT 55* 39 44*   AST 20 16 26   PROT 6.1* 5.7* 5.7*   BILITOT 0.6 0.4 0.3   LABALBU 3.3* 3.0* 3.1*     Lactic Acid: No results for input(s): LACTA in the last 72 hours. Troponin: No results for input(s): CKTOTAL, CKMB, TROPONINT in the last 72 hours. ABGs: No results for input(s): PH, PCO2, PO2, HCO3, O2SAT in the last 72 hours. CRP:    Recent Labs     08/10/21  0316 08/11/21  0430 08/12/21  0412   CRP 2.84* 4.28* 2.04*     Sed Rate:  No results for input(s): SEDRATE in the last 72 hours. Cultures:   No results for input(s): CULTURE in the last 72 hours. Recent Labs     08/11/21  1415   BLOODCULT2  Bottle volume = >10 ml  Gram stain Aerobic bottle:  Gram positive cocci in clusters  resembling Staphylococcus  Culture in progress  Please notify Physician   Bottle volume = >10 ml  Gram stain Anaerobic bottle:  Gram positive cocci in clusters  resembling Staphylococcus  Culture in progress  Please notify Physician  *     No results for input(s): CXSURG in the last 72 hours. Radiology reports as per the Radiologist  Radiology: XR CHEST PORTABLE    Result Date: 7/27/2021  XR CHEST PORTABLE 7/27/2021 3:16 PM HISTORY: History: Dyspnea, covid positive COMPARISON: None. CXR: A single frontal view of the chest is performed. Findings: Diminished level of inspiration with basilar atelectasis. No dense consolidation or radiographic evidence of edema. The heart appears normal in size. No pleural effusion or pneumothorax. Acute appearing bony pathology. 1.. Diminished level of inspiration with basilar atelectasis. No lobar consolidation or radiographic evidence for edema. Signed by Dr Ni Kingsley     COVID-19 pneumonia. Continue current medical management.     Hypoxemic respiratory failure secondary to #1. Continue ventilatory support. FiO2 requirements are the same. MRSA bacteremia. Begin vancomycin. Enterococcus faecalis urinary tract infection. Await sensitivities.     [place] is poor.     Please document 44 minutes of critical care time for patient assessment, chart review, discussion with staff, .       Blima Brightly, DO

## 2021-08-12 NOTE — PROGRESS NOTES
Spoke with pt's wife, Fabiola Giles, to get phone consent for MD to place left-sided chest tube. Pt's wife gave consent for chest tube at 1143.      Double verified over the phone with Diego Bach RN

## 2021-08-13 NOTE — CONSULTS
**Physician Signature**  This document was electronically signed by: Stephie Kinsey MD  2021   10:55 AM    **Consult Information**  Member Facility: [address] [place] [address] MRN: [id number]  Visit/Encounter Number: [id number]  Consult ID: [id number]  Facility Time Zone: CT  Date and Time of Request: 2021 06:08 AM  CT  Requesting Clinician: DR. Mukul Saldana  Patient Name: Jean Marie Hyatt  YOB: 1970  Gender: Male  Patient identity was confirmed at the beginning of the consult with the   patient/family/staff using two personal identifiers: Patient name and       **Reason for Consult**  Reason for Consult: [facility]    **Admission**  Admission Date: 2021  Chief reason for ICU admission: Diabetic Ketoacidosis  Secondary reasons for ICU admission: Respiratory Failure, COVID -   19    **Core Metrics**  General orienting sentence for patient: 48 yo man admitted for DKA. He was   Covid positive on . . Now on an insulin infusion. off. Held Lantus   so sugar up. Nothing else is new today. On room air. O2 sats dropping now on   15  liters. I have suggested the BiPAP cycle   started on steroids last night HFNC. far. holding his own on BiPAP HFNC   today. bed 100% on 70L CXR showed worsening pneumonia. NO antibiotics yet. Intubated at 2 AM.  100%, PEEP of 10. Prone today. after 18 hours prone. ?   ECMO being discussed. 80% FIO2, 11 PEEP. Prone for 18 hours a day.   75%, PEEP   of 12. blood cultures from central line, GPC clusters, + fever 101.1. + SQ   emphysema Multiple positive cultures  Chief physiologic deterioration: None - Stable patient  Is the patient on DVT prophylaxis?: Yes  Prophylaxis type: Mechanical, Pharmacological  DVT Prophylaxis Comments: Eliquis  Is the patient on GI prophylaxis?: Yes  Gi Prophylaxis Comments: Pepcid  Has this patient reached their nutritional goal?: Yes  Nutritional Goals Details: TFs  Are there current issues with pain management in this patient?:   No  Are there issues with skin integrity?: No  Are there issues with delirium?: Yes and issues are being addressed  Delirium Comments: midazolam on top of propofol and fentanyl  Has the patient been mobilized?: No  Is this patient currently intubated?: Yes  Has facility initiated vent bundle?: Yes  Are there ethical or care philosophy or family issues?: No  Do you recommend an in depth evaluation?: No  Disposition Recommendations: Continue ICU level of care    **Inserted/Removed Devices**  Device Name: Endotracheal Tube  Site of insertion: Trachea  Date of insertion: 08-  Device Name: Choi Catheter  Site of insertion: Urethra  Date of insertion: 08-  Device Name: Orogastric Tube  Site of insertion: Mouth  Date of insertion: 08-  Device Name: PICC  Site of insertion: Left Upper Arm  Date of insertion: 08-  Date of device removal: 08-  Comment: Needs new IV site b/o Staph in blood.     **Physician Signature**  This document was electronically signed by: Aidan Hu MD  08/13/2021   10:55 AM

## 2021-08-13 NOTE — PROGRESS NOTES
pH, Arterial 7.420  7.350 - 7.450 Final 08/13/2021  4:42 AM [facility]   pCO2, Arterial 49. 0High   35.0 - 45.0 mmHg Final 08/13/2021  4:42 AM [facility]   pO2, Arterial 80.0  80.0 - 100.0 mmHg Final 08/13/2021  4:42 AM [facility]   HCO3, Arterial 31.8High   22.0 - 26.0 mmol/L Final 08/13/2021  4:42 AM [facility] Excess, Arterial 6.0High   -2.0 - 2.0 mmol/L Final 08/13/2021  4:42 AM [facility]   Hemoglobin, Art, Extended 14.6  14.0 - 18.0 g/dL Final 08/13/2021  4:42 AM [facility]   O2 Sat, Arterial 95.1  >92 % Final 08/13/2021  4:42 AM [facility]   Carboxyhgb, Arterial 1.8  0.0 - 5.0 % Final 08/13/2021  4:42 AM [facility]        0.0-1.5   (Smokers 1.5-5.0)    Methemoglobin, Arterial 1.4  <1.5 % Final 08/13/2021  4:42 AM [facility]   O2 Content, Arterial 19.6  Not Established mL/dL Final 08/13/2021  4:42 AM [facility]   O2 Therapy Unknown          Vc,20,450,70% + 6 peep  Right radial  At+

## 2021-08-13 NOTE — CONSULTS
sodium phosphate IVPB, polyethylene glycol, calcium carbonate, naloxone, guaiFENesin-dextromethorphan    ALLERGIES:      Bee venom, Mushroom extract complex, and Other      SOCIAL HISTORY:     TOBACCO:   reports that he has quit smoking. His smoking use included cigarettes. He has never used smokeless tobacco.     ETOH:   reports current alcohol use. FAMILY HISTORY:         Problem Relation Age of Onset    Diabetes Mother     Heart Disease Mother     High Blood Pressure Mother     Diabetes Sister        REVIEW OF SYSTEMS:     Unobtainable from patient on ventilator    PHYSICAL EXAM:     /73   Pulse 107   Temp 100.1 °F (37.8 °C)   Resp 20   Ht 5' 9\" (1.753 m)   Wt 198 lb 1.6 oz (89.9 kg)   SpO2 95%   BMI 29.25 kg/m²  Temp (24hrs), Av °F (38.3 °C), Min:100 °F (37.8 °C), Max:102.7 °F (39.3 °C)  General: The patient is acutely ill-appearing evaluated through the glass door of . HEENT: ET tube is in place and connected to the ventilator. OG tube is in place. Respiratory: Effort even and unlabored FiO2 of 70%. PEEP is 6. O2 saturations 95%  Chest: Chest tube in place-unable to visualize exit site. GI: Tube feeding ongoing  Neuro: Mostly sedated on ventilator however patient was noted to spontaneously move his right upper extremity.   Patient is sedated on propofol and fentanyl  Psych: Not agitated          LABS:     CBC with DIFF:   Recent Labs     21  0430 21  0412 21  0418   WBC 18.4* 15.6* 10.7   RBC 5.02 4.90 4.64*   HGB 15.1 14.9 14.0   HCT 47.7 47.8 46.0   MCV 95.0* 97.6* 99.1*   MCH 30.1 30.4 30.2   MCHC 31.7* 31.2* 30.4*   RDW 13.1 13.2 13.2   * 124* 109*   MPV 12.7* 13.0* 12.4   NEUTOPHILPCT 93.0* 88.7* 74.2*   LYMPHOPCT 2.0* 3.0* 11.0*   MONOPCT 4.0 7.2 12.7*   EOSRELPCT 0.1 0.1 1.4   BASOPCT 0.2 0.1 0.3   NEUTROABS 17.1* 13.8* 7.9*   LYMPHSABS 0.4* 0.5* 1.2   MONOSABS 0.70 1.10* 1.40*   EOSABS 0.00 0.00 0.20   BASOSABS 0.00 0.00 0.00 CMP/BMP:  Recent Labs     08/10/21  1711 08/12/21  0412 08/12/21  0412 08/12/21  1230 08/12/21  1454 08/13/21  0418 08/13/21  0442   NA  --  140  --  137  --  141  --    K   < > 5.7*   < > 5.9* 4.7 3.5 3.3   CL  --  102  --  100  --  101  --    CO2  --  25  --  23  --  32*  --    ANIONGAP  --  13  --  14  --  8  --    GLUCOSE  --  310*  --  321*  --  183*  --    BUN  --  40*  --  39*  --  30*  --    CREATININE  --  0.7  --  0.7  --  0.6  --    LABGLOM  --  >60  --  >60  --  >60  --    CALCIUM  --  7.8*  --  7.8*  --  7.1*  --    PROT  --  5.7*  --  5.7*  --  5.0*  --    LABALBU  --  3.1*  --  3.2*  --  2.5*  --    BILITOT  --  0.3  --  0.3  --  0.4  --    ALKPHOS  --  85  --  89  --  91  --    ALT  --  44*  --  45*  --  223*  --    AST  --  26  --  27  --  279*  --     < > = values in this interval not displayed. Culture:   August 11 blood cultures drawn from peripheral, left negative  August 11 blood cultures from central line, both bottles positive for staph epidermidis-appears to have 2 colony types in isolation in progress. Recent Labs     08/11/21  1405 08/11/21  1415   BC No Growth to date. Any change in status will be called. --    BLOODCULT2  --   Bottle volume = >10 ml  Gram stain Aerobic bottle:  Gram positive cocci in clusters  resembling Staphylococcus  Culture in progress  Please notify Physician   Bottle volume = >10 ml  Gram stain Anaerobic bottle:  Gram positive cocci in clusters  resembling Staphylococcus  Culture in progress  Please notify Physician  *  Isolated from Aerobic and Anaerobic bottles  Sensitivity to follow     ORG  --  Staphylococcus epidermidis*  Enterococcus faecalis*  Klebsiella pneumoniae*     ORDER#: [id number]                          ORDERED BY: Meagan Huston   SOURCE: Urine Clean Catch  Urine           COLLECTED:  08/11/21 14:15   ANTIBIOTICS AT [facility]. :                      RECEIVED :  08/11/21 15:36   Susceptibility    Enterococcus  faecalis (2)    Antibiotic Interpretation ANTIONETTE Status    ampicillin Sensitive <=2 mcg/mL     benzylpenicillin Sensitive 2 mcg/mL     doxycycline Sensitive       gentamicin-syn Sensitive SYN-S mcg/mL     nitrofurantoin Sensitive <=16 mcg/mL     tetracycline Sensitive <=1 mcg/mL     vancomycin Sensitive 1 mcg/mL     Klebsiella pneumoniae (1)    Antibiotic Interpretation ANTIONETTE Status    ampicillin Resistant >=32 mcg/mL     aztreonam Sensitive <=1 mcg/mL     ceFAZolin Sensitive <=4 mcg/mL     cefepime Sensitive <=1 mcg/mL     cefTRIAXone Sensitive <=1 mcg/mL     ertapenem Sensitive <=0.5 mcg/mL     gentamicin Sensitive <=1 mcg/mL     levofloxacin Sensitive <=0.12 mcg/mL     meropenem Sensitive <=0.25 mcg/mL     nitrofurantoin Resistant 128 mcg/mL     piperacillin-tazobactam Sensitive <=4 mcg/mL     trimethoprim-sulfamethoxazole Sensitive <=20 mcg               IMAGING:     XR CHEST PORTABLE [[id number]] Resulted: 08/13/21 0653      Order Status: Completed Updated: 08/13/21 0655     Narrative:       Examination. XR CHEST PORTABLE 8/13/2021 4:30 AM   History: Follow-up. A frontal portable semiupright view of the chest is compared with the   previous study dated 8/12/2021. There is improvement in the bilateral apical pneumothorax. There is a   trace residual pneumothorax in the bilateral apices. There is improvement in soft tissue emphysema since the previous   study. The pneumomediastinum persists. Endotracheal tube and nasogastric tubes are in unchanged position. The   PICC line have been removed in the interval.   There are atelectatic changes in the lower lung bilaterally, left more   than the right. A small amount of contrast seen in the stomach. There is no acute bony abnormality.     Impression:       A moderate improvement in the bilateral apical   pneumothorax and chest wall emphysema.    Signed by Dr Doraine Landau     Patient Active Problem List   Diagnosis    Colitis    Diarrhea of presumed infectious origin    Abdominal pain, vomiting, and diarrhea    Leukocytosis    Diabetes mellitus, type II, insulin dependent ([facility] [address]    Essential hypertension    Obstructive sleep apnea    SBO (small bowel obstruction) (HCC)    Ileus ([facility] [address]    Secondary erythrocytosis    COVID-19    DKA, type 2, not at goal [facility])     Positive blood cultures  Leukocytosis-peaked August 10 and has normalized  Thrombocytopenia  Transaminitis-no report of hypotension. Positive urine culture-1 day prior urinalysis without any significant leukocyte esterase so questionable significance      Obtain additional blood cultures, very sterile prep. It is possible the patient had a line infection however the fact that there are 2 colony types and it is staph epidermidis, difficult to say if that is contaminant or true infection. Although the white count has improved, would expect the patient to defervesce if this was a source of infection and it has been removed. Also concerned that urine is not the source given the lack of leukocyte Estrace on the urinalysis from the day prior to the culture. Fortunately, the patient is not requiring pressor support. Noted transaminases elevated. Patient has reportedly not had any hypotension patient is status post cholecystectomy    Continue current empiric antibiotics at this time.       Thank you Radha Pardo DO for allowing me to participate in this patient's care  Electronically signed by Aury Costa MD on 8/13/2021 at 8:15 AM

## 2021-08-13 NOTE — PROGRESS NOTES
Comprehensive Nutrition Assessment    Type and Reason for Visit:  Reassess    Nutrition Recommendations/Plan: continue current EN    Nutrition Assessment:  Remains on vent. EN is now at 52ml/hr with 20ml free water flush hourly. Propofol continues at 27ml/hr    Malnutrition Assessment:  Malnutrition Status: At risk for malnutrition (Comment)    Context:  Acute Illness     Findings of the 6 clinical characteristics of malnutrition:  Energy Intake:  No significant decrease in energy intake  Weight Loss:  No significant weight loss     Body Fat Loss:  No significant body fat loss     Muscle Mass Loss:  No significant muscle mass loss    Fluid Accumulation:  1 - Mild Generalized   Strength:  Not Performed    Estimated Daily Nutrient Needs:  Energy (kcal):  6982-7016 kcals/day; Weight Used for Energy Requirements:  Current     Protein (g):  110-131 g/pro/day; Weight Used for Protein Requirements:  Ideal (1.5-1.8 g/kg)        Fluid (ml/day):  6660-4756 mL/fluid/day; Method Used for Fluid Requirements:  1 ml/kcal      Nutrition Related Findings:  on vent      Wounds:  None       Current Nutrition Therapies:    Current Tube Feeding (TF) Orders:  · Feeding Route: Nasogastric  · Formula: Peptide Based High Protein  · Schedule: Continuous  · Additives/Modulars: Protein  · Water Flushes: 25ml/hr  · Current TF & Flush Orders Provides: Vital High Protein 52ml/hr = 1243 kcals with 109g protein, 139 g CHO and 1042 ml free water from formula. 480ml free water from flush  · Goal TF & Flush Orders Provides: Vital High Protein 52ml/hr = 1243 kcals with 109g protein, 139 g CHO and 1042 ml free water from formula. 480ml free water from flush.   Propofol provides another 712 NP kcals      Anthropometric Measures:  · Height: 5' 9\" (175.3 cm)  · Current Body Weight: 198 lb 1.6 oz (89.9 kg)   · Admission Body Weight: 195 lb (88.5 kg)    · Usual Body Weight: 245 lb (111.1 kg) (12/2019)     · Ideal Body Weight: 160 lbs; % Ideal Body Weight 123.8 %   · BMI: 29.2  · Adjusted Body Weight:  ; No Adjustment   · BMI Categories: Overweight (BMI 25.0-29. 9)       Nutrition Diagnosis:   · Inadequate oral intake related to acute injury/trauma, impaired respiratory function as evidenced by NPO or clear liquid status due to medical condition, intubation, nutrition support - enteral nutrition    Nutrition Interventions:   Food and/or Nutrient Delivery:  Continue NPO, Continue Current Tube Feeding  Nutrition Education/Counseling:  Education not indicated   Coordination of Nutrition Care:  Continue to monitor while inpatient    Goals:  New goal: Pt will extubate to po diet or nutritional needs will be met through [facility]. Nutrition Monitoring and Evaluation:   Behavioral-Environmental Outcomes:  None Identified   Food/Nutrient Intake Outcomes:  Enteral Nutrition Intake/Tolerance  Physical Signs/Symptoms Outcomes:  Biochemical Data, Weight, Skin, Nutrition Focused Physical Findings, Fluid Status or Edema     Discharge Planning:     Too soon to determine     Electronically signed by Ana Senior MS, RD, LD on 8/13/21 at 2:28 PM CDT    Contact: [phone]

## 2021-08-13 NOTE — PROGRESS NOTES
CARDIOTHORACIC SURGERY PROGRESS NOTE    Post Left Pneumothorax Catheter Placement on 2021    SUBJECTIVE: Patient is sedated, but moving right arm. /73   Pulse 107   Temp 100.1 °F (37.8 °C)   Resp 20   Ht 5' 9\" (1.753 m)   Wt 198 lb 1.6 oz (89.9 kg)   SpO2 95%   BMI 29.25 kg/m²   Average, Min, and Max for last 24 hours Vitals:  TEMPERATURE:  Temp  Av °F (38.3 °C)  Min: 100 °F (37.8 °C)  Max: 102.7 °F (39.3 °C)  RESPIRATIONS RANGE: Resp  Av  Min: 18  Max: 22  PULSE RANGE: Pulse  Av.2  Min: 100  Max: 123  BLOOD PRESSURE RANGE:  Systolic (25AON), RWM:540 , Min:103 , ERJ:074   ; Diastolic (05BBX), EVAN:37, Min:63, Max:81    PULSE OXIMETRY RANGE: SpO2  Av.1 %  Min: 93 %  Max: 98 %    I/O last 3 completed shifts: In: 3643.8 [I.V.:1150; NG/GT:1452; IV Piggyback:1041.8]  Out: 1800 [Urine:1800]     Patient seen and evaluated from doorway along with discussion with patient's nurse to save PPE. Respirations even,nonlabored. Continues on ventilatory support with FiO2 decreased to 70% and PEEP down to 6. Subq air appears decreased around neck. Left pneumothorax catheter in good position, noted on CXR with improved bilateral pneumothoraces. No air leak noted.      LABS:  CBC:   Lab Results   Component Value Date    WBC 10.7 2021    RBC 4.64 2021    HGB 14.0 2021    HCT 46.0 2021    MCV 99.1 2021    MCH 30.2 2021    MCHC 30.4 2021    RDW 13.2 2021     2021    MPV 12.4 2021     CMP:    Lab Results   Component Value Date     2021    K 3.3 2021    K 3.5 2021     2021    CO2 32 2021    BUN 30 2021    CREATININE 0.6 2021    GFRAA >59 2021    LABGLOM >60 2021    GLUCOSE 183 2021    PROT 5.0 2021    LABALBU 2.5 2021    CALCIUM 7.1 2021    BILITOT 0.4 2021    ALKPHOS 91 2021     2021     2021 CHEST XRAY: Moderate improvement of bilateral apical pneumothorax and chest wall emphysema. ASSESSMENT:     1. Acute Hypoxic Respiratory Failure 2' to COVID-19 Pneumonia - Currently Intubated  2. COVID-19 Pneumonia  3. DKA on Admission with Underlying DM, Type 2  4. Bilateral Pneumothoraces 2' to Barotrauma from High PEEP   5. Essential HTN  6. Mixed Hyperlipidemia  7. Obstructive Sleep Apnea    PLAN:     1. Continue left pneumothorax catheter to 20 cm suction.       Electronically signed by USAMA Lees on 8/13/21 at 8:50 AM CDT

## 2021-08-13 NOTE — PROGRESS NOTES
Hospitalist Progress Note    Patient:  Frida Apple  YOB: 1970  Date of Service: 8/13/2021  MRN: [id number]   Acct: [de-identified]   Primary Care Physician: Alvaro Mccauley MD  Advance Directive: Full Code  Admit Date: 7/27/2021       Hospital Day: 17  Referring Provider: Kapil Stuart DO    Patient Seen, Chart, Consults, Notes, Labs, Radiology studies reviewed. Subjective:  Frida Apple is a 49 y.o. male  whom we are following for COVID-19 pneumonia, hypoxemic respiratory failure. He has had expansion of his lung. The pneumothorax on the right side is also improved. Hemodynamics are stable. He is not requiring any pressors for blood pressure support. T-max is 100.0 Fahrenheit. I appreciate the help of all of the consultants.     Allergies:  Bee venom, Mushroom extract complex, and Other    Medicines:  Current Facility-Administered Medications   Medication Dose Route Frequency Provider Last Rate Last Admin    insulin glargine (LANTUS) injection vial 50 Units  50 Units Subcutaneous BID Kapil Stuart DO        vancomycin [facility]) intermittent dosing (placeholder)   Other RX Placeholder Kapil Stuart DO        vancomycin (VANCOCIN) 1,500 mg in dextrose 5 % 500 mL IVPB  1,500 mg Intravenous Q12H Kapil Stuart  mL/hr at 08/13/21 1558 1,500 mg at 08/13/21 1558    ceFEPIme (MAXIPIME) 2,000 mg in sodium chloride (PF) 20 mL IV syringe  2,000 mg Intravenous Q12H Kapil Stuart DO   2,000 mg at 08/13/21 0848    propofol injection  5-50 mcg/kg/min Intravenous Titrated Tesfaye Machuca MD 27.1 mL/hr at 08/13/21 1523 50 mcg/kg/min at 08/13/21 1523    fentanyl (SUBLIMAZE) infusion 10 mcg/mL  12.5-200 mcg/hr Intravenous Continuous Tesfaye Machuca MD 20 mL/hr at 08/13/21 1335 200 mcg/hr at 08/13/21 1335    midazolam (VERSED) infusion 100mg/100mL  1-10 mg/hr Intravenous Continuous Tesfaye Machuca MD 3 mL/hr at 08/13/21 1440 3 mg/hr at 08/13/21 1440    lidocaine PF 1 % injection 5 mL  5 mL Intradermal Once Merissa Diaz MD        sodium chloride flush 0.9 % injection 5-40 mL  5-40 mL Intravenous 2 times per day Merissa Diaz MD   10 mL at 08/13/21 0828    sodium chloride flush 0.9 % injection 5-40 mL  5-40 mL Intravenous PRN Merissa Diaz MD        0.9 % sodium chloride infusion  25 mL Intravenous PRN Merissa Diaz MD        norepinephrine (LEVOPHED) 16 mg in sodium chloride 0.9 % 250 mL infusion  2-100 mcg/min Intravenous Continuous Merissa Diaz MD   Stopped at 08/10/21 1648    vecuronium (NORCURON) injection 10 mg  10 mg Intravenous Q4H PRN Merissa Diaz MD   10 mg at 08/09/21 1121    ipratropium-albuterol (DUONEB) nebulizer solution 1 ampule  1 ampule Inhalation Q4H Merissa Diaz MD   1 ampule at 08/13/21 1626    melatonin disintegrating tablet 10 mg  10 mg Oral Daily Philippe Taylor DO   10 mg at 08/13/21 4936    famotidine (PEPCID) injection 20 mg  20 mg Intravenous BID Merissa Diaz MD   20 mg at 08/13/21 0827    zinc sulfate (ZINCATE) capsule 50 mg  50 mg Oral Daily Merissa Diaz MD   50 mg at 08/13/21 0828    sodium chloride (OCEAN, BABY AYR) 0.65 % nasal spray 2 spray  2 spray Each Nostril PRN Alden Rosales MD   2 spray at 08/03/21 0601    albuterol sulfate  (90 Base) MCG/ACT inhaler 2 puff  2 puff Inhalation 4x daily Philippe Taylor DO   2 puff at 08/10/21 1622    budesonide-formoterol (SYMBICORT) 160-4.5 MCG/ACT inhaler 2 puff  2 puff Inhalation BID Philippe Taylor DO   2 puff at 08/13/21 0842    LORazepam (ATIVAN) injection 1 mg  1 mg Intravenous Q4H PRN Alden Rosales MD   1 mg at 08/12/21 0957    potassium chloride (KLOR-CON M) extended release tablet 40 mEq  40 mEq Oral PRN Steve Walton MD   40 mEq at 07/30/21 0735    Or    potassium bicarb-citric acid (EFFER-K) effervescent tablet 40 mEq  40 mEq Oral PRN Steve Walton MD        Or    potassium chloride 10 mEq/100 mL IVPB (Peripheral Line)  10 mEq Intravenous PRN Karri Mendoza MD        montelukast (SINGULAIR) tablet 10 mg  10 mg Oral Nightly Cubaer Henry, DO   10 mg at 08/12/21 2046    aspirin tablet 325 mg  325 mg Oral Daily Mateusz Figueroa, DO   325 mg at 08/13/21 0828    glucose (GLUTOSE) 40 % oral gel 15 g  15 g Oral PRN Mateusz Figueroa, DO        dextrose 50 % IV solution  12.5 g Intravenous PRN Mateusz Figueroa, DO        glucagon (rDNA) injection 1 mg  1 mg Intramuscular PRN Mateusz Figueroa, DO        dextrose 5 % solution  100 mL/hr Intravenous PRN Mateusz Figueroa, DO        traMADol Yelm Bur) tablet 50 mg  50 mg Oral Q6H PRN Karri Mendoza MD   50 mg at 08/12/21 0957    insulin lispro (HUMALOG) injection vial 0-6 Units  0-6 Units Subcutaneous TID WC Karri Mendoza MD   3 Units at 08/12/21 1305    insulin lispro (HUMALOG) injection vial 0-3 Units  0-3 Units Subcutaneous Nightly Karri Mendoza MD   1 Units at 08/11/21 2042    0.9 % sodium chloride infusion  25 mL Intravenous PRN Karri Mendoza MD        enoxaparin (LOVENOX) injection 30 mg  30 mg Subcutaneous BID Karri Mendoza MD   30 mg at 08/13/21 0955    ondansetron (ZOFRAN-ODT) disintegrating tablet 4 mg  4 mg Oral Q8H PRN Karri Mendoza MD        Or    ondansetron [facility]) injection 4 mg  4 mg Intravenous Q6H PRN Karri Mendoza MD   4 mg at 08/09/21 0159    acetaminophen (TYLENOL) tablet 650 mg  650 mg Oral Q6H PRN Karri Mendoza MD   650 mg at 08/13/21 1158    Or    acetaminophen (TYLENOL) suppository 650 mg  650 mg Rectal Q6H PRN Karri Mendoza MD        magnesium sulfate 1000 mg in dextrose 5% 100 mL IVPB  1,000 mg Intravenous PRN Karri Mendoza MD        sodium phosphate 10 mmol in dextrose 5 % 250 mL IVPB  10 mmol Intravenous PRN Karri Mendoza MD        Or    sodium phosphate 15 mmol in dextrose 5 % 250 mL IVPB  15 mmol Intravenous PRN Karri Mendoza MD   Stopped at 07/29/21 1420    Or    sodium phosphate 20 mmol in Not on file     Social Determinants of Health     Financial Resource Strain:     Difficulty of Paying Living Expenses:    Food Insecurity:     Worried About Running Out of Food in the Last Year:     [address] in the Last Year:    Transportation Needs:     Lack of Transportation (Medical):  Lack of Transportation (Non-Medical):    Physical Activity:     Days of Exercise per Week:     Minutes of Exercise per Session:    Stress:     Feeling of Stress :    Social Connections:     Frequency of Communication with Friends and Family:     Frequency of Social Gatherings with Friends and Family:     Attends Congregational Services:     Active Member of Clubs or Organizations:     Attends Club or Organization Meetings:     Marital Status:    Intimate Partner Violence:     Fear of Current or Ex-Partner:     Emotionally Abused:     Physically Abused:     Sexually Abused:          Review of Systems:    Review of Systems   Unable to perform ROS: Intubated           Objective:  Blood pressure 123/74, pulse 110, temperature 99.9 °F (37.7 °C), temperature source Axillary, resp. rate 26, height 5' 9\" (1.753 m), weight 198 lb 1.6 oz (89.9 kg), SpO2 95 %. Intake/Output Summary (Last 24 hours) at 8/13/2021 1655  Last data filed at 8/13/2021 1200  Gross per 24 hour   Intake 3476.14 ml   Output 1175 ml   Net 2301.14 ml       Physical Exam  Vitals and nursing note reviewed. Constitutional:       Appearance: He is ill-appearing. Interventions: He is intubated. HENT:      Head: Normocephalic and atraumatic. Right Ear: External ear normal.      Left Ear: External ear normal.      Nose: Nose normal.      Mouth/Throat:      Mouth: Mucous membranes are moist.   Eyes:      Conjunctiva/sclera: Conjunctivae normal.      Pupils: Pupils are equal, round, and reactive to light. Cardiovascular:      Rate and Rhythm: Normal rate and regular rhythm. Heart sounds: Normal heart sounds.    Pulmonary:      Effort: Pulmonary effort is normal. He is intubated. Breath sounds: Rhonchi present. Abdominal:      General: Abdomen is flat. Palpations: Abdomen is soft. Musculoskeletal:         General: No swelling. Cervical back: Neck supple. No rigidity. Skin:     General: Skin is warm and dry. Labs:  BMP:   Recent Labs     08/10/21  1711 08/12/21  0412 08/12/21  0412 08/12/21  1230 08/12/21  1454 08/13/21  0418 08/13/21  0442   NA  --  140  --  137  --  141  --    K   < > 5.7*   < > 5.9* 4.7 3.5 3.3   CL  --  102  --  100  --  101  --    CO2  --  25  --  23  --  32*  --    BUN  --  40*  --  39*  --  30*  --    CREATININE  --  0.7  --  0.7  --  0.6  --    CALCIUM  --  7.8*  --  7.8*  --  7.1*  --     < > = values in this interval not displayed. CBC:   Recent Labs     08/11/21  0430 08/12/21  0412 08/13/21 0418   WBC 18.4* 15.6* 10.7   HGB 15.1 14.9 14.0   HCT 47.7 47.8 46.0   MCV 95.0* 97.6* 99.1*   * 124* 109*     LIVER PROFILE:   Recent Labs     08/12/21  0412 08/12/21  1230 08/13/21  0418   AST 26 27 279*   ALT 44* 45* 223*   BILITOT 0.3 0.3 0.4   ALKPHOS 85 89 91     PT/INR: No results for input(s): PROTIME, INR in the last 72 hours. APTT: No results for input(s): APTT in the last 72 hours. BNP:  No results for input(s): BNP in the last 72 hours. Ionized Calcium:No results for input(s): IONCA in the last 72 hours. Magnesium:  Recent Labs     08/11/21  0430 08/13/21 0418   MG 2.9* 2.8*     Phosphorus:No results for input(s): PHOS in the last 72 hours. HgbA1C: No results for input(s): LABA1C in the last 72 hours. Hepatic:   Recent Labs     08/12/21  0412 08/12/21  1230 08/13/21  0418   ALKPHOS 85 89 91   ALT 44* 45* 223*   AST 26 27 279*   PROT 5.7* 5.7* 5.0*   BILITOT 0.3 0.3 0.4   LABALBU 3.1* 3.2* 2.5*     Lactic Acid: No results for input(s): LACTA in the last 72 hours. Troponin: No results for input(s): CKTOTAL, CKMB, TROPONINT in the last 72 hours.   ABGs: No results for input(s): PH, PCO2, PO2, HCO3, O2SAT in the last 72 hours. CRP:    Recent Labs     08/11/21  0430 08/12/21  0412   CRP 4.28* 2.04*     Sed Rate:  No results for input(s): SEDRATE in the last 72 hours. Cultures:   No results for input(s): CULTURE in the last 72 hours. Recent Labs     08/11/21  1405 08/11/21  1415   BC No Growth to date. Any change in status will be called. --    BLOODCULT2  --   Bottle volume = >10 ml  Gram stain Aerobic bottle:  Gram positive cocci in clusters  resembling Staphylococcus  Culture in progress  Please notify Physician   Bottle volume = >10 ml  Gram stain Anaerobic bottle:  Gram positive cocci in clusters  resembling Staphylococcus  Culture in progress  Please notify Physician  *  Isolated from Aerobic and Anaerobic bottles  Sensitivity to follow       No results for input(s): CXSURG in the last 72 hours. Radiology reports as per the Radiologist  Radiology: XR CHEST PORTABLE    Result Date: 7/27/2021  XR CHEST PORTABLE 7/27/2021 3:16 PM HISTORY: History: Dyspnea, covid positive COMPARISON: None. CXR: A single frontal view of the chest is performed. Findings: Diminished level of inspiration with basilar atelectasis. No dense consolidation or radiographic evidence of edema. The heart appears normal in size. No pleural effusion or pneumothorax. Acute appearing bony pathology. 1.. Diminished level of inspiration with basilar atelectasis. No lobar consolidation or radiographic evidence for edema. Signed by Dr Miriam Fletcher     COVID-19 pneumonia. Continue current medical management.     Hypoxemic respiratory failure secondary to #1. Continue ventilatory support. FiO2 requirements are the same.     Staff epidermidis bacteremia. Continue vancomycin for now. Possible contaminant. Await follow-up cultures     Enterococcus faecalis urinary tract infection. Ampicillin sensitive. Klebsiella pneumonia UTI. Infectious disease following.     Bilateral

## 2021-08-14 NOTE — PROGRESS NOTES
Hospitalist Progress Note    Patient:  Aroldo Delarosa  YOB: 1970  Date of Service: 8/14/2021  MRN: [id number]   Acct: [de-identified]   Primary Care Physician: Dariusz Mckeon MD  Advance Directive: Full Code  Admit Date: 7/27/2021       Hospital Day: 18  Referring Provider: Mireya Modi DO    Patient Seen, Chart, Consults, Notes, Labs, Radiology studies reviewed. Subjective:  Aroldo Delarosa is a 49 y.o. male  whom we are following for COVID-19 pneumonia, hypoxemic respiratory failure, bilateral pneumothoraces, possible urinary tract infection. He is having some problems with oxygenation this morning. I discussed with CT surgery, and he has recommended consideration for transfer to tertiary care center for ECMO therapy. [facility]. I discussed with the team at [facility]. He is on the wait list there. He is #2 in line for potential openings. He remains critically ill. He is on assist-control rate of 20, tidal volume of 450, PEEP of 6, FiO2 of 1.     Allergies:  Bee venom, Mushroom extract complex, and Other    Medicines:  Current Facility-Administered Medications   Medication Dose Route Frequency Provider Last Rate Last Admin    dexmedetomidine (PRECEDEX) 400 mcg in sodium chloride 0.9 % 100 mL infusion  0.2-1.4 mcg/kg/hr Intravenous Continuous Clem Butts MD 13.5 mL/hr at 08/14/21 0835 0.6 mcg/kg/hr at 08/14/21 0835    metoprolol (LOPRESSOR) injection 5 mg  5 mg Intravenous Q6H PRN Clem Butts MD   5 mg at 08/14/21 3858    vecuronium (NORCURON) injection 20 mg  20 mg Intravenous Q4H PRN Clem Butts MD        ibuprofen (ADVIL;MOTRIN) tablet 400 mg  400 mg Oral Q6H PRN Mireya Modi DO        insulin glargine (LANTUS) injection vial 50 Units  50 Units Subcutaneous BID Mireya Modi DO   50 Units at 08/13/21 2000    vancomycin (VANCOCIN) intermittent dosing (placeholder)   Other RX Placeholder Mireya Modi DO        vancomycin (VANCOCIN) 1,500 mg in dextrose 5 % 500 mL IVPB  1,500 mg Intravenous Q12H Mateusz Figueroa  mL/hr at 08/14/21 1238 1,500 mg at 08/14/21 1238    ceFEPIme (MAXIPIME) 2,000 mg in sodium chloride (PF) 20 mL IV syringe  2,000 mg Intravenous Q12H Mateusz Figueroa DO   2,000 mg at 08/14/21 0735    propofol injection  5-50 mcg/kg/min Intravenous Titrated Savannah Johns MD 27.1 mL/hr at 08/14/21 1056 50 mcg/kg/min at 08/14/21 1056    fentanyl (SUBLIMAZE) infusion 10 mcg/mL  12.5-200 mcg/hr Intravenous Continuous Savannah Johns MD 20 mL/hr at 08/14/21 0200 200 mcg/hr at 08/14/21 0200    midazolam (VERSED) infusion 100mg/100mL  1-10 mg/hr Intravenous Continuous Savannah Johns MD 5 mL/hr at 08/14/21 1238 5 mg/hr at 08/14/21 1238    lidocaine PF 1 % injection 5 mL  5 mL Intradermal Once Shelly Loja MD        sodium chloride flush 0.9 % injection 5-40 mL  5-40 mL Intravenous 2 times per day Shelly Loja MD   10 mL at 08/14/21 0916    sodium chloride flush 0.9 % injection 5-40 mL  5-40 mL Intravenous PRN Shelly Loja MD        0.9 % sodium chloride infusion  25 mL Intravenous PRN Shelly Loja MD        norepinephrine (LEVOPHED) 16 mg in sodium chloride 0.9 % 250 mL infusion  2-100 mcg/min Intravenous Continuous Shelly Loja MD 8.4 mL/hr at 08/14/21 1134 9 mcg/min at 08/14/21 1134    ipratropium-albuterol (DUONEB) nebulizer solution 1 ampule  1 ampule Inhalation Q4H Shelly Loja MD   1 ampule at 08/14/21 1238    melatonin disintegrating tablet 10 mg  10 mg Oral Daily Mateusz Figueroa DO   10 mg at 08/14/21 0915    famotidine (PEPCID) injection 20 mg  20 mg Intravenous BID Shelly Loja MD   20 mg at 08/14/21 0915    zinc sulfate (ZINCATE) capsule 50 mg  50 mg Oral Daily Shelly Loja MD   50 mg at 08/14/21 0915    sodium chloride (OCEAN, BABY AYR) 0.65 % nasal spray 2 spray  2 spray Each Nostril AJ Johns MD   2 spray at 08/03/21 0601    albuterol sulfate  (90 Base) MCG/ACT inhaler 2 puff  2 puff Inhalation 4x daily Mireya Colerain, DO   2 puff at 08/14/21 1238    budesonide-formoterol (SYMBICORT) 160-4.5 MCG/ACT inhaler 2 puff  2 puff Inhalation BID Mireya Colerain, DO   2 puff at 08/14/21 1017    LORazepam (ATIVAN) injection 1 mg  1 mg Intravenous Q4H PRN Joana Bhatt MD   1 mg at 08/12/21 0957    potassium chloride (KLOR-CON M) extended release tablet 40 mEq  40 mEq Oral PRN Clem Butts MD   40 mEq at 07/30/21 0352    Or    potassium bicarb-citric acid (EFFER-K) effervescent tablet 40 mEq  40 mEq Oral PRN Clem Butts MD        Or    potassium chloride 10 mEq/100 mL IVPB (Peripheral Line)  10 mEq Intravenous PRN Clem Butts MD        montelukast (SINGULAIR) tablet 10 mg  10 mg Oral Nightly Mireya Colerain, DO   10 mg at 08/13/21 2221    aspirin tablet 325 mg  325 mg Oral Daily Mireya Colerain, DO   325 mg at 08/14/21 0915    glucose (GLUTOSE) 40 % oral gel 15 g  15 g Oral PRN Mireya Colerain, DO        dextrose 50 % IV solution  12.5 g Intravenous PRN Mireya Colerain, DO        glucagon (rDNA) injection 1 mg  1 mg Intramuscular PRN Mireya Colerain, DO        dextrose 5 % solution  100 mL/hr Intravenous PRN Mireya Colerain, DO        traMADol [place]) tablet 50 mg  50 mg Oral Q6H PRN Clem Butts MD   50 mg at 08/12/21 0957    insulin lispro (HUMALOG) injection vial 0-6 Units  0-6 Units Subcutaneous TID WC Clem Butts MD   1 Units at 08/13/21 1702    insulin lispro (HUMALOG) injection vial 0-3 Units  0-3 Units Subcutaneous Nightly Clem Butts MD   1 Units at 08/13/21 2253    0.9 % sodium chloride infusion  25 mL Intravenous PRN Clem Butts MD        enoxaparin (LOVENOX) injection 30 mg  30 mg Subcutaneous BID Clem Butts MD   30 mg at 08/14/21 0914    ondansetron (ZOFRAN-ODT) disintegrating tablet 4 mg  4 mg Oral Q8H PRN Clem Butts MD        Or   Barbara Talley ondansetron (ZOFRAN) injection 4 mg  4 mg Intravenous Q6H PRN Virginia Dietz MD   4 mg at 08/09/21 0159    acetaminophen (TYLENOL) tablet 650 mg  650 mg Oral Q6H PRN Virginia Dietz MD   650 mg at 08/14/21 0603    Or    acetaminophen (TYLENOL) suppository 650 mg  650 mg Rectal Q6H PRN Virginia Dietz MD        magnesium sulfate 1000 mg in dextrose 5% 100 mL IVPB  1,000 mg Intravenous PRN Virginia Dietz MD        sodium phosphate 10 mmol in dextrose 5 % 250 mL IVPB  10 mmol Intravenous PRN Virginia Dietz MD        Or    sodium phosphate 15 mmol in dextrose 5 % 250 mL IVPB  15 mmol Intravenous PRN Virginia Dietz MD   Stopped at 07/29/21 1420    Or    sodium phosphate 20 mmol in dextrose 5 % 500 mL IVPB  20 mmol Intravenous PRN Virginia Dietz MD 83.3 mL/hr at 07/28/21 2350 Restarted at 07/28/21 2350    polyethylene glycol (GLYCOLAX) packet 17 g  17 g Oral Daily PRN Virginia Dietz MD        calcium carbonate (TUMS) chewable tablet 500 mg  500 mg Oral TID PRN Virginia Dietz MD   500 mg at 07/28/21 0006    naloxone (NARCAN) injection 0.4 mg  0.4 mg Intravenous PRN Virginia Dietz MD        guaiFENesin-dextromethorphan (ROBITUSSIN DM) 100-10 MG/5ML syrup 5 mL  5 mL Oral Q4H PRN Virginia Dietz MD   5 mL at 08/08/21 2015    Vitamin D (CHOLECALCIFEROL) tablet 2,000 Units  2,000 Units Oral Daily Virginia Dietz MD   2,000 Units at 08/14/21 0915       Past Medical History:  Past Medical History:   Diagnosis Date    Colitis     Diabetes mellitus ([facility] [address]     Erythrocytosis     Intermittent    Gallbladder disease     Hyperlipidemia     Hypertension     Migraine     Sleep apnea     snores       Past Surgical History:  Past Surgical History:   Procedure Laterality Date    CHOLECYSTECTOMY, LAPAROSCOPIC N/A 3/27/2019    CHOLECYSTECTOMY LAPAROSCOPIC performed by Hope Bella MD at [facility]     VASECTOMY         Family History  Family History   Problem Relation Age of Onset    Diabetes Mother  Heart Disease Mother     High Blood Pressure Mother     Diabetes Sister        Social History  Social History     Socioeconomic History    Marital status:      Spouse name: Not on file    Number of children: Not on file    Years of education: Not on file    Highest education level: Not on file   Occupational History    Not on file   Tobacco Use    Smoking status: Former Smoker     Types: Cigarettes    Smokeless tobacco: Never Used   Vaping Use    Vaping Use: Never used   Substance and Sexual Activity    Alcohol use: Yes     Comment: occ.  Drug use: Never    Sexual activity: Not on file   Other Topics Concern    Not on file   Social History Narrative    Not on file     Social Determinants of Health     Financial Resource Strain:     Difficulty of Paying Living Expenses:    Food Insecurity:     Worried About Running Out of Food in the Last Year:     [address] in the Last Year:    Transportation Needs:     Lack of Transportation (Medical):  Lack of Transportation (Non-Medical):    Physical Activity:     Days of Exercise per Week:     Minutes of Exercise per Session:    Stress:     Feeling of Stress :    Social Connections:     Frequency of Communication with Friends and Family:     Frequency of Social Gatherings with Friends and Family:     Attends Shinto Services:     Active Member of Clubs or Organizations:     Attends Club or Organization Meetings:     Marital Status:    Intimate Partner Violence:     Fear of Current or Ex-Partner:     Emotionally Abused:     Physically Abused:     Sexually Abused:          Review of Systems:    Review of Systems   Unable to perform ROS: Intubated           Objective:  Blood pressure (!) 96/54, pulse 99, temperature 101.6 °F (38.7 °C), temperature source Axillary, resp. rate 21, height 5' 9\" (1.753 m), weight 204 lb 5 oz (92.7 kg), SpO2 93 %.     Intake/Output Summary (Last 24 hours) at 8/14/2021 1401  Last data filed at 8/14/2021 1200  Gross per 24 hour   Intake 2846.32 ml   Output 1305 ml   Net 1541.32 ml       Physical Exam  Vitals and nursing note reviewed. Constitutional:       Appearance: He is ill-appearing. Interventions: He is intubated. HENT:      Head: Normocephalic and atraumatic. Right Ear: External ear normal.      Left Ear: External ear normal.      Nose: Nose normal.      Mouth/Throat:      Mouth: Mucous membranes are moist.   Eyes:      Conjunctiva/sclera: Conjunctivae normal.      Pupils: Pupils are equal, round, and reactive to light. Cardiovascular:      Rate and Rhythm: Normal rate and regular rhythm. Heart sounds: Normal heart sounds. Pulmonary:      Effort: Pulmonary effort is normal. He is intubated. Breath sounds: Rhonchi present. Abdominal:      General: Abdomen is flat. Palpations: Abdomen is soft. Musculoskeletal:         General: No swelling. Cervical back: Neck supple. No rigidity. Skin:     General: Skin is warm and dry. Labs:  BMP:   Recent Labs     08/12/21  1230 08/12/21  1454 08/13/21  0418 08/13/21  0442 08/14/21  0500     --  141  --  137   K 5.9*   < > 3.5 3.3 4.2     --  101  --  97*   CO2 23  --  32*  --  35*   BUN 39*  --  30*  --  25*   CREATININE 0.7  --  0.6  --  0.6   CALCIUM 7.8*  --  7.1*  --  6.9*    < > = values in this interval not displayed. CBC:   Recent Labs     08/12/21  0412 08/13/21  0418 08/14/21  0500   WBC 15.6* 10.7 4.2*   HGB 14.9 14.0 15.1   HCT 47.8 46.0 49.2   MCV 97.6* 99.1* 96.5*   * 109* 109*     LIVER PROFILE:   Recent Labs     08/12/21  1230 08/13/21  0418 08/14/21  0500   AST 27 279* 211*   ALT 45* 223* 642*   BILITOT 0.3 0.4 0.6   ALKPHOS 89 91 170*     PT/INR: No results for input(s): PROTIME, INR in the last 72 hours. APTT: No results for input(s): APTT in the last 72 hours. BNP:  No results for input(s): BNP in the last 72 hours.   Ionized Calcium:No results for input(s): IONCA in the last 72 hours. Magnesium:  Recent Labs     08/13/21  0418   MG 2.8*     Phosphorus:No results for input(s): PHOS in the last 72 hours. HgbA1C: No results for input(s): LABA1C in the last 72 hours. Hepatic:   Recent Labs     08/12/21  1230 08/13/21  0418 08/14/21  0500   ALKPHOS 89 91 170*   ALT 45* 223* 642*   AST 27 279* 211*   PROT 5.7* 5.0* 5.1*   BILITOT 0.3 0.4 0.6   LABALBU 3.2* 2.5* 2.5*     Lactic Acid:   Recent Labs     08/14/21  0500   LACTA 2.1*     Troponin: No results for input(s): CKTOTAL, CKMB, TROPONINT in the last 72 hours. ABGs: No results for input(s): PH, PCO2, PO2, HCO3, O2SAT in the last 72 hours. CRP:    Recent Labs     08/12/21  0412   CRP 2.04*     Sed Rate:  No results for input(s): SEDRATE in the last 72 hours. Cultures:   No results for input(s): CULTURE in the last 72 hours. Recent Labs     08/11/21  1405 08/11/21  1415   BC No Growth to date. Any change in status will be called. --    BLOODCULT2  --   Bottle volume = >10 ml   Bottle volume = >10 ml  *  Isolated from Aerobic and Anaerobic bottles     No results for input(s): CXSURG in the last 72 hours. Radiology reports as per the Radiologist  Radiology: XR CHEST PORTABLE    Result Date: 7/27/2021  XR CHEST PORTABLE 7/27/2021 3:16 PM HISTORY: History: Dyspnea, covid positive COMPARISON: None. CXR: A single frontal view of the chest is performed. Findings: Diminished level of inspiration with basilar atelectasis. No dense consolidation or radiographic evidence of edema. The heart appears normal in size. No pleural effusion or pneumothorax. Acute appearing bony pathology. 1.. Diminished level of inspiration with basilar atelectasis. No lobar consolidation or radiographic evidence for edema. Signed by Dr Shiela Saldana     COVID-19 pneumonia. Continue current medical management.     Hypoxemic respiratory failure secondary to #1. Refractory hypoxia.   He is currently waitlisted at the [facility] for ECMO.     Staff epidermidis bacteremia. Continue vancomycin for now. Possible contaminant. Await follow-up cultures     Enterococcus faecalis urinary tract infection. Ampicillin sensitive.     Klebsiella pneumonia UTI. Infectious disease following.     Bilateral pneumothoraces and pneumomediastinum. Status post left-sided chest tube. Clinically stable at present. Continue current management.     Please document 90 minutes of critical care time for patient assessment, chart review, discussion with staff, .       Cecilia Holland, DO

## 2021-08-14 NOTE — CONSULTS
**Physician Signature**  This document was electronically signed by: Hero Howard MD  2021   11:03 AM    **Consult Information**  Member Facility: [address] [place] [address] MRN: [id number]  Visit/Encounter Number: [id number]  Consult ID: [id number]  Facility Time Zone: CT  Date and Time of Request: 2021 06:01 AM  CT  Requesting Clinician: DR. Marlene Alberto  Patient Name: Ad Bates  YOB: 1970  Gender: Male  Patient identity was confirmed at the beginning of the consult with the   patient/family/staff using two personal identifiers: Patient name and       **Reason for Consult**  Reason for Consult: [facility]    **Admission**  Admission Date: 2021  Chief reason for ICU admission: Diabetic Ketoacidosis  Secondary reasons for ICU admission: Respiratory Failure, COVID -   19    **Labs and Diagnostic Studies**  Labs: Needs central IV access if levophed on board. **Core Metrics**  General orienting sentence for patient: 50 yo man admitted for DKA. He was   Covid positive on . . Now on an insulin infusion. off. Held Lantus   so sugar up. Nothing else is new today. On room air. O2 sats dropping now on   15  liters. I have suggested the BiPAP cycle   started on steroids last night HFNC. far. holding his own on BiPAP HFNC   today. bed 100% on 70L CXR showed worsening pneumonia. NO antibiotics yet. Intubated at 2 AM.  100%, PEEP of 10. Prone today. after 18 hours prone. ?   ECMO being discussed. 80% FIO2, 11 PEEP. Prone for 18 hours a day. 75%, PEEP   of 12. blood cultures from central line, GPC clusters, + fever 101.1. + SQ   emphysema Multiple positive cultures proning. Levophed restarted.  Blood cx +   Staph. epi. but also \"MRSA   +\"  Chief physiologic deterioration: Increase in FiO2 required by   30%  Is the patient on DVT prophylaxis?: Yes  Prophylaxis type: Mechanical, Pharmacological  DVT Prophylaxis Comments: Eliquis  Is the patient on GI prophylaxis?:

## 2021-08-14 NOTE — PROGRESS NOTES
CARDIOTHORACIC SURGERY PROGRESS NOTE    Post Left Pneumothorax Catheter Placement on 2021     SUBJECTIVE: Patient is sedated, but moving right arm. /76   Pulse 121   Temp 101.6 °F (38.7 °C) (Temporal)   Resp 20   Ht 5' 9\" (1.753 m)   Wt 204 lb 5 oz (92.7 kg)   SpO2 (!) 89%   BMI 30.17 kg/m²   Average, Min, and Max for last 24 hours Vitals:  TEMPERATURE:  Temp  Av.7 °F (38.2 °C)  Min: 99.9 °F (37.7 °C)  Max: 101.6 °F (38.7 °C)  RESPIRATIONS RANGE: Resp  Av.5  Min: 17  Max: 30  PULSE RANGE: Pulse  Av.1  Min: 104  Max: 148  BLOOD PRESSURE RANGE:  Systolic (50HIW), SLV:850 , Min:108 , MHW:689   ; Diastolic (72FTA), HJF:75, Min:65, Max:88    PULSE OXIMETRY RANGE: SpO2  Av.7 %  Min: 84 %  Max: 96 %    I/O last 3 completed shifts: In: 3057 [I.V.:914; MO/PO:7932; IV Piggyback:700]  Out: 3079 [Urine:1780]    Patient seen and evaluated from doorway along with discussion with patient's nurse to save PPE. FiO2 is at 100%, PEEP at 6. Sating 86%    LABS:  CBC:   Lab Results   Component Value Date    WBC 4.2 2021    RBC 5.10 2021    HGB 15.1 2021    HCT 49.2 2021    MCV 96.5 2021    MCH 29.6 2021    MCHC 30.7 2021    RDW 13.4 2021     2021    MPV 12.6 2021     CMP:    Lab Results   Component Value Date     2021    K 4.2 2021    CL 97 2021    CO2 35 2021    BUN 25 2021    CREATININE 0.6 2021    GFRAA >59 2021    LABGLOM >60 2021    GLUCOSE 197 2021    PROT 5.1 2021    LABALBU 2.5 2021    CALCIUM 6.9 2021    BILITOT 0.6 2021    ALKPHOS 170 2021     2021     2021       CHEST XRAY: Reviewed. Hazy lung fields at the bases bilaterally. Pneumothoraces resolving. ASSESSMENT: 53yom with resp failure intubated, Covid pneumonia, small left pneumothorax.  Suspect that persistent pneumothorax on the left is related to poor lung compliance. Considering clinical picture and current vent support recommend escalating pulmonary care - ie considering paralyzation and proning, alternative ventilation strategies, +/- flolan, possible transfer to a higher level of care for ECMO. PLAN:   - Cont supportive care. - Cont chest tube to suction.   - Consider proning.   - Consider flolan/pulmonary vasodilator.   - Given his age and relative lack of other major comorbidities would consider discussion of transfer to a higher level of care for ECMO. - Will cont to follow. Jerzy Singh. Marvin Brady M.D.   Cardiothoracic Surgeon  NPI: [id number]    Electronically signed by Floyd Marshall MD on 8/14/21 at 8:58 AM CDT

## 2021-08-14 NOTE — PROGRESS NOTES
Pharmacy Vancomycin Consult     Vancomycin Day: 3  Current Dosinmg Q12hr    Temp max:  102.7 ()    Recent Labs     21  1230 21  0418   BUN 39* 30*       Recent Labs     21  1230 21  0418   CREATININE 0.7 0.6       Recent Labs     21  0412 21  0418   WBC 15.6* 10.7         Intake/Output Summary (Last 24 hours) at 2021 0352  Last data filed at 2021 0000  Gross per 24 hour   Intake 3895.07 ml   Output 1650 ml   Net 2245.07 ml       Culture Date Source Results   21    (21-21) Blood    (Blood) Gram+ cocci  Staph epidermidis  (No growth)   21 Urine Enterococcus faecalis  Klebsiella pneumoniae   21 Resp Gram+ cocci       Ht Readings from Last 1 Encounters:   21 5' 9\" (1.753 m)        Wt Readings from Last 1 Encounters:   21 198 lb 1.6 oz (89.9 kg)         Body mass index is 29.25 kg/m². Estimated Creatinine Clearance: 163 mL/min (based on SCr of 0.6 mg/dL).     Trough: 11.9    Assessment/Plan: Level good; continue same     Electronically signed by Belinda Kelley, [address] [place] [address] on 2021 at 3:52 AM

## 2021-08-14 NOTE — PROGRESS NOTES
Infectious Diseases Progress Note    Patient:  Analisa Cee  YOB: 1970  MRN: [id number]   Admit date: 7/27/2021   Admitting Physician: Leatha Malhotra DO  Primary Care Physician: Cassandra Marie MD    Chief Complaint/Interval History:   He remains sedated. He remains on mechanical ventilation. He has had temperature as high as 102 today. He was originally admitted on July 27. He was on 60% FiO2 yesterday. His FiO2 requirements increased to 100% overnight. He is now back down to 60%. He is on 6 of PEEP. He has a chest tube in place due to pneumothorax. He had a PICC line in place that was removed on the 12th. Currently he has peripheral IV access. He shown some increase in LFTs. He has had some blood pressure problems and is currently receiving Levophed. There are not a lot of respiratory secretions per discussion with nursing. Remains on antibiotic treatment vancomycin and cefepime. In/Out    Intake/Output Summary (Last 24 hours) at 8/14/2021 1523  Last data filed at 8/14/2021 1200  Gross per 24 hour   Intake 3052.98 ml   Output 1305 ml   Net 1747.98 ml     Allergies:    Allergies   Allergen Reactions    Bee Venom     Mushroom Extract Complex     Other      mushrooms     Current Meds: dexmedetomidine (PRECEDEX) 400 mcg in sodium chloride 0.9 % 100 mL infusion, Continuous  metoprolol (LOPRESSOR) injection 5 mg, Q6H PRN  vecuronium (NORCURON) injection 20 mg, Q4H PRN  ibuprofen (ADVIL;MOTRIN) tablet 400 mg, Q6H PRN  insulin glargine (LANTUS) injection vial 50 Units, BID  vancomycin (VANCOCIN) intermittent dosing (placeholder), RX Placeholder  vancomycin (VANCOCIN) 1,500 mg in dextrose 5 % 500 mL IVPB, Q12H  ceFEPIme (MAXIPIME) 2,000 mg in sodium chloride (PF) 20 mL IV syringe, Q12H  propofol injection, Titrated  fentanyl (SUBLIMAZE) infusion 10 mcg/mL, Continuous  midazolam (VERSED) infusion 100mg/100mL, Continuous  lidocaine PF 1 % injection 5 mL, Once  sodium chloride flush 0.9 % injection 5-40 mL, 2 times per day  sodium chloride flush 0.9 % injection 5-40 mL, PRN  0.9 % sodium chloride infusion, PRN  norepinephrine (LEVOPHED) 16 mg in sodium chloride 0.9 % 250 mL infusion, Continuous  ipratropium-albuterol (DUONEB) nebulizer solution 1 ampule, Q4H  melatonin disintegrating tablet 10 mg, Daily  famotidine (PEPCID) injection 20 mg, BID  zinc sulfate (ZINCATE) capsule 50 mg, Daily  sodium chloride (OCEAN, BABY AYR) 0.65 % nasal spray 2 spray, PRN  albuterol sulfate  (90 Base) MCG/ACT inhaler 2 puff, 4x daily  LORazepam (ATIVAN) injection 1 mg, Q4H PRN  potassium chloride (KLOR-CON M) extended release tablet 40 mEq, PRN   Or  potassium bicarb-citric acid (EFFER-K) effervescent tablet 40 mEq, PRN   Or  potassium chloride 10 mEq/100 mL IVPB (Peripheral Line), PRN  montelukast (SINGULAIR) tablet 10 mg, Nightly  aspirin tablet 325 mg, Daily  glucose (GLUTOSE) 40 % oral gel 15 g, PRN  dextrose 50 % IV solution, PRN  glucagon (rDNA) injection 1 mg, PRN  dextrose 5 % solution, PRN  traMADol (ULTRAM) tablet 50 mg, Q6H PRN  insulin lispro (HUMALOG) injection vial 0-6 Units, TID WC  insulin lispro (HUMALOG) injection vial 0-3 Units, Nightly  0.9 % sodium chloride infusion, PRN  enoxaparin (LOVENOX) injection 30 mg, BID  ondansetron (ZOFRAN-ODT) disintegrating tablet 4 mg, Q8H PRN   Or  ondansetron (ZOFRAN) injection 4 mg, Q6H PRN  acetaminophen (TYLENOL) tablet 650 mg, Q6H PRN   Or  acetaminophen (TYLENOL) suppository 650 mg, Q6H PRN  magnesium sulfate 1000 mg in dextrose 5% 100 mL IVPB, PRN  sodium phosphate 10 mmol in dextrose 5 % 250 mL IVPB, PRN   Or  sodium phosphate 15 mmol in dextrose 5 % 250 mL IVPB, PRN   Or  sodium phosphate 20 mmol in dextrose 5 % 500 mL IVPB, PRN  polyethylene glycol (GLYCOLAX) packet 17 g, Daily PRN  calcium carbonate (TUMS) chewable tablet 500 mg, TID PRN  naloxone (NARCAN) injection 0.4 mg, PRN  guaiFENesin-dextromethorphan (ROBITUSSIN DM) 100-10 MG/5ML syrup epidermidis-suspect contaminant  Hepatic transaminitis    Recommendations:  Continue vancomycin  Continue cefepime  Follow-up blood cultures obtained  Monitor LFTs  No new recommendation at present  Continue to follow    [facility], MD

## 2021-08-15 NOTE — PROGRESS NOTES
[facility] transfer center called to receive more info on patient. Only 9 circuits available at [facility] and all 9 are currently in use. No known estimated time of availability per transfer center. Will keep in touch with us for updates. Ulices Heard .Electronically signed by Williams Evangelista RN on 8/15/2021 at 3:35 AM

## 2021-08-15 NOTE — PROGRESS NOTES
CARDIOTHORACIC SURGERY PROGRESS NOTE    Post Left Pneumothorax Catheter Placement on 2021    SUBJECTIVE:  Sedated. /67   Pulse 109   Temp 98.4 °F (36.9 °C) (Temporal)   Resp 20   Ht 5' 9\" (1.753 m)   Wt 202 lb 5 oz (91.8 kg)   SpO2 94%   BMI 29.88 kg/m²   Average, Min, and Max for last 24 hours Vitals:  TEMPERATURE:  Temp  Av.6 °F (37.6 °C)  Min: 98 °F (36.7 °C)  Max: 101.6 °F (38.7 °C)  RESPIRATIONS RANGE: Resp  Av.3  Min: 16  Max: 22  PULSE RANGE: Pulse  Av.4  Min: 81  Max: 115  BLOOD PRESSURE RANGE:  Systolic (66WPI), KD , Min:81 , HIY:302   ; Diastolic (97XCS), CCH:52, Min:36, Max:72    PULSE OXIMETRY RANGE: SpO2  Av.1 %  Min: 88 %  Max: 96 %    I/O last 3 completed shifts: In: 3136.8 [I.V.:1559.8; NG/GT:327; IV Piggyback:1250]  Out: 2280 [Urine:2270; Chest Tube:10]    Patient seen and evaluated from doorway along with discussion with patient's nurse to save PPE. DRAINS: output - 10 ml/24 hours; no air leak    LABS:  CBC:   Lab Results   Component Value Date    WBC 10.8 08/15/2021    RBC 4.01 08/15/2021    HGB 11.9 08/15/2021    HCT 38.3 08/15/2021    MCV 95.5 08/15/2021    MCH 29.7 08/15/2021    MCHC 31.1 08/15/2021    RDW 13.2 08/15/2021    PLT 95 08/15/2021    MPV 13.5 08/15/2021     BMP:    Lab Results   Component Value Date     08/15/2021    K 3.7 08/15/2021     08/15/2021    CO2 29 08/15/2021    BUN 16 08/15/2021    LABALBU 2.6 08/15/2021    CREATININE 0.4 08/15/2021    CALCIUM 7.3 08/15/2021    GFRAA >59 08/15/2021    LABGLOM >60 08/15/2021    GLUCOSE 192 08/15/2021       CHEST XRAY: Stable appearance to bilateral pulm infiltrates. No appreciable ptx. ASSESSMENT: 53yom with resp failure intubated, Covid pneumonia, small left pneumothorax. Resp failure still critical. FiO2 vacillating between % yesterday. Currently at 75%. Possible transfer to a tertiary center pending bed availability for ECMO.      PLAN:   - Cont supportive care. - Cont chest tube to suction.   - Consider proning.   - Consider flolan/pulmonary vasodilator.   - Poss transfer to tertiary care center.   - Will cont to follow.      Will discuss patient's case and care with Dr. Kandis Patel the [facility] surgeon. Shabana Spaulding. Maxwell Cunningham M.D.   Cardiothoracic Surgeon  NPI: [id number]    Electronically signed by Henrietta Padilla MD on 8/15/21 at 9:22 AM CDT

## 2021-08-15 NOTE — CONSULTS
**Physician Signature**  This document was electronically signed by: Simon Dominguez MD  08/15/2021   10:42 AM    **Consult Information**  Member Facility: [address] [place] [address] MRN: [id number]  Visit/Encounter Number: [id number]  Consult ID: [id number]  Facility Time Zone: CT  Date and Time of Request: 08/15/2021 07:02 AM  CT  Requesting Clinician: DR. Dajuan Sampson  Patient Name: Roberto Snow  YOB: 1970  Gender: Male  Patient identity was confirmed at the beginning of the consult with the   patient/family/staff using two personal identifiers: Patient name and       **Reason for Consult**  Reason for Consult: [facility]    **Admission**  Admission Date: 2021  Chief reason for ICU admission: Diabetic Ketoacidosis  Secondary reasons for ICU admission: Respiratory Failure, COVID -   19    **Labs and Diagnostic Studies**  Labs: Needs central IV access if levophed on board. **Core Metrics**  General orienting sentence for patient: 48 yo man admitted for DKA. He was   Covid positive on . . Now on an insulin infusion. off. Held Lantus   so sugar up. Nothing else is new today. On room air. O2 sats dropping now on   15  liters. I have suggested the BiPAP cycle   started on steroids last night HFNC. far. holding his own on BiPAP HFNC   today. bed 100% on 70L CXR showed worsening pneumonia. NO antibiotics yet. Intubated at 2 AM.  100%, PEEP of 10. Prone today. after 18 hours prone. ?   ECMO being discussed. 80% FIO2, 11 PEEP. Prone for 18 hours a day. 75%, PEEP   of 12. blood cultures from central line, GPC clusters, + fever 101.1. + SQ   emphysema Multiple positive cultures proning. Levophed restarted. Blood cx +   Staph. epi. but also \"MRSA +\" FiO2 75%; PEEP 6.   Chief physiologic deterioration: Increase in FiO2 required by   30%  Is the patient on DVT prophylaxis?: Yes  Prophylaxis type: Mechanical, Pharmacological  DVT Prophylaxis Comments: Eliquis  Is the patient on GI prophylaxis?: Yes  Gi Prophylaxis Comments: Pepcid  Has this patient reached their nutritional goal?: Yes  Nutritional Goals Details: TFs  Are there current issues with pain management in this patient?:   No  Are there issues with skin integrity?: Yes and issues are being   addressed  Skin Integrity Details: DTI on Sacrum, ecchymoses  Are there issues with delirium?: Yes and issues are being addressed  Delirium Comments: Versed on top of propofol and fentanyl  Has the patient been mobilized?: No  Is this patient currently intubated?: Yes  Has facility initiated vent bundle?: No  Intubation Details: Too sick for SAT/SBT  Are there ethical or care philosophy or family issues?: No  Do you recommend an in depth evaluation?: No  Disposition Recommendations: Continue ICU level of care    **Inserted/Removed Devices**  Device Name: Endotracheal Tube  Site of insertion: Trachea  Date of insertion: 08-  Device Name: Choi Catheter  Site of insertion: Urethra  Date of insertion: 08-  Device Name: Orogastric Tube  Site of insertion: Mouth  Date of insertion: 08-  Device Name: PICC  Site of insertion: Left Upper Arm  Date of insertion: 08-  Date of device removal: 08-  Comment: Needs new IV site b/o Staph in blood. Device Name: Chest tube  Site of insertion: Chest - Left  Date of insertion: 08-  Device Name: PICC  Site of insertion: Right Upper Arm  Date of insertion: 08-  Date of device removal: 08-  Comment: Pulled b/o bleeding and + cultures.     **Physician Signature**  This document was electronically signed by: Jayleen Alfonso MD  08/15/2021   10:42 AM

## 2021-08-15 NOTE — PROGRESS NOTES
Hospitalist Progress Note    Patient:  Robert Guevara  YOB: 1970  Date of Service: 8/15/2021  MRN: [id number]   Acct: [de-identified]   Primary Care Physician: Francisca Cook MD  Advance Directive: Full Code  Admit Date: 7/27/2021       Hospital Day: 23  Referring Provider: Courtney Steele DO    Patient Seen, Chart, Consults, Notes, Labs, Radiology studies reviewed. Subjective:  Robert Guevara is a 49 y.o. male  whom we are following for COVID-19 pneumonia, hypoxemic respiratory failure. He is doing slightly better. His FiO2 requirements have decreased somewhat. He is now down to .75. The [facility] [address] contacted me this afternoon and because of his current criteria they have removed him from the wait list.  He still has single organ failure. They recommend keeping him at our institution. I feel that this is reasonable. T-max is 99.9. I am going to start him on a small amount of Bumex infusion to see if diuresis may help with oxygenation. He is on assist-control rate of 20, tidal volume of 450, 6 of PEEP, FiO2 of 0.65.     Allergies:  Bee venom, Mushroom extract complex, and Other    Medicines:  Current Facility-Administered Medications   Medication Dose Route Frequency Provider Last Rate Last Admin    metoclopramide (REGLAN) injection 10 mg  10 mg Intravenous Q6H Courtney Steele, DO   10 mg at 08/15/21 0910    bumetanide (BUMEX) 12.5 mg in sodium chloride 0.9 % 125 mL infusion  0.2 mg/hr Intravenous Continuous Courtney Steele, DO 2 mL/hr at 08/15/21 1000 0.2 mg/hr at 08/15/21 1000    chlorhexidine (PERIDEX) 0.12 % solution 15 mL  15 mL Mouth/Throat BID Courtney Gazella, DO   15 mL at 08/15/21 1218    dexmedetomidine (PRECEDEX) 400 mcg in sodium chloride 0.9 % 100 mL infusion  0.2-1.4 mcg/kg/hr Intravenous Continuous Vanna Urbina MD 11.2 mL/hr at 08/15/21 0026 0.5 mcg/kg/hr at 08/15/21 0026    metoprolol (LOPRESSOR) injection 5 mg  5 mg Intravenous Q6H PRN Kunal Watkins Sandeep Muller MD   5 mg at 08/14/21 0845    vecuronium (NORCURON) injection 20 mg  20 mg Intravenous Q4H PRN Jair Knutson MD   20 mg at 08/15/21 8116    ibuprofen (ADVIL;MOTRIN) tablet 400 mg  400 mg Oral Q6H PRN Nancye Sergio, DO   400 mg at 08/14/21 1413    insulin glargine (LANTUS) injection vial 50 Units  50 Units Subcutaneous BID Christinacye Sergio, DO   50 Units at 08/14/21 2027    vancomycin (VANCOCIN) intermittent dosing (placeholder)   Other RX Placeholder Nancye Sergio, DO        vancomycin (VANCOCIN) 1,500 mg in dextrose 5 % 500 mL IVPB  1,500 mg Intravenous Q12H Nancye Sergio,  mL/hr at 08/15/21 1204 1,500 mg at 08/15/21 1204    ceFEPIme (MAXIPIME) 2,000 mg in sodium chloride (PF) 20 mL IV syringe  2,000 mg Intravenous Q12H Christinacye Sergio, DO   2,000 mg at 08/15/21 0736    propofol injection  5-50 mcg/kg/min Intravenous Titrated Freeman Cueto MD 27.1 mL/hr at 08/15/21 1016 50 mcg/kg/min at 08/15/21 1016    fentanyl (SUBLIMAZE) infusion 10 mcg/mL  12.5-200 mcg/hr Intravenous Continuous Freeman Cueto MD 20 mL/hr at 08/15/21 0527 200 mcg/hr at 08/15/21 0527    midazolam (VERSED) infusion 100mg/100mL  1-10 mg/hr Intravenous Continuous Freeman Cueto MD 2 mL/hr at 08/15/21 1229 2 mg/hr at 08/15/21 1229    lidocaine PF 1 % injection 5 mL  5 mL Intradermal Once Miriam Ruiz MD        sodium chloride flush 0.9 % injection 5-40 mL  5-40 mL Intravenous 2 times per day Miriam Ruiz MD   10 mL at 08/15/21 0738    sodium chloride flush 0.9 % injection 5-40 mL  5-40 mL Intravenous PRN Miriam Ruiz MD        0.9 % sodium chloride infusion  25 mL Intravenous PRN Miriam Ruiz MD        norepinephrine (LEVOPHED) 16 mg in sodium chloride 0.9 % 250 mL infusion  2-100 mcg/min Intravenous Continuous Lugeninez Ruiz MD 1.4 mL/hr at 08/15/21 1342 1.5 mcg/min at 08/15/21 1342    ipratropium-albuterol (DUONEB) nebulizer solution 1 ampule  1 ampule Inhalation Q4H Daphnie Pinto MD   1 ampule at 08/15/21 1204    melatonin disintegrating tablet 10 mg  10 mg Oral Daily Donna Soldotna, DO   10 mg at 08/15/21 0736    famotidine (PEPCID) injection 20 mg  20 mg Intravenous BID Daphnie Pinto MD   20 mg at 08/15/21 0736    zinc sulfate (ZINCATE) capsule 50 mg  50 mg Oral Daily Daphnie Pinto MD   50 mg at 08/15/21 0736    sodium chloride (OCEAN, BABY AYR) 0.65 % nasal spray 2 spray  2 spray Each Nostril PRSIVA Gaines MD   2 spray at 08/03/21 0601    albuterol sulfate  (90 Base) MCG/ACT inhaler 2 puff  2 puff Inhalation 4x daily Donna Soldotna, DO   2 puff at 08/15/21 1204    LORazepam (ATIVAN) injection 1 mg  1 mg Intravenous Q4H PRN Palmer Gaines MD   1 mg at 08/12/21 0957    potassium chloride (KLOR-CON M) extended release tablet 40 mEq  40 mEq Oral PRSIVA Still MD   40 mEq at 07/30/21 4741    Or    potassium bicarb-citric acid (EFFER-K) effervescent tablet 40 mEq  40 mEq Oral PRSIVA Still MD        Or    potassium chloride 10 mEq/100 mL IVPB (Peripheral Line)  10 mEq Intravenous PRSIVA Still MD        montelukast (SINGULAIR) tablet 10 mg  10 mg Oral Nightly Donna Soldotna, DO   10 mg at 08/14/21 2032    aspirin tablet 325 mg  325 mg Oral Daily Donna Soldotna, DO   325 mg at 08/15/21 0736    glucose (GLUTOSE) 40 % oral gel 15 g  15 g Oral PRN Donna Soldotna, DO        dextrose 50 % IV solution  12.5 g Intravenous PRN Donna Soldotna, DO        glucagon (rDNA) injection 1 mg  1 mg Intramuscular PRN Donna Soldotna, DO        dextrose 5 % solution  100 mL/hr Intravenous PRN Donna Ra, DO        traMADol Sergey Gathers) tablet 50 mg  50 mg Oral Q6H PRSIVA Still MD   50 mg at 08/12/21 0957    insulin lispro (HUMALOG) injection vial 0-6 Units  0-6 Units Subcutaneous TID JAKE Still MD   1 Units at 08/13/21 1702    insulin lispro (HUMALOG) injection vial 0-3 Units  0-3 Units Subcutaneous Nightly Ras Pennington MD   1 Units at 08/13/21 2253    0.9 % sodium chloride infusion  25 mL Intravenous PRN Ras Pennington MD        enoxaparin (LOVENOX) injection 30 mg  30 mg Subcutaneous BID Ras Pennington MD   30 mg at 08/15/21 0910    ondansetron (ZOFRAN-ODT) disintegrating tablet 4 mg  4 mg Oral Q8H PRN Ras Pennington MD        Or    [facility]ron [facility] injection 4 mg  4 mg Intravenous Q6H PRN Ras Pennington MD   4 mg at 08/09/21 0159    acetaminophen (TYLENOL) tablet 650 mg  650 mg Oral Q6H PRN Ras Pennington MD   650 mg at 08/14/21 6995    Or    acetaminophen (TYLENOL) suppository 650 mg  650 mg Rectal Q6H PRN Ras Pennington MD        magnesium sulfate 1000 mg in dextrose 5% 100 mL IVPB  1,000 mg Intravenous PRN Ras Pennington MD        sodium phosphate 10 mmol in dextrose 5 % 250 mL IVPB  10 mmol Intravenous PRN Ras Pennington MD        Or    sodium phosphate 15 mmol in dextrose 5 % 250 mL IVPB  15 mmol Intravenous PRN Ras Pennington MD   Stopped at 07/29/21 1420    Or    sodium phosphate 20 mmol in dextrose 5 % 500 mL IVPB  20 mmol Intravenous PRN Ras Pennington MD 83.3 mL/hr at 07/28/21 2350 Restarted at 07/28/21 2350    polyethylene glycol (GLYCOLAX) packet 17 g  17 g Oral Daily PRN Ras Pennington MD        calcium carbonate (TUMS) chewable tablet 500 mg  500 mg Oral TID PRN Ras Pennington MD   500 mg at 07/28/21 0006    naloxone (NARCAN) injection 0.4 mg  0.4 mg Intravenous PRN Rsa Pennington MD        guaiFENesin-dextromethorphan [facility] DM) 100-10 MG/5ML syrup 5 mL  5 mL Oral Q4H PRN Ras Pennington MD   5 mL at 08/08/21 2015    Vitamin D (CHOLECALCIFEROL) tablet 2,000 Units  2,000 Units Oral Daily Ras Pennington MD   2,000 Units at 08/15/21 0736       Past Medical History:  Past Medical History:   Diagnosis Date    Colitis     Diabetes mellitus ([facility] [address]     Erythrocytosis     Intermittent    Gallbladder disease     Hyperlipidemia     Hypertension  Migraine     Sleep apnea     snores       Past Surgical History:  Past Surgical History:   Procedure Laterality Date    CHOLECYSTECTOMY, LAPAROSCOPIC N/A 3/27/2019    CHOLECYSTECTOMY LAPAROSCOPIC performed by Pamela Evangelista MD at [facility]     VASECTOMY         Family History  Family History   Problem Relation Age of Onset    Diabetes Mother     Heart Disease Mother     High Blood Pressure Mother     Diabetes Sister        Social History  Social History     Socioeconomic History    Marital status:      Spouse name: Not on file    Number of children: Not on file    Years of education: Not on file    Highest education level: Not on file   Occupational History    Not on file   Tobacco Use    Smoking status: Former Smoker     Types: Cigarettes    Smokeless tobacco: Never Used   Vaping Use    Vaping Use: Never used   Substance and Sexual Activity    Alcohol use: Yes     Comment: occ.  Drug use: Never    Sexual activity: Not on file   Other Topics Concern    Not on file   Social History Narrative    Not on file     Social Determinants of Health     Financial Resource Strain:     Difficulty of Paying Living Expenses:    Food Insecurity:     Worried About Running Out of Food in the Last Year:     [address] in the Last Year:    Transportation Needs:     Lack of Transportation (Medical):      Lack of Transportation (Non-Medical):    Physical Activity:     Days of Exercise per Week:     Minutes of Exercise per Session:    Stress:     Feeling of Stress :    Social Connections:     Frequency of Communication with Friends and Family:     Frequency of Social Gatherings with Friends and Family:     Attends Baptism Services:     Active Member of Clubs or Organizations:     Attends Club or Organization Meetings:     Marital Status:    Intimate Partner Violence:     Fear of Current or Ex-Partner:     Emotionally Abused:     Physically Abused:     Sexually Abused:          Review of Systems:    Review of Systems   Unable to perform ROS: Intubated           Objective:  Blood pressure 126/67, pulse 100, temperature 99.9 °F (37.7 °C), temperature source Axillary, resp. rate 20, height 5' 9\" (1.753 m), weight 202 lb 5 oz (91.8 kg), SpO2 93 %. Intake/Output Summary (Last 24 hours) at 8/15/2021 1358  Last data filed at 8/15/2021 1200  Gross per 24 hour   Intake 2836.61 ml   Output 2460 ml   Net 376.61 ml       Physical Exam  Vitals and nursing note reviewed. Constitutional:       Appearance: He is ill-appearing. Interventions: He is intubated. HENT:      Head: Normocephalic and atraumatic. Right Ear: External ear normal.      Left Ear: External ear normal.      Nose: Nose normal.      Mouth/Throat:      Mouth: Mucous membranes are moist.   Eyes:      Conjunctiva/sclera: Conjunctivae normal.      Pupils: Pupils are equal, round, and reactive to light. Cardiovascular:      Rate and Rhythm: Normal rate and regular rhythm. Heart sounds: Normal heart sounds. Pulmonary:      Effort: Pulmonary effort is normal. He is intubated. Breath sounds: Rhonchi present. Abdominal:      General: Abdomen is flat. Palpations: Abdomen is soft. Musculoskeletal:         General: No swelling. Cervical back: Neck supple. No rigidity. Skin:     General: Skin is warm and dry. Labs:  BMP:   Recent Labs     08/12/21  1454 08/13/21  0418 08/13/21  0442 08/14/21  0500 08/15/21  0430   NA  --  141  --  137 140   K   < > 3.5 3.3 4.2 3.7   CL  --  101  --  97* 102   CO2  --  32*  --  35* 29   BUN  --  30*  --  25* 16   CREATININE  --  0.6  --  0.6 0.4*   CALCIUM  --  7.1*  --  6.9* 7.3*    < > = values in this interval not displayed.      CBC:   Recent Labs     08/13/21  0418 08/14/21  0500 08/15/21  0430   WBC 10.7 4.2* 10.8   HGB 14.0 15.1 11.9*   HCT 46.0 49.2 38.3*   MCV 99.1* 96.5* 95.5*   * 109* 95*     LIVER PROFILE:   Recent Labs 08/13/21  0418 08/14/21  0500 08/15/21  0430   * 211* 42*   * 642* 267*   BILITOT 0.4 0.6 0.6   ALKPHOS 91 170* 96     PT/INR: No results for input(s): PROTIME, INR in the last 72 hours. APTT: No results for input(s): APTT in the last 72 hours. BNP:  No results for input(s): BNP in the last 72 hours. Ionized Calcium:No results for input(s): IONCA in the last 72 hours. Magnesium:  Recent Labs     08/13/21 0418   MG 2.8*     Phosphorus:No results for input(s): PHOS in the last 72 hours. HgbA1C: No results for input(s): LABA1C in the last 72 hours. Hepatic:   Recent Labs     08/13/21  0418 08/14/21  0500 08/15/21  0430   ALKPHOS 91 170* 96   * 642* 267*   * 211* 42*   PROT 5.0* 5.1* 5.1*   BILITOT 0.4 0.6 0.6   LABALBU 2.5* 2.5* 2.6*     Lactic Acid:   Recent Labs     08/14/21  0500   LACTA 2.1*     Troponin: No results for input(s): CKTOTAL, CKMB, TROPONINT in the last 72 hours. ABGs: No results for input(s): PH, PCO2, PO2, HCO3, O2SAT in the last 72 hours. CRP:  No results for input(s): CRP in the last 72 hours. Sed Rate:  No results for input(s): SEDRATE in the last 72 hours. Cultures:   No results for input(s): CULTURE in the last 72 hours. Recent Labs     08/13/21  1540 08/14/21  0500   BC No Growth to date. Any change in status will be called. --    BLOODCULT2  --  No Growth to date. Any change in status will be called. No results for input(s): CXSURG in the last 72 hours. Radiology reports as per the Radiologist  Radiology: XR CHEST PORTABLE    Result Date: 7/27/2021  XR CHEST PORTABLE 7/27/2021 3:16 PM HISTORY: History: Dyspnea, covid positive COMPARISON: None. CXR: A single frontal view of the chest is performed. Findings: Diminished level of inspiration with basilar atelectasis. No dense consolidation or radiographic evidence of edema. The heart appears normal in size. No pleural effusion or pneumothorax. Acute appearing bony pathology.     1.. Diminished level of inspiration with basilar atelectasis. No lobar consolidation or radiographic evidence for edema. Signed by Dr Randle Persons     COVID-19 pneumonia. Continue current medical management.     Hypoxemic respiratory failure secondary to #1. Improving. He has been turned down for ECMO at [address].     Staff epidermidis bacteremia. Continue vancomycin for now. Possible contaminant. Await follow-up cultures     Enterococcus faecalis urinary tract infection. Ampicillin sensitive.     Klebsiella pneumonia UTI. Infectious disease following.     Bilateral pneumothoraces and pneumomediastinum. Status post left-sided chest tube. Clinically stable at present. Continue current management.     Please document 50 minutes of critical care time for patient assessment, chart review, discussion with staff, .       Dillon Chambers, DO

## 2021-08-15 NOTE — PROGRESS NOTES
infusion, Continuous  ipratropium-albuterol (DUONEB) nebulizer solution 1 ampule, Q4H  melatonin disintegrating tablet 10 mg, Daily  famotidine (PEPCID) injection 20 mg, BID  zinc sulfate (ZINCATE) capsule 50 mg, Daily  sodium chloride (OCEAN, BABY AYR) 0.65 % nasal spray 2 spray, PRN  albuterol sulfate  (90 Base) MCG/ACT inhaler 2 puff, 4x daily  LORazepam (ATIVAN) injection 1 mg, Q4H PRN  potassium chloride (KLOR-CON M) extended release tablet 40 mEq, PRN   Or  potassium bicarb-citric acid (EFFER-K) effervescent tablet 40 mEq, PRN   Or  potassium chloride 10 mEq/100 mL IVPB (Peripheral Line), PRN  montelukast (SINGULAIR) tablet 10 mg, Nightly  aspirin tablet 325 mg, Daily  glucose (GLUTOSE) 40 % oral gel 15 g, PRN  dextrose 50 % IV solution, PRN  glucagon (rDNA) injection 1 mg, PRN  dextrose 5 % solution, PRN  traMADol (ULTRAM) tablet 50 mg, Q6H PRN  insulin lispro (HUMALOG) injection vial 0-6 Units, TID WC  insulin lispro (HUMALOG) injection vial 0-3 Units, Nightly  0.9 % sodium chloride infusion, PRN  enoxaparin (LOVENOX) injection 30 mg, BID  ondansetron (ZOFRAN-ODT) disintegrating tablet 4 mg, Q8H PRN   Or  ondansetron (ZOFRAN) injection 4 mg, Q6H PRN  acetaminophen (TYLENOL) tablet 650 mg, Q6H PRN   Or  acetaminophen (TYLENOL) suppository 650 mg, Q6H PRN  magnesium sulfate 1000 mg in dextrose 5% 100 mL IVPB, PRN  sodium phosphate 10 mmol in dextrose 5 % 250 mL IVPB, PRN   Or  sodium phosphate 15 mmol in dextrose 5 % 250 mL IVPB, PRN   Or  sodium phosphate 20 mmol in dextrose 5 % 500 mL IVPB, PRN  polyethylene glycol (GLYCOLAX) packet 17 g, Daily PRN  calcium carbonate (TUMS) chewable tablet 500 mg, TID PRN  naloxone (NARCAN) injection 0.4 mg, PRN  guaiFENesin-dextromethorphan (ROBITUSSIN DM) 100-10 MG/5ML syrup 5 mL, Q4H PRN  Vitamin D (CHOLECALCIFEROL) tablet 2,000 Units, Daily      Review of Systems unobtainable from patient due to sedation and mechanical ventilation    VitalSigns:  /67 Pulse 100   Temp 99.9 °F (37.7 °C) (Axillary)   Resp 20   Ht 5' 9\" (1.753 m)   Wt 202 lb 5 oz (91.8 kg)   SpO2 93%   BMI 29.88 kg/m²      Physical Exam  He appears to be breathing synchronously with the vent  Discussed bedside exam with nursing who is in his room  No wheezing  He has mild bilateral lower extremity edema  Skin appears to be without rash    Lab Results:  CBC:   Recent Labs     08/13/21 0418 08/14/21  0500 08/15/21  0430   WBC 10.7 4.2* 10.8   HGB 14.0 15.1 11.9*   * 109* 95*     BMP:  Recent Labs     08/13/21 0418 08/13/21 0442 08/14/21  0500 08/15/21  0430     --  137 140   K 3.5 3.3 4.2 3.7     --  97* 102   CO2 32*  --  35* 29   BUN 30*  --  25* 16   CREATININE 0.6  --  0.6 0.4*   GLUCOSE 183*  --  197* 192*     CultureResults:  Blood cultures August 11, 2021-1 set Staphylococcus epidermidis  Blood cultures August 13, 2021-no growth  Blood cultures August 14, 2021-no growth    Radiology:   Chest x-ray yesterday:  Impression   1. Tiny apical pneumothoraces suspected, slightly improved from   8/13/2021. Subcutaneous emphysema of the left chest wall and bilateral   lower neck. Similar pulmonary opacities with probable trace left   pleural effusion. Comments: A preliminary report is issued to the ER by the [facility] rad   radiology service.     Signed by Dr Yanet Gamez     Additional Studies Reviewed:  None    Impression:  GXULD-29 pneumonia  Respiratory failure secondary to COVID-19  Positive blood cultures for Staph epidermidis-likely contaminant  Hepatic transaminitis    Recommendations:  Continue current antibiotic treatment vancomycin and cefepime  Await final cultures  Continue to wean Levophed as tolerated  Continue supportive care  No new recommendations at present    Hero Louie MD

## 2021-08-16 NOTE — PROGRESS NOTES
CARDIOTHORACIC SURGERY PROGRESS NOTE    Post Left Pneumothorax Catheter Placement on 2021    SUBJECTIVE: Patient is sedated. Continues on ventilatory support. /79   Pulse 107   Temp 100 °F (37.8 °C) (Temporal)   Resp 16   Ht 5' 9\" (1.753 m)   Wt 203 lb 7 oz (92.3 kg)   SpO2 (!) 87%   BMI 30.04 kg/m²   Average, Min, and Max for last 24 hours Vitals:  TEMPERATURE:  Temp  Av.3 °F (37.4 °C)  Min: 96.5 °F (35.8 °C)  Max: 102.5 °F (39.2 °C)  RESPIRATIONS RANGE: Resp  Av.9  Min: 16  Max: 23  PULSE RANGE: Pulse  Av.6  Min: 90  Max: 131  BLOOD PRESSURE RANGE:  Systolic (00MIN), QNY:652 , Min:92 , KSF:428   ; Diastolic (18UOY), YML:32, Min:57, Max:79    PULSE OXIMETRY RANGE: SpO2  Av.8 %  Min: 87 %  Max: 95 %    I/O last 3 completed shifts: In: 2415.2 [I.V.:1455. 2; NG/GT:460; IV Piggyback:500]  Out: 2539 [Urine:2525; Chest Tube:40]     Patient seen and evaluated from doorway along with discussion with patient's nurse to save PPE. Respirations even,nonlabored. Continues on ventilatory support with FiO2 at 100% and PEEP at 6. Subq air resolved. Left pneumothorax catheter in good position. No air leak noted. LABS:  CBC:   Lab Results   Component Value Date    WBC 10.0 2021    RBC 3.73 2021    HGB 11.2 2021    HCT 36.9 2021    MCV 98.9 2021    MCH 30.0 2021    MCHC 30.4 2021    RDW 13.5 2021    PLT 93 2021    MPV 13.3 2021     CMP:    Lab Results   Component Value Date     2021    K 3.7 2021    K 3.4 2021    CL 95 2021    CO2 37 2021    BUN 14 2021    CREATININE 0.6 2021    GFRAA >59 2021    LABGLOM >60 2021    GLUCOSE 211 2021    PROT 5.2 2021    LABALBU 2.1 2021    CALCIUM 7.4 2021    BILITOT 0.5 2021    ALKPHOS 108 2021    AST 29 2021     2021       CHEST XRAY: Resolution of bilateral pneumothoraces.  Left pneumothorax catheter in good position. ASSESSMENT:     1. Acute Hypoxic Respiratory Failure 2' to COVID-19 Pneumonia - Currently Intubated  2. COVID-19 Pneumonia  3. DKA on Admission with Underlying DM, Type 2  4. Bilateral Pneumothoraces 2' to Barotrauma from High PEEP - Resolved. 5.         Essential HTN  6. Mixed Hyperlipidemia  7. Obstructive Sleep Apnea    PLAN:     1.  Place pneumothorax catheter on water seal.         Electronically signed by USAMA Salcido on 8/13/21 at 8:50 AM CDT

## 2021-08-16 NOTE — PROGRESS NOTES
INFECTIOUS DISEASES PROGRESS NOTE    Patient:  Ruslan Logan  YOB: 1970  MRN: [id number]   Admit date: 7/27/2021   Admitting Physician: Marco Gomez DO  Primary Care Physician: Priscilla Melton MD    CHIEF COMPLAINT: unobtainable      Interval History:  49year old off and on norepi. Marisol Gordon has been involved and ECMO discussed and not offered. He required Fi02 increase to 100 % again overnight - weaning down    Spiked fever to 102. Blood cultures obtained    Tolerating tube feeding - only smear of stool - started on reglan      Another positive blood culture -  Only one of 2 bottles - gram stain looks like staph    HIT panel sent  Yesterday    Bumex drip off      Allergies:    Allergies   Allergen Reactions    Bee Venom     Mushroom Extract Complex     Other      mushrooms       Current Meds: acetaZOLAMIDE (DIAMOX) injection 250 mg, Q8H  sterile water injection,   metoclopramide (REGLAN) injection 10 mg, Q6H  chlorhexidine (PERIDEX) 0.12 % solution 15 mL, BID  dexmedetomidine (PRECEDEX) 400 mcg in sodium chloride 0.9 % 100 mL infusion, Continuous  metoprolol (LOPRESSOR) injection 5 mg, Q6H PRN  vecuronium (NORCURON) injection 20 mg, Q4H PRN  insulin glargine (LANTUS) injection vial 50 Units, BID  vancomycin (VANCOCIN) intermittent dosing (placeholder), RX Placeholder  vancomycin (VANCOCIN) 1,500 mg in dextrose 5 % 500 mL IVPB, Q12H  ceFEPIme (MAXIPIME) 2,000 mg in sodium chloride (PF) 20 mL IV syringe, Q12H  propofol injection, Titrated  fentanyl (SUBLIMAZE) infusion 10 mcg/mL, Continuous  midazolam (VERSED) infusion 100mg/100mL, Continuous  lidocaine PF 1 % injection 5 mL, Once  sodium chloride flush 0.9 % injection 5-40 mL, 2 times per day  sodium chloride flush 0.9 % injection 5-40 mL, PRN  0.9 % sodium chloride infusion, PRN  ipratropium-albuterol (DUONEB) nebulizer solution 1 ampule, Q4H  melatonin disintegrating tablet 10 mg, Daily  famotidine (PEPCID) injection 20 mg, BID  zinc sulfate (ZINCATE) capsule 50 mg, Daily  sodium chloride (OCEAN, BABY AYR) 0.65 % nasal spray 2 spray, PRN  albuterol sulfate  (90 Base) MCG/ACT inhaler 2 puff, 4x daily  LORazepam (ATIVAN) injection 1 mg, Q4H PRN  potassium chloride (KLOR-CON M) extended release tablet 40 mEq, PRN   Or  potassium bicarb-citric acid (EFFER-K) effervescent tablet 40 mEq, PRN   Or  potassium chloride 10 mEq/100 mL IVPB (Peripheral Line), PRN  montelukast (SINGULAIR) tablet 10 mg, Nightly  aspirin tablet 325 mg, Daily  glucose (GLUTOSE) 40 % oral gel 15 g, PRN  dextrose 50 % IV solution, PRN  glucagon (rDNA) injection 1 mg, PRN  dextrose 5 % solution, PRN  traMADol (ULTRAM) tablet 50 mg, Q6H PRN  insulin lispro (HUMALOG) injection vial 0-6 Units, TID WC  insulin lispro (HUMALOG) injection vial 0-3 Units, Nightly  0.9 % sodium chloride infusion, PRN  enoxaparin (LOVENOX) injection 30 mg, BID  ondansetron (ZOFRAN-ODT) disintegrating tablet 4 mg, Q8H PRN   Or  ondansetron (ZOFRAN) injection 4 mg, Q6H PRN  acetaminophen (TYLENOL) tablet 650 mg, Q6H PRN   Or  acetaminophen (TYLENOL) suppository 650 mg, Q6H PRN  magnesium sulfate 1000 mg in dextrose 5% 100 mL IVPB, PRN  sodium phosphate 10 mmol in dextrose 5 % 250 mL IVPB, PRN   Or  sodium phosphate 15 mmol in dextrose 5 % 250 mL IVPB, PRN   Or  sodium phosphate 20 mmol in dextrose 5 % 500 mL IVPB, PRN  polyethylene glycol (GLYCOLAX) packet 17 g, Daily PRN  calcium carbonate (TUMS) chewable tablet 500 mg, TID PRN  naloxone (NARCAN) injection 0.4 mg, PRN  guaiFENesin-dextromethorphan (ROBITUSSIN DM) 100-10 MG/5ML syrup 5 mL, Q4H PRN  Vitamin D (CHOLECALCIFEROL) tablet 2,000 Units, Daily        Review of Systems   Unable to perform ROS: Intubated       Vital Signs:  BP 87/63   Pulse 110   Temp 100 °F (37.8 °C) (Temporal)   Resp 23   Ht 5' 9\" (1.753 m)   Wt 203 lb 7 oz (92.3 kg)   SpO2 94%   BMI 30.04 kg/m²   Temp (24hrs), Av.3 °F (37.4 °C), Min:96.5 °F (35.8 °C), Max:102.5 °F (39.2 °C)      Physical Exam  Vitals reviewed. Constitutional:       Appearance: He is ill-appearing. HENT:      Head: Normocephalic and atraumatic. Nose:      Comments: ETT in place  Cardiovascular:      Rate and Rhythm: Regular rhythm. Tachycardia present. Pulmonary:      Effort: Pulmonary effort is normal. No respiratory distress. Neurological:      Comments: Sedated on vent   Psychiatric:      Comments: Not agitated              Line/IV site: No erythema,warmth, induration, or tenderness. LAB RESULTS:    CBC with DIFF:  Recent Labs     08/14/21  0500 08/15/21  0430 08/16/21  0440   WBC 4.2* 10.8 10.0   RBC 5.10 4.01* 3.73*   HGB 15.1 11.9* 11.2*   HCT 49.2 38.3* 36.9*   MCV 96.5* 95.5* 98.9*   MCH 29.6 29.7 30.0   MCHC 30.7* 31.1* 30.4*   RDW 13.4 13.2 13.5   * 95* 93*   MPV 12.6* 13.5* 13.3*   NEUTOPHILPCT 51.0 59.0 86.0*   LYMPHOPCT 18.0* 9.0* 9.0*   MONOPCT 1.0 3.0 1.0   EOSRELPCT 0.0 0.0 4.0   BASOPCT 0.0 0.0 0.0   NEUTROABS 3.2 9.5* 8.6*   LYMPHSABS 1.0* 1.0* 0.9*   MONOSABS 0.00 0.30 0.10   EOSABS 0.00 0.00 0.40   BASOSABS 0.00 0.00 0.00       CMP/BMP:  Recent Labs     08/14/21  0500 08/14/21  0500 08/15/21  0430 08/16/21  0414 08/16/21  0440     --  140  --  136   K 4.2   < > 3.7 3.4 3.7   CL 97*  --  102  --  95*   CO2 35*  --  29  --  37*   ANIONGAP 5*  --  9  --  4*   GLUCOSE 197*  --  192*  --  211*   BUN 25*  --  16  --  14   CREATININE 0.6  --  0.4*  --  0.6   LABGLOM >60  --  >60  --  >60   CALCIUM 6.9*  --  7.3*  --  7.4*   PROT 5.1*  --  5.1*  --  5.2*   LABALBU 2.5*  --  2.6*  --  2.1*   BILITOT 0.6  --  0.6  --  0.5   ALKPHOS 170*  --  96  --  108   *  --  267*  --  151*   *  --  42*  --  29    < > = values in this interval not displayed. Culture Results:    No results for input(s): CXSURG in the last 720 hours. Blood Culture Recent:   Recent Labs     08/13/21  1540 08/11/21  1405 07/29/21  1950 07/27/21  1503   BC No Growth to date.   Any change in status will be called. No Growth to date. Any change in status will be called. No growth after 5 days of incubation. No growth after 5 days of incubation. ORDER#: [id number]                          ORDERED BY: Logan Lawrence   SOURCE: Urine Clean Catch  Urine           COLLECTED:  08/11/21 14:15   ANTIBIOTICS AT JANETH. :                      RECEIVED :  08/11/21 15:36   Susceptibility    Enterococcus  faecalis (2)    Antibiotic Interpretation ANTIONETTE Status    ampicillin Sensitive <=2 mcg/mL     benzylpenicillin Sensitive 2 mcg/mL     doxycycline Sensitive       gentamicin-syn Sensitive SYN-S mcg/mL     nitrofurantoin Sensitive <=16 mcg/mL     tetracycline Sensitive <=1 mcg/mL     vancomycin Sensitive 1 mcg/mL     Klebsiella pneumoniae (1)    Antibiotic Interpretation ANTIONETTE Status    ampicillin Resistant >=32 mcg/mL     aztreonam Sensitive <=1 mcg/mL     ceFAZolin Sensitive <=4 mcg/mL     cefepime Sensitive <=1 mcg/mL     cefTRIAXone Sensitive <=1 mcg/mL     ertapenem Sensitive <=0.5 mcg/mL     gentamicin Sensitive <=1 mcg/mL     levofloxacin Sensitive <=0.12 mcg/mL     meropenem Sensitive <=0.25 mcg/mL     nitrofurantoin Resistant 128 mcg/mL     piperacillin-tazobactam Sensitive <=4 mcg/mL     trimethoprim-sulfamethoxazole Sensitive <=20 mcg/mL             RADIOLOGY     XR CHEST PORTABLE [[id number]] Resulted: 08/16/21 0800     Order Status: Completed Updated: 08/16/21 0802     Narrative:       EXAMINATION: XR CHEST PORTABLE 8/16/2021 8:57 AM   HISTORY: Decreased oxygen saturation   COMPARISON: 8/16/2021   FINDINGS:   Endotracheal tube is in place with tip at the base of neck. Enteric   tube is in place with tip projecting over the left upper quadrant of   the abdomen, presumably in the stomach. The heart and mediastinal   contours are stable. There is consolidation in the left lung base with   air bronchograms. Diffuse groundglass airspace opacities are also   evident.  There is seen within the left

## 2021-08-16 NOTE — PROGRESS NOTES
Results for Nawaf Zelaya (MRN [id number]) as of 8/16/2021 04:17   Ref.  Range 8/16/2021 04:14   Hemoglobin, Art, Extended Latest Ref Range: 14.0 - 18.0 g/dL 8.5 (L)   pH, Arterial Latest Ref Range: 7.350 - 7.450  7.500 (H)   pCO2, Arterial Latest Ref Range: 35.0 - 45.0 mmHg 50.0 (H)   pO2, Arterial Latest Ref Range: 80.0 - 100.0 mmHg 44.0 (LL)   HCO3, Arterial Latest Ref Range: 22.0 - 26.0 mmol/L 39.0 (H)   Base Excess, Arterial Latest Ref Range: -2.0 - 2.0 mmol/L 14.3 (H)   O2 Sat, Arterial Latest Ref Range: >92 % 87.3 (LL)   O2 Content, Arterial Latest Ref Range: Not Established mL/dL 10.5   Methemoglobin, Arterial Latest Ref Range: <1.5 % 1.2   Carboxyhgb, Arterial Latest Ref Range: 0.0 - 5.0 % 2.9     AC\ 20 65 +6    RR AT+

## 2021-08-16 NOTE — PROGRESS NOTES
Noted ETT at 21 cm with audible air movement around cuff. RT here to assess and advanced tube to 24 cm.

## 2021-08-16 NOTE — CONSULTS
**Physician Signature**  This document was electronically signed by: Adina Brown MD  2021   11:03 AM    **Consult Information**  Member Facility: [address] [place] [address] Drive MRN: [id number]  Visit/Encounter Number: [id number]  Consult ID: [id number]  Facility Time Zone: CT  Date and Time of Request: 2021 06:48 AM  CT  Requesting Clinician: DR. Kanchan Silver  Patient Name: Basilio Mayer  YOB: 1970  Gender: Male  Patient identity was confirmed at the beginning of the consult with the   patient/family/staff using two personal identifiers: Patient name and       **Reason for Consult**  Reason for Consult: [facility]    **Admission**  Admission Date: 2021  Chief reason for ICU admission: Diabetic Ketoacidosis  Secondary reasons for ICU admission: Respiratory Failure, COVID -   19    **Core Metrics**  General orienting sentence for patient: 50 yo man admitted for DKA. He was   Covid positive on . . Now on an insulin infusion. off. Held Lantus   so sugar up. Nothing else is new today. On room air. O2 sats dropping now on   15  liters. I have suggested the BiPAP cycle   started on steroids last night HFNC. far. holding his own on BiPAP HFNC   today. bed 100% on 70L CXR showed worsening pneumonia. NO antibiotics yet. Intubated at 2 AM.  100%, PEEP of 10. Prone today. after 18 hours prone. ?   ECMO being discussed. 80% FIO2, 11 PEEP. Prone for 18 hours a day. 75%, PEEP   of 12. blood cultures from central line, GPC clusters, + fever 101.1. + SQ   emphysema Multiple positive cultures proning. Levophed restarted. Blood cx +   Staph. epi. but also \"MRSA +\" FiO2 75%; PEEP 6. at lip, better O2 sats. 6   PEEP. Fever 39.2 overnight. Repeat blood cultures   sent. Staph in blood cx . Off levophed, no central line.   Chief physiologic deterioration: Increase in FiO2 required by   30%  Is the patient on DVT prophylaxis?: Yes  Prophylaxis type: Mechanical, Pharmacological  DVT

## 2021-08-16 NOTE — PROGRESS NOTES
Comprehensive Nutrition Assessment    Type and Reason for Visit:  Reassess    Nutrition Recommendations/Plan: start Proteinex 3x a day with current EN rate    Nutrition Assessment:  Remains on vent. Propofol 27.4ml. EN is currently at 25ml/hr with 10 ml free wter flsuh    Malnutrition Assessment:  Malnutrition Status: At risk for malnutrition (Comment)    Context:  Acute Illness     Findings of the 6 clinical characteristics of malnutrition:  Energy Intake:  No significant decrease in energy intake  Weight Loss:  No significant weight loss     Body Fat Loss:  No significant body fat loss     Muscle Mass Loss:  No significant muscle mass loss    Fluid Accumulation:  1 - Mild Extremities   Strength:  Not Performed    Estimated Daily Nutrient Needs:  Energy (kcal):  2504-0977 kcals/day; Weight Used for Energy Requirements:  Current     Protein (g):  110-131 g/pro/day; Weight Used for Protein Requirements:  Ideal (1.5-1.8 g/kg)        Fluid (ml/day):  6568-6119 mL/fluid/day; Method Used for Fluid Requirements:  1 ml/kcal      Nutrition Related Findings:  on vent      Wounds:  None       Current Nutrition Therapies:    Current Tube Feeding (TF) Orders:  · Feeding Route: Nasogastric  · Formula: Peptide Based  · Schedule: Continuous  · Additives/Modulars: Protein  · Water Flushes: 10ml free water flush  · Current TF & Flush Orders Provides: Vital High Protein 25ml/hr = 600 kcals with 52g protein, 67g CHO and 501 ml free water. 240ml free water from flush  Propofol provides 723 NP kcals       3 Proteinex  312 kcals with 78g protein. · Goal TF & Flush Orders Provides: Vital High Protein at goal rate of 52ml/hr = 1243 kcals with 109 g protein, 139g CHO and 1042 ml free water from formula. 240ml free water from flush.       Anthropometric Measures:  · Height: 5' 9\" (175.3 cm)  · Current Body Weight: 203 lb 7 oz (92.3 kg)   · Admission Body Weight: 195 lb (88.5 kg)    · Usual Body Weight: 245 lb (111.1 kg) (12/2019)

## 2021-08-16 NOTE — PROGRESS NOTES
Attempted to call and notify ID of positive blood cultures. Unable to reach answering services despite multiple attempts. Will pass along to day shift. Jillian Rivero .Electronically signed by Concetta Ward RN on 8/16/2021 at 7:41 AM

## 2021-08-16 NOTE — PROGRESS NOTES
Called ID office and spoke to nurse Antonio José. Notified of 0600 temp of 102.5 which is now 100 F. Also notified of 8/14 positive blood culture from second bottle showing gram positive cocci in clusters resembling staph. Informed that she will relay message to Dr. Ingris To.

## 2021-08-16 NOTE — PROGRESS NOTES
Hospitalist Progress Note    Patient:  Ublado Mcdowell  YOB: 1970  Date of Service: 8/16/2021  MRN: [id number]   Acct: [de-identified]   Primary Care Physician: Irma Pulido MD  Advance Directive: Full Code  Admit Date: 7/27/2021       Hospital Day: 20  Referring Provider: Adilene Desir DO    Patient Seen, Chart, Consults, Notes, Labs, Radiology studies reviewed. Subjective:  Ubaldo Mcdowell is a 49 y.o. male  whom we are following for COVID-19 pneumonia, hypoxemic respiratory failure, bilateral pneumothoraces, pneumomediastinum. He spiked another temperature last evening. Blood cultures obtained. He remains critically ill.       Allergies:  Bee venom, Mushroom extract complex, and Other    Medicines:  Current Facility-Administered Medications   Medication Dose Route Frequency Provider Last Rate Last Admin    acetaZOLAMIDE (DIAMOX) injection 250 mg  250 mg Intravenous Q8H Adilene Desir DO   250 mg at 08/16/21 1213    sterile water injection             guaiFENesin (ROBITUSSIN) 100 MG/5ML liquid 600 mg  600 mg Oral Q12H Noemi Tovar MD   600 mg at 08/16/21 1419    piperacillin-tazobactam (ZOSYN) 3,375 mg in dextrose 5 % 50 mL IVPB extended infusion (mini-bag)  3,375 mg Intravenous Q8H Noemi Tovar MD 12.5 mL/hr at 08/16/21 1533 3,375 mg at 08/16/21 1533    metoclopramide (REGLAN) injection 10 mg  10 mg Intravenous Q6H Adilene Desir DO   10 mg at 08/16/21 1419    chlorhexidine (PERIDEX) 0.12 % solution 15 mL  15 mL Mouth/Throat BID Adilene Desir DO   15 mL at 08/16/21 0813    dexmedetomidine (PRECEDEX) 400 mcg in sodium chloride 0.9 % 100 mL infusion  0.2-1.4 mcg/kg/hr Intravenous Continuous Maria Dolores Powell MD 9 mL/hr at 08/16/21 0045 0.4 mcg/kg/hr at 08/16/21 0045    metoprolol (LOPRESSOR) injection 5 mg  5 mg Intravenous Q6H PRN Maria Dolores Powell MD   5 mg at 08/16/21 0648    vecuronium (NORCURON) injection 20 mg  20 mg Intravenous Q4H PRN Vickii Bj Carolynn Oropeza MD   20 mg at 08/16/21 1442    insulin glargine (LANTUS) injection vial 50 Units  50 Units Subcutaneous BID Wilbert Rivera DO   50 Units at 08/16/21 3245    vancomycin (VANCOCIN) intermittent dosing (placeholder)   Other RX Placeholder Wilbert Rivera DO        vancomycin (VANCOCIN) 1,500 mg in dextrose 5 % 500 mL IVPB  1,500 mg Intravenous Q12H Wilbert Rivera DO   Stopped at 08/16/21 1413    propofol injection  5-50 mcg/kg/min Intravenous Titrated Raman Hernandez MD 27.1 mL/hr at 08/16/21 1430 50 mcg/kg/min at 08/16/21 1430    fentanyl (SUBLIMAZE) infusion 10 mcg/mL  12.5-200 mcg/hr Intravenous Continuous Raman Hernandez MD   Stopped at 08/16/21 1056    midazolam (VERSED) infusion 100mg/100mL  1-10 mg/hr Intravenous Continuous Raman Hernandez MD   Stopped at 08/16/21 0017    lidocaine PF 1 % injection 5 mL  5 mL Intradermal Once Shiloh Benz MD        sodium chloride flush 0.9 % injection 5-40 mL  5-40 mL Intravenous 2 times per day Shiloh Benz MD   10 mL at 08/16/21 0814    sodium chloride flush 0.9 % injection 5-40 mL  5-40 mL Intravenous PRN Shiloh Benz MD        0.9 % sodium chloride infusion  25 mL Intravenous PRN Shiloh Benz MD        ipratropium-albuterol (DUONEB) nebulizer solution 1 ampule  1 ampule Inhalation Q4H Shiloh Benz MD   1 ampule at 08/16/21 1625    melatonin disintegrating tablet 10 mg  10 mg Oral Daily Wilbert Rivera DO   10 mg at 08/16/21 0810    famotidine (PEPCID) injection 20 mg  20 mg Intravenous BID Shiloh Benz MD   20 mg at 08/16/21 0813    zinc sulfate (ZINCATE) capsule 50 mg  50 mg Oral Daily Shiloh Benz MD   50 mg at 08/16/21 0810    sodium chloride (OCEAN, BABY AYR) 0.65 % nasal spray 2 spray  2 spray Each Nostril PRN Raman Hernandez MD   2 spray at 08/03/21 0601    LORazepam (ATIVAN) injection 1 mg  1 mg Intravenous Q4H PRN Raman Hernandez MD   1 mg at 08/12/21 0957    potassium chloride (KLOR-CON M) extended release tablet 40 mEq  40 mEq Oral PRN Steve Walton MD   40 mEq at 07/30/21 0008    Or    potassium bicarb-citric acid (EFFER-K) effervescent tablet 40 mEq  40 mEq Oral PRN Steve Walton MD        Or    potassium chloride 10 mEq/100 mL IVPB (Peripheral Line)  10 mEq Intravenous PRN Steve Walton MD        montelukast (SINGULAIR) tablet 10 mg  10 mg Oral Nightly Victorine Nanas, DO   10 mg at 08/15/21 2055    aspirin tablet 325 mg  325 mg Oral Daily Victorine Nanas, DO   325 mg at 08/16/21 0810    glucose (GLUTOSE) 40 % oral gel 15 g  15 g Oral PRN Philippe Taylor, DO        dextrose 50 % IV solution  12.5 g Intravenous PRN Philippe Taylor, DO        glucagon (rDNA) injection 1 mg  1 mg Intramuscular PRN Philippe Taylor, DO        dextrose 5 % solution  100 mL/hr Intravenous PRN Philippe Taylor, DO        traMADol [facility] [place]) tablet 50 mg  50 mg Oral Q6H PRN Steve Walton MD   50 mg at 08/12/21 0957    insulin lispro (HUMALOG) injection vial 0-6 Units  0-6 Units Subcutaneous TID WC Steve Walton MD   2 Units at 08/16/21 0846    insulin lispro (HUMALOG) injection vial 0-3 Units  0-3 Units Subcutaneous Nightly Steve Walton MD   1 Units at 08/15/21 2109    0.9 % sodium chloride infusion  25 mL Intravenous PRN Steve Walton MD        enoxaparin (LOVENOX) injection 30 mg  30 mg Subcutaneous BID Steve Walton MD   30 mg at 08/15/21 2054    ondansetron (ZOFRAN-ODT) disintegrating tablet 4 mg  4 mg Oral Q8H PRN Steve Walton MD        Or    ondansetron [facility]) injection 4 mg  4 mg Intravenous Q6H PRN Steve Walton MD   4 mg at 08/09/21 0159    acetaminophen (TYLENOL) tablet 650 mg  650 mg Oral Q6H PRN Steve Walton MD   650 mg at 08/14/21 0216    Or    acetaminophen (TYLENOL) suppository 650 mg  650 mg Rectal Q6H PRN Steve Walton MD   650 mg at 08/16/21 0551    magnesium sulfate 1000 mg in dextrose 5% 100 mL IVPB  1,000 mg Intravenous PRN Virginia Dietz MD        sodium phosphate 10 mmol in dextrose 5 % 250 mL IVPB  10 mmol Intravenous PRN Virginia Dietz MD        Or    sodium phosphate 15 mmol in dextrose 5 % 250 mL IVPB  15 mmol Intravenous PRSIVA Dietz MD   Stopped at 07/29/21 1420    Or    sodium phosphate 20 mmol in dextrose 5 % 500 mL IVPB  20 mmol Intravenous PRN Virginia Dietz MD 83.3 mL/hr at 07/28/21 2350 Restarted at 07/28/21 2350    polyethylene glycol (GLYCOLAX) packet 17 g  17 g Oral Daily PRN Virginia Dietz MD        calcium carbonate (TUMS) chewable tablet 500 mg  500 mg Oral TID PRN Virginia Dietz MD   500 mg at 07/28/21 0006    naloxone (NARCAN) injection 0.4 mg  0.4 mg Intravenous PRN Virginia Dietz MD        guaiFENesin-dextromethorphan [facility]) 100-10 MG/5ML syrup 5 mL  5 mL Oral Q4H PRN Virginia Dietz MD   5 mL at 08/08/21 2015    Vitamin D (CHOLECALCIFEROL) tablet 2,000 Units  2,000 Units Oral Daily Virginia Dietz MD   2,000 Units at 08/16/21 0810       Past Medical History:  Past Medical History:   Diagnosis Date    Colitis     Diabetes mellitus ([facility] [address]     Erythrocytosis     Intermittent    Gallbladder disease     Hyperlipidemia     Hypertension     Migraine     Sleep apnea     snores       Past Surgical History:  Past Surgical History:   Procedure Laterality Date    CHOLECYSTECTOMY, LAPAROSCOPIC N/A 3/27/2019    CHOLECYSTECTOMY LAPAROSCOPIC performed by Hope Bella MD at [facility]     VASECTOMY         Family History  Family History   Problem Relation Age of Onset    Diabetes Mother     Heart Disease Mother     High Blood Pressure Mother     Diabetes Sister        Social History  Social History     Socioeconomic History    Marital status:      Spouse name: Not on file    Number of children: Not on file    Years of education: Not on file    Highest education level: Not on file   Occupational History    Not on file   Tobacco Use    Smoking status: Former Smoker     Types: Cigarettes    Smokeless tobacco: Never Used   Vaping Use    Vaping Use: Never used   Substance and Sexual Activity    Alcohol use: Yes     Comment: occ.  Drug use: Never    Sexual activity: Not on file   Other Topics Concern    Not on file   Social History Narrative    Not on file     Social Determinants of Health     Financial Resource Strain:     Difficulty of Paying Living Expenses:    Food Insecurity:     Worried About Running Out of Food in the Last Year:     [address] in the Last Year:    Transportation Needs:     Lack of Transportation (Medical):  Lack of Transportation (Non-Medical):    Physical Activity:     Days of Exercise per Week:     Minutes of Exercise per Session:    Stress:     Feeling of Stress :    Social Connections:     Frequency of Communication with Friends and Family:     Frequency of Social Gatherings with Friends and Family:     Attends Zoroast[employer]ism Services:     Active Member of Clubs or Organizations:     Attends Club or Organization Meetings:     Marital Status:    Intimate Partner Violence:     Fear of Current or Ex-Partner:     Emotionally Abused:     Physically Abused:     Sexually Abused:          Review of Systems:    Review of Systems   Unable to perform ROS: Intubated           Objective:  Blood pressure (!) 77/54, pulse 104, temperature 96.7 °F (35.9 °C), temperature source Axillary, resp. rate 20, height 5' 9\" (1.753 m), weight 203 lb 7 oz (92.3 kg), SpO2 94 %. Intake/Output Summary (Last 24 hours) at 8/16/2021 1819  Last data filed at 8/16/2021 1600  Gross per 24 hour   Intake 2632.71 ml   Output 2705 ml   Net -72.29 ml       Physical Exam  Vitals and nursing note reviewed. Constitutional:       Appearance: He is ill-appearing. Interventions: He is intubated. HENT:      Head: Normocephalic and atraumatic.       Right Ear: External ear normal.      Left Ear: External ear normal.      Nose: Nose normal.      Mouth/Throat:      Mouth: Mucous membranes are moist.   Eyes:      Conjunctiva/sclera: Conjunctivae normal.      Pupils: Pupils are equal, round, and reactive to light. Cardiovascular:      Rate and Rhythm: Normal rate and regular rhythm. Heart sounds: Normal heart sounds. Pulmonary:      Effort: Pulmonary effort is normal. He is intubated. Breath sounds: Rhonchi present. Abdominal:      General: Abdomen is flat. Palpations: Abdomen is soft. Musculoskeletal:         General: Swelling present. Cervical back: Neck supple. No rigidity. Right lower leg: Edema present. Left lower leg: Edema present. Skin:     General: Skin is warm and dry. Labs:  BMP:   Recent Labs     08/14/21  0500 08/14/21  0500 08/15/21  0430 08/16/21  0414 08/16/21  0440     --  140  --  136   K 4.2   < > 3.7 3.4 3.7   CL 97*  --  102  --  95*   CO2 35*  --  29  --  37*   BUN 25*  --  16  --  14   CREATININE 0.6  --  0.4*  --  0.6   CALCIUM 6.9*  --  7.3*  --  7.4*    < > = values in this interval not displayed. CBC:   Recent Labs     08/14/21  0500 08/15/21  0430 08/16/21  0440   WBC 4.2* 10.8 10.0   HGB 15.1 11.9* 11.2*   HCT 49.2 38.3* 36.9*   MCV 96.5* 95.5* 98.9*   * 95* 93*     LIVER PROFILE:   Recent Labs     08/14/21  0500 08/15/21  0430 08/16/21  0440   * 42* 29   * 267* 151*   BILITOT 0.6 0.6 0.5   ALKPHOS 170* 96 108     PT/INR: No results for input(s): PROTIME, INR in the last 72 hours. APTT: No results for input(s): APTT in the last 72 hours. BNP:  No results for input(s): BNP in the last 72 hours. Ionized Calcium:No results for input(s): IONCA in the last 72 hours. Magnesium:No results for input(s): MG in the last 72 hours. Phosphorus:No results for input(s): PHOS in the last 72 hours. HgbA1C: No results for input(s): LABA1C in the last 72 hours.   Hepatic:   Recent Labs     08/14/21  0500 08/15/21  0430 08/16/21  0440   ALKPHOS 170* 96 108 * 267* 151*   * 42* 29   PROT 5.1* 5.1* 5.2*   BILITOT 0.6 0.6 0.5   LABALBU 2.5* 2.6* 2.1*     Lactic Acid:   Recent Labs     08/14/21  0500   LACTA 2.1*     Troponin: No results for input(s): CKTOTAL, CKMB, TROPONINT in the last 72 hours. ABGs: No results for input(s): PH, PCO2, PO2, HCO3, O2SAT in the last 72 hours. CRP:  No results for input(s): CRP in the last 72 hours. Sed Rate:  No results for input(s): SEDRATE in the last 72 hours. Cultures:   No results for input(s): CULTURE in the last 72 hours. Recent Labs     08/14/21  0500   BLOODCULT2 Gram stain Anaerobic bottle:  Gram positive cocci in clusters  resembling Staphylococcus  Culture in progress  Please notify Physician   Bottle volume = 9 ml  *     No results for input(s): CXSURG in the last 72 hours. Radiology reports as per the Radiologist  Radiology: XR CHEST PORTABLE    Result Date: 7/27/2021  XR CHEST PORTABLE 7/27/2021 3:16 PM HISTORY: History: Dyspnea, covid positive COMPARISON: None. CXR: A single frontal view of the chest is performed. Findings: Diminished level of inspiration with basilar atelectasis. No dense consolidation or radiographic evidence of edema. The heart appears normal in size. No pleural effusion or pneumothorax. Acute appearing bony pathology. 1.. Diminished level of inspiration with basilar atelectasis. No lobar consolidation or radiographic evidence for edema. Signed by Dr Ale Millan     COVID-19 pneumonia. Continue current medical management.     Hypoxemic respiratory failure secondary to #1. Stable. He has been turned down for ECMO at [address]     Staff epidermidis bacteremia. Continue vancomycin for now. Possible contaminant. Await follow-up cultures     Enterococcus faecalis urinary tract infection. Ampicillin sensitive.     Klebsiella pneumonia UTI. Infectious disease following.     Bilateral pneumothoraces and pneumomediastinum.   Status post left-sided chest tube. Clinically stable at present. Continue current management.     Please document 40 minutes of critical care time for patient assessment, chart review, discussion with staff, .       Talon Madsen,

## 2021-08-16 NOTE — PROGRESS NOTES
Pulmonary and Critical Care Progress note. [address]    MRN# [id number]    Acct# [de-identified]  8/16/2021   1:12 PM CDT    Referring Aron Bueno DO      Chief Complaint: Respiratory failure on mechanical ventilation      HPI: Continues to be intubated on mechanical ventilation sedated chest tube in place but off suction no evidence of air leak.       Medications    acetaZOLAMIDE, 250 mg, Intravenous, Q8H    sterile water, , ,     guaiFENesin, 600 mg, Oral, Q12H    piperacillin-tazobactam, 3,375 mg, Intravenous, Q8H    metoclopramide, 10 mg, Intravenous, Q6H    chlorhexidine, 15 mL, Mouth/Throat, BID    insulin glargine, 50 Units, Subcutaneous, BID    vancomycin (VANCOCIN) intermittent dosing (placeholder), , Other, RX Placeholder    vancomycin, 1,500 mg, Intravenous, Q12H    lidocaine 1 % injection, 5 mL, Intradermal, Once    sodium chloride flush, 5-40 mL, Intravenous, 2 times per day    ipratropium-albuterol, 1 ampule, Inhalation, Q4H    melatonin, 10 mg, Oral, Daily    famotidine (PEPCID) injection, 20 mg, Intravenous, BID    zinc sulfate, 50 mg, Oral, Daily    albuterol sulfate HFA, 2 puff, Inhalation, 4x daily    montelukast, 10 mg, Oral, Nightly    aspirin, 325 mg, Oral, Daily    insulin lispro, 0-6 Units, Subcutaneous, TID WC    insulin lispro, 0-3 Units, Subcutaneous, Nightly    enoxaparin, 30 mg, Subcutaneous, BID    Vitamin D, 2,000 Units, Oral, Daily     Review of Systems:  Unable to obtain due to patient factors  Physical Exam:  BP 87/63   Pulse 110   Temp 100 °F (37.8 °C) (Temporal)   Resp 23   Ht 5' 9\" (1.753 m)   Wt 203 lb 7 oz (92.3 kg)   SpO2 94%   BMI 30.04 kg/m²   Intake/Output Summary (Last 24 hours) at 8/16/2021 1312  Last data filed at 8/16/2021 1000  Gross per 24 hour   Intake 2616.61 ml   Output 3160 ml   Net -543.39 ml       General appearance: Middle-aged male who is intubated sedated on mechanical ventilation   HEENT: Intubated ET tube in place. Normocephalic atraumatic no crepitus  Heart: S1-S2 distant sounds no murmurs  Lungs: Diminished breath sounds bilaterally. No rubs or chest wall tenderness or dullness to percussion  Abdomen: Soft nontender no organomegalies normal bowel sounds   Extremities:no clubbing cyanosis 1+ pitting edema  Neuro: No focal findings  Skin: Intact    Recent Labs     08/14/21  0500 08/15/21  0430 08/16/21  0440   WBC 4.2* 10.8 10.0   RBC 5.10 4.01* 3.73*   HGB 15.1 11.9* 11.2*   HCT 49.2 38.3* 36.9*   * 95* 93*   MCV 96.5* 95.5* 98.9*   MCH 29.6 29.7 30.0   MCHC 30.7* 31.1* 30.4*   RDW 13.4 13.2 13.5   BANDSPCT 12* 29*  --       Recent Labs     08/14/21  0500 08/15/21  0430 08/16/21  0414 08/16/21  0440    140  --  136   K 4.2 3.7 3.4 3.7   CL 97* 102  --  95*   CO2 35* 29  --  37*   BUN 25* 16  --  14   CREATININE 0.6 0.4*  --  0.6   CALCIUM 6.9* 7.3*  --  7.4*   GLUCOSE 197* 192*  --  211*      Recent Labs     08/16/21 0414   PHART 7.500*   QRL9BIQ 50.0*   PO2ART 44.0*   MHK2LRB 39.0*   L4CVOEMY 87.3*   BEART 14.3*     Recent Labs     08/14/21  0500 08/15/21  0430 08/16/21  0440   *   < > 29   *   < > 151*   ALKPHOS 170*   < > 108   BILITOT 0.6   < > 0.5   CALCIUM 6.9*   < > 7.4*   LACTA 2.1*  --   --     < > = values in this interval not displayed. Recent Labs     08/13/21  1540   BC No Growth to date. Any change in status will be called. Radiograph: XR CHEST PORTABLE    Result Date: 8/16/2021  1. Endotracheal tube tip is at the level of the thoracic inlet. 2. Similar bilateral airspace disease with worsening retrocardiac consolidation concerning for pneumonia. Signed by Dr Marci Carrera    XR CHEST PORTABLE    Result Date: 8/16/2021  Improvement. Signed by Dr Teo Mcghee    Result Date: 8/14/2021  1. Tiny apical pneumothoraces suspected, slightly improved from 8/13/2021.  Subcutaneous emphysema of the left chest wall and bilateral lower neck. Similar pulmonary opacities with probable trace left pleural effusion. Comments: A preliminary report is issued to the ER by the [facility] radiology service. Signed by Dr Avril Gray    XR CHEST PORTABLE    Result Date: 8/13/2021  A moderate improvement in the bilateral apical pneumothorax and chest wall emphysema. Signed by Dr Barbara Villa    Result Date: 8/12/2021  Persistent bilateral pneumothorax and pneumomediastinum. Left-sided chest catheter in place. Persistent chest wall and neck emphysema. The tubes and lines in place. Signed by Dr Barbara Villa    Result Date: 8/12/2021  Persistent biapical small pneumothorax, pneumomediastinum and chest wall and neck soft tissue emphysema. No significant change. The tubes and lines in place. Signed by Dr Barbara Villa    Result Date: 8/12/2021  1. . Pneumomediastinum. There is subcutaneous air within the left chest wall and neck which is a new finding from the previous study. A left-sided pneumothorax is confirmed within the apex. Suspect the patient may also be developing a right-sided pneumothorax with a deep sulcus sign noted on the right and a subtle pleural line within the right apex. I spoke with Dheeraj Liu in the CCU at 1:00 AM concerning the findings and changes from the previous examination of one day earlier. Signed by Dr Marci Dong    Result Date: 8/10/2021  1. Bilateral groundglass infiltrates unchanged from August 9, 2021. 2. Endotracheal tube is now proximal to the level of clavicles. This is 8.8 cm proximal to the sarah. Signed by Dr Beth Cavazos    XR CHEST PORTABLE    Result Date: 8/9/2021  Satisfactory position of the newly inserted left-sided PICC. Increasing infiltrates particularly in the left upper lobe as described. Satisfactory position of the endotracheal tube and NG tube. 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 Acute hypoxic respiratory failure due to COVID-19 infection. Continue mechanical ventilation. Adjust ventilator settings as necessary. Continue to titrate oxygen down. He continues to require high FiO2.  2. Severe ARDS due to the above. Continue mechanical ventilation. 3. COVID-19 infection treated appropriately. 4. Bacterial sepsis. Infectious disease following. He is treated with antibiotic therapy. 5. Tolerating tube feeding. 6. DVT and GI prophylaxis. Cc time 36 min.           Mega Morel MD, FCCP, DABSM      Electronically signed by Mega Morel MD on 08/16/21 at 1:12 PM

## 2021-08-17 NOTE — PROGRESS NOTES
Wasted 90 mL versed. Witnessed by Eh Gamez RN.     Electronically signed by Eh Gamez RN on 8/16/2021 at 11:28 PM

## 2021-08-17 NOTE — DISCHARGE SUMMARY
Discharge Summary    Robert Guevara  :  1970  MRN:  [id number]    Admit date:  2021  Discharge date: 2021     Admitting Physician:  Courtney Steele DO    Advance Directive: DNR-CC    Consults: pulmonary/intensive care, ID and CT surgery    Primary Care Physician:  Francisca Cook MD    Discharge Diagnoses: Active Problems:    DKA, type 2, not at goal [facility])  Resolved Problems:    * No resolved hospital problems. *      Significant Diagnostic Studies:   XR CHEST PORTABLE    Result Date: 2021  XR CHEST PORTABLE 2021 3:16 PM HISTORY: History: Dyspnea, covid positive COMPARISON: None. CXR: A single frontal view of the chest is performed. Findings: Diminished level of inspiration with basilar atelectasis. No dense consolidation or radiographic evidence of edema. The heart appears normal in size. No pleural effusion or pneumothorax. Acute appearing bony pathology. 1.. Diminished level of inspiration with basilar atelectasis. No lobar consolidation or radiographic evidence for edema. Signed by Dr Julia Blank      CBC:   Recent Labs     21  04321  1639   WBC 10.0 11.0* 14.5*   HGB 11.2* 11.8* 11.7*   PLT 93* 132 141     BMP:    Recent Labs     08/15/21  0430 08/16/21  0414 08/16/21  0440 21  0421 21  0435 21  1250     --  136  --  139  --    K 3.7   < > 3.7 2.9 3.3* 3.9     --  95*  --  96*  --    CO2 29  --  37*  --  33*  --    BUN 16  --  14  --  15  --    CREATININE 0.4*  --  0.6  --  0.6  --    GLUCOSE 192*  --  211*  --  175*  --     < > = values in this interval not displayed. INR: No results for input(s): INR in the last 72 hours. Lipids: No results for input(s): CHOL, HDL in the last 72 hours.     Invalid input(s): LDLCALCU  ABGs:  Recent Labs     21/21  1250   PHART 7.500* 7.470* 7.250*   UBV1BYD 50.0* 51.0* 86.0*   PO2ART 44.0* 64.0* 46.0*   LHV8NJA 39.0* 37.1* 37.7*   BEART 14.3* 130   Temp 99.9 °F (37.7 °C) (Axillary)   Resp 27   Ht 5' 9\" (1.753 m)   Wt 198 lb 14.4 oz (90.2 kg)   SpO2 (!) 74%   BMI 29.37 kg/m²   General appearance:. Alert and Cooperative   HEENT: Normocephalic. Chest: clear to auscultation bilaterally without wheezes or rhonchi. Cardiac: Normal heart tones with regular rate and rhythm, S1, S2 normal. No murmurs, gallops, or rubs auscultated. Abdomen:soft, non-tender; normal bowel sounds, no masses, no organomegaly. Extremities: No clubbing or cyanosis. No peripheral edema. Peripheral pulses palpable. Neurologic: Grossly intact. Discharge Medications:       Ghazal Mclaughlin   Home Medication Instructions [id number]    Printed on:08/17/21 1819   Medication Information                      traMADol-acetaminophen (ULTRACET) 37.5-325 MG per tablet  Take 1 tablet by mouth every 6 hours as needed. Time spent on discharge 40 minutes.     Signed:  Cecilia Holland DO

## 2021-08-17 NOTE — CONSULTS
**Physician Signature**  This document was electronically signed by: Noah Denise MD  2021   11:30 AM    **Consult Information**  Member Facility: [address] [place] [address] MRN: [id number]  Visit/Encounter Number: [id number]  Consult ID: [id number]  Facility Time Zone: CT  Date and Time of Request: 2021 08:48 AM  CT  Requesting Clinician: DR. Hugo Jones  Patient Name: Dagmar Mitchell  YOB: 1970  Gender: Male  Patient identity was confirmed at the beginning of the consult with the   patient/family/staff using two personal identifiers: Patient name and       **Reason for Consult**  Reason for Consult: [facility]    **Admission**  Admission Date: 2021  Chief reason for ICU admission: Diabetic Ketoacidosis  Secondary reasons for ICU admission: Respiratory Failure, COVID -   19    **Labs and Diagnostic Studies**  Labs: Needs central line or 3rd try at PICC. **Core Metrics**  General orienting sentence for patient: 48 yo man admitted for DKA. He was   Covid positive on . . Now on an insulin infusion. off. Held Lantus   so sugar up. Nothing else is new today. On room air. O2 sats dropping now on   15  liters. I have suggested the BiPAP cycle   started on steroids last night HFNC. far. holding his own on BiPAP HFNC   today. bed 100% on 70L CXR showed worsening pneumonia. NO antibiotics yet. Intubated at 2 AM.  100%, PEEP of 10. Prone today. after 18 hours prone. ?   ECMO being discussed. 80% FIO2, 11 PEEP. Prone for 18 hours a day. 75%, PEEP   of 12. blood cultures from central line, GPC clusters, + fever 101.1. + SQ   emphysema Multiple positive cultures proning. Levophed restarted. Blood cx +   Staph. epi. but also \"MRSA +\" FiO2 75%; PEEP 6. at lip, better O2 sats. 6   PEEP. Fever 39.2 overnight. Repeat blood cultures   sent. Staph in blood cx . Off levophed, no central line. FIO2, 5 PEEP,   levophed back on. Tachycardia.  Chest tube with minimal output. Chief physiologic deterioration: Increase in FiO2 required by   30%  Is the patient on DVT prophylaxis?: Yes  Prophylaxis type: Mechanical, Pharmacological  DVT Prophylaxis Comments: Eliquis  Is the patient on GI prophylaxis?: Yes  Gi Prophylaxis Comments: Pepcid  Has this patient reached their nutritional goal?: Yes  Nutritional Goals Details: Tube feeds  Are there current issues with pain management in this patient?:   No  Are there issues with skin integrity?: Yes and issues are being   addressed  Skin Integrity Details: DTI on Sacrum, ecchymoses. Chest tube site clean,   small serosang. drainage from tube. Are there issues with delirium?: Yes and issues are being addressed  Delirium Comments: Precedex on top of propofol and fentanyl  Has the patient been mobilized?: No  Is this patient currently intubated?: Yes  Has facility initiated vent bundle?: No  Intubation Details: Too sick for SAT/SBT  Are there ethical or care philosophy or family issues?: No  Do you recommend an in depth evaluation?: No  Disposition Recommendations: Continue ICU level of care    **Inserted/Removed Devices**  Device Name: Endotracheal Tube  Site of insertion: Trachea  Date of insertion: 08-  Device Name: Choi Catheter  Site of insertion: Urethra  Date of insertion: 08-  Device Name: Orogastric Tube  Site of insertion: Mouth  Date of insertion: 08-  Device Name: PICC  Site of insertion: Left Upper Arm  Date of insertion: 08-  Date of device removal: 08-  Comment: Needs new IV site b/o Staph in blood. Device Name: Chest tube  Site of insertion: Chest - Left  Date of insertion: 08-  Device Name: PICC  Site of insertion: Right Upper Arm  Date of insertion: 08-  Date of device removal: 08-  Comment: Pulled b/o bleeding and + cultures.     **Physician Signature**  This document was electronically signed by: Lonell Simmonds MD  08/17/2021   11:30 AM

## 2021-08-17 NOTE — PROGRESS NOTES
Upon my arrival to the bedside the patient was in the prone position , hypotensive on pressors, bradycardic. Family is at the bedside. The family has decided to make the patient DO NOT RESUSCITATE. Discussed the care and management with the family at the bedside. The patient had an echo done earlier due to sudden severe decompensation. Echo was suggestive of a massive PE according to verbal report by cardiology. While at the bedside the patient developed bradycardia and developed sinus arrest and asystole. No blood pressure. No pulse detectable. Patient was DNR so no CPR or ACLS attempted. The patient .

## 2021-08-17 NOTE — PROGRESS NOTES
INFECTIOUS DISEASES PROGRESS NOTE    Patient:  Harshil Kennedy  YOB: 1970  MRN: [id number]   Admit date: 7/27/2021   Admitting Physician: Yadi Adkins DO  Primary Care Physician: Neeta Coronado MD    CHIEF COMPLAINT: unobtainable      Interval History:  49year old restarted on norepi - down from 15 to 8 ucg/min this am    Had tachycardia requiring Beta blocker    Platelets up today    NO FEVER overnight    Fi02  Down to 65% - sats 90-91%        Allergies:    Allergies   Allergen Reactions    Bee Venom     Mushroom Extract Complex     Other      mushrooms       Current Meds: acetaZOLAMIDE (DIAMOX) injection 250 mg, Q8H  guaiFENesin (ROBITUSSIN) 100 MG/5ML liquid 600 mg, Q12H  piperacillin-tazobactam (ZOSYN) 3,375 mg in dextrose 5 % 50 mL IVPB extended infusion (mini-bag), Q8H  norepinephrine (LEVOPHED) 16 mg in sodium chloride 0.9 % 250 mL infusion, Continuous PRN  metoclopramide (REGLAN) injection 10 mg, Q6H  chlorhexidine (PERIDEX) 0.12 % solution 15 mL, BID  dexmedetomidine (PRECEDEX) 400 mcg in sodium chloride 0.9 % 100 mL infusion, Continuous  metoprolol (LOPRESSOR) injection 5 mg, Q6H PRN  vecuronium (NORCURON) injection 20 mg, Q4H PRN  insulin glargine (LANTUS) injection vial 50 Units, BID  vancomycin (VANCOCIN) intermittent dosing (placeholder), RX Placeholder  vancomycin (VANCOCIN) 1,500 mg in dextrose 5 % 500 mL IVPB, Q12H  propofol injection, Titrated  fentanyl (SUBLIMAZE) infusion 10 mcg/mL, Continuous  midazolam (VERSED) infusion 100mg/100mL, Continuous  lidocaine PF 1 % injection 5 mL, Once  sodium chloride flush 0.9 % injection 5-40 mL, 2 times per day  sodium chloride flush 0.9 % injection 5-40 mL, PRN  0.9 % sodium chloride infusion, PRN  ipratropium-albuterol (DUONEB) nebulizer solution 1 ampule, Q4H  melatonin disintegrating tablet 10 mg, Daily  famotidine (PEPCID) injection 20 mg, BID  zinc sulfate (ZINCATE) capsule 50 mg, Daily  sodium chloride (OCEAN, BABY AYR) 0.65 % nasal spray 2 spray, PRN  LORazepam (ATIVAN) injection 1 mg, Q4H PRN  potassium chloride (KLOR-CON M) extended release tablet 40 mEq, PRN   Or  potassium bicarb-citric acid (EFFER-K) effervescent tablet 40 mEq, PRN   Or  potassium chloride 10 mEq/100 mL IVPB (Peripheral Line), PRN  montelukast (SINGULAIR) tablet 10 mg, Nightly  aspirin tablet 325 mg, Daily  glucose (GLUTOSE) 40 % oral gel 15 g, PRN  dextrose 50 % IV solution, PRN  glucagon (rDNA) injection 1 mg, PRN  dextrose 5 % solution, PRN  traMADol (ULTRAM) tablet 50 mg, Q6H PRN  insulin lispro (HUMALOG) injection vial 0-6 Units, TID WC  insulin lispro (HUMALOG) injection vial 0-3 Units, Nightly  0.9 % sodium chloride infusion, PRN  enoxaparin (LOVENOX) injection 30 mg, BID  ondansetron (ZOFRAN-ODT) disintegrating tablet 4 mg, Q8H PRN   Or  ondansetron (ZOFRAN) injection 4 mg, Q6H PRN  acetaminophen (TYLENOL) tablet 650 mg, Q6H PRN   Or  acetaminophen (TYLENOL) suppository 650 mg, Q6H PRN  magnesium sulfate 1000 mg in dextrose 5% 100 mL IVPB, PRN  sodium phosphate 10 mmol in dextrose 5 % 250 mL IVPB, PRN   Or  sodium phosphate 15 mmol in dextrose 5 % 250 mL IVPB, PRN   Or  sodium phosphate 20 mmol in dextrose 5 % 500 mL IVPB, PRN  polyethylene glycol (GLYCOLAX) packet 17 g, Daily PRN  calcium carbonate (TUMS) chewable tablet 500 mg, TID PRN  naloxone (NARCAN) injection 0.4 mg, PRN  guaiFENesin-dextromethorphan (ROBITUSSIN DM) 100-10 MG/5ML syrup 5 mL, Q4H PRN  Vitamin D (CHOLECALCIFEROL) tablet 2,000 Units, Daily        Review of Systems   Unable to perform ROS: Intubated       Vital Signs:  /67   Pulse 118   Temp 98 °F (36.7 °C) (Axillary)   Resp 20   Ht 5' 9\" (1.753 m)   Wt 198 lb 14.4 oz (90.2 kg)   SpO2 90%   BMI 29.37 kg/m²   Temp (24hrs), Av.3 °F (36.8 °C), Min:96.7 °F (35.9 °C), Max:100 °F (37.8 °C)      Physical Exam  Vitals reviewed. Nursing note reviewed: lying in bed with head of bed up.    Constitutional:       Appearance: He is ill-appearing. HENT:      Head: Normocephalic and atraumatic. Nose:      Comments: ETT in place  Cardiovascular:      Rate and Rhythm: Regular rhythm. Tachycardia present. Pulmonary:      Effort: Pulmonary effort is normal. No respiratory distress. Neurological:      Comments: Sedated on vent   Psychiatric:      Comments: Not agitated          LAB RESULTS:    CBC with DIFF:  Recent Labs     08/15/21  0430 08/16/21  0440 08/17/21  0435   WBC 10.8 10.0 11.0*   RBC 4.01* 3.73* 4.00*   HGB 11.9* 11.2* 11.8*   HCT 38.3* 36.9* 40.0*   MCV 95.5* 98.9* 100.0*   MCH 29.7 30.0 29.5   MCHC 31.1* 30.4* 29.5*   RDW 13.2 13.5 13.4   PLT 95* 93* 132   MPV 13.5* 13.3* 12.9*   NEUTOPHILPCT 59.0 86.0* 80.7*   LYMPHOPCT 9.0* 9.0* 9.2*   MONOPCT 3.0 1.0 5.8   EOSRELPCT 0.0 4.0 3.0   BASOPCT 0.0 0.0 0.5   NEUTROABS 9.5* 8.6* 8.9*   LYMPHSABS 1.0* 0.9* 1.0*   MONOSABS 0.30 0.10 0.60   EOSABS 0.00 0.40 0.30   BASOSABS 0.00 0.00 0.10       CMP/BMP:  Recent Labs     08/15/21  0430 08/16/21  0414 08/16/21 0440 08/17/21  0421 08/17/21 0435     --  136  --  139   K 3.7   < > 3.7 2.9 3.3*     --  95*  --  96*   CO2 29  --  37*  --  33*   ANIONGAP 9  --  4*  --  10   GLUCOSE 192*  --  211*  --  175*   BUN 16  --  14  --  15   CREATININE 0.4*  --  0.6  --  0.6   LABGLOM >60  --  >60  --  >60   CALCIUM 7.3*  --  7.4*  --  8.3*   PROT 5.1*  --  5.2*  --  6.1*   LABALBU 2.6*  --  2.1*  --  2.3*   BILITOT 0.6  --  0.5  --  0.8   ALKPHOS 96  --  108  --  130   *  --  151*  --  118*   AST 42*  --  29  --  31    < > = values in this interval not displayed. pH, Arterial 7.470High   7.350 - 7.450 Final 08/17/2021  4:21 AM [facility] Lab   pCO2, Arterial 51. 0High   35.0 - 45.0 mmHg Final 08/17/2021  4:21 AM [facility] Lab   pO2, Arterial 64. 0Low   80.0 - 100.0 mmHg Final 08/17/2021  4:21 AM [facility] Lab   HCO3, Arterial 37. 1High   22.0 - 26.0 mmol/L Final 08/17/2021  4:21 AM [facility] Excess, Arterial 11.7High   -2.0 - 2.0 mmol/L Final 08/17/2021  4:21 AM [facility] Lab   Hemoglobin, Art, Extended 11.8Low   14.0 - 18.0 g/dL Final 08/17/2021  4:21 AM [facility] Lab   O2 Sat, Arterial 93.0          Vc,20,450,70% + 6 peep      Culture Results:    No results for input(s): CXSURG in the last 720 hours. Blood Culture Recent:   Recent Labs     08/13/21  1540 08/11/21  1405 07/29/21  1950 07/27/21  1503   BC No Growth to date. Any change in status will be called. No growth after 5 days of incubation. No growth after 5 days of incubation. No growth after 5 days of incubation. ORDER#: [id number]                          ORDERED BY: Suri Ray   SOURCE: Urine Clean Catch  Urine           COLLECTED:  08/11/21 14:15   ANTIBIOTICS AT JANETH. :                      RECEIVED :  08/11/21 15:36   Susceptibility    Enterococcus  faecalis (2)    Antibiotic Interpretation ANTIONETTE Status    ampicillin Sensitive <=2 mcg/mL     benzylpenicillin Sensitive 2 mcg/mL     doxycycline Sensitive       gentamicin-syn Sensitive SYN-S mcg/mL     nitrofurantoin Sensitive <=16 mcg/mL     tetracycline Sensitive <=1 mcg/mL     vancomycin Sensitive 1 mcg/mL     Klebsiella pneumoniae (1)    Antibiotic Interpretation ANTIONETTE Status    ampicillin Resistant >=32 mcg/mL     aztreonam Sensitive <=1 mcg/mL     ceFAZolin Sensitive <=4 mcg/mL     cefepime Sensitive <=1 mcg/mL     cefTRIAXone Sensitive <=1 mcg/mL     ertapenem Sensitive <=0.5 mcg/mL     gentamicin Sensitive <=1 mcg/mL     levofloxacin Sensitive <=0.12 mcg/mL     meropenem Sensitive <=0.25 mcg/mL     nitrofurantoin Resistant 128 mcg/mL     piperacillin-tazobactam Sensitive <=4 mcg/mL     trimethoprim-sulfamethoxazole Sensitive <=20 mcg/mL             RADIOLOGY     XR CHEST PORTABLE [[id number]] Resulted: 08/16/21 0800     Order Status: Completed Updated: 08/16/21 0802     Narrative:       EXAMINATION: XR CHEST PORTABLE

## 2021-08-17 NOTE — PROGRESS NOTES
Component Value Ref Range & Units Status Collected Lab   pH, Arterial 7.470High   7.350 - 7.450 Final 08/17/2021  4:21 AM [facility] Lab   pCO2, Arterial 51. 0High   35.0 - 45.0 mmHg Final 08/17/2021  4:21 AM [facility] Lab   pO2, Arterial 64. 0Low   80.0 - 100.0 mmHg Final 08/17/2021  4:21 AM [facility] Lab   HCO3, Arterial 37. 1High   22.0 - 26.0 mmol/L Final 08/17/2021  4:21 AM [facility] Excess, Arterial 11.7High   -2.0 - 2.0 mmol/L Final 08/17/2021  4:21 AM [facility] Lab   Hemoglobin, Art, Extended 11.8Low   14.0 - 18.0 g/dL Final 08/17/2021  4:21 AM [facility] Lab   O2 Sat, Arterial 93.0  >92 % Final 08/17/2021  4:21 AM [facility] Lab   Carboxyhgb, Arterial 2.6  0.0 - 5.0 % Final 08/17/2021  4:21 AM [facility] Lab        0.0-1.5   (Smokers 1.5-5.0)    Methemoglobin, Arterial 1.4  <1.5 % Final 08/17/2021  4:21 AM [facility] Lab   O2 Content, Arterial 15.5          Vc,20,450,70% + 6 peep  Right radial  At+

## 2021-08-17 NOTE — PROGRESS NOTES
(ULTRACET) 37.5-325 MG per tablet Take 1 tablet by mouth every 6 hours as needed. Objective    BP (!) 96/58   Pulse 105   Temp 98.7 °F (37.1 °C) (Axillary)   Resp 17   Ht 5' 9\" (1.753 m)   Wt 198 lb 14.4 oz (90.2 kg)   SpO2 92%   BMI 29.37 kg/m²     LABS:  WBC:    Lab Results   Component Value Date    WBC 11.0 08/17/2021     H/H:    Lab Results   Component Value Date    HGB 11.8 08/17/2021    HCT 40.0 08/17/2021     PTT:    Lab Results   Component Value Date    APTT 30.2 08/11/2019   [APTT}  PT/INR:    Lab Results   Component Value Date    PROTIME 13.0 08/11/2019    INR 1.04 08/11/2019     HgBA1c:    Lab Results   Component Value Date    LABA1C 8.1 11/05/2019       Assessment   David Risk Score: David Scale Score: 9    Patient Active Problem List   Diagnosis Code    Colitis K52.9    Diarrhea of presumed infectious origin R19.7    Abdominal pain, vomiting, and diarrhea R10.9, R11.10, R19.7    Leukocytosis D72.829    Diabetes mellitus, type II, insulin dependent ([facility] [address] E11.9, Z79.4    Essential hypertension I10    Obstructive sleep apnea G47.33    SBO (small bowel obstruction) (formerly [facility]) K56.609    Ileus ([facility] [address] K56.7    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    COVID-19 U07.1    DKA, type 2, not at goal [facility]) E11.10       Measurements:  Wound 08/14/21 Sacrum dark purple (Active)   Wound Image   08/17/21 1509   Wound Etiology Deep tissue/Injury 08/17/21 1509   Dressing Status New dressing applied 08/17/21 1509   Wound Cleansed Soap and water 08/17/21 1509   Dressing/Treatment Barrier film;Silicone border 65/68/18 1509   Dressing Change Due 08/20/21 08/17/21 1509   Wound Length (cm) 10.5 cm 08/17/21 1509   Wound Width (cm) 9 cm 08/17/21 1509   Wound Depth (cm) 0 cm 08/17/21 1509   Wound Surface Area (cm^2) 94.5 cm^2 08/17/21 1509   Wound Volume (cm^3) 0 cm^3 08/17/21 1509   Wound Assessment Blood filled blister; Purple/maroon;Non-blanchable erythema 08/17/21 1509   Drainage Amount None 08/17/21 1509   Odor None 08/17/21 1509   Number of days: 3            Response to treatment:  Well tolerated by patient. Pain Assessment:  Severity:  0 / 10  Quality of pain: N/A  Wound Pain Timing/Severity: none  Premedicated: N/A    Plan   Plan of Care: Wound 08/14/21 Sacrum dark purple-Dressing/Treatment: Barrier film, Silicone border    Specialty Bed Required : Yes (Patient on [facility] ICU bed)  [x] Low Air Loss   [] Pressure Redistribution  [] Fluid Immersion  [] Bariatric  [] Total Pressure Relief  [] Other:     Current Diet: Diet NPO  ADULT TUBE FEEDING; Nasogastric; Peptide Based High Protein; Continuous; 25; Yes; 5; Q 6 hours; 52; 10; Q 1 hour; Protein; 3 Proteinex as tf flush in 24 hours  Dietician consult:  Yes    Discharge Plan:  Placement for patient upon discharge: TBD    Patient appropriate for Outpatient [address]: TBD    Referrals:  []   [] [address] [facility]  [] Supplies  [] Other    Patient/Caregiver Teaching:  Level of patient/caregiver understanding able to:   [] Indicates understanding       [] Needs reinforcement  [] Unsuccessful      [] Verbal Understanding  [] Demonstrated understanding       [] No evidence of learning  [] Refused teaching         [x] N/A       Patient is COVID-19 critical care patient. Patient has been hospitalized since 7/27/21 and a deep tissue injury was first noted on 8/14/21. Patient condition has been declining and this may represent an end of life skin failure. Deep tissue injury is currently an intact blood filled bulla surrounded by non-blanching redness. Patient is currently proned. Recommend apply skin prep to DTI and surrounding skin. Cover with silicone bordered foam dressing and change every 3 days or prn soiling, saturation, dislodgement. Off loading with turn and repositioning every 2 hours.     Electronically signed by   Latrell Freire RN, [facility] 8/17/2021

## 2021-08-17 NOTE — PROGRESS NOTES
Hospitalist Progress Note    Patient:  Hector Jacobo  YOB: 1970  Date of Service: 2021  MRN: [id number]   Acct: [de-identified]   Primary Care Physician: Dominik Bae MD  Advance Directive: Full Code  Admit Date: 2021       Hospital Day: 21  Referring Provider: Ann Marie Mackenzie DO    Patient Seen, Chart, Consults, Notes, Labs, Radiology studies reviewed. Subjective:  Hector Jacobo is a 49 y.o. male  whom we are following for COVID 19 pneumonia,  Hypoxemic respiratory failure. He had a significant change in his clinical status. He had a drop in his O2 saturation and had refractory hypoxemia. His sats were consistently in the upper 70s. He was too unstable for a CT angiogram.  I did a stat two-dimensional echocardiogram.  He had a markedly dilated right ventricle that significant decrease in wall motion abnormality. He had moderate tricuspid insufficiency. I discussed with cardiology and this most likely confirms that he has had a massive PE. His mother, sister, wife came to bedside. I explained the gravity of the situation and that he is too unstable for any type of intervention. I recommended DNR and comfort care. His O2 sats continued to fall. And he  at [id number] with his family at his bedside.     Allergies:  Bee venom, Mushroom extract complex, and Other    Medicines:  Current Facility-Administered Medications   Medication Dose Route Frequency Provider Last Rate Last Admin    heparin (porcine) injection 7,220 Units  80 Units/kg Intravenous PRN Ann Marie Mackenzie DO        heparin (porcine) injection 3,610 Units  40 Units/kg Intravenous PRN Ann Marie Mackenzie DO        heparin 25,000 units in dextrose 5% 250 mL (premix) infusion  5-30 Units/kg/hr Intravenous Continuous Ann Marie Mackenzie DO 16.2 mL/hr at 21 1522 18 Units/kg/hr at 21 1522    acetaZOLAMIDE (DIAMOX) injection 250 mg  250 mg Intravenous Q8H Ann Marie Mackenzie DO   250 mg at 21 1052    guaiFENesin (ROBITUSSIN) 100 MG/5ML liquid 600 mg  600 mg Oral Q12H Cristela Minaya MD   600 mg at 08/17/21 1448    piperacillin-tazobactam (ZOSYN) 3,375 mg in dextrose 5 % 50 mL IVPB extended infusion (mini-bag)  3,375 mg Intravenous Q8H Cristela Minaya MD 12.5 mL/hr at 08/17/21 1433 3,375 mg at 08/17/21 1433    norepinephrine (LEVOPHED) 16 mg in sodium chloride 0.9 % 250 mL infusion  2-100 mcg/min Intravenous Continuous PRN Susie Padilla, DO 4.7 mL/hr at 08/17/21 1150 5 mcg/min at 08/17/21 1150    metoclopramide (REGLAN) injection 10 mg  10 mg Intravenous Q6H Susie Padilla, DO   10 mg at 08/17/21 1434    chlorhexidine (PERIDEX) 0.12 % solution 15 mL  15 mL Mouth/Throat BID Susie Padilla, DO   15 mL at 08/17/21 0817    dexmedetomidine (PRECEDEX) 400 mcg in sodium chloride 0.9 % 100 mL infusion  0.2-1.4 mcg/kg/hr Intravenous Continuous David Nuñez MD 27 mL/hr at 08/17/21 1550 1.2 mcg/kg/hr at 08/17/21 1550    metoprolol (LOPRESSOR) injection 5 mg  5 mg Intravenous Q6H PRN David Nuñez MD   5 mg at 08/17/21 1550    vecuronium (NORCURON) injection 20 mg  20 mg Intravenous Q4H PRN David Nuñez MD   20 mg at 08/17/21 1052    insulin glargine (LANTUS) injection vial 50 Units  50 Units Subcutaneous BID Susie Padilla, DO   50 Units at 08/17/21 6623    vancomycin (VANCOCIN) intermittent dosing (placeholder)   Other RX Placeholder Susie Padilla, DO        vancomycin (VANCOCIN) 1,500 mg in dextrose 5 % 500 mL IVPB  1,500 mg Intravenous Q12H Susie Padilla,  mL/hr at 08/17/21 1434 1,500 mg at 08/17/21 1434    propofol injection  5-50 mcg/kg/min Intravenous Titrated Juan Valencia MD 19 mL/hr at 08/17/21 1300 35 mcg/kg/min at 08/17/21 1300    fentanyl (SUBLIMAZE) infusion 10 mcg/mL  12.5-200 mcg/hr Intravenous Continuous Juan Valencia MD 20 mL/hr at 08/17/21 1409 200 mcg/hr at 08/17/21 1409    midazolam (VERSED) infusion 100mg/100mL  1-10 mg/hr Intravenous Continuous Parish Rizo MD   Stopped at 08/16/21 0017    lidocaine PF 1 % injection 5 mL  5 mL Intradermal Once Zoraida Russ MD        sodium chloride flush 0.9 % injection 5-40 mL  5-40 mL Intravenous 2 times per day Zoraida Russ MD   10 mL at 08/17/21 0818    sodium chloride flush 0.9 % injection 5-40 mL  5-40 mL Intravenous PRN Zoraida Russ MD        0.9 % sodium chloride infusion  25 mL Intravenous PRN Zoraida Russ MD        ipratropium-albuterol (DUONEB) nebulizer solution 1 ampule  1 ampule Inhalation Q4H Zoraida Russ MD   1 ampule at 08/17/21 1536    melatonin disintegrating tablet 10 mg  10 mg Oral Daily Elmon Hof, DO   10 mg at 08/17/21 0741    famotidine (PEPCID) injection 20 mg  20 mg Intravenous BID Zoraida Russ MD   20 mg at 08/17/21 0741    zinc sulfate (ZINCATE) capsule 50 mg  50 mg Oral Daily Zoraida Russ MD   50 mg at 08/17/21 0741    sodium chloride (OCEAN, BABY AYR) 0.65 % nasal spray 2 spray  2 spray Each Nostril PRN Parish Rizo MD   2 spray at 08/03/21 0601    LORazepam (ATIVAN) injection 1 mg  1 mg Intravenous Q4H PRN Parish Rizo MD   1 mg at 08/12/21 0957    potassium chloride (KLOR-CON M) extended release tablet 40 mEq  40 mEq Oral PRN Cameron Seo MD   40 mEq at 07/30/21 0735    Or    potassium bicarb-citric acid (EFFER-K) effervescent tablet 40 mEq  40 mEq Oral PRN Cameron Seo MD   40 mEq at 08/17/21 0545    Or    potassium chloride 10 mEq/100 mL IVPB (Peripheral Line)  10 mEq Intravenous PRN Cameron Seo MD        montelukast (SINGULAIR) tablet 10 mg  10 mg Oral Nightly Elmon Hof, DO   10 mg at 08/16/21 2013    aspirin tablet 325 mg  325 mg Oral Daily Elmon Hof, DO   325 mg at 08/17/21 0741    glucose (GLUTOSE) 40 % oral gel 15 g  15 g Oral PRN Elmon Hof, DO        dextrose 50 % IV solution  12.5 g Intravenous PRN Sai Johnson, DO  glucagon (rDNA) injection 1 mg  1 mg Intramuscular PRN Katelynn Calamity, DO        dextrose 5 % solution  100 mL/hr Intravenous PRN Katelynn Calamity, DO        traMADol Ray Sousa) tablet 50 mg  50 mg Oral Q6H PRN Martha Tomlinson MD   50 mg at 08/12/21 0957    insulin lispro (HUMALOG) injection vial 0-6 Units  0-6 Units Subcutaneous TID WC Martha Tomlinson MD   3 Units at 08/17/21 1535    insulin lispro (HUMALOG) injection vial 0-3 Units  0-3 Units Subcutaneous Nightly Martha Tomlinson MD   1 Units at 08/15/21 2109    ondansetron (ZOFRAN-ODT) disintegrating tablet 4 mg  4 mg Oral Q8H PRN Martha Tomlinson MD        Or    ondansetron [facility]) injection 4 mg  4 mg Intravenous Q6H PRN Martha Tomlinson MD   4 mg at 08/09/21 0159    acetaminophen (TYLENOL) tablet 650 mg  650 mg Oral Q6H PRN Martha Tomlinson MD   650 mg at 08/14/21 9321    Or    acetaminophen (TYLENOL) suppository 650 mg  650 mg Rectal Q6H PRSIVA Tomlinson MD   650 mg at 08/16/21 0551    magnesium sulfate 1000 mg in dextrose 5% 100 mL IVPB  1,000 mg Intravenous PRSIVA Tomlinson MD        sodium phosphate 10 mmol in dextrose 5 % 250 mL IVPB  10 mmol Intravenous PRSIVA Tomlinson MD        Or    sodium phosphate 15 mmol in dextrose 5 % 250 mL IVPB  15 mmol Intravenous PRSIVA Tomlinson MD   Stopped at 07/29/21 1420    Or    sodium phosphate 20 mmol in dextrose 5 % 500 mL IVPB  20 mmol Intravenous PRN Martha Tomlinson MD 83.3 mL/hr at 07/28/21 2350 Restarted at 07/28/21 2350    polyethylene glycol (GLYCOLAX) packet 17 g  17 g Oral Daily PRN Martha Tomlinson MD        calcium carbonate (TUMS) chewable tablet 500 mg  500 mg Oral TID PRN Martha Tomlinson MD   500 mg at 07/28/21 0006    naloxone (NARCAN) injection 0.4 mg  0.4 mg Intravenous PRN Martha Tomlinson MD        guaiFENesin-dextromethorphan [facility] DM) 100-10 MG/5ML syrup 5 mL  5 mL Oral Q4H PRN Martha Tomlinson MD   5 mL at 08/08/21 2015    Vitamin D (CHOLECALCIFEROL) tablet 2,000 Units 2,000 Units Oral Daily Hanh Anderson MD   2,000 Units at 08/17/21 0741       Past Medical History:  Past Medical History:   Diagnosis Date    Colitis     Diabetes mellitus ([address]     Erythrocytosis     Intermittent    Gallbladder disease     Hyperlipidemia     Hypertension     Migraine     Sleep apnea     snores       Past Surgical History:  Past Surgical History:   Procedure Laterality Date    CHOLECYSTECTOMY, LAPAROSCOPIC N/A 3/27/2019    CHOLECYSTECTOMY LAPAROSCOPIC performed by Justin Alarcon MD at [facility]     VASECTOMY         Family History  Family History   Problem Relation Age of Onset    Diabetes Mother     Heart Disease Mother     High Blood Pressure Mother     Diabetes Sister        Social History  Social History     Socioeconomic History    Marital status:      Spouse name: Not on file    Number of children: Not on file    Years of education: Not on file    Highest education level: Not on file   Occupational History    Not on file   Tobacco Use    Smoking status: Former Smoker     Types: Cigarettes    Smokeless tobacco: Never Used   Vaping Use    Vaping Use: Never used   Substance and Sexual Activity    Alcohol use: Yes     Comment: occ.  Drug use: Never    Sexual activity: Not on file   Other Topics Concern    Not on file   Social History Narrative    Not on file     Social Determinants of Health     Financial Resource Strain:     Difficulty of Paying Living Expenses:    Food Insecurity:     Worried About Running Out of Food in the Last Year:     [address] in the Last Year:    Transportation Needs:     Lack of Transportation (Medical):      Lack of Transportation (Non-Medical):    Physical Activity:     Days of Exercise per Week:     Minutes of Exercise per Session:    Stress:     Feeling of Stress :    Social Connections:     Frequency of Communication with Friends and Family:     Frequency of Social Gatherings with Friends and Family:     Attends [employer] Services:     Active Member of Clubs or Organizations:     Attends Club or Organization Meetings:     Marital Status:    Intimate Partner Violence:     Fear of Current or Ex-Partner:     Emotionally Abused:     Physically Abused:     Sexually Abused:          Review of Systems:    Review of Systems   Unable to perform ROS: Intubated           Objective:  Blood pressure (!) 103/59, pulse 130, temperature 99.9 °F (37.7 °C), temperature source Axillary, resp. rate 27, height 5' 9\" (1.753 m), weight 198 lb 14.4 oz (90.2 kg), SpO2 (!) 74 %. Intake/Output Summary (Last 24 hours) at 8/17/2021 1643  Last data filed at 8/17/2021 1600  Gross per 24 hour   Intake 2911.79 ml   Output 2085 ml   Net 826.79 ml       Physical Exam  Vitals and nursing note reviewed. Constitutional:       Appearance: He is ill-appearing. Interventions: He is intubated. HENT:      Head: Normocephalic and atraumatic. Right Ear: External ear normal.      Left Ear: External ear normal.      Nose: Nose normal.      Mouth/Throat:      Mouth: Mucous membranes are moist.   Eyes:      Conjunctiva/sclera: Conjunctivae normal.      Pupils: Pupils are equal, round, and reactive to light. Cardiovascular:      Rate and Rhythm: Normal rate and regular rhythm. Heart sounds: Normal heart sounds. Pulmonary:      Effort: Pulmonary effort is normal. He is intubated. Breath sounds: Normal breath sounds. Abdominal:      General: Abdomen is flat. Palpations: Abdomen is soft. Musculoskeletal:         General: No swelling. Cervical back: Neck supple. No rigidity. Skin:     General: Skin is warm and dry.       Comments: mottling         Labs:  BMP:   Recent Labs     08/15/21  0430 08/16/21  0414 08/16/21  0440 08/17/21  0421 08/17/21  0435 08/17/21  1250     --  136  --  139  --    K 3.7   < > 3.7 2.9 3.3* 3.9     --  95*  --  96*  --    CO2 29  --  37*  --  33*  --    BUN 16 --  14  --  15  --    CREATININE 0.4*  --  0.6  --  0.6  --    CALCIUM 7.3*  --  7.4*  --  8.3*  --     < > = values in this interval not displayed. CBC:   Recent Labs     08/15/21  0430 08/16/21  0440 08/17/21  0435   WBC 10.8 10.0 11.0*   HGB 11.9* 11.2* 11.8*   HCT 38.3* 36.9* 40.0*   MCV 95.5* 98.9* 100.0*   PLT 95* 93* 132     LIVER PROFILE:   Recent Labs     08/15/21  0430 08/16/21  0440 08/17/21  0435   AST 42* 29 31   * 151* 118*   BILITOT 0.6 0.5 0.8   ALKPHOS 96 108 130     PT/INR: No results for input(s): PROTIME, INR in the last 72 hours. APTT:   Recent Labs     08/17/21  1323   APTT 38.0*     BNP:  No results for input(s): BNP in the last 72 hours. Ionized Calcium:No results for input(s): IONCA in the last 72 hours. Magnesium:  Recent Labs     08/17/21 0435   MG 2.5     Phosphorus:No results for input(s): PHOS in the last 72 hours. HgbA1C: No results for input(s): LABA1C in the last 72 hours. Hepatic:   Recent Labs     08/15/21  0430 08/16/21  0440 08/17/21  0435   ALKPHOS 96 108 130   * 151* 118*   AST 42* 29 31   PROT 5.1* 5.2* 6.1*   BILITOT 0.6 0.5 0.8   LABALBU 2.6* 2.1* 2.3*     Lactic Acid: No results for input(s): LACTA in the last 72 hours. Troponin: No results for input(s): CKTOTAL, CKMB, TROPONINT in the last 72 hours. ABGs: No results for input(s): PH, PCO2, PO2, HCO3, O2SAT in the last 72 hours. CRP:  No results for input(s): CRP in the last 72 hours. Sed Rate:  No results for input(s): SEDRATE in the last 72 hours. Cultures:   No results for input(s): CULTURE in the last 72 hours. Recent Labs     08/16/21  1020 08/16/21  1030   BC No Growth to date. Any change in status will be called. --    BLOODCULT2  --  No Growth to date. Any change in status will be called. No results for input(s): CXSURG in the last 72 hours.     Radiology reports as per the Radiologist  Radiology: XR CHEST PORTABLE    Result Date: 7/27/2021  XR CHEST PORTABLE 7/27/2021 3:16 PM HISTORY: History: Dyspnea, covid positive COMPARISON: None. CXR: A single frontal view of the chest is performed. Findings: Diminished level of inspiration with basilar atelectasis. No dense consolidation or radiographic evidence of edema. The heart appears normal in size. No pleural effusion or pneumothorax. Acute appearing bony pathology. 1.. Diminished level of inspiration with basilar atelectasis. No lobar consolidation or radiographic evidence for edema. Signed by Dr Herman Mitchell     COVID-19 pneumonia. Continue current medical management.     Hypoxemic respiratory failure secondary to #1. Worsening.     Staph epidermidis bacteremia. Continue vancomycin for now. Possible contaminant. Await follow-up cultures     Enterococcus faecalis urinary tract infection. Ampicillin sensitive.     Klebsiella pneumonia UTI. Infectious disease following.     Bilateral pneumothoraces and pneumomediastinum. Status post left-sided chest tube. Clinically stable at present.   Continue current management.     Please document 90 minutes of critical care time for patient assessment, chart review, discussion with staff, .  126 Grace Cm DO

## 2021-08-17 NOTE — PROGRESS NOTES
CT Surgery       S: Continues to be sedated, on ventilatory support. O: Patient assessed from doorway; discussion with patient's nurse to save PPE. BP (!) 96/58   Pulse 108   Temp 98.7 °F (37.1 °C) (Axillary)   Resp 17   Ht 5' 9\" (1.753 m)   Wt 198 lb 14.4 oz (90.2 kg)   SpO2 91%   BMI 29.37 kg/m²      General: Sedated on ventilatory support. Resp: Tolerating decreased FiO2. Left pneumothorax catheter to water seal. No air leak. Minimal drainage. CV: ST on monitor. A:  1.         Acute Hypoxic Respiratory Failure 2' to COVID-19 Pneumonia - Currently Intubated  2.         COVID-19 Pneumonia  3.         DKA on Admission with Underlying DM, Type 2  4.         Bilateral Pneumothoraces 2' to Barotrauma from High PEEP - Resolved. 5.         Essential HTN  6.         Mixed Hyperlipidemia  7.         Obstructive Sleep Apnea      P:  Recommend to leave pneumothorax catheter in place until extubated or off PEEP. CT surgery will see prn. Call again if needed.        Eli Sotomayor, APRN-BC

## 2021-08-17 NOTE — PROGRESS NOTES
Family called to patient's bedside due to his rapid decline. Dr. Froy Motley spoke with them earlier in the day regarding his code status and grave prognosis. Family came to bedside around 4 pm and patient continued to decline. His wife spoke with me and confirmed she would like to make him a DNR. Even with all treatment continued patient became bradycardic then went into asystole with Dr. Silviano Merritt, myself, and family at bedside. Time of death was [id number]. Freida Martin has been notified and states ok to go to  home.     Electronically signed by Trevin Rudd RN on 2021 at 5:46 PM

## 2021-08-17 NOTE — PROGRESS NOTES
190 mls of Fentanyl wasted with Megan Pedersen RN witnessing.     Electronically signed by Miguel Angel Perry RN on 8/17/2021 at 5:49 PM

## 2021-08-17 NOTE — PROGRESS NOTES
Pharmacy Vancomycin Consult     Vancomycin Day: 6  Current Dosin mg IV Q 12 hours    Temp max:  99    Recent Labs     21  0440 21  0435   BUN 14 15       Recent Labs     21  0440 21  0435   CREATININE 0.6 0.6       Recent Labs     21  0440 21  0435   WBC 10.0 11.0*         Intake/Output Summary (Last 24 hours) at 2021 1541  Last data filed at 2021 0800  Gross per 24 hour   Intake 2933.14 ml   Output 1885 ml   Net 1048.14 ml     Culture Date Source Results   21 Blood x 2 No growth   21 Blood x 2 1 of 2 positive for Staphylococcus epidermidis   21 Respiratory Normal bienvenido   21 Urine Enterococcus faecalis and Klebsiella pneumoniae   21 Blood x 1 No growth   21 Blood x 1 Gram positive cocci in clusters resembling Staphylococcus    21 Blood x 2 Ordered   21 Respiratory Normal respiratory bienvenido           Ht Readings from Last 1 Encounters:   21 5' 9\" (1.753 m)        Wt Readings from Last 1 Encounters:   21 198 lb 14.4 oz (90.2 kg)         Body mass index is 29.37 kg/m². Estimated Creatinine Clearance: 164 mL/min (based on SCr of 0.6 mg/dL). Trough: 13.9    Assessment/Plan: Level good. Continue Vancomycin 1500 mg IV Q 12 hours.     Electronically signed by SMILEY Velásquez [facility] on 2021 at 3:41 PM

## 2021-08-17 NOTE — PLAN OF CARE
Problem: Falls - Risk of:  Goal: Will remain free from falls  Description: Will remain free from falls  8/17/2021 0838 by Qi Madrid RN  Outcome: Ongoing  8/17/2021 0621 by Jossue Laura RN  Outcome: Ongoing  Goal: Absence of physical injury  Description: Absence of physical injury  8/17/2021 0838 by Qi Madrid RN  Outcome: Ongoing  8/17/2021 0621 by Jossue Laura RN  Outcome: Ongoing

## 2021-08-17 NOTE — PROGRESS NOTES
Contacted LINDA regarding patient's [address] of [address] Christopher Martin currently following and will check patient's status in the morning.      Electronically signed by Mauro Holley RN on 8/16/2021 at 11:29 PM

## 2021-08-18 LAB
BLOOD CULTURE, ROUTINE: NORMAL
CULTURE, RESPIRATORY: ABNORMAL
GRAM STAIN RESULT: ABNORMAL
ORGANISM: ABNORMAL
ORGANISM: ABNORMAL

## 2021-08-18 NOTE — FLOWSHEET NOTE
21 1730   Encounter Summary   Services provided to: Patient and family together  (wife, mother and sister at bedside in CCU/Covid Unit)   Referral/Consult From: Nurse;Palliative Care   Complexity of Encounter High   Length of Encounter 30 minutes   Crisis   Type Emotional distress   Assessment Grieving; Anxious   Intervention Active listening;Explored feelings, thoughts, concerns; End of life care   Outcome Expressed gratitude     Received call from nurse and informed that the patient . Emotional and spiritual support at bedside. Family appreciated spiritual care.    Electronically signed by Mehnaz Kline on 2021 at 7:30 PM

## 2021-08-21 LAB
BLOOD CULTURE, ROUTINE: NORMAL
CULTURE, BLOOD 2: NORMAL

## (undated) DEVICE — SUTURE MNCRYL 0 UNDYED CT1 Y496H

## (undated) DEVICE — GLOVE SURG SZ 7 CRM LTX FREE POLYISOPRENE POLYMER BEAD ANTI

## (undated) DEVICE — TBG SMPL FLTR LINE NASL 02/C02 A/ BX/100

## (undated) DEVICE — SENSR O2 OXIMAX FNGR A/ 18IN NONSTR

## (undated) DEVICE — DECANTER VI VENT W/ VLV FOR ASEP TRNSF OF FLD

## (undated) DEVICE — TROCAR ENDOSCP L100MM DIA11MM DIL TIP STBL SL DISP ENDOPATH

## (undated) DEVICE — Device: Brand: DEFENDO AIR/WATER/SUCTION AND BIOPSY VALVE

## (undated) DEVICE — DRAPE,UTILITY,XL,4/PK,STERILE: Brand: MEDLINE

## (undated) DEVICE — SOLUTION IV 1000ML 0.9% SOD CHL PH 5 INJ USP VIAFLX PLAS

## (undated) DEVICE — APPLIER CLP M/L SHFT DIA5MM 15 LIG LIGAMAX 5

## (undated) DEVICE — GLOVE ORTHO 8   MSG9480

## (undated) DEVICE — FRCP BX RADJAW4 NDL 2.8 240 STD OG

## (undated) DEVICE — CUFF,BP,DISP,1 TUBE,ADULT,HP: Brand: MEDLINE

## (undated) DEVICE — TROCAR ENDOSCP SHFT L100MM DIA5MM DIL TIP ENDOPATH XCEL

## (undated) DEVICE — YANKAUER,BULB TIP WITH VENT: Brand: ARGYLE

## (undated) DEVICE — ADHESIVE SKIN CLSR 0.7ML TOP DERMBND ADV

## (undated) DEVICE — PUMP SUC IRR TBNG L10FT W/ HNDPC ASSEMB STRYKEFLOW 2

## (undated) DEVICE — THE CHANNEL CLEANING BRUSH IS A NYLON FLEXI BRUSH ATTACHED TO A FLEXIBLE PLASTIC SHEATH DESIGNED TO SAFELY REMOVE DEBRIS FROM FLEXIBLE ENDOSCOPES.

## (undated) DEVICE — SNAR POLYP SENSATION MICRO OVL 13 240X40

## (undated) DEVICE — ENDOGATOR AUXILIARY WATER JET CONNECTOR: Brand: ENDOGATOR

## (undated) DEVICE — BAG SPEC REM 224ML W4XL6IN DIA10MM 1 HND GYN DISP ENDOPCH

## (undated) DEVICE — GENERAL LAP CDS

## (undated) DEVICE — SUTURE VCRL SZ 3-0 L27IN ABSRB UD L26MM SH 1/2 CIR J416H

## (undated) DEVICE — CONMED SCOPE SAVER BITE BLOCK, 20X27 MM: Brand: SCOPE SAVER

## (undated) DEVICE — THE SINGLE USE ETRAP – POLYP TRAP IS USED FOR SUCTION RETRIEVAL OF ENDOSCOPICALLY REMOVED POLYPS.: Brand: ETRAP

## (undated) DEVICE — MASK,OXYGEN,MED CONC,ADLT,7' TUB, UC: Brand: PENDING

## (undated) DEVICE — SUTURE SZ 0 27IN 5/8 CIR UR-6  TAPER PT VIOLET ABSRB VICRYL J603H

## (undated) DEVICE — SUTURE MCRYL SZ 4-0 L18IN ABSRB UD L19MM PS-2 3/8 CIR PRIM Y496G

## (undated) DEVICE — TROCAR ENDOSCP L100MM DIA5MM BLDELSS STBL SL OBT RADLUC